# Patient Record
Sex: FEMALE | Race: WHITE | NOT HISPANIC OR LATINO | Employment: OTHER | URBAN - METROPOLITAN AREA
[De-identification: names, ages, dates, MRNs, and addresses within clinical notes are randomized per-mention and may not be internally consistent; named-entity substitution may affect disease eponyms.]

---

## 2017-01-09 DIAGNOSIS — F41.9 ANXIETY: ICD-10-CM

## 2017-01-09 DIAGNOSIS — F98.8 ADD (ATTENTION DEFICIT DISORDER): ICD-10-CM

## 2017-01-09 DIAGNOSIS — K21.9 GASTROESOPHAGEAL REFLUX DISEASE, ESOPHAGITIS PRESENCE NOT SPECIFIED: Primary | ICD-10-CM

## 2017-01-11 LAB
ALBUMIN SERPL-MCNC: 4.5 G/DL (ref 3.5–5.2)
ALBUMIN/GLOB SERPL: 1.6 G/DL
ALP SERPL-CCNC: 86 U/L (ref 39–117)
ALT SERPL-CCNC: 15 U/L (ref 1–33)
AST SERPL-CCNC: 21 U/L (ref 1–32)
BASOPHILS # BLD AUTO: 0.02 10*3/MM3 (ref 0–0.2)
BASOPHILS NFR BLD AUTO: 0.3 % (ref 0–1.5)
BILIRUB SERPL-MCNC: 0.2 MG/DL (ref 0.1–1.2)
BUN SERPL-MCNC: 22 MG/DL (ref 8–23)
BUN/CREAT SERPL: 26.5 (ref 7–25)
CALCIUM SERPL-MCNC: 9.6 MG/DL (ref 8.6–10.5)
CHLORIDE SERPL-SCNC: 99 MMOL/L (ref 98–107)
CO2 SERPL-SCNC: 28.3 MMOL/L (ref 22–29)
CREAT SERPL-MCNC: 0.83 MG/DL (ref 0.57–1)
EOSINOPHIL # BLD AUTO: 0.16 10*3/MM3 (ref 0–0.7)
EOSINOPHIL NFR BLD AUTO: 2.3 % (ref 0.3–6.2)
ERYTHROCYTE [DISTWIDTH] IN BLOOD BY AUTOMATED COUNT: 12.3 % (ref 11.7–13)
GLOBULIN SER CALC-MCNC: 2.8 GM/DL
GLUCOSE SERPL-MCNC: 125 MG/DL (ref 65–99)
HCT VFR BLD AUTO: 41.3 % (ref 35.6–45.5)
HGB BLD-MCNC: 13.2 G/DL (ref 11.9–15.5)
IMM GRANULOCYTES # BLD: 0 10*3/MM3 (ref 0–0.03)
IMM GRANULOCYTES NFR BLD: 0 % (ref 0–0.5)
LYMPHOCYTES # BLD AUTO: 2.02 10*3/MM3 (ref 0.9–4.8)
LYMPHOCYTES NFR BLD AUTO: 29 % (ref 19.6–45.3)
MCH RBC QN AUTO: 29.9 PG (ref 26.9–32)
MCHC RBC AUTO-ENTMCNC: 32 G/DL (ref 32.4–36.3)
MCV RBC AUTO: 93.7 FL (ref 80.5–98.2)
MONOCYTES # BLD AUTO: 0.53 10*3/MM3 (ref 0.2–1.2)
MONOCYTES NFR BLD AUTO: 7.6 % (ref 5–12)
NEUTROPHILS # BLD AUTO: 4.23 10*3/MM3 (ref 1.9–8.1)
NEUTROPHILS NFR BLD AUTO: 60.8 % (ref 42.7–76)
PLATELET # BLD AUTO: 238 10*3/MM3 (ref 140–500)
POTASSIUM SERPL-SCNC: 4 MMOL/L (ref 3.5–5.2)
PROT SERPL-MCNC: 7.3 G/DL (ref 6–8.5)
RBC # BLD AUTO: 4.41 10*6/MM3 (ref 3.9–5.2)
SODIUM SERPL-SCNC: 142 MMOL/L (ref 136–145)
TSH SERPL DL<=0.005 MIU/L-ACNC: 2.79 MIU/ML (ref 0.27–4.2)
WBC # BLD AUTO: 6.96 10*3/MM3 (ref 4.5–10.7)

## 2017-01-11 NOTE — PROGRESS NOTES
Your laboratory results are NORMAL with the exception of glucose which is ELEVATED. Start a low carbohydrate diet w/ routine fitness.  We will discuss these results in detail at your next office visit. Please call with any questions or concerns.  Sincerely,   Camila Mejia MD

## 2017-01-24 ENCOUNTER — OFFICE VISIT (OUTPATIENT)
Dept: INTERNAL MEDICINE | Facility: CLINIC | Age: 67
End: 2017-01-24

## 2017-01-24 VITALS
WEIGHT: 150 LBS | SYSTOLIC BLOOD PRESSURE: 124 MMHG | DIASTOLIC BLOOD PRESSURE: 66 MMHG | HEART RATE: 64 BPM | BODY MASS INDEX: 23.49 KG/M2 | OXYGEN SATURATION: 99 %

## 2017-01-24 DIAGNOSIS — F41.9 ANXIETY: ICD-10-CM

## 2017-01-24 DIAGNOSIS — H61.891 NODULE OF EXTERNAL EAR, RIGHT: ICD-10-CM

## 2017-01-24 DIAGNOSIS — F98.8 ADD (ATTENTION DEFICIT DISORDER): Primary | ICD-10-CM

## 2017-01-24 DIAGNOSIS — Z83.3 FAMILY HISTORY OF DIABETES MELLITUS (DM): ICD-10-CM

## 2017-01-24 DIAGNOSIS — R73.9 HYPERGLYCEMIA: ICD-10-CM

## 2017-01-24 PROCEDURE — 99214 OFFICE O/P EST MOD 30 MIN: CPT | Performed by: INTERNAL MEDICINE

## 2017-01-24 RX ORDER — ALPRAZOLAM 0.25 MG/1
0.25 TABLET ORAL 2 TIMES DAILY PRN
Qty: 20 TABLET | Refills: 2 | Status: SHIPPED | OUTPATIENT
Start: 2017-01-24 | End: 2019-01-08

## 2017-01-24 NOTE — PROGRESS NOTES
Anxious symptoms  History of Present Illness   Iesha Roldan is a 66 y.o. female presents for routine follow up evaluation. She has mild symptoms of anxiety. Lost her father last year and has a sister that is ill. Did not require any prozac. Has excellent support network w/ family and friends and attends counseling sessions. Takes 1/2-1 tab xanax about 2 times weekly w/ good benefit. On adderall for ADD w good focus unfortunately is noting some jaw clenching.    noting nodule behind right ear. Nontender. None noted on left.   The following portions of the patient's history were reviewed and updated as appropriate: allergies, current medications, past family history, past medical history, past social history, past surgical history and problem list.  Current Outpatient Prescriptions on File Prior to Visit   Medication Sig Dispense Refill   • vitamin D (ERGOCALCIFEROL) 51160 UNITS capsule capsule Take  by mouth.     • [DISCONTINUED] ALPRAZolam (XANAX) 0.25 MG tablet Take 1 tablet by mouth 2 (two) times a day as needed for anxiety. 15 tablet 1   • [DISCONTINUED] amphetamine-dextroamphetamine (ADDERALL) 20 MG tablet Take 1 tablet by mouth daily. 90 tablet 0   • [DISCONTINUED] FLUoxetine (PROZAC) 20 MG capsule Take 1 capsule by mouth daily. 30 capsule 6   • [DISCONTINUED] meclizine (ANTIVERT) 25 MG tablet Take 1 tablet by mouth 3 (three) times a day as needed for dizziness. 25 tablet 1     No current facility-administered medications on file prior to visit.      Review of Systems   Constitutional: Negative.    HENT: Negative.    Eyes: Negative.    Respiratory: Negative.    Cardiovascular: Negative.    Gastrointestinal: Negative.    Endocrine: Negative.    Genitourinary: Negative.    Musculoskeletal: Negative.    Skin: Negative.    Allergic/Immunologic: Negative.    Neurological: Negative.    Hematological: Negative.    Psychiatric/Behavioral:        Mild anxiety  ADD       Objective   Physical Exam   Constitutional:  She is oriented to person, place, and time. She appears well-developed and well-nourished.   HENT:   Head: Normocephalic and atraumatic.   Right Ear: External ear normal.   Left Ear: External ear normal.   Nose: Nose normal.   Mouth/Throat: Oropharynx is clear and moist.   Retroauricular cyst v nodule   Eyes: Conjunctivae and EOM are normal. Pupils are equal, round, and reactive to light.   Neck: Normal range of motion. Neck supple.   Cardiovascular: Normal rate, regular rhythm, normal heart sounds and intact distal pulses.    Pulmonary/Chest: Effort normal and breath sounds normal.   Abdominal: Soft. Bowel sounds are normal.   Musculoskeletal: Normal range of motion.   Neurological: She is alert and oriented to person, place, and time. She has normal reflexes.   Skin: Skin is warm and dry.   Psychiatric: She has a normal mood and affect. Her behavior is normal. Judgment and thought content normal.   Nursing note and vitals reviewed.       Visit Vitals   • /66   • Pulse 64   • Wt 150 lb (68 kg)   • SpO2 99%   • BMI 23.49 kg/m2       Assessment/Plan   Diagnoses and all orders for this visit:    ADD (attention deficit disorder)    Anxiety    Hyperglycemia  -     Basic Metabolic Panel  -     Hemoglobin A1c    Family history of diabetes mellitus (DM)  -     Basic Metabolic Panel  -     Hemoglobin A1c    Other orders  -     lisdexamfetamine (VYVANSE) 20 MG capsule; Take 1 capsule by mouth Every Morning.  -     ALPRAZolam (XANAX) 0.25 MG tablet; Take 1 tablet by mouth 2 (Two) Times a Day As Needed for anxiety.        Patient w/ ADD. Will try changing from adderall to vyvanse due to jaw clenching. Will start at a low dose and increase if needed.   Continue prn xanax for anxiety. Patient is aware of risks and benefits of meds and displays no signs of dependence nor abuse. Slight increase in glucose. Nonfasting. Will test a repeat glucose level w/ a1c.   Will refer to ENT for nodule behind right ear.     F/u 1 month or  prn.

## 2017-01-24 NOTE — MR AVS SNAPSHOT
Iesha Roldan   1/24/2017 10:00 AM   Office Visit    Dept Phone:  [phone]   Encounter #:  [id number]    Provider:  Camila Mejia MD   Department:  [facility]                Your Full Care Plan              Today's Medication Changes          These changes are accurate as of: 1/24/17 10:53 AM.  If you have any questions, ask your nurse or doctor.               New Medication(s)Ordered:     lisdexamfetamine 20 MG capsule   Commonly known as:  VYVANSE   Take 1 capsule by mouth Every Morning.         Stop taking medication(s)listed here:     amphetamine-dextroamphetamine 20 MG tablet   Commonly known as:  ADDERALL           FLUoxetine 20 MG capsule   Commonly known as:  PROZAC           meclizine 25 MG tablet   Commonly known as:  ANTIVERT                Where to Get Your Medications      You can get these medications from any pharmacy     Bring a paper prescription for each of these medications     ALPRAZolam 0.25 MG tablet    lisdexamfetamine 20 MG capsule                  Your Updated Medication List          This list is accurate as of: 1/24/17 10:53 AM.  Always use your most recent med list.                ALPRAZolam 0.25 MG tablet   Commonly known as:  XANAX   Take 1 tablet by mouth 2 (Two) Times a Day As Needed for anxiety.       lisdexamfetamine 20 MG capsule   Commonly known as:  VYVANSE   Take 1 capsule by mouth Every Morning.       vitamin D 78295 UNITS capsule capsule   Commonly known as:  ERGOCALCIFEROL               We Performed the Following     Ambulatory Referral to ENT (Otolaryngology)     Basic Metabolic Panel     Hemoglobin A1c       You Were Diagnosed With        Codes Comments    ADD (attention deficit disorder)    -  Primary ICD-10-CM: F98.8  ICD-9-CM: 314.00     Anxiety     ICD-10-CM: F41.9  ICD-9-CM: 300.00     Hyperglycemia     ICD-10-CM: R73.9  ICD-9-CM: 790.29     Family history of diabetes mellitus (DM)     ICD-10-CM:  Z83.3  ICD-9-CM: V18.0     Nodule of external ear, right     ICD-10-CM: H61.891  ICD-9-CM: 380.89       Instructions     None    Patient Instructions History      Upcoming Appointments     Visit Type Date Time Department    OFFICE VISIT 2017 10:00 AM [facility]    WELLNESS 2017 10:00 AM [facility]    BONE DENSITY 2017  9:30 AM [facility]    OFFICE VISIT 2017 11:15 AM [facility]      MyChart Signup     [facility] [employer] allows you to send messages to your doctor, view your test results, renew your prescriptions, schedule appointments, and more. To sign up, go to [employer] and click on the Sign Up Now link in the New User? box. Enter your [employer] Activation Code exactly as it appears below along with the last four digits of your Social Security Number and your Date of Birth () to complete the sign-up process. If you do not sign up before the expiration date, you must request a new code.    [employer] Activation Code: [email or web address]  Expires: 2017 10:53 AM    If you have questions, you can email [email or web address] or call [phone] to talk to our [employer] staff. Remember, [employer] is NOT to be used for urgent needs. For medical emergencies, dial 911.               Other Info from Your Visit           Your Appointments     2017  9:30 AM EST   Bone Density with [facility] (--)    [address] [address]   [phone]            2017 10:00 AM EST   Wellness with Clement Valderrama MD   [facility] (--)    [address] [address]   [phone]            2017 11:15 AM EST   Office Visit with Camila Mejia MD   [facility] (--)    [address] [address] [phone]           Arrive 15 minutes prior to appointment.               Allergies     Erythromycin        Reason for Visit     Results blood work      Vital Signs     Blood Pressure Pulse Weight Oxygen Saturation Body Mass Index Smoking Status    124/66 64 150 lb (68 kg) 99% 23.49 kg/m2 Never Smoker      Problems and Diagnoses Noted     ADD (attention deficit disorder)    Anxiety problem    Hyperglycemia    Family history of diabetes mellitus (DM)        Nodule of external ear

## 2017-01-25 LAB
BUN SERPL-MCNC: 17 MG/DL (ref 8–23)
BUN/CREAT SERPL: 20.7 (ref 7–25)
CALCIUM SERPL-MCNC: 9.7 MG/DL (ref 8.6–10.5)
CHLORIDE SERPL-SCNC: 100 MMOL/L (ref 98–107)
CO2 SERPL-SCNC: 30.5 MMOL/L (ref 22–29)
CREAT SERPL-MCNC: 0.82 MG/DL (ref 0.57–1)
GLUCOSE SERPL-MCNC: 82 MG/DL (ref 65–99)
HBA1C MFR BLD: 5.4 % (ref 4.8–5.6)
POTASSIUM SERPL-SCNC: 4.3 MMOL/L (ref 3.5–5.2)
SODIUM SERPL-SCNC: 143 MMOL/L (ref 136–145)

## 2017-01-25 NOTE — PROGRESS NOTES
Your laboratory results are NORMAL. Glucose and 3 mo glucose average are both NORMAL. We will discuss these results in detail at your next office visit. Please call with any questions or concerns.  Sincerely,  Camila Mejia MD

## 2017-01-27 RX ORDER — DEXTROAMPHETAMINE SACCHARATE, AMPHETAMINE ASPARTATE MONOHYDRATE, DEXTROAMPHETAMINE SULFATE AND AMPHETAMINE SULFATE 5; 5; 5; 5 MG/1; MG/1; MG/1; MG/1
20 CAPSULE, EXTENDED RELEASE ORAL EVERY MORNING
Qty: 30 CAPSULE | Refills: 0 | Status: SHIPPED | OUTPATIENT
Start: 2017-01-27 | End: 2017-02-20

## 2017-01-30 ENCOUNTER — APPOINTMENT (OUTPATIENT)
Dept: PREADMISSION TESTING | Facility: HOSPITAL | Age: 67
End: 2017-01-30

## 2017-01-30 VITALS
DIASTOLIC BLOOD PRESSURE: 77 MMHG | HEART RATE: 99 BPM | SYSTOLIC BLOOD PRESSURE: 113 MMHG | RESPIRATION RATE: 16 BRPM | HEIGHT: 67 IN | TEMPERATURE: 98.1 F | OXYGEN SATURATION: 99 % | WEIGHT: 148 LBS | BODY MASS INDEX: 23.23 KG/M2

## 2017-01-30 PROCEDURE — 93005 ELECTROCARDIOGRAM TRACING: CPT

## 2017-01-30 PROCEDURE — 93010 ELECTROCARDIOGRAM REPORT: CPT | Performed by: INTERNAL MEDICINE

## 2017-01-30 RX ORDER — HYDROCORTISONE 25 MG/ML
LOTION TOPICAL AS NEEDED
COMMUNITY
Start: 2017-01-25 | End: 2019-08-08

## 2017-01-31 ENCOUNTER — HOSPITAL ENCOUNTER (OUTPATIENT)
Facility: HOSPITAL | Age: 67
Discharge: HOME | End: 2017-02-01
Attending: OTOLARYNGOLOGY | Admitting: OTOLARYNGOLOGY

## 2017-01-31 ENCOUNTER — ANESTHESIA (OUTPATIENT)
Dept: PERIOP | Facility: HOSPITAL | Age: 67
End: 2017-01-31

## 2017-01-31 ENCOUNTER — ANESTHESIA EVENT (OUTPATIENT)
Dept: PERIOP | Facility: HOSPITAL | Age: 67
End: 2017-01-31

## 2017-01-31 DIAGNOSIS — K11.8 MASS OF PAROTID GLAND: ICD-10-CM

## 2017-01-31 PROCEDURE — 25010000002 ONDANSETRON PER 1 MG: Performed by: NURSE ANESTHETIST, CERTIFIED REGISTERED

## 2017-01-31 PROCEDURE — 25010000002 FENTANYL CITRATE (PF) 100 MCG/2ML SOLUTION: Performed by: NURSE ANESTHETIST, CERTIFIED REGISTERED

## 2017-01-31 PROCEDURE — C1729 CATH, DRAINAGE: HCPCS | Performed by: OTOLARYNGOLOGY

## 2017-01-31 PROCEDURE — 25010000002 DEXAMETHASONE PER 1 MG: Performed by: NURSE ANESTHETIST, CERTIFIED REGISTERED

## 2017-01-31 PROCEDURE — 25010000002 ONDANSETRON PER 1 MG: Performed by: OTOLARYNGOLOGY

## 2017-01-31 PROCEDURE — 25010000002 HYDROMORPHONE PER 4 MG: Performed by: NURSE ANESTHETIST, CERTIFIED REGISTERED

## 2017-01-31 PROCEDURE — 25010000002 MIDAZOLAM PER 1 MG: Performed by: ANESTHESIOLOGY

## 2017-01-31 PROCEDURE — 25010000002 SUCCINYLCHOLINE PER 20 MG: Performed by: NURSE ANESTHETIST, CERTIFIED REGISTERED

## 2017-01-31 PROCEDURE — 25010000002 PROPOFOL 10 MG/ML EMULSION: Performed by: NURSE ANESTHETIST, CERTIFIED REGISTERED

## 2017-01-31 PROCEDURE — 88307 TISSUE EXAM BY PATHOLOGIST: CPT | Performed by: OTOLARYNGOLOGY

## 2017-01-31 PROCEDURE — 25010000002 PROMETHAZINE PER 50 MG: Performed by: NURSE ANESTHETIST, CERTIFIED REGISTERED

## 2017-01-31 RX ORDER — ROCURONIUM BROMIDE 10 MG/ML
INJECTION, SOLUTION INTRAVENOUS AS NEEDED
Status: DISCONTINUED | OUTPATIENT
Start: 2017-01-31 | End: 2017-01-31 | Stop reason: SURG

## 2017-01-31 RX ORDER — DIPHENHYDRAMINE HYDROCHLORIDE 50 MG/ML
12.5 INJECTION INTRAMUSCULAR; INTRAVENOUS
Status: DISCONTINUED | OUTPATIENT
Start: 2017-01-31 | End: 2017-01-31 | Stop reason: HOSPADM

## 2017-01-31 RX ORDER — SODIUM CHLORIDE 0.9 % (FLUSH) 0.9 %
1-10 SYRINGE (ML) INJECTION AS NEEDED
Status: DISCONTINUED | OUTPATIENT
Start: 2017-01-31 | End: 2017-01-31 | Stop reason: HOSPADM

## 2017-01-31 RX ORDER — LIDOCAINE HYDROCHLORIDE AND EPINEPHRINE 10; 10 MG/ML; UG/ML
INJECTION, SOLUTION INFILTRATION; PERINEURAL AS NEEDED
Status: DISCONTINUED | OUTPATIENT
Start: 2017-01-31 | End: 2017-01-31 | Stop reason: HOSPADM

## 2017-01-31 RX ORDER — ALPRAZOLAM 0.25 MG/1
0.25 TABLET ORAL 2 TIMES DAILY PRN
Status: DISCONTINUED | OUTPATIENT
Start: 2017-01-31 | End: 2017-02-01 | Stop reason: HOSPADM

## 2017-01-31 RX ORDER — FENTANYL CITRATE 50 UG/ML
INJECTION, SOLUTION INTRAMUSCULAR; INTRAVENOUS AS NEEDED
Status: DISCONTINUED | OUTPATIENT
Start: 2017-01-31 | End: 2017-01-31 | Stop reason: SURG

## 2017-01-31 RX ORDER — FENTANYL CITRATE 50 UG/ML
100 INJECTION, SOLUTION INTRAMUSCULAR; INTRAVENOUS
Status: DISCONTINUED | OUTPATIENT
Start: 2017-01-31 | End: 2017-01-31 | Stop reason: HOSPADM

## 2017-01-31 RX ORDER — HYDRALAZINE HYDROCHLORIDE 20 MG/ML
5 INJECTION INTRAMUSCULAR; INTRAVENOUS
Status: DISCONTINUED | OUTPATIENT
Start: 2017-01-31 | End: 2017-01-31 | Stop reason: HOSPADM

## 2017-01-31 RX ORDER — MAGNESIUM HYDROXIDE 1200 MG/15ML
LIQUID ORAL AS NEEDED
Status: DISCONTINUED | OUTPATIENT
Start: 2017-01-31 | End: 2017-01-31 | Stop reason: HOSPADM

## 2017-01-31 RX ORDER — FAMOTIDINE 10 MG/ML
20 INJECTION, SOLUTION INTRAVENOUS ONCE
Status: COMPLETED | OUTPATIENT
Start: 2017-01-31 | End: 2017-01-31

## 2017-01-31 RX ORDER — HYDROMORPHONE HYDROCHLORIDE 1 MG/ML
0.5 INJECTION, SOLUTION INTRAMUSCULAR; INTRAVENOUS; SUBCUTANEOUS
Status: DISCONTINUED | OUTPATIENT
Start: 2017-01-31 | End: 2017-01-31 | Stop reason: HOSPADM

## 2017-01-31 RX ORDER — SODIUM CHLORIDE, SODIUM LACTATE, POTASSIUM CHLORIDE, CALCIUM CHLORIDE 600; 310; 30; 20 MG/100ML; MG/100ML; MG/100ML; MG/100ML
9 INJECTION, SOLUTION INTRAVENOUS CONTINUOUS
Status: DISCONTINUED | OUTPATIENT
Start: 2017-01-31 | End: 2017-01-31

## 2017-01-31 RX ORDER — PROMETHAZINE HYDROCHLORIDE 25 MG/ML
12.5 INJECTION, SOLUTION INTRAMUSCULAR; INTRAVENOUS ONCE AS NEEDED
Status: COMPLETED | OUTPATIENT
Start: 2017-01-31 | End: 2017-01-31

## 2017-01-31 RX ORDER — ONDANSETRON 2 MG/ML
4 INJECTION INTRAMUSCULAR; INTRAVENOUS ONCE AS NEEDED
Status: COMPLETED | OUTPATIENT
Start: 2017-01-31 | End: 2017-01-31

## 2017-01-31 RX ORDER — HYDROMORPHONE HCL 110MG/55ML
PATIENT CONTROLLED ANALGESIA SYRINGE INTRAVENOUS AS NEEDED
Status: DISCONTINUED | OUTPATIENT
Start: 2017-01-31 | End: 2017-01-31 | Stop reason: SURG

## 2017-01-31 RX ORDER — HYDROCODONE BITARTRATE AND ACETAMINOPHEN 7.5; 325 MG/1; MG/1
1 TABLET ORAL EVERY 4 HOURS PRN
Qty: 30 TABLET | Refills: 0 | Status: SHIPPED | OUTPATIENT
Start: 2017-01-31 | End: 2017-02-20

## 2017-01-31 RX ORDER — HYDROMORPHONE HYDROCHLORIDE 1 MG/ML
0.25 INJECTION, SOLUTION INTRAMUSCULAR; INTRAVENOUS; SUBCUTANEOUS
Status: DISCONTINUED | OUTPATIENT
Start: 2017-01-31 | End: 2017-01-31 | Stop reason: HOSPADM

## 2017-01-31 RX ORDER — LIDOCAINE HYDROCHLORIDE 10 MG/ML
5 INJECTION, SOLUTION EPIDURAL; INFILTRATION; INTRACAUDAL; PERINEURAL ONCE AS NEEDED
Status: COMPLETED | OUTPATIENT
Start: 2017-01-31 | End: 2017-01-31

## 2017-01-31 RX ORDER — NALOXONE HCL 0.4 MG/ML
0.2 VIAL (ML) INJECTION AS NEEDED
Status: DISCONTINUED | OUTPATIENT
Start: 2017-01-31 | End: 2017-01-31 | Stop reason: HOSPADM

## 2017-01-31 RX ORDER — HYDROCODONE BITARTRATE AND ACETAMINOPHEN 7.5; 325 MG/1; MG/1
1 TABLET ORAL EVERY 4 HOURS PRN
Status: DISCONTINUED | OUTPATIENT
Start: 2017-01-31 | End: 2017-02-01 | Stop reason: HOSPADM

## 2017-01-31 RX ORDER — LABETALOL HYDROCHLORIDE 5 MG/ML
5 INJECTION, SOLUTION INTRAVENOUS
Status: DISCONTINUED | OUTPATIENT
Start: 2017-01-31 | End: 2017-01-31 | Stop reason: HOSPADM

## 2017-01-31 RX ORDER — FENTANYL CITRATE 50 UG/ML
50 INJECTION, SOLUTION INTRAMUSCULAR; INTRAVENOUS
Status: DISCONTINUED | OUTPATIENT
Start: 2017-01-31 | End: 2017-01-31 | Stop reason: HOSPADM

## 2017-01-31 RX ORDER — BACITRACIN ZINC 500 [USP'U]/G
OINTMENT TOPICAL AS NEEDED
Status: DISCONTINUED | OUTPATIENT
Start: 2017-01-31 | End: 2017-01-31 | Stop reason: HOSPADM

## 2017-01-31 RX ORDER — PROMETHAZINE HYDROCHLORIDE 25 MG/1
12.5 TABLET ORAL ONCE AS NEEDED
Status: DISCONTINUED | OUTPATIENT
Start: 2017-01-31 | End: 2017-01-31 | Stop reason: HOSPADM

## 2017-01-31 RX ORDER — MIDAZOLAM HYDROCHLORIDE 1 MG/ML
2 INJECTION INTRAMUSCULAR; INTRAVENOUS
Status: DISCONTINUED | OUTPATIENT
Start: 2017-01-31 | End: 2017-01-31 | Stop reason: HOSPADM

## 2017-01-31 RX ORDER — PROMETHAZINE HYDROCHLORIDE 25 MG/ML
12.5 INJECTION, SOLUTION INTRAMUSCULAR; INTRAVENOUS EVERY 6 HOURS PRN
Status: DISCONTINUED | OUTPATIENT
Start: 2017-01-31 | End: 2017-02-01 | Stop reason: HOSPADM

## 2017-01-31 RX ORDER — PROMETHAZINE HYDROCHLORIDE 25 MG/ML
12.5 INJECTION, SOLUTION INTRAMUSCULAR; INTRAVENOUS EVERY 4 HOURS PRN
Status: DISCONTINUED | OUTPATIENT
Start: 2017-01-31 | End: 2017-02-01 | Stop reason: HOSPADM

## 2017-01-31 RX ORDER — FLUMAZENIL 0.1 MG/ML
0.2 INJECTION INTRAVENOUS AS NEEDED
Status: DISCONTINUED | OUTPATIENT
Start: 2017-01-31 | End: 2017-01-31 | Stop reason: HOSPADM

## 2017-01-31 RX ORDER — LIDOCAINE HYDROCHLORIDE 20 MG/ML
INJECTION, SOLUTION INFILTRATION; PERINEURAL AS NEEDED
Status: DISCONTINUED | OUTPATIENT
Start: 2017-01-31 | End: 2017-01-31 | Stop reason: SURG

## 2017-01-31 RX ORDER — PROMETHAZINE HYDROCHLORIDE 12.5 MG/1
12.5 TABLET ORAL EVERY 6 HOURS PRN
Qty: 12 TABLET | Refills: 0 | Status: SHIPPED | OUTPATIENT
Start: 2017-01-31 | End: 2017-02-20

## 2017-01-31 RX ORDER — PROMETHAZINE HYDROCHLORIDE 25 MG/1
25 TABLET ORAL ONCE AS NEEDED
Status: COMPLETED | OUTPATIENT
Start: 2017-01-31 | End: 2017-01-31

## 2017-01-31 RX ORDER — PROPOFOL 10 MG/ML
VIAL (ML) INTRAVENOUS AS NEEDED
Status: DISCONTINUED | OUTPATIENT
Start: 2017-01-31 | End: 2017-01-31 | Stop reason: SURG

## 2017-01-31 RX ORDER — PROMETHAZINE HYDROCHLORIDE 25 MG/1
25 SUPPOSITORY RECTAL ONCE AS NEEDED
Status: COMPLETED | OUTPATIENT
Start: 2017-01-31 | End: 2017-01-31

## 2017-01-31 RX ORDER — MIDAZOLAM HYDROCHLORIDE 1 MG/ML
1 INJECTION INTRAMUSCULAR; INTRAVENOUS
Status: DISCONTINUED | OUTPATIENT
Start: 2017-01-31 | End: 2017-01-31 | Stop reason: HOSPADM

## 2017-01-31 RX ORDER — HYDROCODONE BITARTRATE AND ACETAMINOPHEN 7.5; 325 MG/1; MG/1
1 TABLET ORAL ONCE AS NEEDED
Status: COMPLETED | OUTPATIENT
Start: 2017-01-31 | End: 2017-01-31

## 2017-01-31 RX ORDER — ONDANSETRON 2 MG/ML
INJECTION INTRAMUSCULAR; INTRAVENOUS AS NEEDED
Status: DISCONTINUED | OUTPATIENT
Start: 2017-01-31 | End: 2017-01-31 | Stop reason: SURG

## 2017-01-31 RX ORDER — DEXAMETHASONE SODIUM PHOSPHATE 10 MG/ML
INJECTION INTRAMUSCULAR; INTRAVENOUS AS NEEDED
Status: DISCONTINUED | OUTPATIENT
Start: 2017-01-31 | End: 2017-01-31 | Stop reason: SURG

## 2017-01-31 RX ORDER — ONDANSETRON 4 MG/1
4 TABLET, FILM COATED ORAL EVERY 6 HOURS PRN
Status: DISCONTINUED | OUTPATIENT
Start: 2017-01-31 | End: 2017-02-01 | Stop reason: HOSPADM

## 2017-01-31 RX ORDER — OXYCODONE AND ACETAMINOPHEN 7.5; 325 MG/1; MG/1
1 TABLET ORAL ONCE AS NEEDED
Status: DISCONTINUED | OUTPATIENT
Start: 2017-01-31 | End: 2017-01-31 | Stop reason: HOSPADM

## 2017-01-31 RX ORDER — ONDANSETRON 4 MG/1
4 TABLET, ORALLY DISINTEGRATING ORAL EVERY 6 HOURS PRN
Status: DISCONTINUED | OUTPATIENT
Start: 2017-01-31 | End: 2017-02-01 | Stop reason: HOSPADM

## 2017-01-31 RX ORDER — DEXTROSE AND SODIUM CHLORIDE 5; .45 G/100ML; G/100ML
100 INJECTION, SOLUTION INTRAVENOUS CONTINUOUS
Status: DISCONTINUED | OUTPATIENT
Start: 2017-01-31 | End: 2017-02-01 | Stop reason: HOSPADM

## 2017-01-31 RX ORDER — ONDANSETRON 2 MG/ML
4 INJECTION INTRAMUSCULAR; INTRAVENOUS EVERY 6 HOURS PRN
Status: DISCONTINUED | OUTPATIENT
Start: 2017-01-31 | End: 2017-02-01 | Stop reason: HOSPADM

## 2017-01-31 RX ORDER — SUCCINYLCHOLINE CHLORIDE 20 MG/ML
INJECTION INTRAMUSCULAR; INTRAVENOUS AS NEEDED
Status: DISCONTINUED | OUTPATIENT
Start: 2017-01-31 | End: 2017-01-31 | Stop reason: SURG

## 2017-01-31 RX ADMIN — ONDANSETRON 4 MG: 2 INJECTION INTRAMUSCULAR; INTRAVENOUS at 13:22

## 2017-01-31 RX ADMIN — SODIUM CHLORIDE, POTASSIUM CHLORIDE, SODIUM LACTATE AND CALCIUM CHLORIDE 9 ML/HR: 600; 310; 30; 20 INJECTION, SOLUTION INTRAVENOUS at 08:20

## 2017-01-31 RX ADMIN — DEXAMETHASONE SODIUM PHOSPHATE 8 MG: 10 INJECTION INTRAMUSCULAR; INTRAVENOUS at 13:22

## 2017-01-31 RX ADMIN — DEXTROSE AND SODIUM CHLORIDE 100 ML/HR: 5; 450 INJECTION, SOLUTION INTRAVENOUS at 17:26

## 2017-01-31 RX ADMIN — ROCURONIUM BROMIDE 10 MG: 10 INJECTION INTRAVENOUS at 10:06

## 2017-01-31 RX ADMIN — MIDAZOLAM 2 MG: 1 INJECTION INTRAMUSCULAR; INTRAVENOUS at 08:46

## 2017-01-31 RX ADMIN — ONDANSETRON 4 MG: 2 INJECTION INTRAMUSCULAR; INTRAVENOUS at 14:32

## 2017-01-31 RX ADMIN — EPHEDRINE SULFATE 10 MG: 50 INJECTION INTRAMUSCULAR; INTRAVENOUS; SUBCUTANEOUS at 10:59

## 2017-01-31 RX ADMIN — HYDROMORPHONE HYDROCHLORIDE 0.2 MG: 2 INJECTION, SOLUTION INTRAMUSCULAR; INTRAVENOUS; SUBCUTANEOUS at 12:51

## 2017-01-31 RX ADMIN — SODIUM CHLORIDE, POTASSIUM CHLORIDE, SODIUM LACTATE AND CALCIUM CHLORIDE: 600; 310; 30; 20 INJECTION, SOLUTION INTRAVENOUS at 11:00

## 2017-01-31 RX ADMIN — HYDROMORPHONE HYDROCHLORIDE 0.2 MG: 2 INJECTION, SOLUTION INTRAMUSCULAR; INTRAVENOUS; SUBCUTANEOUS at 10:33

## 2017-01-31 RX ADMIN — LIDOCAINE HYDROCHLORIDE 0.2 ML: 10 INJECTION, SOLUTION EPIDURAL; INFILTRATION; INTRACAUDAL; PERINEURAL at 08:29

## 2017-01-31 RX ADMIN — FENTANYL CITRATE 50 MCG: 50 INJECTION INTRAMUSCULAR; INTRAVENOUS at 14:41

## 2017-01-31 RX ADMIN — PROPOFOL 200 MG: 10 INJECTION, EMULSION INTRAVENOUS at 10:06

## 2017-01-31 RX ADMIN — LIDOCAINE HYDROCHLORIDE 100 MG: 20 INJECTION, SOLUTION INFILTRATION; PERINEURAL at 10:06

## 2017-01-31 RX ADMIN — HYDROCODONE BITARTRATE AND ACETAMINOPHEN 1 TABLET: 7.5; 325 TABLET ORAL at 19:02

## 2017-01-31 RX ADMIN — EPHEDRINE SULFATE 10 MG: 50 INJECTION INTRAMUSCULAR; INTRAVENOUS; SUBCUTANEOUS at 10:24

## 2017-01-31 RX ADMIN — SUCCINYLCHOLINE CHLORIDE 100 MG: 20 INJECTION, SOLUTION INTRAMUSCULAR; INTRAVENOUS; PARENTERAL at 10:06

## 2017-01-31 RX ADMIN — HYDROMORPHONE HYDROCHLORIDE 0.2 MG: 2 INJECTION, SOLUTION INTRAMUSCULAR; INTRAVENOUS; SUBCUTANEOUS at 13:15

## 2017-01-31 RX ADMIN — EPHEDRINE SULFATE 10 MG: 50 INJECTION INTRAMUSCULAR; INTRAVENOUS; SUBCUTANEOUS at 11:39

## 2017-01-31 RX ADMIN — ONDANSETRON 4 MG: 2 INJECTION INTRAMUSCULAR; INTRAVENOUS at 20:51

## 2017-01-31 RX ADMIN — HYDROCODONE BITARTRATE AND ACETAMINOPHEN 1 TABLET: 7.5; 325 TABLET ORAL at 14:45

## 2017-01-31 RX ADMIN — FENTANYL CITRATE 50 MCG: 50 INJECTION INTRAMUSCULAR; INTRAVENOUS at 14:49

## 2017-01-31 RX ADMIN — FAMOTIDINE 20 MG: 10 INJECTION, SOLUTION INTRAVENOUS at 08:46

## 2017-01-31 RX ADMIN — PROMETHAZINE HYDROCHLORIDE 12.5 MG: 25 INJECTION INTRAMUSCULAR; INTRAVENOUS at 14:14

## 2017-01-31 RX ADMIN — FENTANYL CITRATE 100 MCG: 50 INJECTION INTRAMUSCULAR; INTRAVENOUS at 10:06

## 2017-01-31 NOTE — ANESTHESIA POSTPROCEDURE EVALUATION
Patient: Iesha Roldan    Procedure Summary     Date Anesthesia Start Anesthesia Stop Room / Location    01/31/17 0959 1336  [facility]  /  [facility]       Procedure Diagnosis Surgeon Provider    RIGHT SUPERFICIAL PAROTIDECTOMY EXCISION PAROTID MASS  (Right Face) No diagnosis on file. MD Evan Hylton MD          Anesthesia Type: general  Last vitals  /71 (01/31/17 1430)    Temp      Pulse 83 (01/31/17 1430)   Resp 16 (01/31/17 1430)    SpO2 99 % (01/31/17 1430)      Post Anesthesia Care and Evaluation    Patient location during evaluation: bedside  Patient participation: complete - patient participated  Level of consciousness: awake  Pain score: 2  Pain management: adequate  Airway patency: patent  Anesthetic complications: No anesthetic complications    Cardiovascular status: acceptable  Respiratory status: acceptable  Hydration status: acceptable

## 2017-01-31 NOTE — ANESTHESIA PROCEDURE NOTES
Airway  Urgency: elective    Airway not difficult    General Information and Staff    Patient location during procedure: OR  Anesthesiologist: JESSICA GUARDADO  CRNA: KATHIE GARZA    Indications and Patient Condition  Indications for airway management: airway protection    Preoxygenated: yes  Mask difficulty assessment: 2 - vent by mask + OA or adjuvant +/- NMBA    Final Airway Details  Final airway type: endotracheal airway      Successful airway: ETT  Cuffed: yes   Successful intubation technique: direct laryngoscopy  Endotracheal tube insertion site: oral  Blade: Matthews  Blade size: #2  ETT size: 7.0 mm  Cormack-Lehane Classification: grade I - full view of glottis  Placement verified by: chest auscultation and capnometry   Cuff volume (mL): 8  Number of attempts at approach: 1    Additional Comments  PreO2 with 100% O2;  FeO2 >85%;  sniff position;  Intubated with no difficultly after eyes taped; Appears atraumatic;  Lips and teeth intact as preop condition;  Airway secured.

## 2017-02-01 VITALS
TEMPERATURE: 97.6 F | HEART RATE: 68 BPM | OXYGEN SATURATION: 94 % | RESPIRATION RATE: 18 BRPM | DIASTOLIC BLOOD PRESSURE: 64 MMHG | SYSTOLIC BLOOD PRESSURE: 106 MMHG

## 2017-02-01 PROBLEM — K11.8 MASS OF PAROTID GLAND: Status: ACTIVE | Noted: 2017-02-01

## 2017-02-01 PROBLEM — K11.8 MASS OF PAROTID GLAND: Status: RESOLVED | Noted: 2017-02-01 | Resolved: 2017-02-01

## 2017-02-01 PROCEDURE — G0378 HOSPITAL OBSERVATION PER HR: HCPCS

## 2017-02-01 RX ADMIN — ONDANSETRON HYDROCHLORIDE 4 MG: 4 TABLET, FILM COATED ORAL at 07:18

## 2017-02-01 RX ADMIN — HYDROCODONE BITARTRATE AND ACETAMINOPHEN 1 TABLET: 7.5; 325 TABLET ORAL at 08:35

## 2017-02-01 NOTE — PLAN OF CARE
Problem: Patient Care Overview (Adult)  Goal: Plan of Care Review  Outcome: Ongoing (interventions implemented as appropriate)    02/01/17 0300   Coping/Psychosocial Response Interventions   Plan Of Care Reviewed With patient   Patient Care Overview   Progress improving   Outcome Evaluation   Outcome Summary/Follow up Plan Doing well. Afebrile/VS stable. Incision to right neck/ear area intact. Drain to test-tube with small amts of output. voiding without difficulty. Ambulated. Nausea present post-op but appears to be stopping. Norco for pain but pt states only very minimal pain       Goal: Adult Individualization and Mutuality  Outcome: Ongoing (interventions implemented as appropriate)  Goal: Discharge Needs Assessment  Outcome: Ongoing (interventions implemented as appropriate)    Problem: Perioperative Period (Adult)  Goal: Signs and Symptoms of Listed Potential Problems Will be Absent or Manageable (Perioperative Period)  Outcome: Ongoing (interventions implemented as appropriate)    Problem: Pain, Acute (Adult)  Goal: Identify Related Risk Factors and Signs and Symptoms  Outcome: Outcome(s) achieved Date Met:  02/01/17  Goal: Acceptable Pain Control/Comfort Level  Outcome: Outcome(s) achieved Date Met:  02/01/17

## 2017-02-09 LAB
CYTO UR: NORMAL
DX PRELIMINARY: NORMAL
LAB AP CASE REPORT: NORMAL
Lab: NORMAL
PATH REPORT.FINAL DX SPEC: NORMAL
PATH REPORT.GROSS SPEC: NORMAL

## 2017-02-10 ENCOUNTER — TELEPHONE (OUTPATIENT)
Dept: INTERNAL MEDICINE | Facility: CLINIC | Age: 67
End: 2017-02-10

## 2017-02-10 NOTE — TELEPHONE ENCOUNTER
"Pt called to ask if you could call her  She had a knot on her neck and has had surgery and it is some kind of \"rare tumor\" and has some questions.  "

## 2017-02-14 NOTE — OP NOTE
DATE OF PROCEDURE:  02/13/2017    PREOPERATIVE DIAGNOSIS: Right parotid mass.     POSTOPERATIVE DIAGNOSIS: Right parotid mass.     PROCEDURE PERFORMED: Right superficial parotidectomy with preservation of facial nerve for excision of right parotid mass.     SURGEON:  Candelario Calderon MD      ANESTHESIA: General.     SPECIMEN: Right parotid mass.     COMPLICATIONS: None.     ESTIMATED BLOOD LOSS: Minimal.      DRAINS:  James-Baird x1     FINDINGS: Hard, firm mass in the central portion of the main body of the parotid gland just inferior to the parotid duct.  The mass measured approximately 12 mm.     DESCRIPTION OF PROCEDURE: The patient was placed supine on the operating table where general anesthetic was administered via oral endotracheal tube. The patient was prepped and draped in the usual manner for right parotidectomy. Sterile prep and drape was completed.  All motor units of the face were visible within the sterile see-through drape.  The preauricular skin incision area was injected with 1% lidocaine 1:100,000 epinephrine. We made our incision with a #15 blade meticulously in the preauricular space.  A facial flap was elevated off the parotid fascia without difficulty. The flap was retracted with a silk suture. The lobule of the ear was retracted posteriorly. The parotid gland was gently grasped with a smooth forceps and gland was  from the anterior sternocleidomastoid muscle.  The gland was also  from the cartilaginous portion of the external auditory canal. We meticulously divided soft tissue using Harmonic scalpel during this dissection. Once we had identified the level of the posterior belly of the digastric inferiorly we then began meticulously dissecting in the area of the tympanomastoid suture line until, we discovered the main trunk of the facial nerve.  We confirmed this nerve with nerve stimulation testing and all motor units tested positive. We then began dissecting superior  division sliding a blunt Miller dissector very meticulously above the nerve and dividing parotid tissue well above our dissector with a Harmonic.  At times we used a curved #12 blade to divide small amounts of parotid tissue,  it to allow further dissection of the nerve branches. As we were completing this dissection we were mindful at all times the position of the tumor and we continued to feel and palpate the tumor as we completed this dissection of the superior division. Once the superior division was dissected out completely we divided the parotid fascia around the tumor and began to dissect circumferentially around the mass  it away from the surrounding normal parotid gland. We tried to leave a good 5 mm of normal parotid tissue around the mass, but I should state that it was quite close to this upper division branch. We then traced the lower main division and as we traced it out it was actually closer to the mass. We gently pushed some of the buccal branches away from the palpable tumor as we continued to dissect with the Harmonic scalpel.  Eventually all branches were dissected out nicely, and the tumor was excised away from the gland.  Again most of our dissection was with the Harmonic scalpel.     The mass was submitted to pathology. We irrigated with cool saline. All branches of the facial nerve tested as a functional. We closed the deep layer with interrupted 4-0 Monocryl, and the skin with running 5-0 nylon. All of our sponge and needle counts were correct. We made our final closure over a small round James-Baird drain secured to a test tube. At the conclusion of the procedure, the patient was awakened and returned to recovery.       Candelario Calderon M.D.*  ALBERTA:josh  D:   02/13/2017 17:57:04  T:   02/13/2017 21:09:05  Job ID:   [id number]  Document ID:   [id number]  cc:

## 2017-02-20 ENCOUNTER — OFFICE VISIT (OUTPATIENT)
Dept: INTERNAL MEDICINE | Facility: CLINIC | Age: 67
End: 2017-02-20

## 2017-02-20 VITALS
SYSTOLIC BLOOD PRESSURE: 120 MMHG | WEIGHT: 150 LBS | HEART RATE: 70 BPM | OXYGEN SATURATION: 98 % | BODY MASS INDEX: 23.49 KG/M2 | DIASTOLIC BLOOD PRESSURE: 64 MMHG

## 2017-02-20 DIAGNOSIS — R93.7 ABNORMAL MRI, THORACIC SPINE: ICD-10-CM

## 2017-02-20 DIAGNOSIS — C80.1 MUCOEPIDERMOID CARCINOMA (HCC): Primary | ICD-10-CM

## 2017-02-20 PROCEDURE — 99214 OFFICE O/P EST MOD 30 MIN: CPT | Performed by: INTERNAL MEDICINE

## 2017-02-20 NOTE — PROGRESS NOTES
Chief Complaint   Patient presents with   • Follow-up   parotid mucoepidermoid carcinoma    History of Present Illness   Iesha Roldan is a 66 y.o. female presents for follow up evaluation. Had a nodule noted. Referred to ENT. Nodule removed. 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 Went to [facility] for further evaluation. MRI w/ contrast obtained. This revealed inflammation v residual cancer. Also noted a hemangioma v other on the spine. Had a MRI spine and was inconclusive. She was advised to have a repeat MRI neck in May. Unfortunately reports are not available for review here today. Patient did f/u w/ local ENT and is advised to discuss case further w/ rad onc. Overall she is feeling well. Some fatigue. Initially had difficulty w/ sleep but this has improved this week.           The following portions of the patient's history were reviewed and updated as appropriate: allergies, current medications, past family history, past medical history, past social history, past surgical history and problem list.  Current Outpatient Prescriptions on File Prior to Visit   Medication Sig Dispense Refill   • ALPRAZolam (XANAX) 0.25 MG tablet Take 1 tablet by mouth 2 (Two) Times a Day As Needed for anxiety. 20 tablet 2   • hydrocortisone 2.5 % lotion Apply  topically As Needed.     • [DISCONTINUED] amphetamine-dextroamphetamine XR (ADDERALL XR) 20 MG 24 hr capsule Take 1 capsule by mouth Every Morning. (Patient taking differently: Take 20 mg by mouth As Needed.) 30 capsule 0   • [DISCONTINUED] HYDROcodone-acetaminophen (NORCO) 7.5-325 MG per tablet Take 1 tablet by mouth Every 4 (Four) Hours As Needed for moderate pain (4-6). 30 tablet 0   • [DISCONTINUED] promethazine (PHENERGAN) 12.5 MG tablet Take 1 tablet by mouth Every 6 (Six) Hours As Needed for nausea or vomiting. 12 tablet 0     No current facility-administered medications on file prior to visit.      Review of Systems   Constitutional:  Negative.    HENT: Negative.    Eyes: Negative.    Respiratory: Negative.    Cardiovascular: Negative.    Gastrointestinal: Negative.    Endocrine: Negative.    Genitourinary: Negative.    Musculoskeletal: Negative.    Skin: Negative.    Allergic/Immunologic: Negative.    Neurological: Negative.    Hematological: Negative.    Psychiatric/Behavioral: Negative.        Objective   Physical Exam   Constitutional: She is oriented to person, place, and time. She appears well-developed and well-nourished.   HENT:   Head: Normocephalic and atraumatic.   Right Ear: External ear normal.   Left Ear: External ear normal.   Nose: Nose normal.   Mouth/Throat: Oropharynx is clear and moist.   Eyes: Conjunctivae and EOM are normal. Pupils are equal, round, and reactive to light.   Neck: Normal range of motion. Neck supple.   Cardiovascular: Normal rate, regular rhythm and normal heart sounds.    Pulmonary/Chest: Effort normal and breath sounds normal.   Abdominal: Soft. Bowel sounds are normal.   Musculoskeletal: Normal range of motion.   Neurological: She is alert and oriented to person, place, and time. She has normal reflexes.   Skin: Skin is warm and dry.   Psychiatric: She has a normal mood and affect. Her behavior is normal. Judgment and thought content normal.   Nursing note and vitals reviewed.       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 Will request records from [facility]. She is encouraged to also request films. Will see if additional testing needed on thoracic spine. She will have a referral w/ radiation oncologist. She declines need for counseling or meds for mood or sleep at this time. Close f/u.

## 2017-02-28 ENCOUNTER — APPOINTMENT (OUTPATIENT)
Dept: RADIATION ONCOLOGY | Facility: HOSPITAL | Age: 67
End: 2017-02-28

## 2017-02-28 ENCOUNTER — OFFICE VISIT (OUTPATIENT)
Dept: RADIATION ONCOLOGY | Facility: HOSPITAL | Age: 67
End: 2017-02-28

## 2017-02-28 VITALS
HEIGHT: 67 IN | TEMPERATURE: 97.7 F | BODY MASS INDEX: 23.54 KG/M2 | RESPIRATION RATE: 16 BRPM | SYSTOLIC BLOOD PRESSURE: 141 MMHG | DIASTOLIC BLOOD PRESSURE: 85 MMHG | WEIGHT: 150 LBS | OXYGEN SATURATION: 98 % | HEART RATE: 81 BPM

## 2017-02-28 DIAGNOSIS — C80.1 MUCOEPIDERMOID CARCINOMA (HCC): Primary | ICD-10-CM

## 2017-02-28 PROCEDURE — 99205 OFFICE O/P NEW HI 60 MIN: CPT

## 2017-02-28 PROCEDURE — G0463 HOSPITAL OUTPT CLINIC VISIT: HCPCS

## 2017-02-28 NOTE — PROGRESS NOTES
2/28/2017      Radiation Therapy Consultation:  Ms. Iesha Roldan   ( 1950 )    History of Present Illness:  Ms. Iesha Roldan is a 66 y.o. 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 . She is referred by Dr. Calderon.  The tumor was excised on 01/30/17 with clear margins though the deep margin (by the facial nerve) is less than 0.5 mm. The tumor is of intermediate grade.  She has been to the [facility] where she saw two oncologists and had two MRIs done. The visit did little to clarify the situation for her and she remains ill at ease about what, if anything else, to do at this time. .  We were able to borrow the disc of the MRIs from Dr. Calderon but, unfortunately it is locked and requires an access code to view and there is no access code. We will attempt to have the reports faxed to us.  The second oncologist suggested she do nothing else now and return to see him in May. She is not comfortable with that.  As she and I see it, her options are 1.) Observation alone  2.) Post-operative, prophylactic radiation therapy now based on intermediate grade and clse deep margin as suggested for consideration in NCCN guidelines  3.) Return to the [facility] to discuss more radical surgery. At present she favors post-op radiation therapy.    Active Problem List:     Patient Active Problem List   Diagnosis   • ADD (attention deficit disorder)   • Allergic rhinitis   • Fatigue   • Acid reflux   • Colon cancer screening   • Abnormal EKG   • Anxiety   • Hyperglycemia   • Mucoepidermoid carcinoma   • Abnormal MRI, thoracic spine        Past Medical History: she  has a past medical history of Acute bronchitis; Fracture, hip; Gastroenteritis; Neck pain; Osteopenia; and Pharyngitis.    Past Surgical History:  she has a past surgical history that includes Hip surgery;  "Parotidectomy; and Parotidectomy (Right, 1/31/2017).    Meds:    Current Outpatient Prescriptions:   •  ALPRAZolam (XANAX) 0.25 MG tablet, Take 1 tablet by mouth 2 (Two) Times a Day As Needed for anxiety., Disp: 20 tablet, Rfl: 2  •  hydrocortisone 2.5 % lotion, Apply  topically As Needed., Disp: , Rfl:     Allergies:  is allergic to erythromycin.    Family History:  her family history includes Peripheral vascular disease in her mother.    Social History:  she  reports that she has never smoked. She has never used smokeless tobacco. She reports that she drinks alcohol. She reports that she does not use illicit drugs.    Pertinent Findings on   Review of Systems - Oncology Checked nothingo n the 54 question inquiry sheet.    Vital Signs    Vitals:    02/28/17 0923   BP: 141/85   Pulse: 81   Resp: 16   Temp: 97.7 °F (36.5 °C)   TempSrc: Oral   SpO2: 98%   Weight: 150 lb (68 kg)   Height: 67\" (170.2 cm)   PainSc: 0-No pain       Pertinent Findings on Problem Focused Exam:  Physical Exam Well healed incision at angle of right mandible, still slightly indurated. No palpable nodes. No palpable sub-cu nodularity.    Pertinent Findings on Problem Focused Imaging: MRI reports pending.    Karnofsky Performance Status:  100 - Fully active, able to carry on all pre-disease performed without restriction    Impression: T1   Nx   Mx  Mucoepidermoid cancer of the right parotid gland.    Summary of Our Discussion :      We discussed the diagnosis, stage, standard of care in the [place]. We discussed the  intent of the treatment (curative, palliative, prophylactic). We discussed the role of radiation therapy in the treatment plan both in generality and in specific. With regard to the latter we discussed the logistics of treatment, the number of treatments, the side effects,  both probable and possible. We discussed potential after-effects,  both probable and possible. We discussed both intra-treatment and post-treatment " coordination of care. We discussed the recommendation in NCCN guidelines 1.2017 for salivary gland tumors of intermediate grade to consider post-op radiation therapy.     We discussed the utility of PET scans in staging of head and neck cancer and we both feel that should be done.     We discussed the planning process which will include immobilization devices required to assure high precision treatment and we discussed the planning CT scan (which will not require administration of any contrast agent).  All questions answered.     On the assumption she will want to have post-operative, prophylactic radiation therapy, we will begin  the planning process tomorrow with manufacture of the immobilization devices and the planning scan. We will then await the results of the pET scan and in the meantime, will evolve a high precision computerized treatment plan to treat the right parotid and first and second echelon lymph nodes regions to a dose of 6600 cGy in 33 fractions.    Plan:1.) PET scan ordered   2.) Planning prelims tomorrow   3.) Return after the PET scan and if she is still inclined to have post-op prophylactic radiation therapy, we will begin same promptly.        I appreciate being asked to see  MsNilay Roldan in consultation.      Fred Love MD         Today, I was with Ms.Georgette Roldan  from  9:28am  until 10:55am  of which  80   minutes was face to face  Of that face to face time, 80 minutes  was spent in counseling and coordination of care as indicated in the Summary of Our Discussion, above.

## 2017-03-01 ENCOUNTER — APPOINTMENT (OUTPATIENT)
Dept: RADIATION ONCOLOGY | Facility: HOSPITAL | Age: 67
End: 2017-03-01

## 2017-03-02 PROCEDURE — 77290 THER RAD SIMULAJ FIELD CPLX: CPT

## 2017-03-02 PROCEDURE — 77334 RADIATION TREATMENT AID(S): CPT

## 2017-03-02 PROCEDURE — 77263 THER RADIOLOGY TX PLNG CPLX: CPT

## 2017-03-03 ENCOUNTER — PROCEDURE VISIT (OUTPATIENT)
Dept: OBSTETRICS AND GYNECOLOGY | Age: 67
End: 2017-03-03

## 2017-03-03 ENCOUNTER — OFFICE VISIT (OUTPATIENT)
Dept: OBSTETRICS AND GYNECOLOGY | Age: 67
End: 2017-03-03

## 2017-03-03 ENCOUNTER — HOSPITAL ENCOUNTER (OUTPATIENT)
Dept: PET IMAGING | Facility: HOSPITAL | Age: 67
Discharge: HOME | End: 2017-03-03

## 2017-03-03 VITALS
DIASTOLIC BLOOD PRESSURE: 78 MMHG | BODY MASS INDEX: 23.82 KG/M2 | SYSTOLIC BLOOD PRESSURE: 118 MMHG | WEIGHT: 148.2 LBS | HEIGHT: 66 IN

## 2017-03-03 DIAGNOSIS — Z00.00 ANNUAL PHYSICAL EXAM: ICD-10-CM

## 2017-03-03 DIAGNOSIS — Z78.0 POST-MENOPAUSAL: Primary | ICD-10-CM

## 2017-03-03 DIAGNOSIS — Z11.51 SPECIAL SCREENING EXAMINATION FOR HUMAN PAPILLOMAVIRUS (HPV): ICD-10-CM

## 2017-03-03 DIAGNOSIS — Z12.4 ROUTINE CERVICAL SMEAR: Primary | ICD-10-CM

## 2017-03-03 PROCEDURE — 78815 PET IMAGE W/CT SKULL-THIGH: CPT

## 2017-03-03 PROCEDURE — G0101 CA SCREEN;PELVIC/BREAST EXAM: HCPCS | Performed by: OBSTETRICS & GYNECOLOGY

## 2017-03-03 PROCEDURE — A9552 F18 FDG: HCPCS

## 2017-03-03 PROCEDURE — 0 FLUDEOXYGLUCOSE F18 SOLUTION

## 2017-03-03 PROCEDURE — 77080 DXA BONE DENSITY AXIAL: CPT | Performed by: OBSTETRICS & GYNECOLOGY

## 2017-03-03 RX ADMIN — FLUDEOXYGLUCOSE F18 1 DOSE: 300 INJECTION INTRAVENOUS at 12:35

## 2017-03-03 NOTE — PROGRESS NOTES
Subjective   Iesha Roldan is a 66 y.o. female is being seen today for Annual Exam  Chief Complaint   Patient presents with   • Gynecologic Exam   .    History of Present Illness  Patient is here for annual exam and has had an interesting last few months.  She came up with a not around the ear ended up seeing ENT Curtis downtrend that the a parotid gland tumor.  She then went up to [place] and has seen people hear a little conflicting results of everything but really everything was done correctly and the whole tumor was removed but there is a question of whether or not she will need radiation.  She is tied her workup work through all these potential questions.  Her father did die pancreatic cancer otherwise no new family history.  Father was 90.  She ever got that she feels vaccine.  Off the right now is not a good time for that.  Family is doing well no other complaints  The following portions of the patient's history were reviewed and updated as appropriate: allergies, current medications, past family history, past medical history, past social history, past surgical history and problem list.    PAST MEDICAL HISTORY  Past Medical History   Diagnosis Date   • Acute bronchitis    • Fracture, hip    • Gastroenteritis    • Neck pain    • Osteopenia    • Pharyngitis      OB History      Para Term  AB TAB SAB Ectopic Multiple Living    2 2 2               Past Surgical History   Procedure Laterality Date   • Hip surgery     • Parotidectomy     • Parotidectomy Right 2017     Procedure: RIGHT SUPERFICIAL PAROTIDECTOMY EXCISION PAROTID MASS ;  Surgeon: Candelario Calderon MD;  Location: [facility] OR [facility];  Service:      Family History   Problem Relation Age of Onset   • Peripheral vascular disease Mother    • No Known Problems Father    • No Known Problems Sister    • No Known Problems Brother    • No Known Problems Daughter    • No Known Problems Son    • No Known Problems Maternal Grandmother    • No  Known Problems Paternal Grandmother    • No Known Problems Maternal Aunt    • No Known Problems Paternal Aunt    • BRCA 1/2 Neg Hx    • Breast cancer Neg Hx    • Colon cancer Neg Hx    • Endometrial cancer Neg Hx    • Ovarian cancer Neg Hx      History   Smoking Status   • Never Smoker   Smokeless Tobacco   • Never Used       Current Outpatient Prescriptions:   •  ALPRAZolam (XANAX) 0.25 MG tablet, Take 1 tablet by mouth 2 (Two) Times a Day As Needed for anxiety., Disp: 20 tablet, Rfl: 2  •  hydrocortisone 2.5 % lotion, Apply  topically As Needed., Disp: , Rfl:   No current facility-administered medications for this visit.   Immunization History   Administered Date(s) Administered   • Tdap 04/18/2016       Review of Systems   Constitutional: Negative for chills, fatigue, fever and unexpected weight change.   Respiratory: Negative for shortness of breath and wheezing.    Cardiovascular: Negative for chest pain.   Gastrointestinal: Negative for abdominal distention, abdominal pain, blood in stool, constipation, diarrhea and nausea.   Genitourinary: Negative for difficulty urinating, dyspareunia, dysuria, frequency, hematuria, menstrual problem, pelvic pain, urgency and vaginal discharge.   Skin: Negative for rash.       Objective   Physical Exam   Constitutional: She is oriented to person, place, and time. Vital signs are normal. She appears well-developed and well-nourished.   Neck: No thyromegaly present.   Cardiovascular: Normal rate, regular rhythm and normal heart sounds.    Pulmonary/Chest: Effort normal. Right breast exhibits no inverted nipple, no mass, no nipple discharge, no skin change and no tenderness. Left breast exhibits no inverted nipple, no mass, no nipple discharge, no skin change and no tenderness. Breasts are symmetrical. There is no breast swelling.   Abdominal: Soft.   Genitourinary: Vagina normal and uterus normal. No breast tenderness, discharge or bleeding. Pelvic exam was performed with  patient supine. No labial fusion. There is no rash, tenderness, lesion or injury on the right labia. There is no rash, tenderness, lesion or injury on the left labia. Cervix exhibits no motion tenderness, no discharge and no friability. Right adnexum displays no mass, no tenderness and no fullness. Left adnexum displays no mass, no tenderness and no fullness.   Neurological: She is alert and oriented to person, place, and time.   Psychiatric: She has a normal mood and affect.   Vitals reviewed.        Assessment/Plan   Iesha was seen today for gynecologic exam.    Diagnoses and all orders for this visit:    Routine cervical smear  -     IGP, Apt HPV,rfx 16 / 18,45    Special screening examination for human papillomavirus (HPV)  -     IGP, Apt HPV,rfx 16 / 18,45    Annual physical exam    Overall normal exam today.  Pap was done today.  Colonoscopy was negative just a month ago bone density today showing the same osteopenia in the mammograms up-to-date.  Come back in a year

## 2017-03-06 PROCEDURE — 77370 RADIATION PHYSICS CONSULT: CPT

## 2017-03-06 NOTE — PROGRESS NOTES
Assessment/Plan   Assessment & Plan    Subjective   Subjective     @[id number]()@  @IOTHISSHIFT()@    Objective   Objective  Active Problems:    * No active hospital problems. *    dexa complete

## 2017-03-07 ENCOUNTER — TELEPHONE (OUTPATIENT)
Dept: INTERNAL MEDICINE | Facility: CLINIC | Age: 67
End: 2017-03-07

## 2017-03-07 ENCOUNTER — OFFICE VISIT (OUTPATIENT)
Dept: RADIATION ONCOLOGY | Facility: HOSPITAL | Age: 67
End: 2017-03-07

## 2017-03-07 VITALS
OXYGEN SATURATION: 99 % | HEART RATE: 66 BPM | SYSTOLIC BLOOD PRESSURE: 118 MMHG | DIASTOLIC BLOOD PRESSURE: 76 MMHG | RESPIRATION RATE: 16 BRPM | TEMPERATURE: 97 F

## 2017-03-07 DIAGNOSIS — C80.1 MUCOEPIDERMOID CARCINOMA (HCC): Primary | ICD-10-CM

## 2017-03-07 DIAGNOSIS — R93.89 ABNORMAL MRI, NECK: Primary | ICD-10-CM

## 2017-03-07 LAB
CYTOLOGIST CVX/VAG CYTO: NORMAL
CYTOLOGY CVX/VAG DOC THIN PREP: NORMAL
DX ICD CODE: NORMAL
HIV 1 & 2 AB SER-IMP: NORMAL
HPV I/H RISK 4 DNA CVX QL PROBE+SIG AMP: NEGATIVE
OTHER STN SPEC: NORMAL
PATH REPORT.FINAL DX SPEC: NORMAL
STAT OF ADQ CVX/VAG CYTO-IMP: NORMAL

## 2017-03-07 PROCEDURE — 99215 OFFICE O/P EST HI 40 MIN: CPT

## 2017-03-07 NOTE — PROGRESS NOTES
March 7, 2017        Second Radiation Oncology Visit          Ms. Iesha Roldan is a 66 y.o. female who is here for further discussions regarding treatment options for her  Low stage, intermediate grade mucoepidermoid carcinoma of the right parotid gland. Today she is accompanied by her , Eduard.  Allergies reviewed?   Yes  Problemlist reviewed?   Yes  Medication reviewed?   Yes   New medications given? no  Karnofsky Performance Status:  100 - Fully active, able to carry on all pre-disease performed without restriction      Summary of Our Discussion::   Since our last visit she has had a PET/CT scan which showed no evidence of metastatic lymphadenopathy nor distant spread. The uptake at the operative site was low level and indeterminate but certainly compatible with lingering post-op inflammation.  Also, since her first visit we were able to obtain the paper work from her [facility] ER visit and reviewed all of that with the Christianne.  We again reviewed the three options she has, as follows:  1.) Close observation and if that is done, in my opinion, it should be done by Dr. Calderon at monthly intervals and not the three or four month interval suggested by the ENT consultant  at the [facility] ER.  2.) Post-op, adjuvant irradiation as insurance against a recurrence  3.) More radical surgery now with the risks that carries to the integrity of the facial nerve      Plan: She wants to continue to think the matter over and will contact me if she wishes to pursue post-op adjuvant irradiation.  In the meantime I suggested she follow thru with the suggestion at the [facility] ER that she have a non-contrasted CT scan of her cervical spine to furhter investigate what may be a benign hemangioma of her C4 vertebral body.    Fred Love MD     Today, I was with Ms.Georgette Roldan  from 9:15am   until 10:15 am  of which  60  minutes was face to face  Of that face to face time, 60  minutes  was spent in counseling  and coordination of care as indicated in the Summary of Our Discussion, above.

## 2017-03-14 ENCOUNTER — DOCUMENTATION (OUTPATIENT)
Dept: RADIATION ONCOLOGY | Facility: HOSPITAL | Age: 67
End: 2017-03-14

## 2017-03-14 NOTE — PROGRESS NOTES
Referral rec'd from [facility].  Patient scored a 7 on distress screening tool.  Chart reviewed.  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  Patient still trying to decide her course of treatment, but is leaning towards XRT treatment.  Called and spoke to patient via phone.  Patient states that her anxiety has been all over the place since her diagnosis.  She states good family support while going through all of the tests, procedures and consultations.    OSW explained services and ways that our supportive care clinic can provided psychosocial support.  Patient expressed understanding and did not verbalize any needs at this time.    Contact letter sent to patient.  Encouraged patient to call if needs arise in the future.  Thank you,  Suzanne Charles, HAILEW, CSW, OSW-C

## 2017-03-16 ENCOUNTER — HOSPITAL ENCOUNTER (OUTPATIENT)
Dept: CT IMAGING | Facility: HOSPITAL | Age: 67
Discharge: HOME | End: 2017-03-16
Admitting: INTERNAL MEDICINE

## 2017-03-16 DIAGNOSIS — R93.89 ABNORMAL MRI, NECK: ICD-10-CM

## 2017-03-16 PROCEDURE — 72125 CT NECK SPINE W/O DYE: CPT

## 2017-03-21 ENCOUNTER — TELEPHONE (OUTPATIENT)
Dept: INTERNAL MEDICINE | Facility: CLINIC | Age: 67
End: 2017-03-21

## 2017-03-21 DIAGNOSIS — C07 CARCINOMA OF PAROTID GLAND (HCC): ICD-10-CM

## 2017-03-21 DIAGNOSIS — R91.1 LUNG NODULE: Primary | ICD-10-CM

## 2017-03-24 ENCOUNTER — APPOINTMENT (OUTPATIENT)
Dept: ONCOLOGY | Facility: CLINIC | Age: 67
End: 2017-03-24

## 2017-03-24 ENCOUNTER — APPOINTMENT (OUTPATIENT)
Dept: LAB | Facility: HOSPITAL | Age: 67
End: 2017-03-24

## 2017-03-27 ENCOUNTER — LAB (OUTPATIENT)
Dept: LAB | Facility: HOSPITAL | Age: 67
End: 2017-03-27

## 2017-03-27 ENCOUNTER — DOCUMENTATION (OUTPATIENT)
Dept: ONCOLOGY | Facility: CLINIC | Age: 67
End: 2017-03-27

## 2017-03-27 ENCOUNTER — APPOINTMENT (OUTPATIENT)
Dept: ONCOLOGY | Facility: CLINIC | Age: 67
End: 2017-03-27

## 2017-03-27 ENCOUNTER — CONSULT (OUTPATIENT)
Dept: ONCOLOGY | Facility: CLINIC | Age: 67
End: 2017-03-27

## 2017-03-27 VITALS
BODY MASS INDEX: 23.45 KG/M2 | HEIGHT: 67 IN | HEART RATE: 68 BPM | DIASTOLIC BLOOD PRESSURE: 70 MMHG | TEMPERATURE: 98.2 F | WEIGHT: 149.4 LBS | SYSTOLIC BLOOD PRESSURE: 132 MMHG | RESPIRATION RATE: 16 BRPM

## 2017-03-27 DIAGNOSIS — C80.1 MUCOEPIDERMOID CARCINOMA (HCC): Primary | ICD-10-CM

## 2017-03-27 DIAGNOSIS — C80.1 CARCINOMA (HCC): Primary | ICD-10-CM

## 2017-03-27 LAB
BASOPHILS # BLD AUTO: 0.02 10*3/MM3 (ref 0–0.1)
BASOPHILS NFR BLD AUTO: 0.3 % (ref 0–1.1)
DEPRECATED RDW RBC AUTO: 43.5 FL (ref 37–49)
EOSINOPHIL # BLD AUTO: 0.14 10*3/MM3 (ref 0–0.36)
EOSINOPHIL NFR BLD AUTO: 2.4 % (ref 1–5)
ERYTHROCYTE [DISTWIDTH] IN BLOOD BY AUTOMATED COUNT: 13.1 % (ref 11.7–14.5)
HCT VFR BLD AUTO: 39.1 % (ref 34–45)
HGB BLD-MCNC: 12.7 G/DL (ref 11.5–14.9)
HOLD SPECIMEN: NORMAL
IMM GRANULOCYTES # BLD: 0.01 10*3/MM3 (ref 0–0.03)
IMM GRANULOCYTES NFR BLD: 0.2 % (ref 0–0.5)
LYMPHOCYTES # BLD AUTO: 1.77 10*3/MM3 (ref 1–3.5)
LYMPHOCYTES NFR BLD AUTO: 30.8 % (ref 20–49)
MCH RBC QN AUTO: 29.8 PG (ref 27–33)
MCHC RBC AUTO-ENTMCNC: 32.5 G/DL (ref 32–35)
MCV RBC AUTO: 91.8 FL (ref 83–97)
MONOCYTES # BLD AUTO: 0.42 10*3/MM3 (ref 0.25–0.8)
MONOCYTES NFR BLD AUTO: 7.3 % (ref 4–12)
NEUTROPHILS # BLD AUTO: 3.39 10*3/MM3 (ref 1.5–7)
NEUTROPHILS NFR BLD AUTO: 59 % (ref 39–75)
NRBC BLD MANUAL-RTO: 0 /100 WBC (ref 0–0)
PLATELET # BLD AUTO: 228 10*3/MM3 (ref 150–375)
PMV BLD AUTO: 9.8 FL (ref 8.9–12.1)
RBC # BLD AUTO: 4.26 10*6/MM3 (ref 3.9–5)
WBC NRBC COR # BLD: 5.75 10*3/MM3 (ref 4–10)
WHOLE BLOOD HOLD SPECIMEN: NORMAL

## 2017-03-27 PROCEDURE — 99205 OFFICE O/P NEW HI 60 MIN: CPT | Performed by: INTERNAL MEDICINE

## 2017-03-27 PROCEDURE — 85025 COMPLETE CBC W/AUTO DIFF WBC: CPT | Performed by: INTERNAL MEDICINE

## 2017-03-27 PROCEDURE — 36415 COLL VENOUS BLD VENIPUNCTURE: CPT | Performed by: INTERNAL MEDICINE

## 2017-03-27 NOTE — PROGRESS NOTES
History:     Reason For Consultation:   1. Stage I salivary gland tumor, intermediate grade mucoepidermoid carcinoma    Referring Provider:   Camila Mejia MD  [address]  [address] [address]    Records Obtained: Records of the patients history including those obtained from the referring provider were reviewed and summarized in detail.    HPI:   Iesha Roldan presents for consultation of newly diagnosed intermediate grade mucoepidermoid carcinoma.  Patient noticed swelling behind the right ear over a few months and ultimately underwent evaluation by ENT. On January 31, 2017, she underwent parotidectomy on the right side with pathology showing intermediate grade mucoepidermoid carcinoma.  This surgery was limited due to concern for facial nerve paralysis. Pathology showed a 1.2 cm tumor with negative but close margins.  On February 13, 2017 patient presented to the [place] for further evaluation.  MRI of the face showed a 2.7 cm peripheral enhancement in the lateral aspect of the right parotid gland, representing postoperative changes versus residual cancer.  There was also an abnormal T1 hypointensity of the C4 vertebral body that was likely representing an atypical hemangioma, but CT was recommended.  While at the [facility] she saw ENT as well.  It was not felt that she would need adjuvant chemotherapy but felt that she would need either radiation therapy, observation, or additional surgery.  She received a second opinion multidisciplinary consult here in [place] and the recommendation was to proceed with radiation.  She has seen radiation oncology at [facility] and she is leaning towards treatment with radiation therapy.  Additional evaluation with imaging included a PET CT scan that revealed hypermetabolism in the right parotid surgical bed was nonspecific.  There is no evidence for gricel metastases or distant metastases.  The CT of the cervical spine that was done to follow-up on the abnormal  MRI showed a sclerotic C4 vertebral body and the findings are suggestive of a hemangioma.  There also was a 4 mm left upper lobe pulmonary nodule for which further evaluation with a chest CT scan was recommended.  The chest CT scan has been ordered but has yet to be performed. Patient is feeling well today, but understandably overwhelmed by the abnormal imaging findings that are nonspecific. She's very active and really asymptomatic. 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 She's really done very well since.     Past Medical   Past Medical History:   Diagnosis Date   • Acute bronchitis    • Fracture, hip    • Gastroenteritis    • H/O Lung nodule    • Mucoepidermoid carcinoma    • Neck pain    • Osteopenia    • Pharyngitis      Patient Active Problem List   Diagnosis   • ADD (attention deficit disorder)   • Allergic rhinitis   • Fatigue   • Acid reflux   • Colon cancer screening   • Abnormal EKG   • Anxiety   • Hyperglycemia   • Mucoepidermoid carcinoma   • Abnormal MRI, cervical spine, not thoracic; C4 to be specific   • Carcinoma     Social History   Social History     Social History   • Marital status:      Spouse name: Eduard   • Number of children: N/A   • Years of education: N/A     Occupational History   •  Retired     Social History Main Topics   • Smoking status: Never Smoker   • Smokeless tobacco: Never Used   • Alcohol use Yes      Comment: SOCIALLY   • Drug use: No   • Sexual activity: Defer     Other Topics Concern   • Not on file     Social History Narrative     Family History  Family History   Problem Relation Age of Onset   • Peripheral vascular disease Mother    • Pancreatic cancer Father 91   • Heart disease Father    • Rectal cancer Sister 36   • No Known Problems Brother    • No Known Problems Daughter    • No Known Problems Son    • No Known Problems Maternal Grandmother    •  "No Known Problems Paternal Grandmother    • No Known Problems Maternal Aunt    • No Known Problems Paternal Aunt    • BRCA 1/2 Neg Hx    • Breast cancer Neg Hx    • Endometrial cancer Neg Hx    • Ovarian cancer Neg Hx      Medications    Current Outpatient Prescriptions:   •  ALPRAZolam (XANAX) 0.25 MG tablet, Take 1 tablet by mouth 2 (Two) Times a Day As Needed for anxiety., Disp: 20 tablet, Rfl: 2  •  hydrocortisone 2.5 % lotion, Apply  topically As Needed., Disp: , Rfl:   Allergies  Allergies   Allergen Reactions   • Erythromycin      Review of Systems  GENERAL: No change in appetite or weight; No fevers, chills, sweats.    SKIN: No nonhealing lesions. No rashes.  HEME/LYMPH: No easy bruising, bleeding. No swollen nodes.   EYES: No vision changes or diplopia.   ENT: No tinnitus, hearing loss, gum bleeding, epistaxis, hoarseness or dysphagia.   RESPIRATORY: No cough, shortness of breath, hemoptysis or wheezing.   CVS: No chest pain, palpitations, orthopnea, dyspnea on exertion or PND.   GI: No melena or hematochezia. No abdominal pain. No nausea, vomiting, constipation, diarrhea  : No lower tract obstructive symptoms, dysuria or hematuria.   MUSCULOSKELETAL: No bone pain.No joint stiffness.   NEUROLOGICAL: No global weakness, loss of consciousness or seizures.   PSYCHIATRIC: No increased nervousness, mood changes or depression.      Objective     Objective:     Vitals:    03/27/17 1336   BP: 132/70   Pulse: 68   Resp: 16   Temp: 98.2 °F (36.8 °C)   Weight: 149 lb 6.4 oz (67.8 kg)   Height: 66.54\" (169 cm)   PainSc: 0-No pain     Current Status 3/27/2017   ECOG score 0     GENERAL:  Well-developed, well-nourished female in no acute distress.   SKIN:  Warm, dry without rashes, purpura or petechiae.  HEAD:  Normocephalic.  EYES:  EOMs intact.  Conjunctivae normal.  EARS:  Hearing intact.  NOSE:  Septum midline.  No excoriations or nasal discharge.  MOUTH:  Tongue is well-papillated; no ulcers.  Lips " "normal.  THROAT:  Oropharynx without lesions or exudates.  NECK:  Supple with good range of motion; predominance noted right postauricular area about 2-3 cm.   LYMPHATICS:  No cervical, supraclavicular adenopathy.  CHEST:  Lungs clear to percussion and auscultation. Good airflow.  CARDIAC:  Regular rate and rhythm without murmurs, rubs or gallops. Normal S1,S2.  ABDOMEN:  Soft, nontender, no hepatosplenomegaly, normal bowel sounds  EXTREMITIES:  No clubbing, cyanosis or edema.  NEUROLOGICAL:  No focal neurological deficits.  PSYCHIATRIC:  Normal affect and mood.     Labs and Imaging  Results for orders placed or performed in visit on 03/27/17   CBC Auto Differential   Result Value Ref Range    WBC 5.75 4.00 - 10.00 10*3/mm3    RBC 4.26 3.90 - 5.00 10*6/mm3    Hemoglobin 12.7 11.5 - 14.9 g/dL    Hematocrit 39.1 34.0 - 45.0 %    MCV 91.8 83.0 - 97.0 fL    MCH 29.8 27.0 - 33.0 pg    MCHC 32.5 32.0 - 35.0 g/dL    RDW 13.1 11.7 - 14.5 %    RDW-SD 43.5 37.0 - 49.0 fl    MPV 9.8 8.9 - 12.1 fL    Platelets 228 150 - 375 10*3/mm3    Neutrophil % 59.0 39.0 - 75.0 %    Lymphocyte % 30.8 20.0 - 49.0 %    Monocyte % 7.3 4.0 - 12.0 %    Eosinophil % 2.4 1.0 - 5.0 %    Basophil % 0.3 0.0 - 1.1 %    Immature Grans % 0.2 0.0 - 0.5 %    Neutrophils, Absolute 3.39 1.50 - 7.00 10*3/mm3    Lymphocytes, Absolute 1.77 1.00 - 3.50 10*3/mm3    Monocytes, Absolute 0.42 0.25 - 0.80 10*3/mm3    Eosinophils, Absolute 0.14 0.00 - 0.36 10*3/mm3    Basophils, Absolute 0.02 0.00 - 0.10 10*3/mm3    Immature Grans, Absolute 0.01 0.00 - 0.03 10*3/mm3    nRBC 0.0 0.0 - 0.0 /100 WBC     Multiple images reviewed    Pathology:  Final Diagnosis   \"RIGHT PAROTID MASS\", PRELIMINARY REPORT:  MUCOEPIDERMOID CARCINOMA, INTERMEDIATE GRADE, 1.2 CM, with sclerosis and inflammation.  MARGINS APPEAR CLEAR BUT FOCALLY VERY CLOSE (LESS THAN 0.5 MM).       Assessment/Plan   Assessment/Plan:   1. 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 "    * status post resection 1/2017   * We discussed that intermediate grade mucoepidermoid tumors can have variable behaviors. She has met with multiple surgeons, a medical oncologist at [facility], and our radiation oncologist. The current plans are for likely radiation therapy, but imaging also shows a 4 mm PATRICK nodule of unclear significance. She had a PET/CT scan that was normal, but a 4 mm lesions would be beyond its detection limits. I will follow up with results of the CT scan chest that's ordered. If this is an isolated lesion, my suggestion would be to move forward with radiation therapy and monitor this area with repeat CT scans. It is too small to biopsy. If the area were to grow, biopsy confirmed metastatic disease, and she was oligometastatic, I would favor radiation or surgery as chemotherapy isn't known to dramatically change the course of disease. If there are multiple areas present on CT chest, I will plan to send to pulmonary. I will call her on Friday and make final plans.     2. C4 hemangioma.   3. History of bronchiolitis obliterans organizing pneumonia. Treated by Dr Sotelo about 15 years ago.   4. PATRICK pulmonary nodule. See #1. CT Chest Wednesday.     Follow-up depends on CT chest results. I asked the patient to call for any questions, concerns, or new symptoms.

## 2017-03-29 ENCOUNTER — APPOINTMENT (OUTPATIENT)
Dept: CT IMAGING | Facility: HOSPITAL | Age: 67
End: 2017-03-29

## 2017-03-29 ENCOUNTER — HOSPITAL ENCOUNTER (OUTPATIENT)
Dept: CT IMAGING | Facility: HOSPITAL | Age: 67
End: 2017-03-29

## 2017-03-30 ENCOUNTER — OFFICE VISIT (OUTPATIENT)
Dept: RADIATION ONCOLOGY | Facility: HOSPITAL | Age: 67
End: 2017-03-30

## 2017-03-30 ENCOUNTER — HOSPITAL ENCOUNTER (OUTPATIENT)
Dept: CT IMAGING | Facility: HOSPITAL | Age: 67
Discharge: HOME | End: 2017-03-30
Admitting: INTERNAL MEDICINE

## 2017-03-30 ENCOUNTER — APPOINTMENT (OUTPATIENT)
Dept: CT IMAGING | Facility: HOSPITAL | Age: 67
End: 2017-03-30

## 2017-03-30 ENCOUNTER — DOCUMENTATION (OUTPATIENT)
Dept: NUTRITION | Facility: HOSPITAL | Age: 67
End: 2017-03-30

## 2017-03-30 VITALS — WEIGHT: 149 LBS | BODY MASS INDEX: 23.39 KG/M2 | HEIGHT: 67 IN

## 2017-03-30 VITALS
WEIGHT: 147 LBS | RESPIRATION RATE: 16 BRPM | DIASTOLIC BLOOD PRESSURE: 71 MMHG | TEMPERATURE: 97.7 F | HEART RATE: 69 BPM | BODY MASS INDEX: 23.35 KG/M2 | SYSTOLIC BLOOD PRESSURE: 112 MMHG | OXYGEN SATURATION: 97 %

## 2017-03-30 DIAGNOSIS — R91.1 LUNG NODULE: ICD-10-CM

## 2017-03-30 DIAGNOSIS — C07 CARCINOMA OF PAROTID GLAND (HCC): ICD-10-CM

## 2017-03-30 DIAGNOSIS — C80.1 MUCOEPIDERMOID CARCINOMA (HCC): Primary | ICD-10-CM

## 2017-03-30 LAB — CREAT BLDA-MCNC: 0.8 MG/DL (ref 0.6–1.3)

## 2017-03-30 PROCEDURE — 77301 RADIOTHERAPY DOSE PLAN IMRT: CPT

## 2017-03-30 PROCEDURE — 71260 CT THORAX DX C+: CPT

## 2017-03-30 PROCEDURE — 82565 ASSAY OF CREATININE: CPT

## 2017-03-30 PROCEDURE — 0 IOPAMIDOL 61 % SOLUTION: Performed by: INTERNAL MEDICINE

## 2017-03-30 PROCEDURE — 99214 OFFICE O/P EST MOD 30 MIN: CPT

## 2017-03-30 RX ADMIN — IOPAMIDOL 75 ML: 612 INJECTION, SOLUTION INTRAVENOUS at 16:30

## 2017-03-30 NOTE — PROGRESS NOTES
"Adult Outpatient Nutrition  Assessment/PES    Patient Name:  Iesha Roladn  YOB: 1950  MRN: [id number]    Assessment Date:  3/30/2017     Comments:  [facility] to discuss nutrition prior to treatment, and during treatment, side effects, etc.  Provided resources including handout for head and neck ca patients undergoing radiation treatments and reviewed importance of maintaining weight during treatment. Will follow as patient progresses through treatment and help as needed to ensure that she is able to meet nutrition needs with po intake. Request that patient be placed on twice a week weights to monitor.              General Info       03/30/17 1413    Today's Session    Person(s) attending today's session Patient    General Information    Oncology patient? yes   R salivary gland mucoepidermoid tumor    Oncology Specific Assessment    Type of treatment Radiation    Frequency of treatment to begin 4/13/2017    Goal of treatment Currative                Nutritional Info/Activity       03/30/17 1415    Nutritional Information    Have you had weight changes? No            Home Nutrition Report       03/30/17 1415    Home Nutrition Report    Diet No specific            Estimated/Assessed Needs       03/30/17 1415    Calculation Measurements    Weight Used For Calculations 67.6 kg (149 lb)    Height Used for Calculations 1.69 m (5' 6.53\")    Estimated/Assessed Energy Needs    Energy Need Method Kcal/kg    kcal/kg 25    25 Kcal/Kg (kcal) 1689.65    Estimated/Assessed Protein Needs    Weight Used for Protein Calculation 67.6 kg (149 lb)    Protein (gm/kg) 1.0    1.0 Gm Protein (gm) 67.59    Estimated/Assessed Fluid Needs    Fluid Need Method RDA method    RDA Method (mL)  1690              Labs/Tests/Procedures/Meds       03/30/17 1416    Labs/Tests/Procedures/Meds    Diagnostic Test/Procedure Review reviewed    Labs/Tests Review Reviewed    Medication Review Reviewed, " pertinent    Significant Vitals reviewed            Problem/Interventions:        Problem 1       03/30/17 1416    Nutrition Diagnoses Problem 1    Problem 1 Nutrition Appropriate for Condition at this Time    Etiology (related to) Medical Diagnosis    Oncology Head/neck cancer                    Intervention Goal       03/30/17 1417    Intervention Goal    General Maintain nutrition    PO Tolerate PO    Weight No significant weight loss                Electronically signed by:  Shannon Jean RD  03/30/17 2:18 PM

## 2017-03-31 ENCOUNTER — DOCUMENTATION (OUTPATIENT)
Dept: ONCOLOGY | Facility: CLINIC | Age: 67
End: 2017-03-31

## 2017-03-31 DIAGNOSIS — R91.8 PULMONARY NODULES: Primary | ICD-10-CM

## 2017-03-31 NOTE — PROGRESS NOTES
Reviewed CT of the chest with patient. There are two very small pulmonary nodules. These are indeterminate and my recommendation would be to proceed forward with radiation for the mucoepidermoid tumor with plan to repeat CT scan of the chest in 3-4 months. Plan discussed with patient and she is agreeable.

## 2017-04-01 ENCOUNTER — APPOINTMENT (OUTPATIENT)
Dept: RADIATION ONCOLOGY | Facility: HOSPITAL | Age: 67
End: 2017-04-01

## 2017-04-03 ENCOUNTER — TELEPHONE (OUTPATIENT)
Dept: ONCOLOGY | Facility: CLINIC | Age: 67
End: 2017-04-03

## 2017-04-03 PROCEDURE — 77300 RADIATION THERAPY DOSE PLAN: CPT

## 2017-04-03 NOTE — TELEPHONE ENCOUNTER
----- Message from Desiree Isabel MD sent at 3/31/2017  5:32 PM EDT -----  Regarding: CT scan/appt  Can patient be scheduled for a CT chest with contrast in 3 months with 2 unit MD follow up a few days afterwards?    Thanks,  Desiree

## 2017-04-06 DIAGNOSIS — C80.1 MUCOEPIDERMOID CARCINOMA (HCC): Primary | ICD-10-CM

## 2017-04-14 ENCOUNTER — OFFICE VISIT (OUTPATIENT)
Dept: RADIATION ONCOLOGY | Facility: HOSPITAL | Age: 67
End: 2017-04-14

## 2017-04-14 VITALS
DIASTOLIC BLOOD PRESSURE: 75 MMHG | BODY MASS INDEX: 23.51 KG/M2 | OXYGEN SATURATION: 96 % | WEIGHT: 148 LBS | SYSTOLIC BLOOD PRESSURE: 113 MMHG | RESPIRATION RATE: 16 BRPM | TEMPERATURE: 97.7 F | HEART RATE: 66 BPM

## 2017-04-14 DIAGNOSIS — C80.1 MUCOEPIDERMOID CARCINOMA (HCC): Primary | ICD-10-CM

## 2017-04-14 PROCEDURE — 77386 CHG INTENSITY MODULATED RADIATION TX DLVR COMPLEX: CPT | Performed by: RADIOLOGY

## 2017-04-14 PROCEDURE — 99213 OFFICE O/P EST LOW 20 MIN: CPT

## 2017-04-14 PROCEDURE — 77338 DESIGN MLC DEVICE FOR IMRT: CPT

## 2017-04-14 PROCEDURE — 77386: CPT | Performed by: RADIOLOGY

## 2017-04-14 PROCEDURE — 77014 CHG CT GUIDANCE RADIATION THERAPY FLDS PLACEMENT: CPT

## 2017-04-14 PROCEDURE — 77427 RADIATION TX MANAGEMENT X5: CPT | Performed by: RADIOLOGY

## 2017-04-14 RX ORDER — CHLORHEXIDINE GLUCONATE 0.12 MG/ML
RINSE ORAL
COMMUNITY
Start: 2017-03-28 | End: 2017-08-29

## 2017-04-14 NOTE — PROGRESS NOTES
April 14, 2017        Repeat Radiation Oncology Visit          Ms. Iesha Roldan is a 66 y.o. 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    Allergies reviewed?   Yes  Problemlist reviewed?   Yes  Medication reviewed?   Yes   New medications given? no  Karnofsky Performance Status:  100 - Fully active, able to carry on all pre-disease performed without restriction      Summary of Our Discussion::   Mrs. Roldan returns for re-evaluation. Sites of recent tooth extraction all healed. Ready to start.      Plan: Unilateral treatment to right parotid by Rapid Arc image guided IMRT which will relatively spare the contralateral salivary glands and the left side of her mouth. Plan a dose of 200 cGy per day X 30 days.      Fred Love MD     Today, I was with Ms.Georgette Roldan  from  9:25am  until  9:45am  of which  20 minutes was face to face  Of that face to face time, 20  minutes  was spent in counseling and coordination of care as indicated in the Summary of Our Discussion, above.

## 2017-04-17 PROCEDURE — 77386: CPT

## 2017-04-17 PROCEDURE — 77386 CHG INTENSITY MODULATED RADIATION TX DLVR COMPLEX: CPT

## 2017-04-17 PROCEDURE — 77014 CHG CT GUIDANCE RADIATION THERAPY FLDS PLACEMENT: CPT

## 2017-04-18 ENCOUNTER — HOSPITAL ENCOUNTER (OUTPATIENT)
Dept: PHYSICAL THERAPY | Facility: HOSPITAL | Age: 67
Setting detail: THERAPIES SERIES
Discharge: HOME | End: 2017-04-18

## 2017-04-18 DIAGNOSIS — C80.1 MUCOEPIDERMOID CARCINOMA (HCC): Primary | ICD-10-CM

## 2017-04-18 PROCEDURE — 77386 CHG INTENSITY MODULATED RADIATION TX DLVR COMPLEX: CPT

## 2017-04-18 PROCEDURE — G8986 CARRY D/C STATUS: HCPCS

## 2017-04-18 PROCEDURE — 97161 PT EVAL LOW COMPLEX 20 MIN: CPT | Performed by: PHYSICAL THERAPIST

## 2017-04-18 PROCEDURE — 77014 CHG CT GUIDANCE RADIATION THERAPY FLDS PLACEMENT: CPT

## 2017-04-18 PROCEDURE — G8985 CARRY GOAL STATUS: HCPCS

## 2017-04-18 PROCEDURE — 97110 THERAPEUTIC EXERCISES: CPT | Performed by: PHYSICAL THERAPIST

## 2017-04-18 PROCEDURE — 77386: CPT

## 2017-04-18 PROCEDURE — G8984 CARRY CURRENT STATUS: HCPCS

## 2017-04-18 NOTE — PROGRESS NOTES
Outpatient Physical Therapy Ortho Initial Evaluation  [facility]     Patient Name: Iesha Roldan  : 1950  MRN: [id number]  Today's Date: 2017      Visit Date: 2017    Patient Active Problem List   Diagnosis   • ADD (attention deficit disorder)   • Allergic rhinitis   • Fatigue   • Acid reflux   • Colon cancer screening   • Abnormal EKG   • Anxiety   • Hyperglycemia   • Mucoepidermoid carcinoma   • Abnormal MRI, cervical spine, not thoracic; C4 to be specific   • Carcinoma        Past Medical History:   Diagnosis Date   • Acute bronchitis    • Fracture, hip    • Gastroenteritis    • H/O Lung nodule    • Mucoepidermoid carcinoma    • Neck pain    • Osteopenia    • Pharyngitis         Past Surgical History:   Procedure Laterality Date   • HIP SURGERY     • PAROTIDECTOMY Right 2017    Procedure: RIGHT SUPERFICIAL PAROTIDECTOMY EXCISION PAROTID MASS ;  Surgeon: Candelario Calderon MD;  Location: [facility] OR [facility];  Service:        Visit Dx:     ICD-10-CM ICD-9-CM   1. Mucoepidermoid carcinoma C80.1 199.1             Patient History       17 1000          Fall Risk Assessment    Any falls in the past year: No  -KJ        User Key  (r) = Recorded By, (t) = Taken By, (c) = Cosigned By    Initials Name Provider Type    THOMAS Ramirez, PT Physical Therapist                PT Ortho       17 1000    Posture/Observations    Alignment Options Forward head;Rounded shoulders;Lumbar lordosis  -KJ    Forward Head Mild;Moderate  -KJ    Rounded Shoulders Mild;Moderate  -KJ    Lumbar lordosis Moderate  -KJ    Myotomal Screen- Upper Quarter Clearing    Shoulder flexion (C5) --   shoulder abd R 4/5, L 4+5. B ER 4/5, IR 4+/5 no pain  -KJ    Elbow flexion/wrist extension (C6) Bilateral:;4+ (Good +)  -KJ    Elbow extension/wrist flexion (C7) Bilateral:;4+ (Good +)  -KJ    Myotomal Screen- Lower Quarter Clearing    Hip flexion (L2) Bilateral:;4+ (Good +)  -KJ    Knee extension (L3)  Bilateral:;4+ (Good +)  -KJ    Ankle DF (L4) Bilateral:;4+ (Good +)  -KJ    Ankle PF (S1) Bilateral:;4+ (Good +)  -KJ    Knee flexion (S2) Bilateral:;4+ (Good +)  -KJ    Lumbar ROM Screen- Lower Quarter Clearing    Lumbar Flexion Normal  -KJ    Lumbar Extension Normal  -KJ    Lumbar Lateral Flexion Normal  -KJ    Lumbar Rotation Normal  -KJ    ROM (Range of Motion)    General ROM Detail Full B UE and LE functional AROM, no pain  -KJ      User Key  (r) = Recorded By, (t) = Taken By, (c) = Cosigned By    Initials Name Provider Type    THOMAS Ramirez, PT Physical Therapist                            Therapy Education       04/18/17 1127          Therapy Education    Given HEP;Symptoms/condition management;Posture/body mechanics   Importance of postural awareness, goals of CARE program, realistic expectations, likelihood of low back pain being postural issues/sress and that strengthening program will help this.   -KJ      Program New  -KJ      How Provided Verbal;Demonstration;Written  -KJ      Provided to Patient  -KJ      Level of Understanding Teach back education performed;Verbalized;Demonstrated  -KJ        User Key  (r) = Recorded By, (t) = Taken By, (c) = Cosigned By    Initials Name Provider Type    THOMAS Ramirez, PT Physical Therapist                PT OP Goals       04/18/17 1100       PT Short Term Goals    STG 1 Pt. will verbalize understanding of CARE program, benefits, and plan.   -KJ     STG 1 Progress Met  -KJ       User Key  (r) = Recorded By, (t) = Taken By, (c) = Cosigned By    Initials Name Provider Type    THOMAS Ramirez, PT Physical Therapist                PT Assessment/Plan       04/18/17 1128       PT Assessment    Functional Limitations Performance in leisure activities;Performance in sport activities  -KJ     Impairments Muscle strength;Posture  -KJ     Assessment Comments Pt. is a 66 year old female currently being treated with radiation for Mucoepidermoid carcinoma and has  been referred for the CARE program at [facility].  Pt. presents with normal range of motion but decreased B shoulder ER, hip extension, and scapular strength. Pt. presents with forward head and rounded shoulders posture. Pt. reports increasing general back pain  (PET scans negative) and this is likely due to poor muscle strength and postural awareness. Pt. would benefit from focused strengthening on postural musculature, core, and overall endurance work.   -KJ     Please refer to paper survey for additional self-reported information Yes  -KJ     Rehab Potential Good  -KJ     Patient/caregiver participated in establishment of treatment plan and goals Yes  -KJ     Patient would benefit from skilled therapy intervention Yes  -KJ     PT Plan    PT Frequency One time visit  -KJ     Predicted Duration of Therapy Intervention (days/wks) One time vists  -KJ     Planned CPT's? PT EVAL LOW COMPLEXITY: 89009;PT THER PROC EA 15 MIN: 94057  -KJ     PT Plan Comments Refer to [facility] for CARE program.  -KJ       User Key  (r) = Recorded By, (t) = Taken By, (c) = Cosigned By    Initials Name Provider Type    KJ Agatha Ramirez, PT Physical Therapist                                    Time Calculation:   Start Time: 1045  Stop Time: 1125  Time Calculation (min): 40 min     Therapy Charges for Today     Code Description Service Date Service Provider Modifiers Qty    89658501745 HC PT THER PROC EA 15 MIN 4/18/2017 Agatha Ramirez, PT GP 1    27919321124 HC PT EVAL LOW COMPLEXITY 2 4/18/2017 Agatha Ramirez, PT GP 1                    Agatha Ramirez, PT  4/18/2017

## 2017-04-19 ENCOUNTER — RADIATION ONCOLOGY WEEKLY ASSESSMENT (OUTPATIENT)
Dept: RADIATION ONCOLOGY | Facility: HOSPITAL | Age: 67
End: 2017-04-19

## 2017-04-19 VITALS
WEIGHT: 147 LBS | OXYGEN SATURATION: 97 % | DIASTOLIC BLOOD PRESSURE: 71 MMHG | BODY MASS INDEX: 23.07 KG/M2 | TEMPERATURE: 97 F | HEART RATE: 63 BPM | SYSTOLIC BLOOD PRESSURE: 114 MMHG | RESPIRATION RATE: 16 BRPM | HEIGHT: 67 IN

## 2017-04-19 DIAGNOSIS — C80.1 MUCOEPIDERMOID CARCINOMA (HCC): Primary | ICD-10-CM

## 2017-04-19 PROCEDURE — 77386: CPT

## 2017-04-19 PROCEDURE — 77014 CHG CT GUIDANCE RADIATION THERAPY FLDS PLACEMENT: CPT

## 2017-04-19 PROCEDURE — 77386 CHG INTENSITY MODULATED RADIATION TX DLVR COMPLEX: CPT

## 2017-04-19 NOTE — PROGRESS NOTES
4/19/2017    Iesha Roldan is a 66 y.o. female    Vitals:    04/19/17 1159   BP: 114/71   Pulse: 63   Resp: 16   Temp: 97 °F (36.1 °C)   SpO2: 97%       Weekly Management:  Allergies reviewed?   Yes  Problemlist reviewed?   Yes  Medication reviewed?   Yes   New medications given? no  Problem added?   no  Issues raised requiring referral to support services:no    Technical aspects reviewed:  Weekly port films approved?   Yes  Weekly OBI imaging reviewed? Yes  Patient setup and plan reviewed?   Yes  Change requests noted on port film?   no    Chart Reviewed:  Treatment plan change requested?   no  Continue current treatment plan?   Yes  Concurrent Chemo?  no  CBC reviewed?   no    Toxicities:  None     Performance Status: 100 - Fully active, able to carry on all pre-disease performed without restriction    Reason for Visit: No chief complaint on file.      Notes on Treatment course, Films, Medical progress: 4 / 30. Doing well. No problems. No questions.         Seen and approved by:  Fred Love MD  04/19/2017

## 2017-04-20 PROCEDURE — 77336 RADIATION PHYSICS CONSULT: CPT | Performed by: RADIOLOGY

## 2017-04-20 PROCEDURE — 77014 CHG CT GUIDANCE RADIATION THERAPY FLDS PLACEMENT: CPT

## 2017-04-20 PROCEDURE — 77386: CPT

## 2017-04-20 PROCEDURE — 77386 CHG INTENSITY MODULATED RADIATION TX DLVR COMPLEX: CPT

## 2017-04-21 PROCEDURE — 77427 RADIATION TX MANAGEMENT X5: CPT

## 2017-04-21 PROCEDURE — 77386: CPT

## 2017-04-21 PROCEDURE — 77386 CHG INTENSITY MODULATED RADIATION TX DLVR COMPLEX: CPT

## 2017-04-21 PROCEDURE — 77014 CHG CT GUIDANCE RADIATION THERAPY FLDS PLACEMENT: CPT

## 2017-04-24 PROCEDURE — 77014 CHG CT GUIDANCE RADIATION THERAPY FLDS PLACEMENT: CPT

## 2017-04-24 PROCEDURE — 77386: CPT

## 2017-04-24 PROCEDURE — 77386 CHG INTENSITY MODULATED RADIATION TX DLVR COMPLEX: CPT

## 2017-04-25 PROCEDURE — 77386 CHG INTENSITY MODULATED RADIATION TX DLVR COMPLEX: CPT

## 2017-04-25 PROCEDURE — 77386: CPT

## 2017-04-25 PROCEDURE — 77014 CHG CT GUIDANCE RADIATION THERAPY FLDS PLACEMENT: CPT

## 2017-04-26 ENCOUNTER — RADIATION ONCOLOGY WEEKLY ASSESSMENT (OUTPATIENT)
Dept: RADIATION ONCOLOGY | Facility: HOSPITAL | Age: 67
End: 2017-04-26
Payer: MEDICARE

## 2017-04-26 VITALS
BODY MASS INDEX: 23.7 KG/M2 | RESPIRATION RATE: 16 BRPM | TEMPERATURE: 97.1 F | HEART RATE: 60 BPM | DIASTOLIC BLOOD PRESSURE: 76 MMHG | HEIGHT: 67 IN | OXYGEN SATURATION: 98 % | WEIGHT: 151 LBS | SYSTOLIC BLOOD PRESSURE: 139 MMHG

## 2017-04-26 DIAGNOSIS — C80.1 MUCOEPIDERMOID CARCINOMA: Primary | ICD-10-CM

## 2017-04-26 PROCEDURE — 77014 CHG CT GUIDANCE RADIATION THERAPY FLDS PLACEMENT: CPT

## 2017-04-26 PROCEDURE — 77386 CHG INTENSITY MODULATED RADIATION TX DLVR COMPLEX: CPT

## 2017-04-26 PROCEDURE — 77386: CPT

## 2017-04-26 NOTE — PROGRESS NOTES
4/26/2017    Iesha Roldan is a 66 y.o. female    Vitals:    04/26/17 1153   BP: 139/76   Pulse: 60   Resp: 16   Temp: 97.1 °F (36.2 °C)   SpO2: 98%       Weekly Management:  Allergies reviewed?   Yes  Problemlist reviewed?   Yes  Medication reviewed?   Yes   New medications given? no  Problem added?   no  Issues raised requiring referral to support services:no    Technical aspects reviewed:  Weekly port films approved?   Yes  Weekly OBI imaging reviewed? Yes  Patient setup and plan reviewed?   Yes  Change requests noted on port film?   no    Chart Reviewed:  Treatment plan change requested?   no  Continue current treatment plan?   Yes  Concurrent Chemo?  no  CBC reviewed?   no    Toxicities:   None, so far     Performance Status: 100 - Fully active, able to carry on all pre-disease performed without restriction    Reason for Visit: Radiation (9/30)      Notes on Treatment course, Films, Medical progress: Doing well. No problems. Continue on.         Seen and approved by:  Fred Love MD  04/26/2017

## 2017-04-27 PROCEDURE — 77014 CHG CT GUIDANCE RADIATION THERAPY FLDS PLACEMENT: CPT

## 2017-04-27 PROCEDURE — 77386 CHG INTENSITY MODULATED RADIATION TX DLVR COMPLEX: CPT

## 2017-04-27 PROCEDURE — 77336 RADIATION PHYSICS CONSULT: CPT

## 2017-04-27 PROCEDURE — 77386: CPT

## 2017-04-28 PROCEDURE — 77386: CPT

## 2017-04-28 PROCEDURE — 77014 CHG CT GUIDANCE RADIATION THERAPY FLDS PLACEMENT: CPT

## 2017-04-28 PROCEDURE — 77427 RADIATION TX MANAGEMENT X5: CPT

## 2017-04-28 PROCEDURE — 77386 CHG INTENSITY MODULATED RADIATION TX DLVR COMPLEX: CPT

## 2017-05-01 ENCOUNTER — RADIATION ONCOLOGY WEEKLY ASSESSMENT (OUTPATIENT)
Dept: RADIATION ONCOLOGY | Facility: HOSPITAL | Age: 67
End: 2017-05-01

## 2017-05-01 ENCOUNTER — APPOINTMENT (OUTPATIENT)
Dept: RADIATION ONCOLOGY | Facility: HOSPITAL | Age: 67
End: 2017-05-01

## 2017-05-01 DIAGNOSIS — C80.1 MUCOEPIDERMOID CARCINOMA (HCC): Primary | ICD-10-CM

## 2017-05-01 PROCEDURE — 77386 CHG INTENSITY MODULATED RADIATION TX DLVR COMPLEX: CPT

## 2017-05-01 PROCEDURE — 77014 CHG CT GUIDANCE RADIATION THERAPY FLDS PLACEMENT: CPT

## 2017-05-01 PROCEDURE — 77386: CPT

## 2017-05-02 PROCEDURE — 77386 CHG INTENSITY MODULATED RADIATION TX DLVR COMPLEX: CPT

## 2017-05-02 PROCEDURE — 77014 CHG CT GUIDANCE RADIATION THERAPY FLDS PLACEMENT: CPT

## 2017-05-02 PROCEDURE — 77386: CPT

## 2017-05-03 PROCEDURE — 77386: CPT

## 2017-05-03 PROCEDURE — 77386 CHG INTENSITY MODULATED RADIATION TX DLVR COMPLEX: CPT

## 2017-05-03 PROCEDURE — 77014 CHG CT GUIDANCE RADIATION THERAPY FLDS PLACEMENT: CPT

## 2017-05-04 PROCEDURE — 77386 CHG INTENSITY MODULATED RADIATION TX DLVR COMPLEX: CPT

## 2017-05-04 PROCEDURE — 77386: CPT

## 2017-05-04 PROCEDURE — 77336 RADIATION PHYSICS CONSULT: CPT

## 2017-05-04 PROCEDURE — 77014 CHG CT GUIDANCE RADIATION THERAPY FLDS PLACEMENT: CPT

## 2017-05-05 PROCEDURE — 77386 CHG INTENSITY MODULATED RADIATION TX DLVR COMPLEX: CPT

## 2017-05-05 PROCEDURE — 77427 RADIATION TX MANAGEMENT X5: CPT

## 2017-05-05 PROCEDURE — 77014 CHG CT GUIDANCE RADIATION THERAPY FLDS PLACEMENT: CPT

## 2017-05-05 PROCEDURE — 77386: CPT

## 2017-05-08 ENCOUNTER — DOCUMENTATION (OUTPATIENT)
Dept: NUTRITION | Facility: HOSPITAL | Age: 67
End: 2017-05-08

## 2017-05-08 PROCEDURE — 77386: CPT

## 2017-05-08 PROCEDURE — 77014 CHG CT GUIDANCE RADIATION THERAPY FLDS PLACEMENT: CPT

## 2017-05-08 PROCEDURE — 77386 CHG INTENSITY MODULATED RADIATION TX DLVR COMPLEX: CPT

## 2017-05-09 PROCEDURE — 77386: CPT

## 2017-05-09 PROCEDURE — 77014 CHG CT GUIDANCE RADIATION THERAPY FLDS PLACEMENT: CPT

## 2017-05-09 PROCEDURE — 77386 CHG INTENSITY MODULATED RADIATION TX DLVR COMPLEX: CPT

## 2017-05-10 ENCOUNTER — RADIATION ONCOLOGY WEEKLY ASSESSMENT (OUTPATIENT)
Dept: RADIATION ONCOLOGY | Facility: HOSPITAL | Age: 67
End: 2017-05-10

## 2017-05-10 VITALS
OXYGEN SATURATION: 98 % | DIASTOLIC BLOOD PRESSURE: 79 MMHG | TEMPERATURE: 97 F | SYSTOLIC BLOOD PRESSURE: 132 MMHG | WEIGHT: 149 LBS | RESPIRATION RATE: 16 BRPM | BODY MASS INDEX: 23.66 KG/M2 | HEART RATE: 65 BPM

## 2017-05-10 DIAGNOSIS — C80.1 MUCOEPIDERMOID CARCINOMA (HCC): Primary | ICD-10-CM

## 2017-05-10 PROCEDURE — 77386 CHG INTENSITY MODULATED RADIATION TX DLVR COMPLEX: CPT

## 2017-05-10 PROCEDURE — 77014 CHG CT GUIDANCE RADIATION THERAPY FLDS PLACEMENT: CPT

## 2017-05-10 PROCEDURE — 77386: CPT

## 2017-05-11 PROCEDURE — 77336 RADIATION PHYSICS CONSULT: CPT

## 2017-05-11 PROCEDURE — 77386: CPT

## 2017-05-11 PROCEDURE — 77386 CHG INTENSITY MODULATED RADIATION TX DLVR COMPLEX: CPT

## 2017-05-11 PROCEDURE — 77014 CHG CT GUIDANCE RADIATION THERAPY FLDS PLACEMENT: CPT

## 2017-05-12 PROCEDURE — 77427 RADIATION TX MANAGEMENT X5: CPT

## 2017-05-12 PROCEDURE — 77386: CPT

## 2017-05-12 PROCEDURE — 77386 CHG INTENSITY MODULATED RADIATION TX DLVR COMPLEX: CPT

## 2017-05-12 PROCEDURE — 77014 CHG CT GUIDANCE RADIATION THERAPY FLDS PLACEMENT: CPT

## 2017-05-15 PROCEDURE — 77386: CPT

## 2017-05-15 PROCEDURE — 77386 CHG INTENSITY MODULATED RADIATION TX DLVR COMPLEX: CPT

## 2017-05-15 PROCEDURE — 77014 CHG CT GUIDANCE RADIATION THERAPY FLDS PLACEMENT: CPT

## 2017-05-16 PROCEDURE — 77386: CPT

## 2017-05-16 PROCEDURE — 77386 CHG INTENSITY MODULATED RADIATION TX DLVR COMPLEX: CPT

## 2017-05-16 PROCEDURE — 77014 CHG CT GUIDANCE RADIATION THERAPY FLDS PLACEMENT: CPT

## 2017-05-17 PROCEDURE — 77386: CPT

## 2017-05-17 PROCEDURE — 77014 CHG CT GUIDANCE RADIATION THERAPY FLDS PLACEMENT: CPT

## 2017-05-17 PROCEDURE — 77386 CHG INTENSITY MODULATED RADIATION TX DLVR COMPLEX: CPT

## 2017-05-18 ENCOUNTER — RADIATION ONCOLOGY WEEKLY ASSESSMENT (OUTPATIENT)
Dept: RADIATION ONCOLOGY | Facility: HOSPITAL | Age: 67
End: 2017-05-18

## 2017-05-18 DIAGNOSIS — C80.1 MUCOEPIDERMOID CARCINOMA (HCC): Primary | ICD-10-CM

## 2017-05-18 PROCEDURE — 77386 CHG INTENSITY MODULATED RADIATION TX DLVR COMPLEX: CPT

## 2017-05-18 PROCEDURE — 77386: CPT

## 2017-05-18 PROCEDURE — 77336 RADIATION PHYSICS CONSULT: CPT

## 2017-05-18 PROCEDURE — 77014 CHG CT GUIDANCE RADIATION THERAPY FLDS PLACEMENT: CPT

## 2017-05-19 DIAGNOSIS — C80.1 MUCOEPIDERMOID CARCINOMA (HCC): Primary | ICD-10-CM

## 2017-05-19 PROCEDURE — 77427 RADIATION TX MANAGEMENT X5: CPT

## 2017-05-19 PROCEDURE — 77386 CHG INTENSITY MODULATED RADIATION TX DLVR COMPLEX: CPT

## 2017-05-19 PROCEDURE — 77014 CHG CT GUIDANCE RADIATION THERAPY FLDS PLACEMENT: CPT

## 2017-05-19 PROCEDURE — 77386: CPT

## 2017-05-22 ENCOUNTER — RADIATION ONCOLOGY WEEKLY ASSESSMENT (OUTPATIENT)
Dept: RADIATION ONCOLOGY | Facility: HOSPITAL | Age: 67
End: 2017-05-22

## 2017-05-22 ENCOUNTER — DOCUMENTATION (OUTPATIENT)
Dept: PSYCHIATRY | Facility: HOSPITAL | Age: 67
End: 2017-05-22

## 2017-05-22 VITALS
TEMPERATURE: 97.3 F | OXYGEN SATURATION: 97 % | DIASTOLIC BLOOD PRESSURE: 73 MMHG | BODY MASS INDEX: 23.82 KG/M2 | RESPIRATION RATE: 16 BRPM | SYSTOLIC BLOOD PRESSURE: 114 MMHG | HEART RATE: 69 BPM | WEIGHT: 150 LBS

## 2017-05-22 DIAGNOSIS — C80.1 MUCOEPIDERMOID CARCINOMA (HCC): Primary | ICD-10-CM

## 2017-05-22 PROCEDURE — 77014 CHG CT GUIDANCE RADIATION THERAPY FLDS PLACEMENT: CPT

## 2017-05-22 PROCEDURE — 77386: CPT | Performed by: RADIOLOGY

## 2017-05-22 PROCEDURE — 77386 CHG INTENSITY MODULATED RADIATION TX DLVR COMPLEX: CPT | Performed by: RADIOLOGY

## 2017-05-23 PROCEDURE — 77386: CPT | Performed by: RADIOLOGY

## 2017-05-23 PROCEDURE — 77014 CHG CT GUIDANCE RADIATION THERAPY FLDS PLACEMENT: CPT

## 2017-05-23 PROCEDURE — 77386 CHG INTENSITY MODULATED RADIATION TX DLVR COMPLEX: CPT | Performed by: RADIOLOGY

## 2017-05-24 PROCEDURE — 77386: CPT | Performed by: RADIOLOGY

## 2017-05-24 PROCEDURE — 77014 CHG CT GUIDANCE RADIATION THERAPY FLDS PLACEMENT: CPT

## 2017-05-24 PROCEDURE — 77386 CHG INTENSITY MODULATED RADIATION TX DLVR COMPLEX: CPT | Performed by: RADIOLOGY

## 2017-05-25 ENCOUNTER — DOCUMENTATION (OUTPATIENT)
Dept: RADIATION ONCOLOGY | Facility: HOSPITAL | Age: 67
End: 2017-05-25

## 2017-05-25 PROCEDURE — 77386: CPT | Performed by: RADIOLOGY

## 2017-05-25 PROCEDURE — 77014 CHG CT GUIDANCE RADIATION THERAPY FLDS PLACEMENT: CPT

## 2017-05-25 PROCEDURE — 77336 RADIATION PHYSICS CONSULT: CPT | Performed by: RADIOLOGY

## 2017-05-25 PROCEDURE — 77386 CHG INTENSITY MODULATED RADIATION TX DLVR COMPLEX: CPT | Performed by: RADIOLOGY

## 2017-05-31 ENCOUNTER — LAB REQUISITION (OUTPATIENT)
Dept: LAB | Facility: HOSPITAL | Age: 67
End: 2017-05-31

## 2017-05-31 DIAGNOSIS — Z00.00 ENCOUNTER FOR GENERAL ADULT MEDICAL EXAMINATION WITHOUT ABNORMAL FINDINGS: ICD-10-CM

## 2017-05-31 PROCEDURE — 87070 CULTURE OTHR SPECIMN AEROBIC: CPT | Performed by: OTOLARYNGOLOGY

## 2017-05-31 PROCEDURE — 87147 CULTURE TYPE IMMUNOLOGIC: CPT | Performed by: OTOLARYNGOLOGY

## 2017-05-31 PROCEDURE — 87205 SMEAR GRAM STAIN: CPT | Performed by: OTOLARYNGOLOGY

## 2017-06-02 ENCOUNTER — TELEPHONE (OUTPATIENT)
Dept: INTERNAL MEDICINE | Facility: CLINIC | Age: 67
End: 2017-06-02

## 2017-06-02 DIAGNOSIS — C80.1 MUCOEPIDERMOID CARCINOMA (HCC): ICD-10-CM

## 2017-06-02 DIAGNOSIS — R91.1 PULMONARY NODULE: Primary | ICD-10-CM

## 2017-06-02 DIAGNOSIS — R91.1 LUNG NODULE: Primary | ICD-10-CM

## 2017-06-02 LAB
BACTERIA SPEC AEROBE CULT: ABNORMAL
GRAM STN SPEC: ABNORMAL
GRAM STN SPEC: ABNORMAL

## 2017-06-02 NOTE — TELEPHONE ENCOUNTER
Pt would like to go see Dr. Sotelo. He is a pulmonologist. She was recently diagnosed with cancer and on a scan she had they found some lung nodules. They recommended she see a pulmonologist. She has seen Dr. Mcintosh before.   DX:Lung Nodules  Order is in, just needs signed.

## 2017-06-05 ENCOUNTER — TELEPHONE (OUTPATIENT)
Dept: INTERNAL MEDICINE | Facility: CLINIC | Age: 67
End: 2017-06-05

## 2017-06-07 DIAGNOSIS — C80.1 MUCOEPIDERMOID CARCINOMA (HCC): Primary | ICD-10-CM

## 2017-06-13 ENCOUNTER — TELEPHONE (OUTPATIENT)
Dept: ONCOLOGY | Facility: CLINIC | Age: 67
End: 2017-06-13

## 2017-06-13 NOTE — TELEPHONE ENCOUNTER
Patient calling to see why she had a PET scan ordered for the 19th. Informed patient, it wasn't a PET scan, its a f/u CT chest. Pt v/u

## 2017-06-15 ENCOUNTER — TELEPHONE (OUTPATIENT)
Dept: RADIATION ONCOLOGY | Facility: HOSPITAL | Age: 67
End: 2017-06-15

## 2017-06-15 NOTE — TELEPHONE ENCOUNTER
Ms. Roldan states she feels unsettled after completing radiation and did not have closure. She states she has had ear pain but saw Dr Calderon regarding the pain and he noted she had an open wound and was started on Silvadene. She states her wound is healing and she no longer needs to be seen in our office and will be followed by Dr. Calderon.

## 2017-06-19 ENCOUNTER — HOSPITAL ENCOUNTER (OUTPATIENT)
Dept: PET IMAGING | Facility: HOSPITAL | Age: 67
End: 2017-06-19
Attending: INTERNAL MEDICINE

## 2017-06-19 ENCOUNTER — HOSPITAL ENCOUNTER (OUTPATIENT)
Dept: CT IMAGING | Facility: HOSPITAL | Age: 67
Discharge: HOME | End: 2017-06-19
Attending: INTERNAL MEDICINE | Admitting: INTERNAL MEDICINE

## 2017-06-19 DIAGNOSIS — R91.8 PULMONARY NODULES: ICD-10-CM

## 2017-06-19 LAB — CREAT BLDA-MCNC: 0.8 MG/DL (ref 0.6–1.3)

## 2017-06-19 PROCEDURE — 0 IOPAMIDOL 61 % SOLUTION: Performed by: INTERNAL MEDICINE

## 2017-06-19 PROCEDURE — 71260 CT THORAX DX C+: CPT

## 2017-06-19 PROCEDURE — 82565 ASSAY OF CREATININE: CPT

## 2017-06-19 RX ADMIN — IOPAMIDOL 75 ML: 612 INJECTION, SOLUTION INTRAVENOUS at 14:46

## 2017-06-21 ENCOUNTER — TELEPHONE (OUTPATIENT)
Dept: ONCOLOGY | Facility: HOSPITAL | Age: 67
End: 2017-06-21

## 2017-06-21 NOTE — TELEPHONE ENCOUNTER
Pt called wanting CT scan results. Reviewed With Dr. Isabel per her order will discuss with pt on Monday however, looks stable. Informed pt and she V/U.

## 2017-06-26 ENCOUNTER — OFFICE VISIT (OUTPATIENT)
Dept: ONCOLOGY | Facility: CLINIC | Age: 67
End: 2017-06-26

## 2017-06-26 ENCOUNTER — APPOINTMENT (OUTPATIENT)
Dept: LAB | Facility: HOSPITAL | Age: 67
End: 2017-06-26

## 2017-06-26 VITALS
HEIGHT: 67 IN | OXYGEN SATURATION: 97 % | DIASTOLIC BLOOD PRESSURE: 58 MMHG | WEIGHT: 146.4 LBS | TEMPERATURE: 97.8 F | BODY MASS INDEX: 22.98 KG/M2 | SYSTOLIC BLOOD PRESSURE: 108 MMHG | HEART RATE: 80 BPM | RESPIRATION RATE: 16 BRPM

## 2017-06-26 DIAGNOSIS — C80.1 MUCOEPIDERMOID CARCINOMA (HCC): Primary | ICD-10-CM

## 2017-06-26 PROCEDURE — 99214 OFFICE O/P EST MOD 30 MIN: CPT | Performed by: INTERNAL MEDICINE

## 2017-06-26 PROCEDURE — G0463 HOSPITAL OUTPT CLINIC VISIT: HCPCS | Performed by: INTERNAL MEDICINE

## 2017-06-26 NOTE — PROGRESS NOTES
History:     Reason for follow up:   1. 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    * status post resection 1/2017   * status post radiation therapy   2. Pulmonary nodules     HPI:  Iesha Roldan presents for follow-up of her salivary gland mucoepidermoid tumor.  She has completed radiation therapy and did rather well.  She is also followed up with Dr. Calderon since I last saw her.  She had CT scans performed last week that reveals stability. She has an appointment with Dr Sotelo in the coming weeks. She's feeling well without any cough, fevers, chills, night sweats.     Past Medical   Past Medical History:   Diagnosis Date   • Acute bronchitis    • Fracture, hip    • Gastroenteritis    • H/O Lung nodule    • Mucoepidermoid carcinoma    • Neck pain    • Osteopenia    • Pharyngitis     and   Patient Active Problem List   Diagnosis   • ADD (attention deficit disorder)   • Allergic rhinitis   • Fatigue   • Acid reflux   • Colon cancer screening   • Abnormal EKG   • Anxiety   • Hyperglycemia   • Mucoepidermoid carcinoma of RIGHT parotid gland   • Abnormal MRI, cervical spine, not thoracic; C4 to be specific     Social History   Social History     Social History   • Marital status:      Spouse name: Eduard   • Number of children: N/A   • Years of education: College     Occupational History   • 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     Social History Main Topics   • Smoking status: Never Smoker   • Smokeless tobacco: Never Used   • Alcohol use Yes      Comment: SOCIALLY   • Drug use: No   • Sexual activity: Defer     Other Topics Concern   • Not on file     Social History Narrative     Family History  Family History   Problem Relation Age of Onset   • Peripheral vascular disease Mother    • Pancreatic cancer Father 91   • Heart disease Father    • Rectal cancer Sister 36   • No Known Problems Brother    • No Known Problems Daughter    • No Known Problems Son    • No Known Problems Maternal  "Grandmother    • No Known Problems Paternal Grandmother    • No Known Problems Maternal Aunt    • No Known Problems Paternal Aunt    • BRCA 1/2 Neg Hx    • Breast cancer Neg Hx    • Endometrial cancer Neg Hx    • Ovarian cancer Neg Hx      Allergies  Allergies   Allergen Reactions   • Erythromycin        Medications: The current medication list was reviewed in the EMR.    Review of Systems  GENERAL: No change in appetite or weight; No fevers, chills, sweats.    SKIN: No nonhealing lesions. No rashes.  HEME/LYMPH: No easy bruising, bleeding. No swollen nodes.   EYES: No vision changes or diplopia.   ENT: No tinnitus, hearing loss, gum bleeding, epistaxis, hoarseness or dysphagia.   RESPIRATORY: No cough, shortness of breath, hemoptysis or wheezing.   CVS: No chest pain, palpitations, orthopnea, dyspnea on exertion or PND.   GI: No melena or hematochezia. No abdominal pain.No nausea, vomiting, constipation, diarrhea  : No lower tract obstructive symptoms, dysuria or hematuria.   MUSCULOSKELETAL: No bone pain.No joint stiffness.   NEUROLOGICAL: No global weakness, loss of consciousness or seizures.   PSYCHIATRIC: No increased nervousness, mood changes or depression.     Objective    Objective:     Vitals:    06/26/17 1444   BP: 108/58   Pulse: 80   Resp: 16   Temp: 97.8 °F (36.6 °C)   SpO2: 97%   Weight: 146 lb 6.4 oz (66.4 kg)   Height: 66.54\" (169 cm)   PainSc: 0-No pain     Current Status 6/26/2017   ECOG score 0     GENERAL:  Well-developed, well-nourished female in no acute distress.   SKIN:  Warm, dry without rashes, purpura or petechiae.  HEAD:  Normocephalic.  EYES:  EOMs intact.  Conjunctivae normal.  EARS:  Hearing intact.  NOSE:  Septum midline.  No excoriations or nasal discharge.  MOUTH:  Tongue is well-papillated; no stomatitis or ulcers.  Lips normal.  CHEST:  Lungs clear to percussion and auscultation. Good airflow.  CARDIAC:  Regular rate and rhythm without murmurs, rubs or gallops. Normal " S1,S2.  EXTREMITIES:  No clubbing, cyanosis or edema.     Labs and Imaging  Results for orders placed or performed during the hospital encounter of 06/19/17   POC Creatinine   Result Value Ref Range    Creatinine 0.80 0.60 - 1.30 mg/dL     Ct Chest With Contrast    Result Date: 6/20/2017  Narrative: CT SCAN OF THE CHEST WITH INTRAVENOUS CONTRAST  HISTORY: Follow-up of tiny pulmonary nodules.  FINDINGS:  The CT scan was performed through the chest with intravenous contrast and compared to the previous chest CT scan of 03/30/2017. The following findings are present:  1. There are 3 calcified granulomas in the lower left lung. There is also a larger partially calcified nodule with central calcification located in the left midlung posteriorly measuring 11 mm. This likely represents a partially calcified granuloma and is unchanged. There is some minimal pleural-based nodular density in the left upper lobe laterally that remains unchanged. There is also a slightly more nodular parenchymal density in the left lower lobe measuring 5 or 6 mm which is unchanged. This is best seen on image 66. This also shows no change from an abdominal CT scan dating back to 05/17/2011 and is therefore consistent with benign etiology.  2. There is also some minimal predominantly pleural-based parenchymal and pleural scarring in the left midlung posteromedially that remains stable. Lungs are otherwise clear. There is no mediastinal or hilar or axillary adenopathy.  3. There is a very small pericardial effusion which is unchanged. The CT images through the upper liver, spleen, both adrenal glands, and upper poles of both kidneys are unremarkable except for a small cortical cyst involving the right kidney.  This report was finalized on 6/20/2017 3:04 PM by Dr. Portillo Oliver MD.        Assessment/Plan   Assessment/Plan:   1. 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    * status post resection 1/2017   * status post  radiation therapy 4/14/2017 - 5/25/2017   * We previously discussed that intermediate grade mucoepidermoid tumors can have variable behaviors. She has met with multiple surgeons, a medical oncologist at [place], and our radiation oncologist. Staging CTs showed pulmonary nodules however they were favored to be benign.    * Following with Dr Calderon for surveillance.      2. History of bronchiolitis obliterans organizing pneumonia. Treated by Dr Sotelo about 15 years ago.   3. PATRICK pulmonary nodule. Repeat imaging shows stability and benign etiology is favored. She is seeing Dr Sotelo in a few weeks, and since we suspect benign etiology, I will defer repeat imaging of these pulmonary nodules to him.       Follow-up as needed since she'll be following with Dr Calderon and Dr Sotelo. I asked the patient to call for any questions, concerns, or new symptoms.

## 2017-07-28 ENCOUNTER — OFFICE VISIT (OUTPATIENT)
Dept: OTHER | Facility: HOSPITAL | Age: 67
End: 2017-07-28

## 2017-07-28 VITALS
WEIGHT: 143 LBS | TEMPERATURE: 97 F | HEIGHT: 67 IN | OXYGEN SATURATION: 99 % | HEART RATE: 69 BPM | BODY MASS INDEX: 22.44 KG/M2 | SYSTOLIC BLOOD PRESSURE: 112 MMHG | RESPIRATION RATE: 18 BRPM | DIASTOLIC BLOOD PRESSURE: 76 MMHG

## 2017-07-28 DIAGNOSIS — C80.1 MUCOEPIDERMOID CARCINOMA (HCC): Primary | ICD-10-CM

## 2017-07-28 PROCEDURE — 99215 OFFICE O/P EST HI 40 MIN: CPT | Performed by: NURSE PRACTITIONER

## 2017-07-28 PROCEDURE — G0463 HOSPITAL OUTPT CLINIC VISIT: HCPCS | Performed by: NURSE PRACTITIONER

## 2017-07-28 NOTE — PROGRESS NOTES
[facility] CANCER SURVIVORSHIP VISIT NOTE    Iesha Roldan is a pleasant 67 y.o.   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 Here today to review survivorship care plan.    TREATMENT HISTORY:   67 year old female with intermediate grade mucoepidermoid carcinoma.  Patient noticed swelling behind the right ear over a few months and ultimately underwent evaluation by ENT. On January 31, 2017, she underwent parotidectomy on the right side with pathology showing intermediate grade mucoepidermoid carcinoma.  This surgery was limited due to concern for facial nerve paralysis. Pathology showed a 1.2 cm tumor with negative but close margins.      On February 13, 2017 patient presented to the [facility] for further evaluation.  MRI of the face showed a 2.7 cm peripheral enhancement in the lateral aspect of the right parotid gland, representing postoperative changes versus residual cancer.  There was also an abnormal T1 hypointensity of the C4 vertebral body that was likely representing an atypical hemangioma, but CT was recommended.  While at the [facility] she saw ENT as well.  It was not felt that she would need adjuvant chemotherapy but felt that she would need either radiation therapy, observation, or additional surgery.  She received a second opinion multidisciplinary consult here in [place] and the recommendation was to proceed with radiation.        Additional evaluation with imaging included a PET CT scan that revealed hypermetabolism in the right parotid surgical bed was nonspecific.  There is no evidence for gricel metastases or distant metastases.  The CT of the cervical spine that was done to follow-up on the abnormal MRI showed a sclerotic C4 vertebral body and the findings are suggestive of a hemangioma.  There also was a 4 mm left upper lobe pulmonary nodule  for which further evaluation with a chest CT scan was recommended.      3/31/2017 CT chest w contrast shows two very small pulmonary nodules. These are indeterminate and Dr Isabel did recommend proceeding forward with radiation for the mucoepidermoid tumor with plan to repeat CT scan of the chest in 3-4 months.    She underwent radiation to the right parotid from 4/14/2017 - 5/25/2017 and tolerated the treatments very well, with only the expected mild mucositis and sore throat.    She's very active and really asymptomatic. 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     Repeat imaging of PATRICK pulmonary nodule shows stability and benign etiology is favored. 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    Patient following with Dr Calderon for surveillance.      Allergies as of 07/28/2017 - Herrera as Reviewed 07/28/2017   Allergen Reaction Noted   • Erythromycin  04/18/2016       MEDICATIONS:  I have reviewed the medication list with the patient and I updated it in the electronic medical record. Medication dosages and frequencies were confirmed to be accurate with the patient.      Review of Systems   Constitutional: Positive for fatigue. Negative for appetite change.   HENT: Positive for dental problem and sore throat. Negative for trouble swallowing.    Respiratory: Negative for choking, chest tightness and shortness of breath.    Cardiovascular: Negative for chest pain and leg swelling.   Gastrointestinal: Negative for abdominal pain, constipation, diarrhea and nausea.   Genitourinary: Negative for difficulty urinating and dysuria.  "  Musculoskeletal: Positive for neck stiffness.        Neck \"aches, feels tired\"   Skin: Negative for rash and wound.   Neurological: Positive for headaches. Negative for dizziness, seizures, light-headedness and numbness.   Psychiatric/Behavioral: Positive for decreased concentration. The patient is nervous/anxious.         Fear of recurrence       DISTRESS QUESTIONNAIRE: 5-6/10 EFFECT TO LEVEL OF FUNCTIONING: moderate  Patient identified areas of distress: see flowsheet    FATIGUE: 5/10    /76  Pulse 69  Temp 97 °F (36.1 °C) (Oral)   Resp 18  Ht 67\" (170.2 cm)  Wt 143 lb (64.9 kg)  SpO2 99% Comment: room air  BMI 22.4 kg/m2    Pain Score    07/28/17 1246   PainSc: 0-No pain         Physical Exam   Constitutional: She is oriented to person, place, and time. Vital signs are normal. She appears well-developed and well-nourished. She is cooperative.   HENT:   Head: Normocephalic and atraumatic.   Mouth/Throat: Oropharynx is clear and moist and mucous membranes are normal.   Neck: Normal range of motion.   Cardiovascular: Normal rate, regular rhythm and intact distal pulses.    Pulmonary/Chest: Effort normal. No respiratory distress.   Abdominal: Soft. Normal appearance.   Neurological: She is alert and oriented to person, place, and time.   Skin: Skin is warm, dry and intact.   Psychiatric: Her speech is normal and behavior is normal. Judgment and thought content normal. Her mood appears anxious.         Problem List Items Addressed This Visit        Other    Mucoepidermoid carcinoma of RIGHT parotid gland - Primary            SURVIVORSHIP DISCUSSION HELD TODAY:   Primary patient goal(s): employment rights, late and long-term effects of dz/tx, wellness and treatment decisions     Management of disease and treatment related effects: Iesha has had some persistent dry mouth. She had been using chlorhexidine but she felt like this was contributing to mouth/jaw pain and swelling and so stopped using it " "about a week ago and has noticed some improvement.       Psychosocial and spiritual: Iesha's biggest concern today was related to uncertainty. She consulted with many physicians during her treatment and is understandably concerned over the possibility of anything falling through the cracks. She describes feeling a bit adrift and desiring closure. We discussed that many of the feelings are normal. Her spouse has been a great source of support for her. She also mentions connecting with several friends also living with a cancer diagnosis. She is quite knowledgeable regarding her health and treatment history and I applauded her consistent efforts to advocate for herself. She has met with social work in the past and expressed appreciation for the contact but does not feel the need to pursue additional supportive counseling in that way.      Maintaining personal wellness: Iesha presents with many questions regarding her treatment decisions. She brought up her concern that recurrence is most likely for her within the first two years post-diagnosis. We did spend some time in review of NCCN guidelines for treatment of salivary cancers and I believe this provided her some relief. She is understandably concerned about her dental health particularly since she sometimes feels like \"my whole mouth hurts.\" She will be following with her dentist every three months. We reviewed s/s requiring evaluation. We reviewed her surveillance schedule. Reviewed wellness and cancer screening recommendations. She is scheduled for follow-up with Dr Calderon on Tuesday and her dentist also on Tuesday. We discussed the importance of self-care and strategies for coping with uncertainty. We discussed availability of Reiki and Massage therapy here at the cancer resource center and written information provided.         I will follow-up with Iesha by phone next week after she meets with Dr Calderon and her dentist to assess for additional needs " at that time.    After review of the Survivorship Treatment Summary & Care Plan, the patient verbalized understanding of reccommendations for follow-up. As outlined in the care plan, they were advised to continue with follow-up care in accordance with the NCCN surveillance guidelines while transitioning back to their primary care physician for continued general preventive and healthcare needs. We discussed the importance of healthy eating, exercise and weight management. We reviewed current guidelines for routine screening of other cancers.     A copy of the Survivorship Treatment Summary & Care Plan for Ms. Roldan (see below) was provided to her and forwarded to the providers identified on the care team.    60 minutes out of 70 minutes face-to-face spent counseling patient extensively on treatment summary, surveillance for recurrence, late and long-term effects of disease and treatment, coordination of care, intimacy and self-image, preventative screening for other cancers, achieving/maintatining healthy BMI and link to risk reduction, adherence to current therapies, management of anxiety/uncertainty and self care strategies.       This Cancer Care Plan will facilitate cancer care following active treatment. It may include important contact information, a treatment summary, recommendations for follow-up care testing, a directory of support services and resources, and other information. [1]                 Care Plan          Prepared by:    ROBERT Verdugo DNP on 7/10/2017 at [facility]                         General Information         Patient Name CHENTE ROLDAN   Medical record number [id number]   Phone (home) [phone]   Phone (cell) [phone]   Email [email or web address]   Date of birth 1950   Age at diagnosis 66   Support contact ROBERT VERDUGO, [phone]                  Care team    Hematologist/oncologist TONY CARLOS MD, [phone]   Surgeon ANAMARIA  YADY PEREZ, [phone]   Radiation oncologist JEWEL RODRIGUEZ MD, [phone]   Primary care physician BRENNA GALVEZ MD, [phone]   Nurse/nurse practitioner    Mental health/    Pulmonology MARIA TERESA SOTELO MD, [phone]   Gynecologist AFRICA GALLEGOS MD, [phone]               Background Information         Symptoms/signs Developed nodule near right ear   Family history/predisposing conditions No   Other health concerns GERD, hyperglycemia, seasonal allergies   Tobacco use-past No   Tobacco use-current No   Cancer type/location Salivary Gland   Diagnosis date 1/31/2017   New or recurrent cancer diagnosis New   Surgery Curative resection   Surgical procedure & findings Less than total parotidectomy w/facial nerve spared   Tumor type/histology/grade Malignant, Poorly differentiated mucoepidermoid carcinoma                  Staging study Date Findings   MRI head/face at [facility] 2/13/2017 1.6 x 2.2 x 2.7 cm T2 hyperintensity of peripheral enhancement in the right lateral aspect of the parotid which would be her operative site   PET scan 3/3/2017 IMP: The hypermetabolic activity at the right parotid surgical bed is nonspecific and may represent residual postsurgical change or possibly radiation change if patient has undergone radiation to the right parotid gland. Residual malignancy can't be                T stage T1   N stage N0   M stage M0   Stage I   Location(s) of metastasis or recurrence NONE   Comments Referral to Radiation Oncology for consideration of radiation therapy due to close margin upon surgery               Treatment Plan & Summary         Patient's height 67 in       Pre-Treatment Post-Treatment   Patient's weight 148 lb. 146 lb.   Patient's BSA 1.78 m² 1.77 m²   Patient's BMI 23.2 22.9   Comments  6/20/2017 CT scan of chest with contrast showed unchanged lung nodules, following with Dr Calderon and Dr Sotelo                  Treatment on clinical trial No       Chemotherapy agents  Route Dose Schedule # cycles % dose reduction                          Non-chemotherapy agents Route Purpose/goal Comments                     Radiation therapy 6000cGy x30 treatments, Administered, to Right parotid, 4/14/2017-5/25/2017               Follow-up Care           Follow-up care When/How often? Coordinating provider   Medical oncology visits Year 1, every 1-3 months; year 2, every 2-6 months; years 3-5, every 4-8 months; >5 years, every 12 months. ANAMARIA CONTEH MD   Lab tests TSH (check thyroid function) every 6-12 months if neck irradiated. Other labs as clinically indicated. ANAMARIA CONTEH MD   Imaging As clinically indicated. ANAMARIA CONTEH MD   Pulmonology MARIA TERESA BRYAN MD Follow-up as needed                  Potential late effects of treatment Permanent skin changes including hyperpigmentation, telangiectasias, fibrosis of the skin and surrounding tissue/muscle resulting in smaller size, poor cosmetic outcome, late edema or cellulitis, late rib fracture, late cardiac damage resulting in slight increased risk of heart attack, late pulmonary fibrosis, lymphedema, brachialplexopathy resulting in numbness and the remote risk of second malignancies.    Symptoms to watch for Swelling anywhere in head and neck or in an area that does not heal; red or white patch in mouth; lumps, bumps or masses; foul oral cavity odor independent of hygiene practices; persistent nasal obstruction or congestion; frequent nose bleeds; unusual discharge from nose; difficulty breathing; double vision; numbness/weakness; ear or jaw pain; difficulty chewing, swallowing or moving jaw or tongue; blood in saliva or phlegm; loose teeth; ill-fitting dentures; unexplained weight loss and/or fatigue.               Needs or concerns    Prevention & wellness Continue all standard non-cancer related healthcare with your primary care provider. Any new, unusual, and/or persistent symptoms should be brought to the attention of your provider.  "  Emotional or mental health Psychiatric care or counseling and/or initiation of pharmacotherapy are available at the [facility]. Please call [phone] or talk to a member of your treatment team about a referral.   Financial advice or assistance For information about resources that are available you can go to the NCI database, “Organizations That Offer Support Services,” at http://www.cancer.gov, search terms “financial assistance.”   Other cancer screening Annual mammogram, pelvic exam, PAP test. Next Mammogram scheduled 3/6/2018. Colonoscopy every 5-10 years if last one was normal. Last colonoscopy completed 2/27/2017   Reducing risk of lymphedema Maintain a healthy weight, avoid restrictive clothing, limit exposure to extreme heat or cold, seek medical evaluation for signs of infection including temperature >100.5 F, skin redness, swelling, warmth or pain   Memory and concentration Some things you can do to manage \"brain fog\" or memory problems include doing mental exercises (e.g., word games, crossword puzzles), setting up and following routines, repeating information, keeping memory journal, avoiding multitasking, exercise   Maintaining a healthy weight Aim for a target BMI of 20-25 m2; Consultations with a registered dietician are available at the cancer resource center. Please call [phone] for more information   Sexuality and intimacy Evaluation and treatment for anxiety, depression, as appropriate; Visit [email or web address]! for more information   Dry mouth Increase hydration; Alcohol free mouthwash, Peppermint water (possible interaction with the drug metoclopramide/Reglan); OTC artificial saliva/moisturizers (Biotene Dry Mouth Oral Rinse, Act Dry Mouth Mouthwash).   Vaccinations Annual trivalent inactivated influenza vaccination; Pneumococcal vaccination with revaccination as appropriate   Fear of recurrence Patients need to know that these feelings are normal, " and they won’t cause the cancer to come back. Support available through American Cancer Society (www.cancer.org, 946.598.3416), Cancer Support Community (www.[employer].org, 182.410.9848).   Alcohol intake Limitation of alcohol intake to less than 1 drink, 2-3x per week   Fatigue Regular physical activity (e.g., walking 20 minutes daily); Evaluation for hypothyroidism, anemia, depression               Referrals provided    Symptoms of head and neck lymphedema Swelling or eyes, face, lips, neck or area below chin; discomfort or tightness in any of those areas, difficulty moving neck, jaw or shoulders; scarring of neck or facial skin; difficulty swallowing speaking or breathing, drooling, congestion.                    Gwendolyn Dozier DNP, APRN, AGCNS-BC, OCN  Oncology Survivorship Clinical Nurse Specialist  [facility]

## 2017-08-29 ENCOUNTER — TRANSCRIBE ORDERS (OUTPATIENT)
Dept: ADMINISTRATIVE | Facility: HOSPITAL | Age: 67
End: 2017-08-29

## 2017-08-29 ENCOUNTER — OFFICE VISIT (OUTPATIENT)
Dept: INTERNAL MEDICINE | Facility: CLINIC | Age: 67
End: 2017-08-29

## 2017-08-29 VITALS
HEART RATE: 76 BPM | OXYGEN SATURATION: 98 % | WEIGHT: 144 LBS | BODY MASS INDEX: 22.6 KG/M2 | RESPIRATION RATE: 18 BRPM | DIASTOLIC BLOOD PRESSURE: 64 MMHG | SYSTOLIC BLOOD PRESSURE: 110 MMHG | HEIGHT: 67 IN

## 2017-08-29 DIAGNOSIS — C80.1 MUCOEPIDERMOID CARCINOMA (HCC): Primary | ICD-10-CM

## 2017-08-29 DIAGNOSIS — R51.9 HEADACHE, UNSPECIFIED HEADACHE TYPE: ICD-10-CM

## 2017-08-29 DIAGNOSIS — C07 CANCER OF PAROTID GLAND (HCC): Primary | ICD-10-CM

## 2017-08-29 DIAGNOSIS — F41.9 ANXIETY: ICD-10-CM

## 2017-08-29 DIAGNOSIS — M54.2 NECK PAIN: ICD-10-CM

## 2017-08-29 PROCEDURE — 99214 OFFICE O/P EST MOD 30 MIN: CPT | Performed by: INTERNAL MEDICINE

## 2017-08-29 RX ORDER — DULOXETIN HYDROCHLORIDE 30 MG/1
30 CAPSULE, DELAYED RELEASE ORAL DAILY
Qty: 30 CAPSULE | Refills: 5 | Status: SHIPPED | OUTPATIENT
Start: 2017-08-29 | End: 2017-10-30

## 2017-08-29 NOTE — PROGRESS NOTES
Chief Complaint   Patient presents with   • Radiation     follow up from radiation treatments   right parotid gland cancer (mucoepidermioid cancer)    History of Present Illness   Iesha Roldan is a 67 y.o. female presents for follow up evlaution. She has a right parotid mucoepidermoid cyst. She is s/p resection and radiation. She had some post radiation pain/ in her ear w/ incisional wound and drainage. She followed up w/ ENT and was started on topical wound care and this has resolved. She in general has done well after radiation. She does report a headache in the back of her neck and top of the neck and top of the head. She does have some continued anxious, depressed symptoms, and anger related to both her diagnosis and the challenges that were presented during her treatment when her radiation oncologist was on leave and she was not initially advised of this and she had some challenges getting to a different md. Previously tried prozac for mood and and developed muscle aches diffusely.     The following portions of the patient's history were reviewed and updated as appropriate: allergies, current medications, past family history, past medical history, past social history, past surgical history and problem list.  Current Outpatient Prescriptions on File Prior to Visit   Medication Sig Dispense Refill   • ALPRAZolam (XANAX) 0.25 MG tablet Take 1 tablet by mouth 2 (Two) Times a Day As Needed for anxiety. 20 tablet 2   • hydrocortisone 2.5 % lotion Apply  topically As Needed.     • [DISCONTINUED] chlorhexidine (PERIDEX) 0.12 % solution        No current facility-administered medications on file prior to visit.      Review of Systems   Constitutional: Positive for fatigue.        Occasional fatigue. Overall doing well   HENT: Positive for sore throat.         Post radiation throat discomfort   Eyes: Negative.    Respiratory: Negative.    Cardiovascular: Negative.    Gastrointestinal: Negative.    Endocrine:  "Negative.    Genitourinary: Negative.    Musculoskeletal: Positive for arthralgias and neck pain.   Skin: Negative.    Allergic/Immunologic: Negative.    Neurological: Negative.    Hematological: Negative.    Psychiatric/Behavioral: Positive for sleep disturbance.       Objective   Physical Exam   Constitutional: She is oriented to person, place, and time. She appears well-developed and well-nourished.   HENT:   Head: Normocephalic and atraumatic.   Right Ear: External ear normal.   Left Ear: External ear normal.   Nose: Nose normal.   Mouth/Throat: Oropharynx is clear and moist.   Eyes: EOM are normal. Pupils are equal, round, and reactive to light.   Neck: Neck supple.   Cardiovascular: Normal rate, regular rhythm and normal heart sounds.    Pulmonary/Chest: Effort normal and breath sounds normal. No respiratory distress.   Abdominal: Soft. Bowel sounds are normal.   Musculoskeletal: Normal range of motion.   Neurological: She is alert and oriented to person, place, and time.   Skin: Skin is warm and dry.   Psychiatric: She has a normal mood and affect. Her behavior is normal. Judgment and thought content normal.   Nursing note and vitals reviewed.       /64  Pulse 76  Resp 18  Ht 67\" (170.2 cm)  Wt 144 lb (65.3 kg)  SpO2 98%  BMI 22.55 kg/m2    Assessment/Plan   Diagnoses and all orders for this visit:    Headache, unspecified headache type  -     Ambulatory Referral to Physical Therapy Evaluate and treat    Neck pain  -     Ambulatory Referral to Physical Therapy Evaluate and treat    Mucoepidermoid carcinoma of RIGHT parotid gland    Anxiety    Other orders  -     DULoxetine (CYMBALTA) 30 MG capsule; Take 1 capsule by mouth Daily.      Patient w/ mucoepidermoid carcinoma of the right parotid gland. She is now s/p resection and xrt. She has healed well. She will continue to maintain hydration. She has some post radiation throat discomfort and will try berry magic mouthwash prn for this. She has had " some challenges through her therapy and feel that she would benefit from cymbalta daily and may add psychotherapy. She is having what sounds like a tension type headache. To try physical therapy for neck tension and headache. She needs to offload her shoulders and is instructed to get a bag on wheels. She should use caution w/ typing and always keep her head in a neutral position. She will f/u w/ oncology and ENT routinely. F/u here in 3 mo or prn.

## 2017-09-22 ENCOUNTER — APPOINTMENT (OUTPATIENT)
Dept: PHYSICAL THERAPY | Facility: HOSPITAL | Age: 67
End: 2017-09-22

## 2017-09-26 ENCOUNTER — HOSPITAL ENCOUNTER (OUTPATIENT)
Dept: PHYSICAL THERAPY | Facility: HOSPITAL | Age: 67
Setting detail: THERAPIES SERIES
Discharge: HOME | End: 2017-09-26

## 2017-09-26 DIAGNOSIS — R26.2 DIFFICULTY IN WALKING: ICD-10-CM

## 2017-09-26 DIAGNOSIS — Z78.9 DIFFICULTY WITH ACTIVITIES OF DAILY LIVING: Primary | ICD-10-CM

## 2017-09-26 DIAGNOSIS — M54.2 NECK PAIN: ICD-10-CM

## 2017-09-26 PROCEDURE — G8981 BODY POS CURRENT STATUS: HCPCS | Performed by: PHYSICAL THERAPIST

## 2017-09-26 PROCEDURE — 97161 PT EVAL LOW COMPLEX 20 MIN: CPT | Performed by: PHYSICAL THERAPIST

## 2017-09-26 PROCEDURE — G8982 BODY POS GOAL STATUS: HCPCS | Performed by: PHYSICAL THERAPIST

## 2017-09-26 PROCEDURE — 97110 THERAPEUTIC EXERCISES: CPT | Performed by: PHYSICAL THERAPIST

## 2017-09-26 NOTE — THERAPY EVALUATION
Outpatient Physical Therapy Ortho Initial Evaluation/Treatment Note  [facility]     Patient Name: Iesha Roldan  : 1950  MRN: [id number]  Today's Date: 2017      Visit Date: 2017    Patient Active Problem List   Diagnosis   • ADD (attention deficit disorder)   • Allergic rhinitis   • Fatigue   • Acid reflux   • Colon cancer screening   • Abnormal EKG   • Anxiety   • Hyperglycemia   • Mucoepidermoid carcinoma of RIGHT parotid gland   • Abnormal MRI, cervical spine, not thoracic; C4 to be specific        Past Medical History:   Diagnosis Date   • Acute bronchitis    • Fracture, hip    • Gastroenteritis    • H/O Lung nodule    • Mucoepidermoid carcinoma    • Neck pain    • Osteopenia    • Pharyngitis         Past Surgical History:   Procedure Laterality Date   • HIP SURGERY     • PAROTIDECTOMY Right 2017    Procedure: RIGHT SUPERFICIAL PAROTIDECTOMY EXCISION PAROTID MASS ;  Surgeon: Candelario Calderon MD;  Location: [facility] OR [facility] [place];  Service:        Visit Dx:     ICD-10-CM ICD-9-CM   1. Difficulty with activities of daily living R53.81 799.3   2. Difficulty in walking R26.2 719.7   3. Neck pain M54.2 723.1             Patient History       17 0900          History    Chief Complaint Difficulty Walking;Difficulty with daily activities;Pain  -MP      Type of Pain Neck pain;Shoulder pain  -MP      Date Current Problem(s) Began 06/15/17  -MP      Brief Description of Current Complaint Iesha reports that she experienced minor neck related issues secondary to postural challenges before 2016.  She was diagnosed with Muroepidnoid cancer, underwent surgery and radiation, during that time was not working regularly but once back at work full time in 2017, noticed that she experienced greater neck pain, B shoulder pain and headaches.  She cut her work hours back in August due to this problem.  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  -MP      Patient seeing anyone else for problem(s)? Yes   PCP, Camila Mejia M.D.  -MP      How has patient tried to help current problem? Changing posture, repositioning and doing some lower back exercises.  -MP      Pain     Pain Location Neck;Shoulder  -MP      Pain at Present 6  -MP      Pain at Best 0  -MP      Pain at Worst 6  -MP      Pain Frequency Intermittent  -MP      Pain Description Aching;Sharp  -MP      What Performance Factors Make the Current Problem(s) WORSE? Sitting, sleeping, reading  -MP      What Performance Factors Make the Current Problem(s) BETTER? Change positions  -MP      Tolerance Time- Standing 3 minutes  -MP      Tolerance Time- Sitting 1 minute  -MP      Tolerance Time- Walking Can do but not comfortable  -MP      Tolerance Time- Lying 2-3 minutes  -MP      Is your sleep disturbed? Yes  -MP      Total hours of sleep per night 6-7 hours but interrupted  -MP      Fall Risk Assessment    Any falls in the past year: No  -MP      Services    Prior Rehab/Home Health Experiences No  -MP      Do you plan to receive Home Health services in the near future No  -MP      Daily Activities    Primary Language English  -MP      Are you able to read Yes  -MP      Are you able to write Yes  -MP      How does patient learn best? Listening;Reading;Demonstration;Pictures/Video  -MP      Pt Participated in POC and Goals Yes  -MP      Safety    Are you being hurt, hit, or frightened by anyone at home or in your life? No  -MP      Are you being neglected by a caregiver No  -MP        User Key  (r) = Recorded By, (t) = Taken By, (c) = Cosigned By     Initials Name Provider Type    BRANDEN Neri PT Physical Therapist                PT Ortho       09/26/17 1200    Posture/Observations    Posture/Observations Comments In standing, posterior view of the spine, L shoulder, scapula and ilium are all higher than the R.  There is a slight scoliosis with L lumbar concavity.  Laterally, she presents with forward head, Dowager's hump at the CT junction, increased thoracic kyphosis and lumbar lordosis WNL.    -MP    ROM (Range of Motion)    General ROM Detail In sitting, C spine AROM, flexion 56 degrees with end range pain, extension, 30 degees with end range pain, rotation L 53 degrees with end range pain, R 51 degrees with end range pain, LB L 15 degrees with end range pain and R 17 degrees with end range pain.  -MP    MMT (Manual Muscle Testing)    General MMT Assessment Detail Motor: C2-T1 myotomes were equal/intact, no fatiguing weakness and grossly rated at 4 to 4+/5 B.  Sensation: C2-T2 dermatomes were intact/equal B to perception to light touch, DTRs B UEs were equal/trace and upper motor neuron testing, Cogwheel Clonus and Larsen's sign were negative.  -MP    Pathomechanics    Pathomechanics Comments C spine compression tests, neutral, negative but other positions were positive, Decompression test was negative  -MP      User Key  (r) = Recorded By, (t) = Taken By, (c) = Cosigned By    Initials Name Provider Type    BRANDEN Neri PT Physical Therapist                  Therapy Education       09/26/17 1209          Therapy Education    Education Details Lying CV flexion was issued to begin his HEP.  -MP      Given HEP  -MP      Program New  -MP      How Provided Written  -MP      Provided to Patient  -MP      Level of Understanding Teach back education performed  -MP        User Key  (r) = Recorded By, (t) = Taken By, (c) = Cosigned By    Initials Name Provider Type    BRANDEN Neri PT Physical Therapist                PT OP Goals       09/26/17 1200        PT Short Term Goals    STG Date to Achieve 10/26/17  -MP     STG 1 Decompression manual therapy for the C spine is begun.  -MP     STG 1 Progress New  -MP     STG 2 Postural exercises in lying to include neck rotation and wand flexion are added to her HEP  -MP     STG 2 Progress New  -MP     STG 3 Postural PREs are begun in standing to promote improved posture and stability in standing.  -MP     STG 3 Progress New  -MP     Long Term Goals    LTG Date to Achieve 11/24/17  -MP     LTG 1 Iesha is experiencing zero neck and B shoulder pain.  -MP     LTG 1 Progress New  -MP     LTG 2 C spine AROM, sitting, is pain free in cardinal planes.  -MP     LTG 2 Progress New  -MP     LTG 3 She is independent with a complete HEP and all self care education.  -MP     LTG 3 Progress New  -MP     LTG 4 NDI score is reduced to below 20%.  -MP     Time Calculation    PT Goal Re-Cert Due Date 10/26/17  -MP       User Key  (r) = Recorded By, (t) = Taken By, (c) = Cosigned By    Initials Name Provider Type    MP Herrera Neri, PT Physical Therapist                PT Assessment/Plan       09/26/17 1216       PT Assessment    Functional Limitations Impaired locomotion;Limitations in functional capacity and performance;Limitations in community activities;Performance in leisure activities  -MP     Impairments Pain;Poor body mechanics;Posture;Endurance;Range of motion  -MP     Assessment Comments C spine strain.  She has poor posture, contributing degenerative changes of the spine that has been further exacerbated by the cancer treatment in the neck area.  -MP     Please refer to paper survey for additional self-reported information Yes  -MP     Rehab Potential Good  -MP     Patient/caregiver participated in establishment of treatment plan and goals Yes  -MP     Patient would benefit from skilled therapy intervention Yes  -MP     PT Plan    PT Frequency 2x/week  -MP     Predicted Duration of Therapy Intervention (days/wks) 4-6 weeks  -MP      Planned CPT's? PT EVAL LOW COMPLEXITY: 15213;PT THER PROC EA 15 MIN: 24589;PT RE-EVAL: 83601;PT MANUAL THERAPY EA 15 MIN: 32647;PT SELF CARE/HOME MGMT/TRAIN EA 15: 87804;PT ELECTRICAL STIM UNATTEND:   -MP     PT Plan Comments Begin low intensity manual therapy to decompress the spine and progress postural exercises next visit.  -MP       User Key  (r) = Recorded By, (t) = Taken By, (c) = Cosigned By    Initials Name Provider Type    BRANDEN Neri PT Physical Therapist                  Exercises       09/26/17 1200          Exercise 1    Exercise Name 1 In hooklying, towel roll under cervical spine lordosis, CV flexion x 20  -MP        User Key  (r) = Recorded By, (t) = Taken By, (c) = Cosigned By    Initials Name Provider Type    BRANDEN Neri PT Physical Therapist                 Outcome Measures       09/26/17 1200          Neck Disability Index    Section 1 - Pain Intensity 2  -MP      Section 2 - Personal Care 0  -MP      Section 3 - Lifting 1  -MP      Section 4 - Work 3  -MP      Section 5 - Headaches 3  -MP      Section 6 - Concentration 1  -MP      Section 7 - Sleeping 3  -MP      Section 8 - Driving 2  -MP      Section 9 - Reading 3  -MP      Section 10 - Recreation 2  -MP      Neck Disability Index Score 20  -MP      Neck Disability Index Comments 20/50, 40% disability  -MP      Functional Assessment    Outcome Measure Options Neck Disability Index (NDI)  -MP        User Key  (r) = Recorded By, (t) = Taken By, (c) = Cosigned By    Initials Name Provider Type    BRANDEN Neri PT Physical Therapist            Time Calculation:   Start Time: 0930  Stop Time: 1015  Time Calculation (min): 45 min     Therapy Charges for Today     Code Description Service Date Service Provider Modifiers Qty    96607864863 HC PT CHNG MAIN POS CURRENT 9/26/2017 Herrera Neri PT GP, CJ 1    71074666511 HC PT CHNG MAIN POS PROJECTED 9/26/2017 Herrera Neri PT GP, CI 1    13289975831 HC PT THER PROC EA 15 MIN  9/26/2017 Herrera Neri, PT GP 1    81326933013 HC PT EVAL LOW COMPLEXITY 2 9/26/2017 Herrera Neri, PT GP 1          PT G-Codes  Outcome Measure Options: Neck Disability Index (NDI)  Score: 20/50, 40% disability  Functional Limitation: Changing and maintaining body position  Changing and Maintaining Body Position Current Status (): At least 20 percent but less than 40 percent impaired, limited or restricted  Changing and Maintaining Body Position Goal Status (): At least 1 percent but less than 20 percent impaired, limited or restricted         Herrera Neri, PT  9/26/2017

## 2017-09-29 ENCOUNTER — APPOINTMENT (OUTPATIENT)
Dept: CT IMAGING | Facility: HOSPITAL | Age: 67
End: 2017-09-29
Attending: OTOLARYNGOLOGY

## 2017-09-29 ENCOUNTER — HOSPITAL ENCOUNTER (OUTPATIENT)
Dept: CT IMAGING | Facility: HOSPITAL | Age: 67
Discharge: HOME | End: 2017-09-29
Attending: OTOLARYNGOLOGY | Admitting: OTOLARYNGOLOGY

## 2017-09-29 DIAGNOSIS — C07 CANCER OF PAROTID GLAND (HCC): ICD-10-CM

## 2017-09-29 LAB — CREAT BLDA-MCNC: 0.9 MG/DL (ref 0.6–1.3)

## 2017-09-29 PROCEDURE — 0 IOPAMIDOL 61 % SOLUTION: Performed by: OTOLARYNGOLOGY

## 2017-09-29 PROCEDURE — 70491 CT SOFT TISSUE NECK W/DYE: CPT

## 2017-09-29 PROCEDURE — 82565 ASSAY OF CREATININE: CPT

## 2017-09-29 RX ADMIN — IOPAMIDOL 75 ML: 612 INJECTION, SOLUTION INTRAVENOUS at 09:12

## 2017-10-30 ENCOUNTER — TELEPHONE (OUTPATIENT)
Dept: INTERNAL MEDICINE | Facility: CLINIC | Age: 67
End: 2017-10-30

## 2017-10-30 ENCOUNTER — TRANSCRIBE ORDERS (OUTPATIENT)
Dept: ADMINISTRATIVE | Facility: HOSPITAL | Age: 67
End: 2017-10-30

## 2017-10-30 ENCOUNTER — OFFICE VISIT (OUTPATIENT)
Dept: INTERNAL MEDICINE | Facility: CLINIC | Age: 67
End: 2017-10-30

## 2017-10-30 VITALS
HEART RATE: 75 BPM | DIASTOLIC BLOOD PRESSURE: 60 MMHG | OXYGEN SATURATION: 96 % | SYSTOLIC BLOOD PRESSURE: 120 MMHG | WEIGHT: 145 LBS | BODY MASS INDEX: 22.71 KG/M2

## 2017-10-30 DIAGNOSIS — Z13.6 ENCOUNTER FOR SCREENING FOR VASCULAR DISEASE: Primary | ICD-10-CM

## 2017-10-30 DIAGNOSIS — C80.1 MUCOEPIDERMOID CARCINOMA (HCC): Primary | ICD-10-CM

## 2017-10-30 PROCEDURE — G0008 ADMIN INFLUENZA VIRUS VAC: HCPCS | Performed by: INTERNAL MEDICINE

## 2017-10-30 PROCEDURE — 99214 OFFICE O/P EST MOD 30 MIN: CPT | Performed by: INTERNAL MEDICINE

## 2017-10-30 NOTE — TELEPHONE ENCOUNTER
----- Message from Camila Mejia MD sent at 10/30/2017  1:35 PM EDT -----  [facility].     ----- Message -----     From: Pratima Mills MA     Sent: 10/30/2017  12:46 PM       To: Camila Mejia MD    Can not find this Drs name  ----- Message -----     From: Camila Mejia MD     Sent: 10/30/2017  11:30 AM       To: Pratima iMlls MA    Please send CT report to dr. Siu. Patient is scheduled to f/u there in January. Ask to have Dr. Siu review and see if he would like to see her before January?       Faxed to [phone]    Office number [phone]

## 2017-10-30 NOTE — PROGRESS NOTES
Chief Complaint   Patient presents with   • Follow-up   mucoepidermoid carcinoma    History of Present Illness   Iesha Roldan is a 67 y.o. female presents for follow up evaluation. 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 Ct scan w/ ? Area of residual. Now noted to have stability from PET in march. She is scheduled to f/u at [facility] in January. Has mild dry mouth that is treated with topical compound solution. She reports that she has improving energy and mood is stable although down when she had this news.        The following portions of the patient's history were reviewed and updated as appropriate: allergies, current medications, past family history, past medical history, past social history, past surgical history and problem list.  Current Outpatient Prescriptions on File Prior to Visit   Medication Sig Dispense Refill   • ALPRAZolam (XANAX) 0.25 MG tablet Take 1 tablet by mouth 2 (Two) Times a Day As Needed for anxiety. 20 tablet 2   • hydrocortisone 2.5 % lotion Apply  topically As Needed.     • [DISCONTINUED] DULoxetine (CYMBALTA) 30 MG capsule Take 1 capsule by mouth Daily. 30 capsule 5     No current facility-administered medications on file prior to visit.      Review of Systems   Constitutional: Negative.    HENT:        Dry mouth   Eyes: Negative.    Respiratory: Negative.    Cardiovascular: Negative.    Gastrointestinal: Negative.    Endocrine: Negative.    Genitourinary: Negative.    Musculoskeletal: Negative.    Skin: Negative.    Allergic/Immunologic: Negative.    Neurological: Negative.    Hematological: Negative.    Psychiatric/Behavioral: Negative.        Objective   Physical Exam   Constitutional: She is oriented to person, place, and time. She appears well-developed and well-nourished.   HENT:   Head: Normocephalic and atraumatic.   Right Ear: External ear normal.   Left Ear: External ear normal.   Nose: Nose normal.   Mouth/Throat: Oropharynx is clear and  moist.   Eyes: EOM are normal. Pupils are equal, round, and reactive to light.   Neck: Neck supple.   Cardiovascular: Normal rate, regular rhythm and normal heart sounds.    Pulmonary/Chest: Effort normal and breath sounds normal. No respiratory distress.   Abdominal: Soft.   Musculoskeletal: Normal range of motion.   Neurological: She is alert and oriented to person, place, and time.   Skin: Skin is warm and dry.   Psychiatric: She has a normal mood and affect. Her behavior is normal. Judgment and thought content normal.   Nursing note and vitals reviewed.       /60  Pulse 75  Wt 145 lb (65.8 kg)  SpO2 96%  BMI 22.71 kg/m2    Assessment/Plan   Diagnoses and all orders for this visit:    Mucoepidermoid carcinoma of RIGHT parotid gland      Patient presents for f/u on mucoepidermoid carcinoma of right parotid gland. She is s/p resection and xrt. ? Residual on ct scan. She will f/u w/ dr. Calderon and is also scheduled to f/u w/ Dr. Siu at [place]. Will send CT report to Dr. Siu and see if he would like to see patient sooner or if additional imaging desired. shaan will also mail a copy of the ct to his clinic. Patient reports that mood is now stable and she declines any medication for this.

## 2017-11-01 ENCOUNTER — DOCUMENTATION (OUTPATIENT)
Dept: PHYSICAL THERAPY | Facility: HOSPITAL | Age: 67
End: 2017-11-01

## 2017-11-01 ENCOUNTER — APPOINTMENT (OUTPATIENT)
Dept: PHYSICAL THERAPY | Facility: HOSPITAL | Age: 67
End: 2017-11-01

## 2017-11-01 NOTE — THERAPY DISCHARGE NOTE
Outpatient Physical Therapy Ortho Discharge       Patient Name: Iesha Roldan  : 1950  MRN: [id number]  Today's Date: 2017      Visit Date: 2017    Patient Active Problem List   Diagnosis   • ADD (attention deficit disorder)   • Allergic rhinitis   • Fatigue   • Acid reflux   • Colon cancer screening   • Abnormal EKG   • Anxiety   • Hyperglycemia   • Mucoepidermoid carcinoma of RIGHT parotid gland   • Abnormal MRI, cervical spine, not thoracic; C4 to be specific        Past Medical History:   Diagnosis Date   • Acute bronchitis    • Fracture, hip    • Gastroenteritis    • H/O Lung nodule    • Mucoepidermoid carcinoma    • Neck pain    • Osteopenia    • Pharyngitis         Past Surgical History:   Procedure Laterality Date   • HIP SURGERY     • PAROTIDECTOMY Right 2017    Procedure: RIGHT SUPERFICIAL PAROTIDECTOMY EXCISION PAROTID MASS ;  Surgeon: Candelario Calderon MD;  [place] [facility] [place] [facility] [place]  Service:          Visit Dx:   No diagnosis found.                PT OP Goals       17 1700       PT Short Term Goals    STG Date to Achieve 10/26/17  -MP     STG 1 Decompression manual therapy for the C spine is begun.  -MP     STG 1 Progress Not Met  -MP     STG 2 Postural exercises in lying to include neck rotation and wand flexion are added to her HEP  -MP     STG 2 Progress Not Met  -MP     STG 3 Postural PREs are begun in standing to promote improved posture and stability in standing.  -MP     STG 3 Progress Not Met  -MP     Long Term Goals    LTG Date to Achieve 17  -MP     LTG 1 Iesha is experiencing zero neck and B shoulder pain.  -MP     LTG 1 Progress Not Met  -MP     LTG 2 C spine AROM, sitting, is pain free in cardinal planes.  -MP     LTG 2 Progress Not Met  -MP     LTG 3 She is independent with a complete HEP and all self care education.  -MP     LTG 3 Progress Not Met  -MP     LTG 4 NDI score is reduced to below 20%.  -MP     LTG 4 Progress Not Met  -MP        User Key  (r) = Recorded By, (t) = Taken By, (c) = Cosigned By    Initials Name Provider Type    BRANDEN Neri, PT Physical Therapist                OP PT Discharge Summary  Date of Discharge: 11/01/17  Reason for Discharge: Unable to participate  Outcomes Achieved: Unable to make functional progress toward goals at this time  Discharge Destination: Unknown (No showed on appointments and would not return calls.)  Discharge Instructions: Ms. Roldan did not show for her appointments and return calls when we called concerning her attendance.        Herrera Neri, PT  11/1/2017

## 2017-11-07 ENCOUNTER — APPOINTMENT (OUTPATIENT)
Dept: PHYSICAL THERAPY | Facility: HOSPITAL | Age: 67
End: 2017-11-07

## 2017-11-09 ENCOUNTER — APPOINTMENT (OUTPATIENT)
Dept: PHYSICAL THERAPY | Facility: HOSPITAL | Age: 67
End: 2017-11-09

## 2017-11-15 ENCOUNTER — APPOINTMENT (OUTPATIENT)
Dept: PHYSICAL THERAPY | Facility: HOSPITAL | Age: 67
End: 2017-11-15

## 2017-11-20 ENCOUNTER — TELEPHONE (OUTPATIENT)
Dept: INTERNAL MEDICINE | Facility: CLINIC | Age: 67
End: 2017-11-20

## 2018-01-30 ENCOUNTER — TELEPHONE (OUTPATIENT)
Dept: INTERNAL MEDICINE | Facility: CLINIC | Age: 68
End: 2018-01-30

## 2018-01-30 NOTE — TELEPHONE ENCOUNTER
Pt called to say that she is Cancer Free and will be in to see you in a few weeks. Just wanted to say what great care she gets here.

## 2018-03-22 ENCOUNTER — OFFICE VISIT (OUTPATIENT)
Dept: INTERNAL MEDICINE | Facility: CLINIC | Age: 68
End: 2018-03-22

## 2018-03-22 VITALS
WEIGHT: 140 LBS | TEMPERATURE: 98.4 F | BODY MASS INDEX: 21.97 KG/M2 | DIASTOLIC BLOOD PRESSURE: 72 MMHG | RESPIRATION RATE: 18 BRPM | HEIGHT: 67 IN | SYSTOLIC BLOOD PRESSURE: 112 MMHG | OXYGEN SATURATION: 98 % | HEART RATE: 81 BPM

## 2018-03-22 DIAGNOSIS — J06.9 ACUTE URI: Primary | ICD-10-CM

## 2018-03-22 PROCEDURE — 99213 OFFICE O/P EST LOW 20 MIN: CPT | Performed by: INTERNAL MEDICINE

## 2018-03-22 RX ORDER — FLUOROURACIL 50 MG/G
CREAM TOPICAL
COMMUNITY
Start: 2018-03-21 | End: 2018-03-26

## 2018-03-22 NOTE — PROGRESS NOTES
Chief Complaint  Iesha Roldan is a 67 y.o. female who presents for URI (x2 days ago); Sore Throat (Started just being scratchy, but completely sore now. ); Cough (Feels it in her chest. ); Earache (Both ears hurt. ); and Fever (Unsure , but is back and forth between chills and being hot, taking ibuprofen. )  .    History of Present Illness   She started the day before yesterday with sore throat, cough, chest congestion.  She has sinus congestion.  No SOA.  + chills.  She hasn't been taking any mucinex.      Review of Systems   Constitution: Positive for chills and malaise/fatigue. Negative for fever.   HENT: Positive for congestion and sore throat. Negative for ear pain.    Respiratory: Positive for cough. Negative for shortness of breath.        Patient Active Problem List   Diagnosis   • ADD (attention deficit disorder)   • Allergic rhinitis   • Fatigue   • Acid reflux   • Colon cancer screening   • Abnormal EKG   • Anxiety   • Hyperglycemia   • Mucoepidermoid carcinoma of RIGHT parotid gland   • Abnormal MRI, cervical spine, not thoracic; C4 to be specific       Past Medical History:   Diagnosis Date   • Acute bronchitis    • Fracture, hip    • Gastroenteritis    • H/O Lung nodule    • Mucoepidermoid carcinoma    • Neck pain    • Osteopenia    • Pharyngitis        Past Surgical History:   Procedure Laterality Date   • HIP SURGERY     • PAROTIDECTOMY Right 1/31/2017    Procedure: RIGHT SUPERFICIAL PAROTIDECTOMY EXCISION PAROTID MASS ;  Surgeon: Candelario Calderon MD;  Location: [facility];  Service:        Family History   Problem Relation Age of Onset   • Peripheral vascular disease Mother    • Pancreatic cancer Father 91   • Heart disease Father    • Rectal cancer Sister 36   • No Known Problems Brother    • No Known Problems Daughter    • No Known Problems Son    • No Known Problems Maternal Grandmother    • No Known Problems Paternal Grandmother    • No Known Problems Maternal Aunt    • No Known  "Problems Paternal Aunt    • BRCA 1/2 Neg Hx    • Breast cancer Neg Hx    • Endometrial cancer Neg Hx    • Ovarian cancer Neg Hx        Social History     Social History   • Marital status:      Spouse name: Eduard   • Number of children: N/A   • Years of education: College     Occupational History   • 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 20 tablet 2   • hydrocortisone 2.5 % lotion Apply  topically As Needed.     • [DISCONTINUED] amoxicillin-clavulanate (AUGMENTIN) 500-125 MG per tablet Take 1 tablet by mouth 3 (Three) Times a Day. 30 tablet 0     No current facility-administered medications on file prior to visit.        Allergies   Allergen Reactions   • Erythromycin        Vitals:    03/22/18 1408   BP: 112/72   Pulse: 81   Resp: 18   Temp: 98.4 °F (36.9 °C)   SpO2: 98%   Weight: 63.5 kg (140 lb)   Height: 170.2 cm (67.01\")       Body mass index is 21.92 kg/m².    Objective   Physical Exam   Constitutional: She is oriented to person, place, and time. She appears well-developed and well-nourished. No distress.   HENT:   Head: Normocephalic and atraumatic.   Right Ear: Tympanic membrane and ear canal normal.   Left Ear: Tympanic membrane and ear canal normal.   Nose: Right sinus exhibits no maxillary sinus tenderness and no frontal sinus tenderness. Left sinus exhibits no maxillary sinus tenderness and no frontal sinus tenderness.   Mouth/Throat: Oropharynx is clear and moist. No oropharyngeal exudate.   Eyes: Conjunctivae are normal. No scleral " icterus.   Neck: Normal range of motion. Neck supple.   Cardiovascular: Normal rate, regular rhythm and normal heart sounds.    No murmur heard.  Pulmonary/Chest: Effort normal and breath sounds normal. No respiratory distress. She has no wheezes. She has no rales.   Lymphadenopathy:     She has cervical adenopathy (mild, tender).   Neurological: She is alert and oriented to person, place, and time. No cranial nerve deficit.   Psychiatric: She has a normal mood and affect. Her behavior is normal.           Assessment/Plan   Diagnoses and all orders for this visit:    Acute URI    Other orders  -     fluorouracil (EFUDEX) 5 % cream;         Discussion/Summary  Iesha is a pt of Dr. Easton seeing me today for acute care.  She has what appears to be a viral URI.  Advised to use mucinex, saline nasal spray/netti pot, flonase, and advil.  Call if worsening or if not improved by Monday.  Nothing on exam was concerning for a bacterial infection.    No Follow-up on file.

## 2018-03-26 ENCOUNTER — OFFICE VISIT (OUTPATIENT)
Dept: INTERNAL MEDICINE | Facility: CLINIC | Age: 68
End: 2018-03-26

## 2018-03-26 VITALS
OXYGEN SATURATION: 95 % | HEART RATE: 74 BPM | SYSTOLIC BLOOD PRESSURE: 118 MMHG | BODY MASS INDEX: 22.08 KG/M2 | DIASTOLIC BLOOD PRESSURE: 78 MMHG | TEMPERATURE: 98.4 F | WEIGHT: 141 LBS

## 2018-03-26 DIAGNOSIS — J02.9 SORE THROAT: ICD-10-CM

## 2018-03-26 DIAGNOSIS — H66.91 RIGHT OTITIS MEDIA, UNSPECIFIED OTITIS MEDIA TYPE: ICD-10-CM

## 2018-03-26 DIAGNOSIS — J01.00 ACUTE NON-RECURRENT MAXILLARY SINUSITIS: ICD-10-CM

## 2018-03-26 DIAGNOSIS — R50.9 FEVER, UNSPECIFIED FEVER CAUSE: ICD-10-CM

## 2018-03-26 DIAGNOSIS — R05.9 COUGH: Primary | ICD-10-CM

## 2018-03-26 LAB
EXPIRATION DATE: NORMAL
HCT VFR BLDA CALC: 40.6 %
HGB BLDA-MCNC: 12.7 G/DL
INTERNAL CONTROL: NORMAL
LYMPHOCYTES # BLD: 23.4 %
Lab: NORMAL
MCH, POC: 28.2
MCHC, POC: 31.1
MCV, POC: 90.6
MONOCYTES # BLD: 5.4 %
PLATELET # BLD: 180 10*3/MM3
PMV BLD: 7.4 FL
POC NEUTROPHIL: 71.2 %
RBC, POC: 4.48
RDW, POC: 14.3
S PYO AG THROAT QL: NEGATIVE
WBC # BLD: 4.3 10*3/UL

## 2018-03-26 PROCEDURE — 87880 STREP A ASSAY W/OPTIC: CPT | Performed by: INTERNAL MEDICINE

## 2018-03-26 PROCEDURE — 85025 COMPLETE CBC W/AUTO DIFF WBC: CPT | Performed by: INTERNAL MEDICINE

## 2018-03-26 PROCEDURE — 99213 OFFICE O/P EST LOW 20 MIN: CPT | Performed by: INTERNAL MEDICINE

## 2018-03-26 PROCEDURE — 71046 X-RAY EXAM CHEST 2 VIEWS: CPT | Performed by: INTERNAL MEDICINE

## 2018-03-26 RX ORDER — GUAIFENESIN AND CODEINE PHOSPHATE 100; 10 MG/5ML; MG/5ML
5 SOLUTION ORAL 3 TIMES DAILY PRN
Qty: 150 ML | Refills: 0 | Status: SHIPPED | OUTPATIENT
Start: 2018-03-26 | End: 2018-05-07

## 2018-03-26 RX ORDER — AMOXICILLIN 875 MG/1
875 TABLET, COATED ORAL 2 TIMES DAILY
Qty: 14 TABLET | Refills: 0 | Status: SHIPPED | OUTPATIENT
Start: 2018-03-26 | End: 2018-03-27 | Stop reason: SINTOL

## 2018-03-26 RX ORDER — SACCHAROMYCES BOULARDII 250 MG
250 CAPSULE ORAL 2 TIMES DAILY
Qty: 20 CAPSULE | Refills: 1 | Status: SHIPPED | OUTPATIENT
Start: 2018-03-26 | End: 2018-05-07

## 2018-03-26 RX ORDER — AMOXICILLIN AND CLAVULANATE POTASSIUM 250; 125 MG/1; MG/1
1 TABLET, FILM COATED ORAL 3 TIMES DAILY
Qty: 14 TABLET | Refills: 0 | Status: SHIPPED | OUTPATIENT
Start: 2018-03-26 | End: 2018-03-26

## 2018-03-26 NOTE — PROGRESS NOTES
Chief Complaint   Patient presents with   • Sore Throat     seen by Dr Plasencia last week       History of Present Illness   Iesha Roldan is a 67 y.o. female presents for acute care. Patient w/ >1 week h/o cough, congestion, and generally feeling unwell. Diagnosed by Dr. Plasencia with viral URI and she has been conservatively managing the symptoms. Mucinex, nasal saline rinse, etc. She has felt feverish w/ tmax 100.0. She is feeling chilled alternating w/ clammy/ sweaty.     The following portions of the patient's history were reviewed and updated as appropriate: allergies, current medications, past family history, past medical history, past social history, past surgical history and problem list.  Current Outpatient Prescriptions on File Prior to Visit   Medication Sig Dispense Refill   • ALPRAZolam (XANAX) 0.25 MG tablet Take 1 tablet by mouth 2 (Two) Times a Day As Needed for anxiety. 20 tablet 2   • hydrocortisone 2.5 % lotion Apply  topically As Needed.     • [DISCONTINUED] fluorouracil (EFUDEX) 5 % cream        No current facility-administered medications on file prior to visit.      Review of Systems   Constitutional: Positive for chills and fever.   HENT: Positive for ear pain and sore throat.    Eyes: Negative.    Respiratory: Positive for cough.    Cardiovascular: Negative.    Gastrointestinal: Negative.    Endocrine: Negative.    Genitourinary: Negative.    Musculoskeletal: Negative.    Skin: Negative.    Allergic/Immunologic: Negative.    Neurological: Negative.    Hematological: Negative.    Psychiatric/Behavioral: Negative.        Objective   Physical Exam   Constitutional: She is oriented to person, place, and time.   Ill appearing but no distress   HENT:   Head: Normocephalic and atraumatic.   Right tm injected. Pharyngeal erythema   Eyes: EOM are normal. Pupils are equal, round, and reactive to light.   Neck: Normal range of motion. Neck supple.   Shotty ant cerv lad.    Cardiovascular: Normal  rate, regular rhythm, normal heart sounds and intact distal pulses.    Pulmonary/Chest: Effort normal and breath sounds normal.   Abdominal: Soft. Bowel sounds are normal.   Musculoskeletal: Normal range of motion.   Lymphadenopathy:     She has cervical adenopathy.   Neurological: She is alert and oriented to person, place, and time.   Skin: Skin is warm and dry.   Psychiatric: She has a normal mood and affect. Her behavior is normal. Judgment and thought content normal.      cxr w/out infiltrates. No prior for comparison available   /78   Pulse 74   Temp 98.4 °F (36.9 °C)   Wt 64 kg (141 lb)   SpO2 95%   BMI 22.08 kg/m²     Assessment/Plan   Diagnoses and all orders for this visit:    Cough  -     XR Chest PA & Lateral (In Office)  -     POC CBC With / Auto Diff    Acute non-recurrent maxillary sinusitis    Right otitis media, unspecified otitis media type    Sore throat  -     POC Rapid Strep A  -     XR Chest PA & Lateral (In Office)  -     POC CBC With / Auto Diff    Fever, unspecified fever cause  -     XR Chest PA & Lateral (In Office)  -     POC CBC With / Auto Diff    Other orders  -     amoxicillin-clavulanate (AUGMENTIN) 250-125 MG per tablet; Take 1 tablet by mouth 3 (Three) Times a Day.  -     saccharomyces boulardii (FLORASTOR) 250 MG capsule; Take 1 capsule by mouth 2 (Two) Times a Day.  -     guaifenesin-codeine (GUAIFENESIN AC) 100-10 MG/5ML liquid; Take 5 mL by mouth 3 (Three) Times a Day As Needed for Cough.       Patient w/ sinusitis, otitis, bronchitis. Cbc w/ sl decreased wbc.Will start augmentin 875 bid w/ florastor. Prn cheratussin cough. F/u if worsens or no improvement.     ADDENDUM  Gi upset w/ augmentin  cxr w/ infiltrate. Switched to doxycycline. Gi upset w/ this. D/c doxy and gi upset improved. Switched to levaquin 500 mg po qd x 7 days. cxr also w/ new nodule. Will repeat cxr in 3 weeks and have further testing if needed. Called and d/w patient. She will f/u 4/17/18.

## 2018-03-27 ENCOUNTER — TELEPHONE (OUTPATIENT)
Dept: INTERNAL MEDICINE | Facility: CLINIC | Age: 68
End: 2018-03-27

## 2018-03-27 RX ORDER — DOXYCYCLINE HYCLATE 100 MG/1
100 CAPSULE ORAL 2 TIMES DAILY
Qty: 14 CAPSULE | Refills: 0 | Status: SHIPPED | OUTPATIENT
Start: 2018-03-27 | End: 2018-05-07

## 2018-03-27 NOTE — TELEPHONE ENCOUNTER
Pt called to say that after her 2nd dose of her antibiotic and probiotic she vomited. Wants to know should she try again or do you want to send something else in for her to use

## 2018-03-27 NOTE — TELEPHONE ENCOUNTER
Stop augmentin.   She may start doxycycline 100 mg bid if symptoms have improved by this evening. Continue probiotic.

## 2018-03-28 ENCOUNTER — TELEPHONE (OUTPATIENT)
Dept: INTERNAL MEDICINE | Facility: CLINIC | Age: 68
End: 2018-03-28

## 2018-03-30 ENCOUNTER — TELEPHONE (OUTPATIENT)
Dept: INTERNAL MEDICINE | Facility: CLINIC | Age: 68
End: 2018-03-30

## 2018-03-30 RX ORDER — LEVOFLOXACIN 500 MG/1
500 TABLET, FILM COATED ORAL DAILY
Qty: 7 TABLET | Refills: 0 | Status: SHIPPED | OUTPATIENT
Start: 2018-03-30 | End: 2018-05-07

## 2018-03-30 NOTE — TELEPHONE ENCOUNTER
Contacted patient. Discussed that she has an apparent pneumonia on cxr. She has stopped doxycycline and will start levaquin daily for 1 week. Will repeat her cxr at a follow up visit in 3 weeks. No additional imaging indicated at this time until she completes levaquin

## 2018-04-11 ENCOUNTER — TELEPHONE (OUTPATIENT)
Dept: INTERNAL MEDICINE | Facility: CLINIC | Age: 68
End: 2018-04-11

## 2018-04-11 NOTE — TELEPHONE ENCOUNTER
Patient called states she finished her last dose of Levaquin on last Friday. She states she feels the cough is coming back. She would like to know if she needs to make another appointment. Please advise.

## 2018-04-12 NOTE — TELEPHONE ENCOUNTER
Cough can often persist after an infection due to lung irritation/ hyperreactivity. If patient is not improved then schedule an office visit.

## 2018-04-17 ENCOUNTER — OFFICE VISIT (OUTPATIENT)
Dept: INTERNAL MEDICINE | Facility: CLINIC | Age: 68
End: 2018-04-17

## 2018-04-17 VITALS
BODY MASS INDEX: 22.55 KG/M2 | DIASTOLIC BLOOD PRESSURE: 80 MMHG | WEIGHT: 144 LBS | HEART RATE: 74 BPM | OXYGEN SATURATION: 97 % | SYSTOLIC BLOOD PRESSURE: 120 MMHG

## 2018-04-17 DIAGNOSIS — R93.89 ABNORMAL CHEST X-RAY: Primary | ICD-10-CM

## 2018-04-17 DIAGNOSIS — J40 BRONCHITIS: ICD-10-CM

## 2018-04-17 PROCEDURE — 99213 OFFICE O/P EST LOW 20 MIN: CPT | Performed by: INTERNAL MEDICINE

## 2018-04-17 PROCEDURE — 71046 X-RAY EXAM CHEST 2 VIEWS: CPT | Performed by: INTERNAL MEDICINE

## 2018-04-17 RX ORDER — LORATADINE 10 MG/1
10 TABLET ORAL DAILY
Qty: 90 TABLET | Refills: 1 | Status: SHIPPED | OUTPATIENT
Start: 2018-04-17 | End: 2020-10-14

## 2018-04-17 RX ORDER — TRIAMCINOLONE ACETONIDE 55 UG/1
2 SPRAY, METERED NASAL DAILY
Qty: 1 BOTTLE | Refills: 11 | Status: SHIPPED | OUTPATIENT
Start: 2018-04-17 | End: 2018-11-12 | Stop reason: SDUPTHER

## 2018-04-17 NOTE — PROGRESS NOTES
Chief Complaint   Patient presents with   • Cough       History of Present Illness   Iesha Roldan is a 67 y.o. female presents for follow up evaluation. She had a recent episode of bronchitis. She had clearing of cough but remains with some sinus congestion. She had an opacification on the chest xray and is due for a repeat image today.     The following portions of the patient's history were reviewed and updated as appropriate: allergies, current medications, past family history, past medical history, past social history, past surgical history and problem list.  Current Outpatient Prescriptions on File Prior to Visit   Medication Sig Dispense Refill   • ALPRAZolam (XANAX) 0.25 MG tablet Take 1 tablet by mouth 2 (Two) Times a Day As Needed for anxiety. 20 tablet 2   • doxycycline (VIBRAMYCIN) 100 MG capsule Take 1 capsule by mouth 2 (Two) Times a Day. 14 capsule 0   • guaifenesin-codeine (GUAIFENESIN AC) 100-10 MG/5ML liquid Take 5 mL by mouth 3 (Three) Times a Day As Needed for Cough. 150 mL 0   • hydrocortisone 2.5 % lotion Apply  topically As Needed.     • saccharomyces boulardii (FLORASTOR) 250 MG capsule Take 1 capsule by mouth 2 (Two) Times a Day. 20 capsule 1   • levoFLOXacin (LEVAQUIN) 500 MG tablet Take 1 tablet by mouth Daily. 7 tablet 0     No current facility-administered medications on file prior to visit.      Review of Systems   Constitutional: Negative.    HENT: Positive for rhinorrhea.    Eyes: Negative.    Respiratory: Positive for cough.    Cardiovascular: Negative.    Gastrointestinal: Negative.    Endocrine: Negative.    Genitourinary: Negative.    Skin: Negative.    Allergic/Immunologic: Negative.    Neurological: Negative.    Hematological: Negative.    Psychiatric/Behavioral: Negative.      Repeat cxr for abnormality on prior. Apparent clearing. Sent to radiology for overread.   Objective   Physical Exam   Constitutional: She is oriented to person, place, and time. She appears  well-developed and well-nourished.   HENT:   Head: Normocephalic and atraumatic.   Right Ear: Hearing, tympanic membrane and external ear normal.   Left Ear: Hearing, tympanic membrane and external ear normal.   Nose: Nose normal.   Mouth/Throat: Oropharynx is clear and moist.   Neck: Neck supple. No thyromegaly present.   Cardiovascular: Normal rate, regular rhythm and normal heart sounds.    No murmur heard.  Pulmonary/Chest: Effort normal and breath sounds normal. Right breast exhibits no mass. Left breast exhibits no mass.   Abdominal: Soft. She exhibits no distension. There is no hepatosplenomegaly. There is no tenderness.   Genitourinary: No breast tenderness.   Lymphadenopathy:     She has no cervical adenopathy.   Neurological: She is alert and oriented to person, place, and time.   Skin: Skin is warm and dry.   Psychiatric: She has a normal mood and affect. Her speech is normal and behavior is normal. Judgment and thought content normal. Cognition and memory are normal.        /80   Pulse 74   Wt 65.3 kg (144 lb)   SpO2 97%   BMI 22.55 kg/m²     Assessment/Plan   Diagnoses and all orders for this visit:    Abnormal chest x-ray  -     XR Chest PA & Lateral (In Office)    Bronchitis    Other orders  -     Triamcinolone Acetonide (NASACORT AQ) 55 MCG/ACT nasal inhaler; 2 sprays into each nostril Daily.  -     loratadine (CLARITIN) 10 MG tablet; Take 1 tablet by mouth Daily.        Patient w/ recent bronchitis. She appears clinically improved and cxr w/ clearing. Sent to radiology for over read and will get CT if indicated given h/o salivary gland carcinoma. She will increase treatment for allergic rhinitis and use nasacort and claritin until pollen count reduces. Routine f/u as scheduled.

## 2018-05-02 DIAGNOSIS — R73.9 HYPERGLYCEMIA: ICD-10-CM

## 2018-05-02 DIAGNOSIS — K21.9 GASTROESOPHAGEAL REFLUX DISEASE, ESOPHAGITIS PRESENCE NOT SPECIFIED: ICD-10-CM

## 2018-05-02 DIAGNOSIS — R53.83 FATIGUE, UNSPECIFIED TYPE: ICD-10-CM

## 2018-05-02 DIAGNOSIS — Z00.00 HEALTHCARE MAINTENANCE: Primary | ICD-10-CM

## 2018-05-02 DIAGNOSIS — Z11.59 ENCOUNTER FOR HEPATITIS C SCREENING TEST FOR LOW RISK PATIENT: ICD-10-CM

## 2018-05-02 DIAGNOSIS — Z13.220 LIPID SCREENING: ICD-10-CM

## 2018-05-02 DIAGNOSIS — C80.1 MUCOEPIDERMOID CARCINOMA (HCC): ICD-10-CM

## 2018-05-07 ENCOUNTER — OFFICE VISIT (OUTPATIENT)
Dept: INTERNAL MEDICINE | Facility: CLINIC | Age: 68
End: 2018-05-07

## 2018-05-07 VITALS
WEIGHT: 143 LBS | SYSTOLIC BLOOD PRESSURE: 116 MMHG | HEIGHT: 66 IN | HEART RATE: 65 BPM | DIASTOLIC BLOOD PRESSURE: 78 MMHG | BODY MASS INDEX: 22.98 KG/M2 | OXYGEN SATURATION: 99 %

## 2018-05-07 DIAGNOSIS — C80.1 MUCOEPIDERMOID CARCINOMA (HCC): ICD-10-CM

## 2018-05-07 DIAGNOSIS — R73.9 HYPERGLYCEMIA: ICD-10-CM

## 2018-05-07 DIAGNOSIS — Z00.00 HEALTHCARE MAINTENANCE: Primary | ICD-10-CM

## 2018-05-07 PROCEDURE — 93000 ELECTROCARDIOGRAM COMPLETE: CPT | Performed by: INTERNAL MEDICINE

## 2018-05-07 PROCEDURE — 99213 OFFICE O/P EST LOW 20 MIN: CPT | Performed by: INTERNAL MEDICINE

## 2018-05-07 PROCEDURE — 90670 PCV13 VACCINE IM: CPT | Performed by: INTERNAL MEDICINE

## 2018-05-07 PROCEDURE — G0009 ADMIN PNEUMOCOCCAL VACCINE: HCPCS | Performed by: INTERNAL MEDICINE

## 2018-05-07 PROCEDURE — G0439 PPPS, SUBSEQ VISIT: HCPCS | Performed by: INTERNAL MEDICINE

## 2018-05-07 NOTE — PATIENT INSTRUCTIONS
Medicare Wellness  Personal Prevention Plan of Service     Date of Office Visit:  2018  Encounter Provider:  Camila Mejia MD  Place of Service:  [facility] INTERNAL MEDICINE  Patient Name: Iesha Roldan  :  1950    As part of the Medicare Wellness portion of your visit today, we are providing you with this personalized preventive plan of services (PPPS). This plan is based upon recommendations of the [place] Preventive Services Task Force (USPSTF) and the Advisory Committee on Immunization Practices (ACIP).    This lists the preventive care services that should be considered, and provides dates of when you are due. Items listed as completed are up-to-date and do not require any further intervention.    Health Maintenance   Topic Date Due   • ZOSTER VACCINE  2016   • PNEUMOCOCCAL VACCINES (65+ LOW/MEDIUM RISK) (1 of 2 - PCV13) 2016   • DXA SCAN  2017   • INFLUENZA VACCINE  2018   • MAMMOGRAM  2019   • MEDICARE ANNUAL WELLNESS  2019   • TDAP/TD VACCINES (2 - Td) 2026   • COLONOSCOPY  2027   • HEPATITIS C SCREENING  Completed       Orders Placed This Encounter   Procedures   • ECG 12 Lead     Order Specific Question:   Reason for Exam:     Answer:   hm       Return in about 6 months (around 2018) for Recheck.

## 2018-05-07 NOTE — PROGRESS NOTES
Subjective   AWV,  mucoepidermioid carcinoma  Allergic rhinitis  Elevated fasting glucose    Iesha Roldan is a 67 y.o. female who presents for an annual wellness visit and review of chronic issues. She is feeling well today. She is s/p excision and radiation of mucoepidermoid carcinoma. She has been to ENT here as well as [facility] for follow of this tumor. She has an MRI scheduled for later this month at [facility]. She has a history of elevated fasting glucose on one occasion with normal glucose after that test. She has a history of allergic rhinitis but this is well managed w/ nasal steroid and antihistamine.     Review of Systems   Constitutional: Negative.    HENT: Negative.    Eyes: Negative.    Respiratory: Negative.    Cardiovascular: Negative.    Gastrointestinal: Negative.    Endocrine: Negative.    Genitourinary: Negative.    Musculoskeletal: Negative.    Skin: Negative.    Allergic/Immunologic: Negative.    Neurological: Negative.    Hematological: Negative.    Psychiatric/Behavioral: Negative.        The following portions of the patient's history were reviewed and updated as appropriate: allergies, current medications, past family history, past medical history, past social history, past surgical history and problem list.     Patient Active Problem List   Diagnosis   • ADD (attention deficit disorder)   • Allergic rhinitis   • Fatigue   • Acid reflux   • Colon cancer screening   • Abnormal EKG   • Anxiety   • Hyperglycemia   • Mucoepidermoid carcinoma of RIGHT parotid gland   • Abnormal MRI, cervical spine, not thoracic; C4 to be specific       Past Medical History:   Diagnosis Date   • Acute bronchitis    • Fracture, hip    • Gastroenteritis    • H/O Lung nodule    • Mucoepidermoid carcinoma    • Neck pain    • Osteopenia    • Pharyngitis        Past Surgical History:   Procedure Laterality Date   • HIP SURGERY     • PAROTIDECTOMY Right 1/31/2017    Procedure: RIGHT SUPERFICIAL PAROTIDECTOMY  EXCISION PAROTID MASS ;  Surgeon: Candelario Calderon MD;  Location: [facility];  Service:        Family History   Problem Relation Age of Onset   • Peripheral vascular disease Mother    • Pancreatic cancer Father 91   • Heart disease Father    • Rectal cancer Sister 36   • No Known Problems Brother    • No Known Problems Daughter    • No Known Problems Son    • No Known Problems Maternal Grandmother    • No Known Problems Paternal Grandmother    • No Known Problems Maternal Aunt    • No Known Problems Paternal Aunt    • BRCA 1/2 Neg Hx    • Breast cancer Neg Hx    • Endometrial cancer Neg Hx    • Ovarian cancer Neg Hx        Social History     Social History   • Marital status:      Spouse name: Eduard   • Number of children: N/A   • Years of education: College     Occupational History   • 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 20 tablet 2   • hydrocortisone 2.5 % lotion Apply  topically As Needed.     • loratadine (CLARITIN) 10 MG tablet Take 1 tablet by mouth Daily. 90 tablet 1   • Triamcinolone Acetonide (NASACORT AQ) 55 MCG/ACT nasal inhaler 2 sprays into each nostril Daily. 1 bottle 11   • [DISCONTINUED] doxycycline (VIBRAMYCIN) 100 MG capsule Take 1 capsule by mouth 2 (Two) Times a Day. 14 capsule 0   • [DISCONTINUED] guaifenesin-codeine (GUAIFENESIN AC) 100-10 MG/5ML liquid Take 5 mL by mouth 3 (Three) Times a Day As Needed for Cough. 150 mL 0   • [DISCONTINUED] levoFLOXacin (LEVAQUIN) 500 MG tablet Take 1 tablet by mouth Daily. 7  "tablet 0   • [DISCONTINUED] saccharomyces boulardii (FLORASTOR) 250 MG capsule Take 1 capsule by mouth 2 (Two) Times a Day. 20 capsule 1     No current facility-administered medications on file prior to visit.        Allergies   Allergen Reactions   • Erythromycin        Immunization History   Administered Date(s) Administered   • Flu Vaccine High Dose PF 65YR+ 10/30/2017   • Tdap 04/18/2016       Objective     /78   Pulse 65   Ht 167.6 cm (66\")   Wt 64.9 kg (143 lb)   SpO2 99%   BMI 23.08 kg/m²     Physical Exam   Constitutional: She is oriented to person, place, and time. She appears well-developed and well-nourished.   HENT:   Head: Normocephalic and atraumatic.   Right Ear: Hearing, tympanic membrane and external ear normal.   Left Ear: Hearing, tympanic membrane and external ear normal.   Nose: Nose normal.   Mouth/Throat: Oropharynx is clear and moist.   Eyes: Conjunctivae and EOM are normal. Pupils are equal, round, and reactive to light.   Neck: Normal range of motion. Neck supple. No thyromegaly present.   Cardiovascular: Normal rate, regular rhythm and normal heart sounds.    No murmur heard.  Pulmonary/Chest: Effort normal and breath sounds normal. Right breast exhibits no mass. Left breast exhibits no mass.   Abdominal: Soft. Bowel sounds are normal. She exhibits no distension. There is no hepatosplenomegaly. There is no tenderness.   Genitourinary: No breast tenderness.   Musculoskeletal: Normal range of motion.   Lymphadenopathy:     She has no cervical adenopathy.   Neurological: She is alert and oriented to person, place, and time.   Skin: Skin is warm and dry.   Psychiatric: She has a normal mood and affect. Her speech is normal and behavior is normal. Judgment and thought content normal. Cognition and memory are normal.     EKG for h/o carcinoma and hyperglycemia. Sinus. No st changes. Unchanged from prior.   Assessment/Plan   Iesha was seen today for annual exam.    Diagnoses and " all orders for this visit:    Healthcare maintenance  -     ECG 12 Lead    Mucoepidermoid carcinoma of RIGHT parotid gland    Hyperglycemia        Discussion    AWV.  See scanned forms for sumit history, PHQ-9, functional ability questionnaire, cognitive impairment screening and preventive services check list.  These were all reviewed with the patient and the patient was provided with a copy of the preventative services checklist. Direct observation of cognitive ability was evaluated and is normal. Patient was given health advice or handouts on the following:  nutrition, fall prevention, exercise    Patient with history of mucoepidermoid carcinoma. She is doing well after excision and XRT. She will get imaging as scheduled. Lab results are pending and these will be reviewed once available. She is advised to get Hep A, shingrix, and prevnar vaccinations.            No future appointments.

## 2018-05-08 LAB
ALBUMIN SERPL-MCNC: 4.6 G/DL (ref 3.5–5.2)
ALBUMIN/GLOB SERPL: 1.7 G/DL
ALP SERPL-CCNC: 84 U/L (ref 39–117)
ALT SERPL-CCNC: 13 U/L (ref 1–33)
APPEARANCE UR: CLEAR
AST SERPL-CCNC: 22 U/L (ref 1–32)
BACTERIA #/AREA URNS HPF: NORMAL /HPF
BILIRUB SERPL-MCNC: 0.3 MG/DL (ref 0.1–1.2)
BILIRUB UR QL STRIP: NEGATIVE
BUN SERPL-MCNC: 17 MG/DL (ref 8–23)
BUN/CREAT SERPL: 25 (ref 7–25)
CALCIUM SERPL-MCNC: 9.7 MG/DL (ref 8.6–10.5)
CHLORIDE SERPL-SCNC: 103 MMOL/L (ref 98–107)
CO2 SERPL-SCNC: 30.9 MMOL/L (ref 22–29)
COLOR UR: YELLOW
CREAT SERPL-MCNC: 0.68 MG/DL (ref 0.57–1)
EPI CELLS #/AREA URNS HPF: NORMAL /HPF
GFR SERPLBLD CREATININE-BSD FMLA CKD-EPI: 105 ML/MIN/1.73
GFR SERPLBLD CREATININE-BSD FMLA CKD-EPI: 87 ML/MIN/1.73
GLOBULIN SER CALC-MCNC: 2.7 GM/DL
GLUCOSE SERPL-MCNC: 90 MG/DL (ref 65–99)
GLUCOSE UR QL: NEGATIVE
HBA1C MFR BLD: 5.1 % (ref 4.8–5.6)
HCV AB S/CO SERPL IA: <0.1 S/CO RATIO (ref 0–0.9)
HGB UR QL STRIP: NEGATIVE
KETONES UR QL STRIP: NEGATIVE
LEUKOCYTE ESTERASE UR QL STRIP: NEGATIVE
MICRO URNS: NORMAL
MICRO URNS: NORMAL
MUCOUS THREADS URNS QL MICRO: PRESENT /HPF
NITRITE UR QL STRIP: NEGATIVE
PH UR STRIP: 5 [PH] (ref 5–7.5)
POTASSIUM SERPL-SCNC: 4.5 MMOL/L (ref 3.5–5.2)
PROT SERPL-MCNC: 7.3 G/DL (ref 6–8.5)
PROT UR QL STRIP: NEGATIVE
RBC #/AREA URNS HPF: NORMAL /HPF
SODIUM SERPL-SCNC: 144 MMOL/L (ref 136–145)
SP GR UR: 1.02 (ref 1–1.03)
TSH SERPL DL<=0.005 MIU/L-ACNC: 2.52 MIU/ML (ref 0.27–4.2)
URINALYSIS REFLEX: NORMAL
UROBILINOGEN UR STRIP-MCNC: 0.2 MG/DL (ref 0.2–1)
WBC #/AREA URNS HPF: NORMAL /HPF

## 2018-05-08 NOTE — PROGRESS NOTES
Iesha,  Your laboratory results are NORMAL. We will discuss these results in detail at your next office visit. Please call with any questions or concerns.  Sincerely,  Camila Mejia MD

## 2018-06-25 ENCOUNTER — OFFICE VISIT (OUTPATIENT)
Dept: OBSTETRICS AND GYNECOLOGY | Age: 68
End: 2018-06-25

## 2018-06-25 ENCOUNTER — APPOINTMENT (OUTPATIENT)
Dept: WOMENS IMAGING | Facility: HOSPITAL | Age: 68
End: 2018-06-25

## 2018-06-25 VITALS
DIASTOLIC BLOOD PRESSURE: 64 MMHG | WEIGHT: 146.2 LBS | HEIGHT: 66 IN | SYSTOLIC BLOOD PRESSURE: 128 MMHG | BODY MASS INDEX: 23.5 KG/M2

## 2018-06-25 DIAGNOSIS — N32.81 OAB (OVERACTIVE BLADDER): Primary | ICD-10-CM

## 2018-06-25 PROCEDURE — 77067 SCR MAMMO BI INCL CAD: CPT | Performed by: RADIOLOGY

## 2018-06-25 PROCEDURE — 99213 OFFICE O/P EST LOW 20 MIN: CPT | Performed by: OBSTETRICS & GYNECOLOGY

## 2018-06-25 RX ORDER — TOBRAMYCIN AND DEXAMETHASONE 3; 1 MG/ML; MG/ML
SUSPENSION/ DROPS OPHTHALMIC
Refills: 0 | COMMUNITY
Start: 2018-04-03 | End: 2018-11-12 | Stop reason: HOSPADM

## 2018-06-25 NOTE — PROGRESS NOTES
Subjective   Iesha Roldan is a 68 y.o. female is being seen today for bladder issues.   Chief Complaint   Patient presents with   • Bladder Problem   .    History of Present Illness    The following portions of the patient's history were reviewed and updated as appropriate: allergies, current medications, past family history, past medical history, past social history, past surgical history and problem list.    Vitals:    18 1057   BP: 128/64         PAST MEDICAL HISTORY  Past Medical History:   Diagnosis Date   • Acute bronchitis    • Fracture, hip    • Gastroenteritis    • H/O Lung nodule    • Mucoepidermoid carcinoma    • Neck pain    • Osteopenia    • Pharyngitis      OB History      Para Term  AB Living    2 2 2          SAB TAB Ectopic Molar Multiple Live Births                       Past Surgical History:   Procedure Laterality Date   • HIP SURGERY     • PAROTIDECTOMY Right 2017    Procedure: RIGHT SUPERFICIAL PAROTIDECTOMY EXCISION PAROTID MASS ;  Surgeon: Candelario Calderon MD;  Location[facility] [place];  Service:      Family History   Problem Relation Age of Onset   • Peripheral vascular disease Mother    • Pancreatic cancer Father 91   • Heart disease Father    • Rectal cancer Sister 36   • No Known Problems Brother    • No Known Problems Daughter    • No Known Problems Son    • No Known Problems Maternal Grandmother    • No Known Problems Paternal Grandmother    • No Known Problems Maternal Aunt    • No Known Problems Paternal Aunt    • BRCA 1/2 Neg Hx    • Breast cancer Neg Hx    • Endometrial cancer Neg Hx    • Ovarian cancer Neg Hx      History   Smoking Status   • Never Smoker   Smokeless Tobacco   • Never Used       Current Outpatient Prescriptions:   •  ALPRAZolam (XANAX) 0.25 MG tablet, Take 1 tablet by mouth 2 (Two) Times a Day As Needed for anxiety., Disp: 20 tablet, Rfl: 2  •  hydrocortisone 2.5 % lotion, Apply  topically As Needed., Disp: , Rfl:   •  loratadine  (CLARITIN) 10 MG tablet, Take 1 tablet by mouth Daily., Disp: 90 tablet, Rfl: 1  •  tobramycin-dexamethasone (TOBRADEX) 0.3-0.1 % ophthalmic suspension, INSTILL 1 DROP INTO THE OD QID FOR 3-5 DAYS, Disp: , Rfl: 0  •  Triamcinolone Acetonide (NASACORT AQ) 55 MCG/ACT nasal inhaler, 2 sprays into each nostril Daily., Disp: 1 bottle, Rfl: 11  Immunization History   Administered Date(s) Administered   • Flu Vaccine High Dose PF 65YR+ 10/30/2017   • Pneumococcal Conjugate 13-Valent (PCV13) 05/07/2018   • Tdap 04/18/2016       Review of Systems    Objective   Physical Exam      Assessment/Plan   There are no diagnoses linked to this encounter.

## 2018-06-25 NOTE — PROGRESS NOTES
Subjective   Iesha Roldan is a 68 y.o. female is being seen today for   Chief Complaint   Patient presents with   • Bladder Problem   .    History of Present Illness  Patient is here for a problem check.  She has been having symptoms of OAB for quite a few months.  She has frequency urgency and occasional urge incontinence.  No particular stress incontinence.  This was discussed in detail and the options and she did rather not take any medicines at this time.  She is doing well since she had her parotid gland cancer has been to [facility] a couple of times is now on a routine follow-up for that.  And the questions well she stay in [place] or see someone else out of town back to [facility] and she is unsure of that right now.  Her family is doing well  The following portions of the patient's history were reviewed and updated as appropriate: allergies, current medications, past family history, past medical history, past social history, past surgical history and problem list.    Vitals:    18 1057   BP: 128/64         PAST MEDICAL HISTORY  Past Medical History:   Diagnosis Date   • Acute bronchitis    • Fracture, hip    • Gastroenteritis    • H/O Lung nodule    • Mucoepidermoid carcinoma    • Neck pain    • Osteopenia    • Pharyngitis      OB History      Para Term  AB Living    2 2 2          SAB TAB Ectopic Molar Multiple Live Births                       Past Surgical History:   Procedure Laterality Date   • HIP SURGERY     • PAROTIDECTOMY Right 2017    Procedure: RIGHT SUPERFICIAL PAROTIDECTOMY EXCISION PAROTID MASS ;  Surgeon: Candelario Calderon MD;  [place] [facility] [place] [facility] [place]  Service:      Family History   Problem Relation Age of Onset   • Peripheral vascular disease Mother    • Pancreatic cancer Father 91   • Heart disease Father    • Rectal cancer Sister 36   • No Known Problems Brother    • No Known Problems Daughter    • No Known Problems Son    • No Known Problems Maternal  Grandmother    • No Known Problems Paternal Grandmother    • No Known Problems Maternal Aunt    • No Known Problems Paternal Aunt    • BRCA 1/2 Neg Hx    • Breast cancer Neg Hx    • Endometrial cancer Neg Hx    • Ovarian cancer Neg Hx      History   Smoking Status   • Never Smoker   Smokeless Tobacco   • Never Used       Current Outpatient Prescriptions:   •  ALPRAZolam (XANAX) 0.25 MG tablet, Take 1 tablet by mouth 2 (Two) Times a Day As Needed for anxiety., Disp: 20 tablet, Rfl: 2  •  hydrocortisone 2.5 % lotion, Apply  topically As Needed., Disp: , Rfl:   •  loratadine (CLARITIN) 10 MG tablet, Take 1 tablet by mouth Daily., Disp: 90 tablet, Rfl: 1  •  tobramycin-dexamethasone (TOBRADEX) 0.3-0.1 % ophthalmic suspension, INSTILL 1 DROP INTO THE OD QID FOR 3-5 DAYS, Disp: , Rfl: 0  •  Triamcinolone Acetonide (NASACORT AQ) 55 MCG/ACT nasal inhaler, 2 sprays into each nostril Daily., Disp: 1 bottle, Rfl: 11  Immunization History   Administered Date(s) Administered   • Flu Vaccine High Dose PF 65YR+ 10/30/2017   • Pneumococcal Conjugate 13-Valent (PCV13) 05/07/2018   • Tdap 04/18/2016       Review of Systems   Genitourinary: Positive for frequency and urgency.       Objective   Physical Exam      Assessment/Plan   Iesha was seen today for bladder problem.    Diagnoses and all orders for this visit:    OAB (overactive bladder)      No physical exam today.  Which has had that discussion about her bladder.  15 minutes was spent with the patient face-to-face all in counseling.  Come back in year for an annual check.  Mammogram was also done today

## 2018-10-17 ENCOUNTER — TELEPHONE (OUTPATIENT)
Dept: INTERNAL MEDICINE | Facility: CLINIC | Age: 68
End: 2018-10-17

## 2018-10-17 RX ORDER — MELOXICAM 15 MG/1
15 TABLET ORAL DAILY
Qty: 30 TABLET | Refills: 1 | Status: SHIPPED | OUTPATIENT
Start: 2018-10-17 | End: 2018-11-12 | Stop reason: SDUPTHER

## 2018-11-12 ENCOUNTER — OFFICE VISIT (OUTPATIENT)
Dept: INTERNAL MEDICINE | Facility: CLINIC | Age: 68
End: 2018-11-12

## 2018-11-12 VITALS
SYSTOLIC BLOOD PRESSURE: 120 MMHG | DIASTOLIC BLOOD PRESSURE: 74 MMHG | WEIGHT: 148 LBS | OXYGEN SATURATION: 99 % | BODY MASS INDEX: 23.89 KG/M2 | HEART RATE: 72 BPM

## 2018-11-12 DIAGNOSIS — J30.9 ALLERGIC RHINITIS, UNSPECIFIED SEASONALITY, UNSPECIFIED TRIGGER: ICD-10-CM

## 2018-11-12 DIAGNOSIS — C80.1 MUCOEPIDERMOID CARCINOMA (HCC): Primary | ICD-10-CM

## 2018-11-12 PROCEDURE — 90732 PPSV23 VACC 2 YRS+ SUBQ/IM: CPT | Performed by: INTERNAL MEDICINE

## 2018-11-12 PROCEDURE — G0009 ADMIN PNEUMOCOCCAL VACCINE: HCPCS | Performed by: INTERNAL MEDICINE

## 2018-11-12 PROCEDURE — 99214 OFFICE O/P EST MOD 30 MIN: CPT | Performed by: INTERNAL MEDICINE

## 2018-11-12 RX ORDER — FLUTICASONE PROPIONATE 50 MCG
2 SPRAY, SUSPENSION (ML) NASAL DAILY
Qty: 1 BOTTLE | Refills: 5 | Status: SHIPPED | OUTPATIENT
Start: 2018-11-12 | End: 2021-08-24 | Stop reason: SDUPTHER

## 2018-11-12 RX ORDER — ALBUTEROL SULFATE 90 UG/1
2 AEROSOL, METERED RESPIRATORY (INHALATION) EVERY 4 HOURS PRN
Qty: 1 INHALER | Refills: 5 | Status: SHIPPED | OUTPATIENT
Start: 2018-11-12 | End: 2019-01-08

## 2018-11-12 RX ORDER — MONTELUKAST SODIUM 10 MG/1
10 TABLET ORAL NIGHTLY
Qty: 30 TABLET | Refills: 5 | Status: SHIPPED | OUTPATIENT
Start: 2018-11-12 | End: 2019-01-08

## 2018-11-12 RX ORDER — MELOXICAM 15 MG/1
15 TABLET ORAL DAILY
Qty: 30 TABLET | Refills: 5 | Status: SHIPPED | OUTPATIENT
Start: 2018-11-12 | End: 2019-07-02

## 2018-11-12 NOTE — PROGRESS NOTES
Chief Complaint   Patient presents with   • Nasal Congestion     seasonal   mucoepidermoid carcinoma      History of Present Illness   Iesha Roldan is a 68 y.o. female presents for follow up evaluation. 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 She is following at [facility]. Unfortunately her medical oncologist and her radiation oncologist have both left the area and she does not feel that she has a good physician to follow up with at this time.   Patient does note some upper respiratory congestion. She tends to do better if she uses flonase. She is concerned that she will have bronchitis as she has many times in the past this time of year. She does note that she feels more short of air in cold weather. Can hike well in normal temperatures but becomes winded if in a cold climate        The following portions of the patient's history were reviewed and updated as appropriate: allergies, current medications, past family history, past medical history, past social history, past surgical history and problem list.  Current Outpatient Medications on File Prior to Visit   Medication Sig Dispense Refill   • ALPRAZolam (XANAX) 0.25 MG tablet Take 1 tablet by mouth 2 (Two) Times a Day As Needed for anxiety. 20 tablet 2   • hydrocortisone 2.5 % lotion Apply  topically As Needed.     • loratadine (CLARITIN) 10 MG tablet Take 1 tablet by mouth Daily. 90 tablet 1   • [DISCONTINUED] meloxicam (MOBIC) 15 MG tablet Take 1 tablet by mouth Daily. 30 tablet 1   • [DISCONTINUED] tobramycin-dexamethasone (TOBRADEX) 0.3-0.1 % ophthalmic suspension INSTILL 1 DROP INTO THE OD QID FOR 3-5 DAYS  0   • [DISCONTINUED] Triamcinolone Acetonide (NASACORT AQ) 55 MCG/ACT nasal inhaler 2 sprays into each nostril Daily. 1 bottle 11     No current facility-administered medications on file prior to visit.      Review of Systems   Constitutional: Negative.    HENT: Positive for rhinorrhea.     Eyes: Negative.    Respiratory: Positive for wheezing.    Cardiovascular: Negative.    Gastrointestinal: Negative.    Endocrine: Negative.    Musculoskeletal: Negative.    Skin: Negative.    Allergic/Immunologic: Negative.    Neurological: Negative.    Hematological: Negative.    Psychiatric/Behavioral: Negative.        Objective   Physical Exam   Constitutional: She is oriented to person, place, and time. She appears well-developed and well-nourished.   HENT:   Head: Normocephalic and atraumatic.   Right Ear: External ear normal.   Left Ear: External ear normal.   Nose: Nose normal.   Mouth/Throat: Oropharynx is clear and moist.   Eyes: EOM are normal. Pupils are equal, round, and reactive to light.   Neck: Normal range of motion. Neck supple.   Cardiovascular: Normal rate, regular rhythm, normal heart sounds and intact distal pulses.   Pulmonary/Chest: Effort normal and breath sounds normal.   Abdominal: Soft. Bowel sounds are normal.   Musculoskeletal: Normal range of motion.   Neurological: She is alert and oriented to person, place, and time.   Skin: Skin is warm and dry.   Psychiatric: She has a normal mood and affect. Her behavior is normal. Judgment and thought content normal.   Nursing note and vitals reviewed.       /74   Pulse 72   Wt 67.1 kg (148 lb)   SpO2 99%   BMI 23.89 kg/m²     Assessment/Plan   Diagnoses and all orders for this visit:    Mucoepidermoid carcinoma (CMS/[facility])  -     Ambulatory Referral to Hematology / Oncology    Allergic rhinitis, unspecified seasonality, unspecified trigger    Other orders  -     fluticasone (FLONASE) 50 MCG/ACT nasal spray; 2 sprays into the nostril(s) as directed by provider Daily.  -     montelukast (SINGULAIR) 10 MG tablet; Take 1 tablet by mouth Every Night.  -     meloxicam (MOBIC) 15 MG tablet; Take 1 tablet by mouth Daily.  -     albuterol (PROVENTIL HFA;VENTOLIN HFA) 108 (90 Base) MCG/ACT inhaler; Inhale 2 puffs Every 4 (Four) Hours As Needed for  Wheezing.        Patient with history of mucoepidermoid carcinoma. She will return to oncology to discuss necessary surveillance moving forward.   She is having some sinus congestion and will start flonase +/- singulair w/ nasal saline rinse. She has some cold air induced bronchospasm and she will start albuterol in a prn fashion. She will receive a pneumovax vaccination today as well as hep A #1. Reviewed imaging from md naya on epic and mri and ct neck with no signs of recurrence. These are availble for oncology to review as well.     Follow up for cpe in 7 months or prn.

## 2018-12-13 ENCOUNTER — CONSULT (OUTPATIENT)
Dept: ONCOLOGY | Facility: CLINIC | Age: 68
End: 2018-12-13

## 2018-12-13 ENCOUNTER — LAB (OUTPATIENT)
Dept: LAB | Facility: HOSPITAL | Age: 68
End: 2018-12-13

## 2018-12-13 ENCOUNTER — APPOINTMENT (OUTPATIENT)
Dept: ONCOLOGY | Facility: CLINIC | Age: 68
End: 2018-12-13

## 2018-12-13 VITALS
SYSTOLIC BLOOD PRESSURE: 112 MMHG | TEMPERATURE: 97.6 F | WEIGHT: 145.9 LBS | DIASTOLIC BLOOD PRESSURE: 68 MMHG | OXYGEN SATURATION: 100 % | HEIGHT: 66 IN | RESPIRATION RATE: 16 BRPM | HEART RATE: 76 BPM | BODY MASS INDEX: 23.45 KG/M2

## 2018-12-13 DIAGNOSIS — E06.0 ACUTE THYROIDITIS: ICD-10-CM

## 2018-12-13 DIAGNOSIS — C80.1 MUCOEPIDERMOID CARCINOMA (HCC): Primary | ICD-10-CM

## 2018-12-13 DIAGNOSIS — D50.9 IRON DEFICIENCY ANEMIA, UNSPECIFIED IRON DEFICIENCY ANEMIA TYPE: ICD-10-CM

## 2018-12-13 DIAGNOSIS — D50.9 IRON DEFICIENCY ANEMIA, UNSPECIFIED IRON DEFICIENCY ANEMIA TYPE: Primary | ICD-10-CM

## 2018-12-13 DIAGNOSIS — C80.1 MUCOEPIDERMOID CARCINOMA (HCC): ICD-10-CM

## 2018-12-13 LAB
BASOPHILS # BLD AUTO: 0.03 10*3/MM3 (ref 0–0.1)
BASOPHILS NFR BLD AUTO: 0.5 % (ref 0–1.1)
DEPRECATED RDW RBC AUTO: 44.1 FL (ref 37–49)
EOSINOPHIL # BLD AUTO: 0.21 10*3/MM3 (ref 0–0.36)
EOSINOPHIL NFR BLD AUTO: 3.6 % (ref 1–5)
ERYTHROCYTE [DISTWIDTH] IN BLOOD BY AUTOMATED COUNT: 14.6 % (ref 11.7–14.5)
ERYTHROCYTE [SEDIMENTATION RATE] IN BLOOD: 33 MM/HR (ref 0–30)
FERRITIN SERPL-MCNC: <5 NG/ML
FOLATE SERPL-MCNC: 14.64 NG/ML (ref 4.78–24.2)
HAPTOGLOB SERPL-MCNC: 178 MG/DL (ref 30–200)
HCT VFR BLD AUTO: 28.4 % (ref 34–45)
HGB BLD-MCNC: 8.8 G/DL (ref 11.5–14.9)
HGB RETIC QN: 22.4 PG (ref 29.8–36.1)
IMM GRANULOCYTES # BLD: 0.05 10*3/MM3 (ref 0–0.03)
IMM GRANULOCYTES NFR BLD: 0.9 % (ref 0–0.5)
IMM RETICS NFR: 19.2 % (ref 3–15.8)
IRON 24H UR-MRATE: 13 MCG/DL (ref 37–145)
IRON SATN MFR SERPL: 3 % (ref 14–48)
LDH SERPL-CCNC: 189 U/L (ref 99–259)
LYMPHOCYTES # BLD AUTO: 1.58 10*3/MM3 (ref 1–3.5)
LYMPHOCYTES NFR BLD AUTO: 27.3 % (ref 20–49)
MCH RBC QN AUTO: 25.4 PG (ref 27–33)
MCHC RBC AUTO-ENTMCNC: 31 G/DL (ref 32–35)
MCV RBC AUTO: 82.1 FL (ref 83–97)
MONOCYTES # BLD AUTO: 0.58 10*3/MM3 (ref 0.25–0.8)
MONOCYTES NFR BLD AUTO: 10 % (ref 4–12)
NEUTROPHILS # BLD AUTO: 3.33 10*3/MM3 (ref 1.5–7)
NEUTROPHILS NFR BLD AUTO: 57.7 % (ref 39–75)
NRBC BLD MANUAL-RTO: 0.5 /100 WBC (ref 0–0)
PLATELET # BLD AUTO: 294 10*3/MM3 (ref 150–375)
PMV BLD AUTO: 10 FL (ref 8.9–12.1)
RBC # BLD AUTO: 3.46 10*6/MM3 (ref 3.9–5)
RETICS/RBC NFR AUTO: 1.33 % (ref 0.6–2)
T4 FREE SERPL-MCNC: 1.16 NG/DL (ref 0.93–1.7)
TIBC SERPL-MCNC: 498 MCG/DL (ref 249–505)
TRANSFERRIN SERPL-MCNC: 356 MG/DL (ref 200–360)
TSH SERPL DL<=0.05 MIU/L-ACNC: 2.14 MIU/ML (ref 0.27–4.2)
VIT B12 BLD-MCNC: 263 PG/ML (ref 211–946)
WBC NRBC COR # BLD: 5.78 10*3/MM3 (ref 4–10)

## 2018-12-13 PROCEDURE — 85652 RBC SED RATE AUTOMATED: CPT | Performed by: INTERNAL MEDICINE

## 2018-12-13 PROCEDURE — 84443 ASSAY THYROID STIM HORMONE: CPT | Performed by: INTERNAL MEDICINE

## 2018-12-13 PROCEDURE — 85025 COMPLETE CBC W/AUTO DIFF WBC: CPT | Performed by: INTERNAL MEDICINE

## 2018-12-13 PROCEDURE — 99214 OFFICE O/P EST MOD 30 MIN: CPT | Performed by: INTERNAL MEDICINE

## 2018-12-13 PROCEDURE — 36415 COLL VENOUS BLD VENIPUNCTURE: CPT | Performed by: INTERNAL MEDICINE

## 2018-12-13 PROCEDURE — 85046 RETICYTE/HGB CONCENTRATE: CPT | Performed by: INTERNAL MEDICINE

## 2018-12-13 PROCEDURE — 82746 ASSAY OF FOLIC ACID SERUM: CPT | Performed by: INTERNAL MEDICINE

## 2018-12-13 PROCEDURE — 83010 ASSAY OF HAPTOGLOBIN QUANT: CPT | Performed by: INTERNAL MEDICINE

## 2018-12-13 PROCEDURE — 82728 ASSAY OF FERRITIN: CPT | Performed by: INTERNAL MEDICINE

## 2018-12-13 PROCEDURE — 83615 LACTATE (LD) (LDH) ENZYME: CPT | Performed by: INTERNAL MEDICINE

## 2018-12-13 PROCEDURE — 83540 ASSAY OF IRON: CPT | Performed by: INTERNAL MEDICINE

## 2018-12-13 PROCEDURE — 84466 ASSAY OF TRANSFERRIN: CPT | Performed by: INTERNAL MEDICINE

## 2018-12-13 PROCEDURE — 84439 ASSAY OF FREE THYROXINE: CPT | Performed by: INTERNAL MEDICINE

## 2018-12-13 PROCEDURE — 82607 VITAMIN B-12: CPT | Performed by: INTERNAL MEDICINE

## 2018-12-13 RX ORDER — ASCORBIC ACID 500 MG
1 TABLET ORAL DAILY
COMMUNITY
End: 2022-02-24 | Stop reason: HOSPADM

## 2018-12-13 NOTE — PROGRESS NOTES
Subjective     REASON FOR CONSULTATION: Follow-up of right parotid mucoepidermoid carcinoma resected in January 2017 treated with postoperative radiation  Provide an opinion on any further workup or treatment                             REQUESTING PHYSICIAN:  Camila Mejia M.D.    RECORDS OBTAINED:  Records of the patients history including those obtained from the referring provider were reviewed and summarized in detail.    HISTORY OF PRESENT ILLNESS:  The patient is a 68 y.o. year old female who is here for an opinion about the above issue.    History of Present Illness patient is a 68-year-old lady seen here for a brief follow-up visit for her UR carcinoma the right parotid which was resected 2 years ago.  She was treated with postoperative radiation had some residual findings on CAT scan and PET scan that were felt to be benign.  She went to [facility] who followed up and told her this was indeed scar tissue in the parotid and did not need any further imaging.  She returned to [place] and has no follow-up here and came to my office to follow up on her parotid cancer.  She is doing well except for fatigue which is fairly new onset.  No weight loss change in bowel habits hematemesis melena hematochezia.  She does report reflux symptoms and wonders if she has gastritis    I had plan to see her back for complete follow-up in a month because I had a previous engagement but noted on her blood work that she is anemic with a hemoglobin of 8.8 and borderline macrocytosis and we have ordered blood work to assess her anemia with plans to see her back in 2-3 weeks for complete exam in follow-up of her parotid cancer in evaluation of anemia    Past Medical History:   Diagnosis Date   • Acute bronchitis    • BOOP (bronchiolitis obliterans with organizing pneumonia) (CMS/HCC) 2000    Resolved   • Fracture, hip (CMS/[facility])    • Gastroenteritis    • H/O Lung nodule    • Herpes zoster 2015   • Mucoepidermoid carcinoma  (CMS/[facility])    • Neck pain    • Osteopenia    • Pharyngitis         Past Surgical History:   Procedure Laterality Date   • HIP SURGERY Left 2009    Joint replacement        Current Outpatient Medications on File Prior to Visit   Medication Sig Dispense Refill   • Multiple Vitamin (MULTIVITAMIN) tablet tablet Take 1 tablet by mouth Daily.     • albuterol (PROVENTIL HFA;VENTOLIN HFA) 108 (90 Base) MCG/ACT inhaler Inhale 2 puffs Every 4 (Four) Hours As Needed for Wheezing. 1 inhaler 5   • ALPRAZolam (XANAX) 0.25 MG tablet Take 1 tablet by mouth 2 (Two) Times a Day As Needed for anxiety. 20 tablet 2   • fluticasone (FLONASE) 50 MCG/ACT nasal spray 2 sprays into the nostril(s) as directed by provider Daily. 1 bottle 5   • hydrocortisone 2.5 % lotion Apply  topically As Needed.     • loratadine (CLARITIN) 10 MG tablet Take 1 tablet by mouth Daily. 90 tablet 1   • meloxicam (MOBIC) 15 MG tablet Take 1 tablet by mouth Daily. 30 tablet 5   • montelukast (SINGULAIR) 10 MG tablet Take 1 tablet by mouth Every Night. 30 tablet 5     No current facility-administered medications on file prior to visit.         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   Tobacco Use   • Smoking status: Never Smoker   • Smokeless tobacco: Never Used   Substance and Sexual Activity   • Alcohol use: Yes     Comment: SOCIALLY   • Drug use: No   • Sexual activity: Defer        Family History   Problem Relation Age of Onset   • Peripheral vascular disease Mother    • Pancreatic cancer Father 91   • Heart disease Father    • Rectal cancer Sister 36   • Diabetes Sister    • No Known Problems Brother    • No Known Problems Daughter    • No Known  "Problems Son    • Colon cancer Maternal Grandmother    • No Known Problems Paternal Grandmother    • No Known Problems Maternal Aunt    • No Known Problems Paternal Aunt    • Cancer Paternal Uncle 38        Mucoepidermoid CA of the parotid gland   • BRCA 1/2 Neg Hx    • Breast cancer Neg Hx    • Endometrial cancer Neg Hx    • Ovarian cancer Neg Hx         Review of Systems   Constitutional: Positive for fatigue. Negative for activity change, appetite change, fever and unexpected weight change.   Cardiovascular: Positive for chest pain (Reflux symptoms).        Objective     Vitals:    12/13/18 1249   BP: 112/68   Pulse: 76   Resp: 16   Temp: 97.6 °F (36.4 °C)   SpO2: 100%   Weight: 66.2 kg (145 lb 14.4 oz)   Height: 167.6 cm (66\")  Comment: new ht w/shoes   PainSc: 0-No pain     Current Status 12/13/2018   ECOG score 0       Physical Exam    GENERAL:  Well-developed, well-nourished in no acute distress.   SKIN:  Warm, dry without rashes, purpura or petechiae.  EYES:  Pupils equal, round and reactive to light.  EOMs intact.  Conjunctivae normal.  EARS:  Hearing intact.  NOSE:  Septum midline.  No excoriations or nasal discharge.  MOUTH:  Tongue is well-papillated; no stomatitis or ulcers.  Lips normal.  THROAT:  Oropharynx without lesions or exudates.  NECK:  Supple with good range of motion; no thyromegaly or masses, no JVD.  No masses appreciated in the right parotid  LYMPHATICS:  No cervical, supraclavicular, axillary or inguinal adenopathy.  CHEST:  Lungs clear to auscultation. Good airflow.  CARDIAC:  Regular rate and rhythm without murmurs, rubs or gallops. Normal S1,S2.  ABDOMEN:  Soft, nontender with no hepatosplenomegaly or masses.  EXTREMITIES:  No clubbing, cyanosis or edema.  NEUROLOGICAL:  Cranial Nerves II-XII grossly intact.  No focal neurological deficits.  PSYCHIATRIC:  Normal affect and mood.    RECENT LABS:  Hematology WBC   Date Value Ref Range Status   12/13/2018 5.78 4.00 - 10.00 10*3/mm3 Final "     RBC   Date Value Ref Range Status   12/13/2018 3.46 (L) 3.90 - 5.00 10*6/mm3 Final   03/26/2018 4.48  Final     Hemoglobin   Date Value Ref Range Status   12/13/2018 8.8 (L) 11.5 - 14.9 g/dL Final     Hematocrit   Date Value Ref Range Status   12/13/2018 28.4 (L) 34.0 - 45.0 % Final     Platelets   Date Value Ref Range Status   12/13/2018 294 150 - 375 10*3/mm3 Final   03/26/2018 180 10*3/mm3 Final                Assessment/Plan   1.  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  New onset anemia with no previous CBCs for comparison normal white count and platelets?  B12 deficiency/iron deficiency  3.  History of BOOP and lung nodule which have resolved     Plan  Anemia workup  Restage with CT of the neck and chest    return in 3-4 weeks to review the results and we will call her with the anemia results sooner

## 2018-12-14 LAB
IGA SERPL-MCNC: 260 MG/DL (ref 87–352)
IGG SERPL-MCNC: 890 MG/DL (ref 700–1600)
IGM SERPL-MCNC: 132 MG/DL (ref 26–217)
PROT PATTERN SERPL IFE-IMP: NORMAL

## 2018-12-17 ENCOUNTER — TELEPHONE (OUTPATIENT)
Dept: GENERAL RADIOLOGY | Facility: HOSPITAL | Age: 68
End: 2018-12-17

## 2018-12-17 ENCOUNTER — TELEPHONE (OUTPATIENT)
Dept: ONCOLOGY | Facility: HOSPITAL | Age: 68
End: 2018-12-17

## 2018-12-17 ENCOUNTER — TELEPHONE (OUTPATIENT)
Dept: INTERNAL MEDICINE | Facility: CLINIC | Age: 68
End: 2018-12-17

## 2018-12-17 DIAGNOSIS — D51.9 ANEMIA DUE TO VITAMIN B12 DEFICIENCY, UNSPECIFIED B12 DEFICIENCY TYPE: Primary | ICD-10-CM

## 2018-12-17 NOTE — TELEPHONE ENCOUNTER
----- Message from Trinidad Richter sent at 12/17/2018 11:39 AM EST -----  Contact: [phone]  Pt has question for nurse.      Attempted to call pt, no answer. LVM for her to return our call.     Pt called for lab results. Reviewed lab results with Dr. Tran. Pt is iron deficient and borderline B12 deficient. Pt will need to start on Ferrous Sulfate 325mg BID with meals. She also needs to get B12 injections weekly x 4. We are awaiting MMA results. Dr. Tran also sent Dr. Mejia a message regarding following up with GI. Informed pt and she v/u. Message sent to scheduling.

## 2018-12-17 NOTE — TELEPHONE ENCOUNTER
----- Message from Prasad Puente sent at 12/17/2018  3:07 PM EST -----  Regarding: Overbook  Can you please overbook a b12 injection for me for 1/10 at [facility] at 9:30 please?    Thank you!!    Bianca    ----- Message -----  From: Kelli Chen RN  Sent: 12/17/2018   2:58 PM  To: k On[facility] Desk Pool    Please schedule pt for B12 injections weekly x 4. No lab needed

## 2018-12-18 ENCOUNTER — TELEPHONE (OUTPATIENT)
Dept: ONCOLOGY | Facility: CLINIC | Age: 68
End: 2018-12-18

## 2018-12-18 DIAGNOSIS — Z78.9 HEALTH MAINTENANCE ALTERATION: Primary | ICD-10-CM

## 2018-12-18 NOTE — TELEPHONE ENCOUNTER
----- Message from Kelli Chen RN sent at 12/17/2018  2:58 PM EST -----  Please schedule pt for B12 injections weekly x 4. No lab needed

## 2018-12-19 ENCOUNTER — INFUSION (OUTPATIENT)
Dept: ONCOLOGY | Facility: HOSPITAL | Age: 68
End: 2018-12-19

## 2018-12-19 DIAGNOSIS — E53.8 B12 DEFICIENCY: Primary | ICD-10-CM

## 2018-12-19 LAB
Lab: NORMAL
METHYLMALONATE SERPL-SCNC: 204 NMOL/L (ref 0–378)

## 2018-12-19 PROCEDURE — 96372 THER/PROPH/DIAG INJ SC/IM: CPT | Performed by: INTERNAL MEDICINE

## 2018-12-19 PROCEDURE — 25010000002 CYANOCOBALAMIN PER 1000 MCG: Performed by: INTERNAL MEDICINE

## 2018-12-19 RX ORDER — CYANOCOBALAMIN 1000 UG/ML
1000 INJECTION, SOLUTION INTRAMUSCULAR; SUBCUTANEOUS
Status: CANCELLED | OUTPATIENT
Start: 2019-01-09

## 2018-12-19 RX ORDER — CYANOCOBALAMIN 1000 UG/ML
1000 INJECTION, SOLUTION INTRAMUSCULAR; SUBCUTANEOUS
Status: CANCELLED | OUTPATIENT
Start: 2019-01-02

## 2018-12-19 RX ORDER — CYANOCOBALAMIN 1000 UG/ML
1000 INJECTION, SOLUTION INTRAMUSCULAR; SUBCUTANEOUS
Status: CANCELLED | OUTPATIENT
Start: 2018-12-26

## 2018-12-19 RX ORDER — CYANOCOBALAMIN 1000 UG/ML
1000 INJECTION, SOLUTION INTRAMUSCULAR; SUBCUTANEOUS
Status: DISCONTINUED | OUTPATIENT
Start: 2018-12-19 | End: 2018-12-19 | Stop reason: HOSPADM

## 2018-12-19 RX ADMIN — CYANOCOBALAMIN 1000 MCG: 1000 INJECTION, SOLUTION INTRAMUSCULAR at 13:41

## 2018-12-21 LAB
BASOPHILS # BLD AUTO: 0.03 10*3/MM3 (ref 0–0.2)
BASOPHILS NFR BLD AUTO: 0.6 % (ref 0–1.5)
ENDOMYSIUM IGA SER QL: NEGATIVE
EOSINOPHIL # BLD AUTO: 0.21 10*3/MM3 (ref 0–0.7)
EOSINOPHIL NFR BLD AUTO: 4.1 % (ref 0.3–6.2)
ERYTHROCYTE [DISTWIDTH] IN BLOOD BY AUTOMATED COUNT: 15 % (ref 11.7–13)
HCT VFR BLD AUTO: 30.7 % (ref 35.6–45.5)
HGB BLD-MCNC: 8.9 G/DL (ref 11.9–15.5)
IF BLOCK AB SER-ACNC: 13.8 AU/ML (ref 0–1.1)
IGA SERPL-MCNC: 278 MG/DL (ref 87–352)
IMM GRANULOCYTES # BLD: 0 10*3/MM3 (ref 0–0.03)
IMM GRANULOCYTES NFR BLD: 0 % (ref 0–0.5)
LYMPHOCYTES # BLD AUTO: 1.63 10*3/MM3 (ref 0.9–4.8)
LYMPHOCYTES NFR BLD AUTO: 32 % (ref 19.6–45.3)
MCH RBC QN AUTO: 24.5 PG (ref 26.9–32)
MCHC RBC AUTO-ENTMCNC: 29 G/DL (ref 32.4–36.3)
MCV RBC AUTO: 84.6 FL (ref 80.5–98.2)
MONOCYTES # BLD AUTO: 0.49 10*3/MM3 (ref 0.2–1.2)
MONOCYTES NFR BLD AUTO: 9.6 % (ref 5–12)
NEUTROPHILS # BLD AUTO: 2.74 10*3/MM3 (ref 1.9–8.1)
NEUTROPHILS NFR BLD AUTO: 53.7 % (ref 42.7–76)
PLATELET # BLD AUTO: 369 10*3/MM3 (ref 140–500)
RBC # BLD AUTO: 3.63 10*6/MM3 (ref 3.9–5.2)
TTG IGA SER-ACNC: <2 U/ML (ref 0–3)
WBC # BLD AUTO: 5.1 10*3/MM3 (ref 4.5–10.7)

## 2018-12-26 ENCOUNTER — INFUSION (OUTPATIENT)
Dept: ONCOLOGY | Facility: HOSPITAL | Age: 68
End: 2018-12-26

## 2018-12-26 ENCOUNTER — HOSPITAL ENCOUNTER (OUTPATIENT)
Dept: PET IMAGING | Facility: HOSPITAL | Age: 68
Discharge: HOME | End: 2018-12-26
Attending: INTERNAL MEDICINE | Admitting: INTERNAL MEDICINE

## 2018-12-26 DIAGNOSIS — E53.8 B12 DEFICIENCY: Primary | ICD-10-CM

## 2018-12-26 DIAGNOSIS — C80.1 MUCOEPIDERMOID CARCINOMA (HCC): ICD-10-CM

## 2018-12-26 LAB — CREAT BLDA-MCNC: 0.8 MG/DL (ref 0.6–1.3)

## 2018-12-26 PROCEDURE — 82565 ASSAY OF CREATININE: CPT

## 2018-12-26 PROCEDURE — 71260 CT THORAX DX C+: CPT

## 2018-12-26 PROCEDURE — 96372 THER/PROPH/DIAG INJ SC/IM: CPT | Performed by: INTERNAL MEDICINE

## 2018-12-26 PROCEDURE — 25010000002 CYANOCOBALAMIN PER 1000 MCG: Performed by: INTERNAL MEDICINE

## 2018-12-26 PROCEDURE — 70491 CT SOFT TISSUE NECK W/DYE: CPT

## 2018-12-26 PROCEDURE — 25010000002 IOPAMIDOL 61 % SOLUTION: Performed by: INTERNAL MEDICINE

## 2018-12-26 RX ORDER — CYANOCOBALAMIN 1000 UG/ML
1000 INJECTION, SOLUTION INTRAMUSCULAR; SUBCUTANEOUS
Status: DISCONTINUED | OUTPATIENT
Start: 2018-12-26 | End: 2018-12-26 | Stop reason: HOSPADM

## 2018-12-26 RX ADMIN — CYANOCOBALAMIN 1000 MCG: 1000 INJECTION, SOLUTION INTRAMUSCULAR at 10:22

## 2018-12-26 RX ADMIN — IOPAMIDOL 75 ML: 612 INJECTION, SOLUTION INTRAVENOUS at 09:40

## 2019-01-04 ENCOUNTER — INFUSION (OUTPATIENT)
Dept: ONCOLOGY | Facility: HOSPITAL | Age: 69
End: 2019-01-04

## 2019-01-04 DIAGNOSIS — E53.8 B12 DEFICIENCY: Primary | ICD-10-CM

## 2019-01-04 PROCEDURE — 96372 THER/PROPH/DIAG INJ SC/IM: CPT | Performed by: INTERNAL MEDICINE

## 2019-01-04 PROCEDURE — 25010000002 CYANOCOBALAMIN PER 1000 MCG: Performed by: INTERNAL MEDICINE

## 2019-01-04 RX ORDER — CYANOCOBALAMIN 1000 UG/ML
1000 INJECTION, SOLUTION INTRAMUSCULAR; SUBCUTANEOUS
Status: DISCONTINUED | OUTPATIENT
Start: 2019-01-04 | End: 2019-01-04 | Stop reason: HOSPADM

## 2019-01-04 RX ADMIN — CYANOCOBALAMIN 1000 MCG: 1000 INJECTION, SOLUTION INTRAMUSCULAR at 12:52

## 2019-01-08 ENCOUNTER — CLINICAL SUPPORT (OUTPATIENT)
Dept: ONCOLOGY | Facility: CLINIC | Age: 69
End: 2019-01-08

## 2019-01-08 ENCOUNTER — INFUSION (OUTPATIENT)
Dept: ONCOLOGY | Facility: HOSPITAL | Age: 69
End: 2019-01-08

## 2019-01-08 ENCOUNTER — OFFICE VISIT (OUTPATIENT)
Dept: ONCOLOGY | Facility: CLINIC | Age: 69
End: 2019-01-08

## 2019-01-08 ENCOUNTER — LAB (OUTPATIENT)
Dept: LAB | Facility: HOSPITAL | Age: 69
End: 2019-01-08

## 2019-01-08 VITALS
HEIGHT: 66 IN | DIASTOLIC BLOOD PRESSURE: 68 MMHG | BODY MASS INDEX: 23.19 KG/M2 | OXYGEN SATURATION: 97 % | SYSTOLIC BLOOD PRESSURE: 114 MMHG | RESPIRATION RATE: 16 BRPM | WEIGHT: 144.3 LBS | HEART RATE: 69 BPM | TEMPERATURE: 98.3 F

## 2019-01-08 DIAGNOSIS — D50.9 IRON DEFICIENCY ANEMIA, UNSPECIFIED IRON DEFICIENCY ANEMIA TYPE: ICD-10-CM

## 2019-01-08 DIAGNOSIS — C80.1 MUCOEPIDERMOID CARCINOMA (HCC): Primary | ICD-10-CM

## 2019-01-08 DIAGNOSIS — C80.1 CARCINOMA (HCC): ICD-10-CM

## 2019-01-08 DIAGNOSIS — E53.8 B12 DEFICIENCY: Primary | ICD-10-CM

## 2019-01-08 LAB
BASOPHILS # BLD AUTO: 0.04 10*3/MM3 (ref 0–0.1)
BASOPHILS NFR BLD AUTO: 0.8 % (ref 0–1.1)
DEPRECATED RDW RBC AUTO: 63 FL (ref 37–49)
EOSINOPHIL # BLD AUTO: 0.2 10*3/MM3 (ref 0–0.36)
EOSINOPHIL NFR BLD AUTO: 4 % (ref 1–5)
ERYTHROCYTE [DISTWIDTH] IN BLOOD BY AUTOMATED COUNT: 20.7 % (ref 11.7–14.5)
HCT VFR BLD AUTO: 35.8 % (ref 34–45)
HGB BLD-MCNC: 11.2 G/DL (ref 11.5–14.9)
IMM GRANULOCYTES # BLD AUTO: 0.01 10*3/MM3 (ref 0–0.03)
IMM GRANULOCYTES NFR BLD AUTO: 0.2 % (ref 0–0.5)
LYMPHOCYTES # BLD AUTO: 1.55 10*3/MM3 (ref 1–3.5)
LYMPHOCYTES NFR BLD AUTO: 31.4 % (ref 20–49)
MCH RBC QN AUTO: 26.5 PG (ref 27–33)
MCHC RBC AUTO-ENTMCNC: 31.3 G/DL (ref 32–35)
MCV RBC AUTO: 84.8 FL (ref 83–97)
MONOCYTES # BLD AUTO: 0.52 10*3/MM3 (ref 0.25–0.8)
MONOCYTES NFR BLD AUTO: 10.5 % (ref 4–12)
NEUTROPHILS # BLD AUTO: 2.62 10*3/MM3 (ref 1.5–7)
NEUTROPHILS NFR BLD AUTO: 53.1 % (ref 39–75)
NRBC BLD AUTO-RTO: 0 /100 WBC (ref 0–0)
PLATELET # BLD AUTO: 267 10*3/MM3 (ref 150–375)
PMV BLD AUTO: 10.2 FL (ref 8.9–12.1)
RBC # BLD AUTO: 4.22 10*6/MM3 (ref 3.9–5)
WBC NRBC COR # BLD: 4.94 10*3/MM3 (ref 4–10)

## 2019-01-08 PROCEDURE — 25010000002 CYANOCOBALAMIN PER 1000 MCG: Performed by: INTERNAL MEDICINE

## 2019-01-08 PROCEDURE — 36415 COLL VENOUS BLD VENIPUNCTURE: CPT | Performed by: INTERNAL MEDICINE

## 2019-01-08 PROCEDURE — 96372 THER/PROPH/DIAG INJ SC/IM: CPT | Performed by: INTERNAL MEDICINE

## 2019-01-08 PROCEDURE — 85025 COMPLETE CBC W/AUTO DIFF WBC: CPT | Performed by: INTERNAL MEDICINE

## 2019-01-08 PROCEDURE — 99214 OFFICE O/P EST MOD 30 MIN: CPT | Performed by: INTERNAL MEDICINE

## 2019-01-08 RX ORDER — CYANOCOBALAMIN 1000 UG/ML
1000 INJECTION, SOLUTION INTRAMUSCULAR; SUBCUTANEOUS
Status: DISCONTINUED | OUTPATIENT
Start: 2019-01-08 | End: 2019-01-08 | Stop reason: HOSPADM

## 2019-01-08 RX ORDER — FERROUS SULFATE 325(65) MG
325 TABLET ORAL 2 TIMES DAILY WITH MEALS
Qty: 60 TABLET | Refills: 4 | Status: SHIPPED | OUTPATIENT
Start: 2019-01-08 | End: 2019-08-08

## 2019-01-08 RX ADMIN — CYANOCOBALAMIN 1000 MCG: 1000 INJECTION, SOLUTION INTRAMUSCULAR at 16:10

## 2019-01-08 NOTE — PROGRESS NOTES
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  Anemia due to B12 and iron deficiency                             REQUESTING PHYSICIAN:  Camila Mejia M.D.    History of Present Illness patient is 68-year-old lady with a parotid carcinoma resected 2 years ago and a newly diagnosed anemia here for review     CAT scans were done of the neck and chest and thankfully this shows no evidence of recurrent cancer  Her anemia workup shows B12 and iron deficiency although her methylmalonic acid levels were not elevated her anti-intrinsic factor antibodies were elevated and this may very well be a pernicious anemia    She has had an improvement in her hemoglobin since starting the B12 injections and she is taking over-the-counter iron but I prescribed ferrous sulfate for is I think this will work with car    She is up-to-date with colonoscopies but is scheduled to see a gastroenterologist after upper endoscopy done and we will wait to see the results of this      Past Medical History:   Diagnosis Date   • Acute bronchitis    • BOOP (bronchiolitis obliterans with organizing pneumonia) (CMS/HCC) 2000    Resolved   • Fracture, hip (CMS/HCC)    • Gastroenteritis    • H/O Lung nodule    • Herpes zoster 2015   • 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   • Neck pain    • Osteopenia    • Pharyngitis         Past Surgical History:   Procedure Laterality Date   • HIP SURGERY Left 2009    Joint replacement   • PAROTIDECTOMY Right 1/31/2017    Procedure: RIGHT SUPERFICIAL PAROTIDECTOMY EXCISION PAROTID MASS ;  Surgeon: Candelario Calderon MD;  Location: [facility];  Service:       HEME ONC HISTORY:   patient is a 68-year-old lady seen here for a brief follow-up visit for her UR carcinoma the right parotid which was resected 2 years ago.  She was treated with postoperative radiation had some residual findings on CAT  scan and PET scan that were felt to be benign.  She went to [facility] who followed up and told her this was indeed scar tissue in the parotid and did not need any further imaging.  She returned to [place] and has no follow-up here and came to my office to follow up on her parotid cancer.  She is doing well except for fatigue which is fairly new onset.  No weight loss change in bowel habits hematemesis melena hematochezia.  She does report reflux symptoms and wonders if she has gastritis    I had plan to see her back for complete follow-up in a month because I had a previous engagement but noted on her blood work that she is anemic with a hemoglobin of 8.8 and borderline macrocytosis and we have ordered blood work to assess her anemia with plans to see her back in 2-3 weeks for complete exam in follow-up of her parotid cancer in evaluation of anemia    Current Outpatient Medications on File Prior to Visit   Medication Sig Dispense Refill   • fluticasone (FLONASE) 50 MCG/ACT nasal spray 2 sprays into the nostril(s) as directed by provider Daily. 1 bottle 5   • hydrocortisone 2.5 % lotion Apply  topically As Needed.     • loratadine (CLARITIN) 10 MG tablet Take 1 tablet by mouth Daily. 90 tablet 1   • meloxicam (MOBIC) 15 MG tablet Take 1 tablet by mouth Daily. 30 tablet 5   • Multiple Vitamin (MULTIVITAMIN) tablet tablet Take 1 tablet by mouth Daily.     • [DISCONTINUED] albuterol (PROVENTIL HFA;VENTOLIN HFA) 108 (90 Base) MCG/ACT inhaler Inhale 2 puffs Every 4 (Four) Hours As Needed for Wheezing. 1 inhaler 5   • [DISCONTINUED] ALPRAZolam (XANAX) 0.25 MG tablet Take 1 tablet by mouth 2 (Two) Times a Day As Needed for anxiety. 20 tablet 2   • [DISCONTINUED] montelukast (SINGULAIR) 10 MG tablet Take 1 tablet by mouth Every Night. 30 tablet 5     No current facility-administered medications on file prior to visit.         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   Tobacco Use   • Smoking status: Never Smoker   • Smokeless tobacco: Never Used   Substance and Sexual Activity   • Alcohol use: Yes     Comment: SOCIALLY   • Drug use: No   • Sexual activity: Defer        Family History   Problem Relation Age of Onset   • Peripheral vascular disease Mother    • Pancreatic cancer Father 91   • Heart disease Father    • Hypertension Father    • Rectal cancer Sister 36   • Diabetes Sister    • No Known Problems Brother    • No Known Problems Daughter    • No Known Problems Son    • Colon cancer Maternal Grandmother    • No Known Problems Paternal Grandmother    • No Known Problems Maternal Aunt    • No Known Problems Paternal Aunt    • Cancer Paternal Uncle 38        Mucoepidermoid CA of the parotid gland   • BRCA 1/2 Neg Hx    • Breast cancer Neg Hx    • Endometrial cancer Neg Hx    • Ovarian cancer Neg Hx         Review of Systems   Constitutional: Positive for fatigue (better after injection 01/07/19). Negative for activity change, appetite change, fever and unexpected weight change.   Respiratory: Negative for chest tightness and shortness of breath (occasional 01/07/19).    Cardiovascular: Negative for chest pain (Reflux symptoms).   Gastrointestinal: Negative for constipation, diarrhea, nausea and vomiting.   Genitourinary: Negative for difficulty urinating.   Musculoskeletal: Negative for arthralgias, back pain and joint swelling.   Neurological: Negative for dizziness, light-headedness, numbness and headaches.   Psychiatric/Behavioral: The patient is not nervous/anxious.         Objective     Vitals:    01/08/19 1450   Height: 167.6 cm (65.98\")     Current Status 1/8/2019   ECOG score 0       Physical Exam  "   GENERAL:  Well-developed, well-nourished in no acute distress.   SKIN:  Warm, dry without rashes, purpura or petechiae.  EYES:  Pupils equal, round and reactive to light.  EOMs intact.  Conjunctivae normal.  EARS:  Hearing intact.  NOSE:  Septum midline.  No excoriations or nasal discharge.  MOUTH:  Tongue is well-papillated; no stomatitis or ulcers.  Lips normal.  THROAT:  Oropharynx without lesions or exudates.  NECK:  Supple with good range of motion; no thyromegaly or masses, no JVD.  No masses appreciated in the right parotid  LYMPHATICS:  No cervical, supraclavicular, axillary or inguinal adenopathy.  CHEST:  Lungs clear to auscultation. Good airflow.  CARDIAC:  Regular rate and rhythm without murmurs, rubs or gallops. Normal S1,S2.  ABDOMEN:  Soft, nontender with no hepatosplenomegaly or masses.  EXTREMITIES:  No clubbing, cyanosis or edema.  NEUROLOGICAL:  Cranial Nerves II-XII grossly intact.  No focal neurological deficits.  PSYCHIATRIC:  Normal affect and mood.    RECENT LABS:  Hematology WBC   Date Value Ref Range Status   12/19/2018 5.10 4.50 - 10.70 10*3/mm3 Final     RBC   Date Value Ref Range Status   12/19/2018 3.63 (L) 3.90 - 5.20 10*6/mm3 Final     Hemoglobin   Date Value Ref Range Status   12/19/2018 8.9 (L) 11.9 - 15.5 g/dL Final   12/13/2018 8.8 (L) 11.5 - 14.9 g/dL Final     Hematocrit   Date Value Ref Range Status   12/19/2018 30.7 (L) 35.6 - 45.5 % Final   12/13/2018 28.4 (L) 34.0 - 45.0 % Final     Platelets   Date Value Ref Range Status   12/19/2018 369 140 - 500 10*3/mm3 Final   12/13/2018 294 150 - 375 10*3/mm3 Final   03/26/2018 180 10*3/mm3 Final          CT CHEST WITH CONTRAST-, CT SOFT TISSUE NECK WITH CONTRAST-     IMPRESSION:  Status post right parotidectomy. There is no evidence of recurrent parotid tumor or metastatic disease within the neck. A couple tiny noncalcified right lower lobe pulmonary nodules remain stable.     This report was finalized on 12/26/2018           Assessment/Plan   1.  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  Low B12 levels and low ferritin?  Etiology responding to therapy   3.  History of BOOP and lung nodule which have resolved     Plan  1.  Continue B12 now monthly after the 4 weekly injections  2.  Ferrous sulfate 325 twice a day for 6 months  3.  GI evaluation with upper GI endoscopy  4.  Return to see me in 3 months review the results of her upper endoscopy and recheck her iron levels

## 2019-01-10 ENCOUNTER — APPOINTMENT (OUTPATIENT)
Dept: ONCOLOGY | Facility: CLINIC | Age: 69
End: 2019-01-10

## 2019-01-10 ENCOUNTER — APPOINTMENT (OUTPATIENT)
Dept: ONCOLOGY | Facility: HOSPITAL | Age: 69
End: 2019-01-10

## 2019-01-10 ENCOUNTER — APPOINTMENT (OUTPATIENT)
Dept: LAB | Facility: HOSPITAL | Age: 69
End: 2019-01-10

## 2019-01-14 ENCOUNTER — OFFICE VISIT (OUTPATIENT)
Dept: INTERNAL MEDICINE | Facility: CLINIC | Age: 69
End: 2019-01-14

## 2019-01-14 VITALS
HEART RATE: 65 BPM | SYSTOLIC BLOOD PRESSURE: 118 MMHG | WEIGHT: 146 LBS | OXYGEN SATURATION: 99 % | BODY MASS INDEX: 23.58 KG/M2 | DIASTOLIC BLOOD PRESSURE: 72 MMHG

## 2019-01-14 DIAGNOSIS — R53.83 FATIGUE, UNSPECIFIED TYPE: ICD-10-CM

## 2019-01-14 DIAGNOSIS — C80.1 MUCOEPIDERMOID CARCINOMA (HCC): ICD-10-CM

## 2019-01-14 DIAGNOSIS — D50.9 IRON DEFICIENCY ANEMIA, UNSPECIFIED IRON DEFICIENCY ANEMIA TYPE: ICD-10-CM

## 2019-01-14 DIAGNOSIS — D51.0 VITAMIN B12 DEFICIENCY ANEMIA DUE TO INTRINSIC FACTOR DEFICIENCY: Primary | ICD-10-CM

## 2019-01-14 PROCEDURE — 99214 OFFICE O/P EST MOD 30 MIN: CPT | Performed by: INTERNAL MEDICINE

## 2019-01-14 NOTE — PROGRESS NOTES
No chief complaint on file.  mucoepidermoid carcinoma  Anemia   Intrinsic factor related b12 deficiency  Iron deficiency      History of Present Illness   Iesha Roldan is a 68 y.o. female presents for an acute care visit. Recent diagnosis of both iron deficiency and b12 deficient anemia. She does have intrinsic factor antibody. She has had injections once weekly for last 4 weeks and is to now switch to monthly injections. She will note improved energy right after a shot but then this fades. She has a history of a mucoepidermoid carcinoma. Ct scans w/ no signs of recurrence.       The following portions of the patient's history were reviewed and updated as appropriate: allergies, current medications, past family history, past medical history, past social history, past surgical history and problem list.  Current Outpatient Medications on File Prior to Visit   Medication Sig Dispense Refill   • ferrous sulfate 325 (65 FE) MG tablet Take 1 tablet by mouth 2 (Two) Times a Day With Meals. 60 tablet 4   • fluticasone (FLONASE) 50 MCG/ACT nasal spray 2 sprays into the nostril(s) as directed by provider Daily. 1 bottle 5   • hydrocortisone 2.5 % lotion Apply  topically As Needed.     • loratadine (CLARITIN) 10 MG tablet Take 1 tablet by mouth Daily. 90 tablet 1   • meloxicam (MOBIC) 15 MG tablet Take 1 tablet by mouth Daily. 30 tablet 5   • Multiple Vitamin (MULTIVITAMIN) tablet tablet Take 1 tablet by mouth Daily.       No current facility-administered medications on file prior to visit.      Review of Systems   Constitutional: Negative.    HENT: Negative.    Eyes: Negative.    Respiratory: Negative.    Cardiovascular: Negative.    Gastrointestinal: Negative.    Endocrine: Negative.    Genitourinary: Negative.    Musculoskeletal: Negative.    Skin: Negative.    Allergic/Immunologic: Negative.    Neurological: Negative.    Hematological: Negative.    Psychiatric/Behavioral: Negative.        Objective   Physical Exam    Constitutional: She is oriented to person, place, and time. She appears well-developed and well-nourished.   HENT:   Head: Normocephalic and atraumatic.   Right Ear: External ear normal.   Left Ear: External ear normal.   Nose: Nose normal.   Mouth/Throat: Oropharynx is clear and moist.   Eyes: Conjunctivae and EOM are normal. Pupils are equal, round, and reactive to light.   Neck: Normal range of motion. Neck supple.   Cardiovascular: Normal rate, regular rhythm and normal heart sounds.   Pulmonary/Chest: Effort normal and breath sounds normal. No respiratory distress.   Abdominal: Soft. Bowel sounds are normal.   Musculoskeletal: Normal range of motion.   Neurological: She is alert and oriented to person, place, and time.   Skin: Skin is warm and dry.   Psychiatric: She has a normal mood and affect. Her behavior is normal. Judgment and thought content normal.   Nursing note and vitals reviewed.       /72   Pulse 65   Wt 66.2 kg (146 lb)   SpO2 99%   BMI 23.58 kg/m²     Assessment/Plan   Diagnoses and all orders for this visit:    Vitamin B12 deficiency anemia due to intrinsic factor deficiency    Iron deficiency anemia, unspecified iron deficiency anemia type    Fatigue, unspecified type    Mucoepidermoid carcinoma (CMS/HCC)    patient w/ anemia. She remains a little fatigued so will check levels today. If now at goal will move b12 inj to q month as planned. She will continue iron replacement for full 6 months as planned and will continue an iron rich diet. She had no evidence of recurrence of cancer on ct scan and pulm nodule is stable. She will follow up routinely.

## 2019-01-15 LAB
ALBUMIN SERPL-MCNC: 4.3 G/DL (ref 3.5–5.2)
ALBUMIN/GLOB SERPL: 1.7 G/DL
ALP SERPL-CCNC: 71 U/L (ref 39–117)
ALT SERPL-CCNC: 13 U/L (ref 1–33)
AST SERPL-CCNC: 20 U/L (ref 1–32)
BASOPHILS # BLD AUTO: 0.02 10*3/MM3 (ref 0–0.2)
BASOPHILS NFR BLD AUTO: 0.5 % (ref 0–1.5)
BILIRUB SERPL-MCNC: <0.2 MG/DL (ref 0.1–1.2)
BUN SERPL-MCNC: 23 MG/DL (ref 8–23)
BUN/CREAT SERPL: 28 (ref 7–25)
CALCIUM SERPL-MCNC: 9.4 MG/DL (ref 8.6–10.5)
CHLORIDE SERPL-SCNC: 103 MMOL/L (ref 98–107)
CO2 SERPL-SCNC: 27.4 MMOL/L (ref 22–29)
CREAT SERPL-MCNC: 0.82 MG/DL (ref 0.57–1)
EOSINOPHIL # BLD AUTO: 0.14 10*3/MM3 (ref 0–0.7)
EOSINOPHIL NFR BLD AUTO: 3.4 % (ref 0.3–6.2)
ERYTHROCYTE [DISTWIDTH] IN BLOOD BY AUTOMATED COUNT: 20.7 % (ref 11.7–13)
FERRITIN SERPL-MCNC: 32.04 NG/ML (ref 13–150)
GLOBULIN SER CALC-MCNC: 2.6 GM/DL
GLUCOSE SERPL-MCNC: 84 MG/DL (ref 65–99)
HCT VFR BLD AUTO: 38 % (ref 35.6–45.5)
HGB BLD-MCNC: 11.4 G/DL (ref 11.9–15.5)
IMM GRANULOCYTES # BLD AUTO: 0 10*3/MM3 (ref 0–0.03)
IMM GRANULOCYTES NFR BLD AUTO: 0 % (ref 0–0.5)
LYMPHOCYTES # BLD AUTO: 1.43 10*3/MM3 (ref 0.9–4.8)
LYMPHOCYTES NFR BLD AUTO: 34.7 % (ref 19.6–45.3)
MCH RBC QN AUTO: 26.8 PG (ref 26.9–32)
MCHC RBC AUTO-ENTMCNC: 30 G/DL (ref 32.4–36.3)
MCV RBC AUTO: 89.4 FL (ref 80.5–98.2)
MONOCYTES # BLD AUTO: 0.31 10*3/MM3 (ref 0.2–1.2)
MONOCYTES NFR BLD AUTO: 7.5 % (ref 5–12)
NEUTROPHILS # BLD AUTO: 2.22 10*3/MM3 (ref 1.9–8.1)
NEUTROPHILS NFR BLD AUTO: 53.9 % (ref 42.7–76)
PLATELET # BLD AUTO: 254 10*3/MM3 (ref 140–500)
POTASSIUM SERPL-SCNC: 4.4 MMOL/L (ref 3.5–5.2)
PROT SERPL-MCNC: 6.9 G/DL (ref 6–8.5)
RBC # BLD AUTO: 4.25 10*6/MM3 (ref 3.9–5.2)
SODIUM SERPL-SCNC: 142 MMOL/L (ref 136–145)
VIT B12 SERPL-MCNC: 766 PG/ML (ref 211–946)
WBC # BLD AUTO: 4.12 10*3/MM3 (ref 4.5–10.7)

## 2019-01-15 NOTE — PROGRESS NOTES
On 19, [facility] met with the patient prior to her meeting with Dr. Tran. She was diagnosed with a parotid gland cancer in 2017. She had surgery and started her radiation treatment. She said that one week into her radiation, Dr. Love . She said that she had a bad experience with the radiation center after that. She ended up with an open wound, which she felt was not attended to properly. She eventually went to [facility] for assessment. They gave her a clean bill of health.     Pt states that her primary care doctor recently had blood work done, and told the patient that she is anemic, and had many blood factors that were low. She was referred to Dr. Tran for further follow-up. She saw Dr. Mejia, an ENT physician. She has had a CT scan done, and hopes to learn the results today.     Pt is  and has 3 children. 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 She is a licensed LMFT.     Pt is alert, oriented x 3, and likely above average in intelligence. She was very pleasant, neatly groomed, and offered information readily during our meeting. She hopes that her CT scan today does not indicate any further problems with cancer. Social work availability and services were explained and assistance offered if needed in the future.

## 2019-01-18 ENCOUNTER — TELEPHONE (OUTPATIENT)
Dept: INTERNAL MEDICINE | Facility: CLINIC | Age: 69
End: 2019-01-18

## 2019-01-28 ENCOUNTER — OFFICE VISIT (OUTPATIENT)
Dept: INTERNAL MEDICINE | Facility: CLINIC | Age: 69
End: 2019-01-28

## 2019-01-28 VITALS
BODY MASS INDEX: 23.09 KG/M2 | OXYGEN SATURATION: 99 % | HEART RATE: 74 BPM | DIASTOLIC BLOOD PRESSURE: 70 MMHG | WEIGHT: 143 LBS | SYSTOLIC BLOOD PRESSURE: 126 MMHG

## 2019-01-28 DIAGNOSIS — F98.8 ATTENTION DEFICIT DISORDER (ADD) WITHOUT HYPERACTIVITY: Primary | ICD-10-CM

## 2019-01-28 PROCEDURE — 99213 OFFICE O/P EST LOW 20 MIN: CPT | Performed by: INTERNAL MEDICINE

## 2019-01-28 RX ORDER — DEXTROAMPHETAMINE SACCHARATE, AMPHETAMINE ASPARTATE MONOHYDRATE, DEXTROAMPHETAMINE SULFATE AND AMPHETAMINE SULFATE 5; 5; 5; 5 MG/1; MG/1; MG/1; MG/1
20 CAPSULE, EXTENDED RELEASE ORAL EVERY MORNING
Qty: 30 CAPSULE | Refills: 0 | Status: SHIPPED | OUTPATIENT
Start: 2019-01-28 | End: 2019-04-05

## 2019-01-28 NOTE — PROGRESS NOTES
Chief Complaint   Patient presents with   • Med Refill   ADD    History of Present Illness   Iesha Roldan is a 68 y.o. female presents for acute care. Patient has a history of ADD. She previously took Adderall XR 20 mg for this with good focus. She is to teach a class and in the past she was unable to do this as successfully without the medication. Patient reports that currently she will start a task and then start one or more others before completing the initial task.       The following portions of the patient's history were reviewed and updated as appropriate: allergies, current medications, past family history, past medical history, past social history, past surgical history and problem list.  Current Outpatient Medications on File Prior to Visit   Medication Sig Dispense Refill   • ferrous sulfate 325 (65 FE) MG tablet Take 1 tablet by mouth 2 (Two) Times a Day With Meals. 60 tablet 4   • fluticasone (FLONASE) 50 MCG/ACT nasal spray 2 sprays into the nostril(s) as directed by provider Daily. 1 bottle 5   • loratadine (CLARITIN) 10 MG tablet Take 1 tablet by mouth Daily. 90 tablet 1   • meloxicam (MOBIC) 15 MG tablet Take 1 tablet by mouth Daily. 30 tablet 5   • Multiple Vitamin (MULTIVITAMIN) tablet tablet Take 1 tablet by mouth Daily.     • hydrocortisone 2.5 % lotion Apply  topically As Needed.       No current facility-administered medications on file prior to visit.      Review of Systems   Constitutional: Negative.    HENT: Negative.    Eyes: Negative.    Respiratory: Negative.    Cardiovascular: Negative.    Gastrointestinal: Negative.    Endocrine: Negative.    Genitourinary: Negative.    Musculoskeletal: Negative.    Skin: Negative.    Allergic/Immunologic: Negative.    Neurological: Negative.    Hematological: Negative.    Psychiatric/Behavioral:        ADD  Focus challenges.        Objective   Physical Exam   Constitutional: She is oriented to person, place, and time. She appears well-developed  and well-nourished.   HENT:   Head: Normocephalic and atraumatic.   Right Ear: External ear normal.   Left Ear: External ear normal.   Mouth/Throat: Oropharynx is clear and moist.   Eyes: Conjunctivae and EOM are normal. Pupils are equal, round, and reactive to light.   Neck: Normal range of motion. Neck supple.   Cardiovascular: Normal rate, regular rhythm, normal heart sounds and intact distal pulses.   Pulmonary/Chest: Effort normal and breath sounds normal.   Abdominal: Soft. Bowel sounds are normal.   Musculoskeletal: Normal range of motion.   Neurological: She is alert and oriented to person, place, and time.   Skin: Skin is warm and dry.   Psychiatric: She has a normal mood and affect. Her behavior is normal. Judgment and thought content normal.   Focus challenge   Nursing note and vitals reviewed.       /70   Pulse 74   Wt 64.9 kg (143 lb)   SpO2 99%   BMI 23.09 kg/m²     Assessment/Plan   Diagnoses and all orders for this visit:    Attention deficit disorder (ADD) without hyperactivity    Other orders  -     amphetamine-dextroamphetamine XR (ADDERALL XR) 20 MG 24 hr capsule; Take 1 capsule by mouth Every Morning    patient with ADD. Previous success with adderall xr 20 mg. Will restart this medication at this dosing. She will monitor bp, pulse, and weight on the medication. She will continue to maximize her environment for focus success as well. She is aware of the risks and benefits of this medication. She will complete a controlled sub agreement w/ the office.

## 2019-02-05 ENCOUNTER — INFUSION (OUTPATIENT)
Dept: ONCOLOGY | Facility: HOSPITAL | Age: 69
End: 2019-02-05

## 2019-02-05 ENCOUNTER — LAB (OUTPATIENT)
Dept: LAB | Facility: HOSPITAL | Age: 69
End: 2019-02-05

## 2019-02-05 DIAGNOSIS — E53.8 B12 DEFICIENCY: Primary | ICD-10-CM

## 2019-02-05 LAB
BASOPHILS # BLD AUTO: 0.03 10*3/MM3 (ref 0–0.1)
BASOPHILS NFR BLD AUTO: 0.9 % (ref 0–1.1)
DEPRECATED RDW RBC AUTO: 60.7 FL (ref 37–49)
EOSINOPHIL # BLD AUTO: 0.13 10*3/MM3 (ref 0–0.36)
EOSINOPHIL NFR BLD AUTO: 3.7 % (ref 1–5)
ERYTHROCYTE [DISTWIDTH] IN BLOOD BY AUTOMATED COUNT: 20.2 % (ref 11.7–14.5)
HCT VFR BLD AUTO: 41.4 % (ref 34–45)
HGB BLD-MCNC: 13.1 G/DL (ref 11.5–14.9)
IMM GRANULOCYTES # BLD AUTO: 0.01 10*3/MM3 (ref 0–0.03)
IMM GRANULOCYTES NFR BLD AUTO: 0.3 % (ref 0–0.5)
LYMPHOCYTES # BLD AUTO: 1.06 10*3/MM3 (ref 1–3.5)
LYMPHOCYTES NFR BLD AUTO: 30.4 % (ref 20–49)
MCH RBC QN AUTO: 26.6 PG (ref 27–33)
MCHC RBC AUTO-ENTMCNC: 31.6 G/DL (ref 32–35)
MCV RBC AUTO: 84.1 FL (ref 83–97)
MONOCYTES # BLD AUTO: 0.31 10*3/MM3 (ref 0.25–0.8)
MONOCYTES NFR BLD AUTO: 8.9 % (ref 4–12)
NEUTROPHILS # BLD AUTO: 1.95 10*3/MM3 (ref 1.5–7)
NEUTROPHILS NFR BLD AUTO: 55.8 % (ref 39–75)
NRBC BLD AUTO-RTO: 0 /100 WBC (ref 0–0)
PLATELET # BLD AUTO: 186 10*3/MM3 (ref 150–375)
PMV BLD AUTO: 10.9 FL (ref 8.9–12.1)
RBC # BLD AUTO: 4.92 10*6/MM3 (ref 3.9–5)
WBC NRBC COR # BLD: 3.49 10*3/MM3 (ref 4–10)

## 2019-02-05 PROCEDURE — 85025 COMPLETE CBC W/AUTO DIFF WBC: CPT | Performed by: INTERNAL MEDICINE

## 2019-02-05 PROCEDURE — 96372 THER/PROPH/DIAG INJ SC/IM: CPT | Performed by: INTERNAL MEDICINE

## 2019-02-05 PROCEDURE — 36415 COLL VENOUS BLD VENIPUNCTURE: CPT | Performed by: INTERNAL MEDICINE

## 2019-02-05 PROCEDURE — 25010000002 CYANOCOBALAMIN PER 1000 MCG: Performed by: INTERNAL MEDICINE

## 2019-02-05 RX ORDER — CYANOCOBALAMIN 1000 UG/ML
1000 INJECTION, SOLUTION INTRAMUSCULAR; SUBCUTANEOUS
Status: DISCONTINUED | OUTPATIENT
Start: 2019-02-05 | End: 2019-02-05 | Stop reason: HOSPADM

## 2019-02-05 RX ADMIN — CYANOCOBALAMIN 1000 MCG: 1000 INJECTION, SOLUTION INTRAMUSCULAR at 08:39

## 2019-02-27 ENCOUNTER — TELEPHONE (OUTPATIENT)
Dept: GASTROENTEROLOGY | Facility: CLINIC | Age: 69
End: 2019-02-27

## 2019-02-27 ENCOUNTER — OFFICE VISIT (OUTPATIENT)
Dept: GASTROENTEROLOGY | Facility: CLINIC | Age: 69
End: 2019-02-27

## 2019-02-27 VITALS
DIASTOLIC BLOOD PRESSURE: 68 MMHG | WEIGHT: 140.6 LBS | HEIGHT: 66 IN | SYSTOLIC BLOOD PRESSURE: 100 MMHG | TEMPERATURE: 98.3 F | BODY MASS INDEX: 22.6 KG/M2

## 2019-02-27 DIAGNOSIS — D50.9 IRON DEFICIENCY ANEMIA, UNSPECIFIED IRON DEFICIENCY ANEMIA TYPE: Primary | ICD-10-CM

## 2019-02-27 DIAGNOSIS — E53.8 B12 DEFICIENCY: ICD-10-CM

## 2019-02-27 DIAGNOSIS — D51.0 PERNICIOUS ANEMIA: ICD-10-CM

## 2019-02-27 PROCEDURE — 99203 OFFICE O/P NEW LOW 30 MIN: CPT | Performed by: INTERNAL MEDICINE

## 2019-02-27 NOTE — PROGRESS NOTES
Chief Complaint   Patient presents with   • Abnormal Lab     low hemoglobin      Iesha Roldan is a 68 y.o. female who presents with anemia.    Most  Recent hb was normal 2/5.  Previously had developed anemia- hb 8.8. Followed by Dr Tran for hx parotid cancer.  Found to have B12 def due to anti-intrinsic factor antibody.  She also was noted to have iron deficiency with ferritin < 5.  Normal celiac panel.    No previous GI bleed or endoscopic exam - last c/s 2018 (Dr Zapata).  She reports that she had a polyp at age 51.  No abdominal pain.  No constipation.  Occasional nausea.  No heartburn, no dysphagia.  Her sister had rectal cancer at age 38.  MGM had CRC.    HPI  Past Medical History:   Diagnosis Date   • Acute bronchitis    • Anemia    • BOOP (bronchiolitis obliterans with organizing pneumonia) (CMS/HCC) 2000    Resolved   • Fracture, hip (CMS/HCC)    • Gastroenteritis    • H/O Lung nodule    • Herpes zoster 2015   • 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   • Neck pain    • Osteopenia    • Pharyngitis      Past Surgical History:   Procedure Laterality Date   • HIP SURGERY Left 2009    Joint replacement   • PAROTIDECTOMY Right 1/31/2017    Procedure: RIGHT SUPERFICIAL PAROTIDECTOMY EXCISION PAROTID MASS ;  Surgeon: Candelario Calderon MD;  Location: [facility];  Service:    • TUBAL ABDOMINAL LIGATION  In 1989       Current Outpatient Medications:   •  ferrous sulfate 325 (65 FE) MG tablet, Take 1 tablet by mouth 2 (Two) Times a Day With Meals., Disp: 60 tablet, Rfl: 4  •  fluticasone (FLONASE) 50 MCG/ACT nasal spray, 2 sprays into the nostril(s) as directed by provider Daily., Disp: 1 bottle, Rfl: 5  •  hydrocortisone 2.5 % lotion, Apply  topically As Needed., Disp: , Rfl:   •  meloxicam (MOBIC) 15 MG tablet, Take 1 tablet by mouth Daily., Disp: 30 tablet, Rfl: 5  •  Multiple Vitamin (MULTIVITAMIN) tablet tablet, Take 1 tablet by mouth Daily., Disp: , Rfl:   •  amphetamine-dextroamphetamine XR  (ADDERALL XR) 20 MG 24 hr capsule, Take 1 capsule by mouth Every Morning, Disp: 30 capsule, Rfl: 0  •  loratadine (CLARITIN) 10 MG tablet, Take 1 tablet by mouth Daily., Disp: 90 tablet, Rfl: 1  Allergies   Allergen Reactions   • Erythromycin      Social History     Socioeconomic History   • Marital status:      Spouse name: Eduard   • Number of children: Not on file   • Years of education: College   • Highest education level: Not on file   Social Needs   • Financial resource strain: Not on file   • Food insecurity - worry: Not on file   • Food insecurity - inability: Not on file   • Transportation needs - medical: Not on file   • Transportation needs - non-medical: Not on file   Occupational History   • 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   Tobacco Use   • Smoking status: Never Smoker   • Smokeless tobacco: Never Used   Substance and Sexual Activity   • Alcohol use: Yes     Comment: SOCIALLY   • Drug use: No   • Sexual activity: Yes     Partners: Male     Birth control/protection: Post-menopausal   Other Topics Concern   • Not on file   Social History Narrative   • Not on file     Family History   Problem Relation Age of Onset   • Peripheral vascular disease Mother    • Pancreatic cancer Father 91   • Heart disease Father    • Hypertension Father    • Rectal cancer Sister 36   • Diabetes Sister    • No Known Problems Brother    • No Known Problems Daughter    • No Known Problems Son    • Colon cancer Maternal Grandmother    • No Known Problems Paternal Grandmother    • No Known Problems Maternal Aunt    • No Known Problems Paternal Aunt    • Cancer Paternal Uncle 38        Mucoepidermoid CA of the parotid gland   • BRCA 1/2 Neg Hx    • Breast cancer Neg Hx    • Endometrial cancer Neg Hx    • Ovarian cancer Neg Hx      Review of Systems   Constitutional: Negative for appetite change and unexpected weight change.   HENT: Negative for trouble swallowing.    Gastrointestinal: Negative for  abdominal pain, blood in stool, nausea and vomiting.   All other systems reviewed and are negative.    Vitals:    02/27/19 1440   BP: 100/68   Temp: 98.3 °F (36.8 °C)         02/27/19  1440   Weight: 63.8 kg (140 lb 9.6 oz)     Physical Exam   Constitutional: She appears well-developed and well-nourished.   HENT:   Head: Normocephalic and atraumatic.   Eyes: No scleral icterus.   Abdominal: Soft. She exhibits no distension and no mass. There is no tenderness.   Neurological: She is alert.   Skin: Skin is warm and dry.   Psychiatric: She has a normal mood and affect.     No images are attached to the encounter.  No notes on file  Iesha was seen today for abnormal lab.    Diagnoses and all orders for this visit:    Iron deficiency anemia, unspecified iron deficiency anemia type  -     Case Request; Standing  -     Case Request    B12 deficiency    Pernicious anemia    Other orders  -     Follow Anesthesia Guidelines / Standing Orders; Future  -     Implement Anesthesia orders day of procedure.; Standing  -     Obtain informed consent; Standing         Plan:  - request c/s report from [facility] - if she really had a high quality c/s last year, would recommend egd only - will review records  - continue iron and B12 per hematoology recs

## 2019-02-27 NOTE — TELEPHONE ENCOUNTER
request c/s from [facility] - Dr Zapata   Received: Today   Message Contents   Naz Conteh MD  P [facility] Clinical 1 Pool     Called Dr Zapata's [phone] and spoke with Yumiko and requested pt c/s path and report be faxed to [phone]

## 2019-02-28 NOTE — TELEPHONE ENCOUNTER
Please let her know that I reviewed Dr. Zapata colonoscopy and it was performed in February 2017.  Given her new anemia and her family history, I would repeat her colonoscopy with the EGD at this time.  If she is agreeable I will modify the case request she will need colonoscopy instructions and prep mailed to her.

## 2019-03-01 ENCOUNTER — PREP FOR SURGERY (OUTPATIENT)
Dept: OTHER | Facility: HOSPITAL | Age: 69
End: 2019-03-01

## 2019-03-05 ENCOUNTER — INFUSION (OUTPATIENT)
Dept: ONCOLOGY | Facility: HOSPITAL | Age: 69
End: 2019-03-05

## 2019-03-05 ENCOUNTER — LAB (OUTPATIENT)
Dept: LAB | Facility: HOSPITAL | Age: 69
End: 2019-03-05

## 2019-03-05 DIAGNOSIS — C80.1 MUCOEPIDERMOID CARCINOMA (HCC): Primary | ICD-10-CM

## 2019-03-05 DIAGNOSIS — E53.8 B12 DEFICIENCY: Primary | ICD-10-CM

## 2019-03-05 LAB
BASOPHILS # BLD AUTO: 0.03 10*3/MM3 (ref 0–0.2)
BASOPHILS NFR BLD AUTO: 0.9 % (ref 0–1.5)
DEPRECATED RDW RBC AUTO: 59.6 FL (ref 37–54)
EOSINOPHIL # BLD AUTO: 0.14 10*3/MM3 (ref 0–0.4)
EOSINOPHIL NFR BLD AUTO: 4.1 % (ref 0.3–6.2)
ERYTHROCYTE [DISTWIDTH] IN BLOOD BY AUTOMATED COUNT: 19.9 % (ref 12.3–15.4)
HCT VFR BLD AUTO: 38.8 % (ref 34–46.6)
HGB BLD-MCNC: 12.7 G/DL (ref 12–15.9)
IMM GRANULOCYTES # BLD AUTO: 0.04 10*3/MM3 (ref 0–0.05)
IMM GRANULOCYTES NFR BLD AUTO: 1.2 % (ref 0–0.5)
LYMPHOCYTES # BLD AUTO: 1.08 10*3/MM3 (ref 0.7–3.1)
LYMPHOCYTES NFR BLD AUTO: 31.3 % (ref 19.6–45.3)
MCH RBC QN AUTO: 27.4 PG (ref 26.6–33)
MCHC RBC AUTO-ENTMCNC: 32.7 G/DL (ref 31.5–35.7)
MCV RBC AUTO: 83.6 FL (ref 79–97)
MONOCYTES # BLD AUTO: 0.36 10*3/MM3 (ref 0.1–0.9)
MONOCYTES NFR BLD AUTO: 10.4 % (ref 5–12)
NEUTROPHILS # BLD AUTO: 1.8 10*3/MM3 (ref 1.4–7)
NEUTROPHILS NFR BLD AUTO: 52.1 % (ref 42.7–76)
NRBC BLD AUTO-RTO: 0 /100 WBC (ref 0–0)
PLATELET # BLD AUTO: 202 10*3/MM3 (ref 140–450)
PMV BLD AUTO: 10.6 FL (ref 6–12)
RBC # BLD AUTO: 4.64 10*6/MM3 (ref 3.77–5.28)
WBC NRBC COR # BLD: 3.45 10*3/MM3 (ref 3.4–10.8)

## 2019-03-05 PROCEDURE — 96372 THER/PROPH/DIAG INJ SC/IM: CPT | Performed by: INTERNAL MEDICINE

## 2019-03-05 PROCEDURE — 25010000002 CYANOCOBALAMIN PER 1000 MCG: Performed by: INTERNAL MEDICINE

## 2019-03-05 PROCEDURE — 36415 COLL VENOUS BLD VENIPUNCTURE: CPT | Performed by: INTERNAL MEDICINE

## 2019-03-05 PROCEDURE — 85025 COMPLETE CBC W/AUTO DIFF WBC: CPT | Performed by: INTERNAL MEDICINE

## 2019-03-05 RX ORDER — CYANOCOBALAMIN 1000 UG/ML
1000 INJECTION, SOLUTION INTRAMUSCULAR; SUBCUTANEOUS
Status: DISCONTINUED | OUTPATIENT
Start: 2019-03-05 | End: 2019-03-05 | Stop reason: HOSPADM

## 2019-03-05 RX ADMIN — CYANOCOBALAMIN 1000 MCG: 1000 INJECTION, SOLUTION INTRAMUSCULAR at 08:57

## 2019-03-20 ENCOUNTER — TELEPHONE (OUTPATIENT)
Dept: INTERNAL MEDICINE | Facility: CLINIC | Age: 69
End: 2019-03-20

## 2019-03-20 RX ORDER — MECLIZINE HYDROCHLORIDE 25 MG/1
25 TABLET ORAL 3 TIMES DAILY PRN
Qty: 30 TABLET | Refills: 0 | Status: SHIPPED | OUTPATIENT
Start: 2019-03-20 | End: 2019-04-05

## 2019-03-20 NOTE — TELEPHONE ENCOUNTER
Pt would like refill on Meclizine 25mg for vertigo. She believes its back. LG    I refilled but if persists she should see Dr. Mejia in f/u.  SLW

## 2019-03-21 NOTE — TELEPHONE ENCOUNTER
Please offer patient either PT referral or office visit if needed for vertigo. Dr. Easton refilled meclizine.

## 2019-03-29 ENCOUNTER — OUTSIDE FACILITY SERVICE (OUTPATIENT)
Dept: GASTROENTEROLOGY | Facility: CLINIC | Age: 69
End: 2019-03-29

## 2019-03-29 PROCEDURE — 45385 COLONOSCOPY W/LESION REMOVAL: CPT | Performed by: INTERNAL MEDICINE

## 2019-03-29 PROCEDURE — 43239 EGD BIOPSY SINGLE/MULTIPLE: CPT | Performed by: INTERNAL MEDICINE

## 2019-04-04 NOTE — PROGRESS NOTES
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  Anemia due to B12 and iron deficiency                             REQUESTING PHYSICIAN:  Camila Mejia M.D.    History of Present Illness patient is 68-year-old lady with a parotid carcinoma resected 2 years ago and a newly diagnosed anemia here for review after having upper endoscopy because of persistent iron deficiency.  Biopsy showed H. pylori infection and she is going back to see her gastroenterologist to discuss treatment  Colonoscopy showed one adenomatous polyp  and I reviewed her path reports with her    At this point iron deficiency appears to be improved and her ferritin is up to 58  And she will stop the iron pills in 6 months  To continue B12 monthly in our office    He tells me 1 of her sisters had rectal cancer at age 38 she never had any genetic testing and I recommended strongly that her sister get genetic testing because her family history is fairly significant-obviously if a mutation is found she will also qualify for genetic testing      Past Medical History:   Diagnosis Date   • Acute bronchitis    • Anemia    • BOOP (bronchiolitis obliterans with organizing pneumonia) (CMS/HCC) 2000    Resolved   • Fracture, hip (CMS/HCC)    • Gastroenteritis    • H/O Lung nodule    • Herpes zoster 2015   • 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   • Neck pain    • Osteopenia    • Pharyngitis         Past Surgical History:   Procedure Laterality Date   • HIP SURGERY Left 2009    Joint replacement   • PAROTIDECTOMY Right 1/31/2017    Procedure: RIGHT SUPERFICIAL PAROTIDECTOMY EXCISION PAROTID MASS ;  Surgeon: Candelario Calderon MD;  Location: [facility];  Service:    • TUBAL ABDOMINAL LIGATION  In 1989      HEME ONC HISTORY:   patient is a 68-year-old lady seen here for a brief follow-up visit for her UR carcinoma the right parotid which was  resected 2 years ago.  She was treated with postoperative radiation had some residual findings on CAT scan and PET scan that were felt to be benign.  She went to [facility] who followed up and told her this was indeed scar tissue in the parotid and did not need any further imaging.  She returned to [facility] and has no follow-up here and came to my office to follow up on her parotid cancer.  She is doing well except for fatigue which is fairly new onset.  No weight loss change in bowel habits hematemesis melena hematochezia.  She does report reflux symptoms and wonders if she has gastritis    I had plan to see her back for complete follow-up in a month because I had a previous engagement but noted on her blood work that she is anemic with a hemoglobin of 8.8 and borderline macrocytosis and we have ordered blood work to assess her anemia with plans to see her back in 2-3 weeks for complete exam in follow-up of her parotid cancer in evaluation of anemia  12/18    CAT scans were done of the neck and chest and thankfully this shows no evidence of recurrent cancer  Her anemia workup shows B12 and iron deficiency although her methylmalonic acid levels were not elevated her anti-intrinsic factor antibodies were elevated and this may very well be a pernicious anemia    She has had an improvement in her hemoglobin since starting the B12 injections and she is taking over-the-counter iron but I prescribed ferrous sulfate for is I think this will work with car    She is up-to-date with colonoscopies but is scheduled to see a gastroenterologist after upper endoscopy done and we will wait to see the results of this      Current Outpatient Medications on File Prior to Visit   Medication Sig Dispense Refill   • amphetamine-dextroamphetamine XR (ADDERALL XR) 20 MG 24 hr capsule Take 1 capsule by mouth Every Morning 30 capsule 0   • ferrous sulfate 325 (65 FE) MG tablet Take 1 tablet by mouth 2 (Two) Times a Day With Meals. 60  tablet 4   • fluticasone (FLONASE) 50 MCG/ACT nasal spray 2 sprays into the nostril(s) as directed by provider Daily. 1 bottle 5   • hydrocortisone 2.5 % lotion Apply  topically As Needed.     • loratadine (CLARITIN) 10 MG tablet Take 1 tablet by mouth Daily. 90 tablet 1   • meclizine (ANTIVERT) 25 MG tablet Take 1 tablet by mouth 3 (Three) Times a Day As Needed for dizziness. 30 tablet 0   • meloxicam (MOBIC) 15 MG tablet Take 1 tablet by mouth Daily. 30 tablet 5   • Multiple Vitamin (MULTIVITAMIN) tablet tablet Take 1 tablet by mouth Daily.       No current facility-administered medications on file prior to visit.         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   Tobacco Use   • Smoking status: Never Smoker   • Smokeless tobacco: Never Used   Substance and Sexual Activity   • Alcohol use: Yes     Comment: SOCIALLY   • Drug use: No   • Sexual activity: Yes     Partners: Male     Birth control/protection: Post-menopausal        Family History   Problem Relation Age of Onset   • Peripheral vascular disease Mother    • Pancreatic cancer Father 91   • Heart disease Father    • Hypertension Father    • Rectal cancer Sister 36   • Diabetes Sister    • No Known Problems Brother    • No Known Problems Daughter    • No Known Problems Son    • Colon cancer Maternal Grandmother    • No Known Problems Paternal Grandmother    • No Known Problems Maternal Aunt    • No Known Problems Paternal Aunt    • Cancer Paternal Uncle 38        Mucoepidermoid CA of the parotid gland   • BRCA 1/2 Neg Hx    • Breast cancer Neg Hx    • Endometrial cancer Neg Hx    • Ovarian cancer Neg Hx         Review of Systems   Constitutional:  Positive for fatigue (better after injection for about 3 weeks 4/5/19 ). Negative for activity change, appetite change, fever and unexpected weight change.   Respiratory: Positive for shortness of breath (occasional  better 4/5/19). Negative for chest tightness.    Cardiovascular: Negative for chest pain (Reflux symptoms).   Gastrointestinal: Negative for constipation, diarrhea, nausea and vomiting.   Genitourinary: Negative for difficulty urinating.   Musculoskeletal: Negative for arthralgias, back pain, gait problem and joint swelling.   Neurological: Positive for dizziness (when sob happens 4/5/19). Negative for light-headedness, numbness and headaches.   Psychiatric/Behavioral: The patient is not nervous/anxious.         Objective     There were no vitals filed for this visit.  Current Status 1/8/2019   ECOG score 0       Physical Exam    GENERAL:  Well-developed, well-nourished in no acute distress.   SKIN:  Warm, dry without rashes, purpura or petechiae.  EYES:  Pupils equal, round and reactive to light.  EOMs intact.  Conjunctivae normal.  EARS:  Hearing intact.  NOSE:  Septum midline.  No excoriations or nasal discharge.  MOUTH:  Tongue is well-papillated; no stomatitis or ulcers.  Lips normal.  THROAT:  Oropharynx without lesions or exudates.  NECK:  Supple with good range of motion; no thyromegaly or masses, no JVD.  No masses appreciated in the right parotid  LYMPHATICS:  No cervical, supraclavicular, axillary or inguinal adenopathy.  CHEST:  Lungs clear to auscultation. Good airflow.  CARDIAC:  Regular rate and rhythm without murmurs, rubs or gallops. Normal S1,S2.  ABDOMEN:  Soft, nontender with no hepatosplenomegaly or masses.  EXTREMITIES:  No clubbing, cyanosis or edema.  NEUROLOGICAL:  Cranial Nerves II-XII grossly intact.  No focal neurological deficits.  PSYCHIATRIC:  Normal affect and mood.    RECENT LABS:  Hematology WBC   Date Value Ref Range Status   03/05/2019 3.45 3.40 - 10.80 10*3/mm3 Final    01/14/2019 4.12 (L) 4.50 - 10.70 10*3/mm3 Final     RBC   Date Value Ref Range Status   03/05/2019 4.64 3.77 - 5.28 10*6/mm3 Final   01/14/2019 4.25 3.90 - 5.20 10*6/mm3 Final     Hemoglobin   Date Value Ref Range Status   03/05/2019 12.7 12.0 - 15.9 g/dL Final     Hematocrit   Date Value Ref Range Status   03/05/2019 38.8 34.0 - 46.6 % Final     Platelets   Date Value Ref Range Status   03/05/2019 202 140 - 450 10*3/mm3 Final   03/26/2018 180 10*3/mm3 Final          CT CHEST WITH CONTRAST-, CT SOFT TISSUE NECK WITH CONTRAST-     IMPRESSION:  Status post right parotidectomy. There is no evidence of recurrent parotid tumor or metastatic disease within the neck. A couple tiny noncalcified right lower lobe pulmonary nodules remain stable.     This report was finalized on 12/26/2018          Assessment/Plan   1.  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  Low B12 levels and low ferritin?  Etiology responding to therapy   3.  History of BOOP and lung nodule which have resolved   4.  H. pylori infection and colon polyps     plan  1.  Continue B12 now monthly   2.  Ferrous sulfate 325 twice a day for 6 months  3.  Return in 6 months for follow-up with iron stores.  We will hold off on imaging at this time unless she has symptoms

## 2019-04-05 ENCOUNTER — INFUSION (OUTPATIENT)
Dept: ONCOLOGY | Facility: HOSPITAL | Age: 69
End: 2019-04-05

## 2019-04-05 ENCOUNTER — LAB (OUTPATIENT)
Dept: LAB | Facility: HOSPITAL | Age: 69
End: 2019-04-05

## 2019-04-05 ENCOUNTER — APPOINTMENT (OUTPATIENT)
Dept: LAB | Facility: HOSPITAL | Age: 69
End: 2019-04-05

## 2019-04-05 ENCOUNTER — OFFICE VISIT (OUTPATIENT)
Dept: ONCOLOGY | Facility: CLINIC | Age: 69
End: 2019-04-05

## 2019-04-05 ENCOUNTER — APPOINTMENT (OUTPATIENT)
Dept: ONCOLOGY | Facility: HOSPITAL | Age: 69
End: 2019-04-05

## 2019-04-05 ENCOUNTER — APPOINTMENT (OUTPATIENT)
Dept: ONCOLOGY | Facility: CLINIC | Age: 69
End: 2019-04-05

## 2019-04-05 VITALS
HEART RATE: 77 BPM | WEIGHT: 140.7 LBS | HEIGHT: 66 IN | BODY MASS INDEX: 22.61 KG/M2 | OXYGEN SATURATION: 97 % | TEMPERATURE: 97.7 F | SYSTOLIC BLOOD PRESSURE: 107 MMHG | DIASTOLIC BLOOD PRESSURE: 69 MMHG | RESPIRATION RATE: 16 BRPM

## 2019-04-05 DIAGNOSIS — E53.8 B12 DEFICIENCY: Primary | ICD-10-CM

## 2019-04-05 DIAGNOSIS — C80.1 MUCOEPIDERMOID CARCINOMA (HCC): ICD-10-CM

## 2019-04-05 DIAGNOSIS — C80.1 CARCINOMA (HCC): Primary | ICD-10-CM

## 2019-04-05 DIAGNOSIS — D50.9 IRON DEFICIENCY ANEMIA, UNSPECIFIED IRON DEFICIENCY ANEMIA TYPE: ICD-10-CM

## 2019-04-05 LAB
ALBUMIN SERPL-MCNC: 4.2 G/DL (ref 3.5–5.2)
ALBUMIN/GLOB SERPL: 1.4 G/DL (ref 1.1–2.4)
ALP SERPL-CCNC: 89 U/L (ref 38–116)
ALT SERPL W P-5'-P-CCNC: 14 U/L (ref 0–33)
ANION GAP SERPL CALCULATED.3IONS-SCNC: 13.1 MMOL/L
AST SERPL-CCNC: 23 U/L (ref 0–32)
BASOPHILS # BLD AUTO: 0.03 10*3/MM3 (ref 0–0.2)
BASOPHILS NFR BLD AUTO: 0.9 % (ref 0–1.5)
BILIRUB SERPL-MCNC: 0.2 MG/DL (ref 0.2–1.2)
BUN BLD-MCNC: 16 MG/DL (ref 6–20)
BUN/CREAT SERPL: 19 (ref 7.3–30)
CALCIUM SPEC-SCNC: 9.7 MG/DL (ref 8.5–10.2)
CHLORIDE SERPL-SCNC: 101 MMOL/L (ref 98–107)
CO2 SERPL-SCNC: 27.9 MMOL/L (ref 22–29)
CREAT BLD-MCNC: 0.84 MG/DL (ref 0.6–1.1)
DEPRECATED RDW RBC AUTO: 48.3 FL (ref 37–54)
EOSINOPHIL # BLD AUTO: 0.14 10*3/MM3 (ref 0–0.4)
EOSINOPHIL NFR BLD AUTO: 4 % (ref 0.3–6.2)
ERYTHROCYTE [DISTWIDTH] IN BLOOD BY AUTOMATED COUNT: 15.9 % (ref 12.3–15.4)
FERRITIN SERPL-MCNC: 58.5 NG/ML (ref 13–150)
GFR SERPL CREATININE-BSD FRML MDRD: 68 ML/MIN/1.73
GLOBULIN UR ELPH-MCNC: 2.9 GM/DL (ref 1.8–3.5)
GLUCOSE BLD-MCNC: 107 MG/DL (ref 74–124)
HCT VFR BLD AUTO: 41.7 % (ref 34–46.6)
HGB BLD-MCNC: 13.2 G/DL (ref 12–15.9)
IMM GRANULOCYTES # BLD AUTO: 0.02 10*3/MM3 (ref 0–0.05)
IMM GRANULOCYTES NFR BLD AUTO: 0.6 % (ref 0–0.5)
LYMPHOCYTES # BLD AUTO: 1.17 10*3/MM3 (ref 0.7–3.1)
LYMPHOCYTES NFR BLD AUTO: 33.6 % (ref 19.6–45.3)
MCH RBC QN AUTO: 28.4 PG (ref 26.6–33)
MCHC RBC AUTO-ENTMCNC: 31.7 G/DL (ref 31.5–35.7)
MCV RBC AUTO: 89.9 FL (ref 79–97)
MONOCYTES # BLD AUTO: 0.35 10*3/MM3 (ref 0.1–0.9)
MONOCYTES NFR BLD AUTO: 10.1 % (ref 5–12)
NEUTROPHILS # BLD AUTO: 1.77 10*3/MM3 (ref 1.4–7)
NEUTROPHILS NFR BLD AUTO: 50.8 % (ref 42.7–76)
NRBC BLD AUTO-RTO: 0 /100 WBC (ref 0–0)
PLATELET # BLD AUTO: 199 10*3/MM3 (ref 140–450)
PMV BLD AUTO: 9.8 FL (ref 6–12)
POTASSIUM BLD-SCNC: 4.9 MMOL/L (ref 3.5–4.7)
PROT SERPL-MCNC: 7.1 G/DL (ref 6.3–8)
RBC # BLD AUTO: 4.64 10*6/MM3 (ref 3.77–5.28)
SODIUM BLD-SCNC: 142 MMOL/L (ref 134–145)
WBC NRBC COR # BLD: 3.48 10*3/MM3 (ref 3.4–10.8)

## 2019-04-05 PROCEDURE — 82728 ASSAY OF FERRITIN: CPT

## 2019-04-05 PROCEDURE — 36415 COLL VENOUS BLD VENIPUNCTURE: CPT

## 2019-04-05 PROCEDURE — 96372 THER/PROPH/DIAG INJ SC/IM: CPT | Performed by: INTERNAL MEDICINE

## 2019-04-05 PROCEDURE — 85025 COMPLETE CBC W/AUTO DIFF WBC: CPT

## 2019-04-05 PROCEDURE — 99214 OFFICE O/P EST MOD 30 MIN: CPT | Performed by: INTERNAL MEDICINE

## 2019-04-05 PROCEDURE — 80053 COMPREHEN METABOLIC PANEL: CPT

## 2019-04-05 PROCEDURE — 25010000002 CYANOCOBALAMIN PER 1000 MCG: Performed by: INTERNAL MEDICINE

## 2019-04-05 RX ORDER — CYANOCOBALAMIN 1000 UG/ML
1000 INJECTION, SOLUTION INTRAMUSCULAR; SUBCUTANEOUS
Status: DISCONTINUED | OUTPATIENT
Start: 2019-04-05 | End: 2019-04-05 | Stop reason: HOSPADM

## 2019-04-05 RX ADMIN — CYANOCOBALAMIN 1000 MCG: 1000 INJECTION, SOLUTION INTRAMUSCULAR; SUBCUTANEOUS at 09:54

## 2019-04-08 ENCOUNTER — TELEPHONE (OUTPATIENT)
Dept: GASTROENTEROLOGY | Facility: CLINIC | Age: 69
End: 2019-04-08

## 2019-04-08 NOTE — TELEPHONE ENCOUNTER
Pt calling for results from [employer] 03/29/2019.  Isaura is to scan results today.  Message sent to Dr Conteh.

## 2019-04-08 NOTE — TELEPHONE ENCOUNTER
----- Message from Carolee Aparicio sent at 4/8/2019 12:19 PM EDT -----  Regarding: Path   Contact: [phone]  Results.

## 2019-04-09 ENCOUNTER — TELEPHONE (OUTPATIENT)
Dept: GASTROENTEROLOGY | Facility: CLINIC | Age: 69
End: 2019-04-09

## 2019-04-09 LAB
Lab: NORMAL
METHYLMALONATE SERPL-SCNC: 251 NMOL/L (ref 0–378)

## 2019-04-09 RX ORDER — TETRACYCLINE HYDROCHLORIDE 500 MG/1
500 CAPSULE ORAL 4 TIMES DAILY
Qty: 56 CAPSULE | Refills: 0 | Status: SHIPPED | OUTPATIENT
Start: 2019-04-09 | End: 2019-05-13

## 2019-04-09 RX ORDER — BISMUTH SUBSALICYLATE 262 MG
524 TABLET,CHEWABLE ORAL 4 TIMES DAILY
Qty: 112 TABLET | Refills: 0 | Status: SHIPPED | OUTPATIENT
Start: 2019-04-09 | End: 2019-05-13

## 2019-04-09 RX ORDER — AMOXICILLIN 500 MG/1
1000 CAPSULE ORAL 2 TIMES DAILY
Qty: 56 CAPSULE | Refills: 0 | Status: SHIPPED | OUTPATIENT
Start: 2019-04-09 | End: 2019-04-09 | Stop reason: ALTCHOICE

## 2019-04-09 RX ORDER — METRONIDAZOLE 250 MG/1
250 TABLET ORAL 4 TIMES DAILY
Qty: 56 TABLET | Refills: 0 | Status: SHIPPED | OUTPATIENT
Start: 2019-04-09 | End: 2019-04-23

## 2019-04-09 RX ORDER — PANTOPRAZOLE SODIUM 40 MG/1
40 TABLET, DELAYED RELEASE ORAL 2 TIMES DAILY
Qty: 28 TABLET | Refills: 0 | Status: SHIPPED | OUTPATIENT
Start: 2019-04-09 | End: 2019-04-23

## 2019-04-09 NOTE — TELEPHONE ENCOUNTER
Pt called and advised per Dr Conteh that her stomach bx showed active inflammation and are positive for h pylori.  She recommends treatment with anitbx to eradicate bacteria.  She has sent tetracycline, flagyl, bismuth and ppi to take these twice a day for 2 wks.      She would like her to have breath test to confirm eradication in 8 wks.     Also advised the colon polyp showed adenomatous change.  This is not cancerous but is considered potentially precancerous and she recommends a repeat c/s  In 5 yrs.      Pt verb understanding and made lab appt for 06/10 at 10am.

## 2019-04-09 NOTE — TELEPHONE ENCOUNTER
Gastric bx show active inflammation and are positive for h pylori - rec treatment with antibiotics to eradicate bacteria - sent to pharmacy (tetracycline, flagyl, bismuth + bid PPI x 14 days)    Schedule breath test to confirm eradication in 8 weeks    The polyp(s) biopsies showed adenomatous change. This is not cancerous but is considered potentially precancerous. Follow-up colonoscopy in 5 years is advised.

## 2019-04-10 ENCOUNTER — PRIOR AUTHORIZATION (OUTPATIENT)
Dept: GASTROENTEROLOGY | Facility: CLINIC | Age: 69
End: 2019-04-10

## 2019-04-10 NOTE — TELEPHONE ENCOUNTER
PA for Tetracyclline sent to [facility] via [facility] with decision pending

## 2019-04-12 ENCOUNTER — TELEPHONE (OUTPATIENT)
Dept: GASTROENTEROLOGY | Facility: CLINIC | Age: 69
End: 2019-04-12

## 2019-04-12 NOTE — TELEPHONE ENCOUNTER
Called pt and advised per Dr Conteh that the inflammation caused by the bacteria venu be contributing to decline in her hgb and we will need to continue to follow to make sure it it improving.  Pt verb understanding.

## 2019-04-12 NOTE — TELEPHONE ENCOUNTER
Tetracycline PA approved per [employer]. Called pharmacy and patient regarding approval status. Patient verbalized understanding.

## 2019-04-12 NOTE — TELEPHONE ENCOUNTER
Pt called and reports that the tetracycline needs a pa.  Advised pt that we began working on this on 04/10.  We will check to see if we have heard from her insurance.      Pt verb understanding and is asking if this bacteria could be the cause of her hgb dropping.  Advised will send message to Dr Conteh.

## 2019-04-12 NOTE — TELEPHONE ENCOUNTER
The inflammation caused by the bacteria may be contributing to decline in h/h will need to continue to follow to make sure it is improving

## 2019-04-29 ENCOUNTER — TELEPHONE (OUTPATIENT)
Dept: GASTROENTEROLOGY | Facility: CLINIC | Age: 69
End: 2019-04-29

## 2019-04-29 DIAGNOSIS — A04.8 HELICOBACTER PYLORI INFECTION: Primary | ICD-10-CM

## 2019-04-29 RX ORDER — ONDANSETRON 4 MG/1
4 TABLET, FILM COATED ORAL EVERY 8 HOURS PRN
Qty: 36 TABLET | Refills: 0 | COMMUNITY
End: 2019-04-29 | Stop reason: SDUPTHER

## 2019-04-29 RX ORDER — ONDANSETRON 4 MG/1
4 TABLET, FILM COATED ORAL EVERY 8 HOURS PRN
Qty: 36 TABLET | Refills: 0 | Status: SHIPPED | OUTPATIENT
Start: 2019-04-29 | End: 2019-05-13

## 2019-04-29 NOTE — TELEPHONE ENCOUNTER
----- Message from Mariola Joshi sent at 4/29/2019 12:07 PM EDT -----  Regarding: pt called with questions [facility] meds   Contact: [phone]  .

## 2019-04-29 NOTE — TELEPHONE ENCOUNTER
It is noted that zofran rx does not show receipt confirmed.  Re-escribed - receipt confirmed.    Call to pt.  Advise per Dr Conteh that recommend zofran q8h while on meds. If can push thru and try to finish, that would be great.  Options for tx are limited to some extent by e-mycin allergy.  If cannot tolerate, let DR Conteh know.      Verb understanding.  Lab appt scheduled for 7/1 @ 9am,.  Instruct NPO 1 hour prior to testing, no abx/ppi/bismuth 2 wks prior.  Verb understanding.    Order placed for h pylori breath test - message to DR Conteh.

## 2019-04-29 NOTE — TELEPHONE ENCOUNTER
rec zofran q 8 while on mds - if she can push through and try to finish that would be great - our options for treatment are limited to some extent by her e-mycin allergy.  Sent to pharmacy.  If she cannot tolerate let me know - h pylori breath test in 8 weeks

## 2019-05-01 ENCOUNTER — TELEPHONE (OUTPATIENT)
Dept: GASTROENTEROLOGY | Facility: CLINIC | Age: 69
End: 2019-05-01

## 2019-05-01 NOTE — TELEPHONE ENCOUNTER
----- Message from Mariola Joshi sent at 5/1/2019  1:19 PM EDT -----  Regarding: pt called back   Contact: [phone]  Stated she is still having trouble with the medication & Is asking for a call back

## 2019-05-01 NOTE — TELEPHONE ENCOUNTER
Recommend holding on further antibiotics.  use anti-gas remedies as needed.  If pain becomes worse, would consider ER evaluation.  Will need to discuss how to treat this infection once her symptoms have resolved.  I will find her a follow-up appointment with me in the next few weeks.

## 2019-05-01 NOTE — TELEPHONE ENCOUNTER
See note of 4/29.    Call to pt.  States zofran helped for 1 day.  Last night, wakened with intense RLQ pain radiating up to ribs and down to lower back.  Ranked at 8 on pain scale.  Got up and paced the floor.      Has not taken any rx today - continues to have pain as above.  Ranks between 5-7 on pain scale.  Nauseated.  Trying to sip fluids, and has had dry toast.     States has had diarrhea every day, but today can't go at all.  Stomach hard. States thought would be able to push thru and take H pylori rx, but doesn't think can tolerate.    Advise will update DR Conteh and if pain becomes OOC - go to ER.  Verb understanding.

## 2019-05-01 NOTE — TELEPHONE ENCOUNTER
Call to pt.  Advise per Dr Conteh that recommend holding on further abx.  Use anti gas remedies as needed. If pain becomes worse, would consider ER eval.  Will need to discuss how to tx this infection once symptoms have resolved.  Dr Conteh will find a fu appt with her in the next few weeks.  Verb understanding .

## 2019-05-09 ENCOUNTER — APPOINTMENT (OUTPATIENT)
Dept: ONCOLOGY | Facility: HOSPITAL | Age: 69
End: 2019-05-09

## 2019-05-09 ENCOUNTER — INFUSION (OUTPATIENT)
Dept: ONCOLOGY | Facility: HOSPITAL | Age: 69
End: 2019-05-09

## 2019-05-09 DIAGNOSIS — E53.8 B12 DEFICIENCY: Primary | ICD-10-CM

## 2019-05-09 PROCEDURE — 25010000002 CYANOCOBALAMIN PER 1000 MCG: Performed by: INTERNAL MEDICINE

## 2019-05-09 PROCEDURE — 96372 THER/PROPH/DIAG INJ SC/IM: CPT | Performed by: INTERNAL MEDICINE

## 2019-05-09 RX ORDER — CYANOCOBALAMIN 1000 UG/ML
1000 INJECTION, SOLUTION INTRAMUSCULAR; SUBCUTANEOUS
Status: DISCONTINUED | OUTPATIENT
Start: 2019-05-09 | End: 2019-05-09 | Stop reason: HOSPADM

## 2019-05-09 RX ADMIN — CYANOCOBALAMIN 1000 MCG: 1000 INJECTION, SOLUTION INTRAMUSCULAR at 11:59

## 2019-05-10 ENCOUNTER — TELEPHONE (OUTPATIENT)
Dept: GASTROENTEROLOGY | Facility: CLINIC | Age: 69
End: 2019-05-10

## 2019-05-10 NOTE — TELEPHONE ENCOUNTER
----- Message from Carolee Aparicio sent at 5/10/2019 12:12 PM EDT -----  Regarding: O/V WITH DR GARCIA-SEE PREVIOUS TASK  Contact: [phone]  PT STILL HAVING ISSUES. STATED DR GARCIA WAS GOING TO FIND A SLOT FOR HER TO COME IN A DISCUSS MEDS. PLEASE CHECK WITH DR GARCIA FOR A DATE.

## 2019-05-13 ENCOUNTER — OFFICE VISIT (OUTPATIENT)
Dept: GASTROENTEROLOGY | Facility: CLINIC | Age: 69
End: 2019-05-13

## 2019-05-13 VITALS
DIASTOLIC BLOOD PRESSURE: 64 MMHG | TEMPERATURE: 97.9 F | BODY MASS INDEX: 22.66 KG/M2 | WEIGHT: 141 LBS | HEIGHT: 66 IN | SYSTOLIC BLOOD PRESSURE: 106 MMHG

## 2019-05-13 DIAGNOSIS — A04.8 HELICOBACTER PYLORI INFECTION: Primary | ICD-10-CM

## 2019-05-13 DIAGNOSIS — D50.9 IRON DEFICIENCY ANEMIA, UNSPECIFIED IRON DEFICIENCY ANEMIA TYPE: ICD-10-CM

## 2019-05-13 PROCEDURE — 99213 OFFICE O/P EST LOW 20 MIN: CPT | Performed by: INTERNAL MEDICINE

## 2019-05-13 RX ORDER — AMOXICILLIN 500 MG/1
1000 CAPSULE ORAL 2 TIMES DAILY
Qty: 56 CAPSULE | Refills: 0 | Status: SHIPPED | OUTPATIENT
Start: 2019-05-13 | End: 2019-06-14

## 2019-05-13 RX ORDER — OMEPRAZOLE 20 MG/1
20 CAPSULE, DELAYED RELEASE ORAL 2 TIMES DAILY
Qty: 28 CAPSULE | Refills: 0 | Status: SHIPPED | OUTPATIENT
Start: 2019-05-13 | End: 2019-05-27

## 2019-05-13 RX ORDER — LEVOFLOXACIN 500 MG/1
500 TABLET, FILM COATED ORAL DAILY
Qty: 14 TABLET | Refills: 0 | Status: SHIPPED | OUTPATIENT
Start: 2019-05-13 | End: 2019-05-27

## 2019-05-13 NOTE — PROGRESS NOTES
Chief Complaint   Patient presents with   • Follow-up   • Anemia   • GI Problem     pain with medication       Iesha Roldan is a  68 y.o. female here for a follow up visit for h pylori gastritis.  She recejntly had an egd/c/s for iron deficiency anemia.  This has since resolved.  EGD notable for chronic active gastritis. Positive for h pylori. Treatment with tetracycline, amoxicillin and flagyl was poorly tolerated due to n/v and she could not complete it.  She has taken erythromycin in the past but it caused n/v.  She denies abd pain, n/v.  No blood in stool.  HPI  Past Medical History:   Diagnosis Date   • Acute bronchitis    • Anemia    • BOOP (bronchiolitis obliterans with organizing pneumonia) (CMS/[facility]) 2000    Resolved   • Fracture, hip (CMS/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   • Neck pain    • Osteopenia    • Pharyngitis      Past Surgical History:   Procedure Laterality Date   • HIP SURGERY Left 2009    Joint replacement   • PAROTIDECTOMY Right 1/31/2017    Procedure: RIGHT SUPERFICIAL PAROTIDECTOMY EXCISION PAROTID MASS ;  Surgeon: Candelario Calderon MD;  Location: [facility] OR [facility];  Service:    • TUBAL ABDOMINAL LIGATION  In 1989       Current Outpatient Medications:   •  ferrous sulfate 325 (65 FE) MG tablet, Take 1 tablet by mouth 2 (Two) Times a Day With Meals., Disp: 60 tablet, Rfl: 4  •  fluticasone (FLONASE) 50 MCG/ACT nasal spray, 2 sprays into the nostril(s) as directed by provider Daily. (Patient taking differently: 2 sprays into the nostril(s) as directed by provider Daily. seasonal), Disp: 1 bottle, Rfl: 5  •  meloxicam (MOBIC) 15 MG tablet, Take 1 tablet by mouth Daily. (Patient taking differently: Take 15 mg by mouth Daily. As needed), Disp: 30 tablet, Rfl: 5  •  Multiple Vitamin (MULTIVITAMIN) tablet tablet, Take 1 tablet by mouth Daily., Disp: , Rfl:   •  amoxicillin (AMOXIL) 500 MG capsule, Take 2  capsules by mouth 2 (Two) Times a Day., Disp: 56 capsule, Rfl: 0  •  hydrocortisone 2.5 % lotion, Apply  topically As Needed., Disp: , Rfl:   •  levoFLOXacin (LEVAQUIN) 500 MG tablet, Take 1 tablet by mouth Daily for 14 days., Disp: 14 tablet, Rfl: 0  •  loratadine (CLARITIN) 10 MG tablet, Take 1 tablet by mouth Daily., Disp: 90 tablet, Rfl: 1  •  omeprazole (priLOSEC) 20 MG capsule, Take 1 capsule by mouth 2 (Two) Times a Day for 14 days., Disp: 28 capsule, Rfl: 0  PRN Meds:.  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   Tobacco Use   • Smoking status: Never Smoker   • Smokeless tobacco: Never Used   Substance and Sexual Activity   • Alcohol use: Yes     Comment: SOCIALLY   • Drug use: No   • Sexual activity: Yes     Partners: Male     Birth control/protection: Post-menopausal     Family History   Problem Relation Age of Onset   • Peripheral vascular disease Mother    • Pancreatic cancer Father 91   • Heart disease Father    • Hypertension Father    • Rectal cancer Sister 36   • Diabetes Sister    • No Known Problems Brother    • No Known Problems Daughter    • No Known Problems Son    • Colon cancer Maternal Grandmother    • No Known Problems Paternal Grandmother    • No Known Problems Maternal Aunt    • No Known Problems Paternal Aunt    • Cancer Paternal Uncle 38        Mucoepidermoid CA of the parotid gland   • BRCA 1/2 Neg Hx    • Breast cancer Neg Hx    • Endometrial cancer Neg Hx    • Ovarian cancer Neg Hx      Review of Systems   Constitutional: Negative for appetite change and unexpected weight change.   Gastrointestinal: Negative for blood in stool, nausea and vomiting.   All other systems reviewed and are  negative.    Vitals:    05/13/19 1203   BP: 106/64   Temp: 97.9 °F (36.6 °C)         05/13/19  1203   Weight: 64 kg (141 lb)     Physical Exam   Constitutional: She appears well-developed and well-nourished.   HENT:   Head: Normocephalic and atraumatic.   Eyes: No scleral icterus.   Pulmonary/Chest: Effort normal.   Abdominal: She exhibits no distension.   Neurological: She is alert.   Skin: Skin is warm and dry.   Psychiatric: She has a normal mood and affect.     No images are attached to the encounter.  Diagnoses and all orders for this visit:    Helicobacter pylori infection    Iron deficiency anemia, unspecified iron deficiency anemia type    Other orders  -     omeprazole (priLOSEC) 20 MG capsule; Take 1 capsule by mouth 2 (Two) Times a Day for 14 days.  -     amoxicillin (AMOXIL) 500 MG capsule; Take 2 capsules by mouth 2 (Two) Times a Day.  -     levoFLOXacin (LEVAQUIN) 500 MG tablet; Take 1 tablet by mouth Daily for 14 days.    Plan:  - no obvious source of anemia but gastritis can certainly contribute and labs have improved - if it should recur of blood seen in stool, would consider capsule  - recommend retreating h pylori with levaquin based therapy in the hopes it will be better tolerated  - breath test in 8 weeks to ensure eradication

## 2019-06-07 DIAGNOSIS — Z00.00 HEALTHCARE MAINTENANCE: Primary | ICD-10-CM

## 2019-06-10 ENCOUNTER — INFUSION (OUTPATIENT)
Dept: ONCOLOGY | Facility: HOSPITAL | Age: 69
End: 2019-06-10

## 2019-06-10 DIAGNOSIS — E53.8 B12 DEFICIENCY: Primary | ICD-10-CM

## 2019-06-10 PROCEDURE — 25010000002 CYANOCOBALAMIN PER 1000 MCG: Performed by: INTERNAL MEDICINE

## 2019-06-10 PROCEDURE — 96372 THER/PROPH/DIAG INJ SC/IM: CPT | Performed by: INTERNAL MEDICINE

## 2019-06-10 RX ORDER — CYANOCOBALAMIN 1000 UG/ML
1000 INJECTION, SOLUTION INTRAMUSCULAR; SUBCUTANEOUS
Status: DISCONTINUED | OUTPATIENT
Start: 2019-06-10 | End: 2019-06-10 | Stop reason: HOSPADM

## 2019-06-10 RX ADMIN — CYANOCOBALAMIN 1000 MCG: 1000 INJECTION, SOLUTION INTRAMUSCULAR at 09:45

## 2019-06-11 LAB
ALBUMIN SERPL-MCNC: 4.5 G/DL (ref 3.5–5.2)
ALBUMIN/GLOB SERPL: 1.9 G/DL
ALP SERPL-CCNC: 80 U/L (ref 39–117)
ALT SERPL-CCNC: 11 U/L (ref 1–33)
APPEARANCE UR: CLEAR
AST SERPL-CCNC: 20 U/L (ref 1–32)
BACTERIA #/AREA URNS HPF: NORMAL /HPF
BASOPHILS # BLD AUTO: 0.02 10*3/MM3 (ref 0–0.2)
BASOPHILS NFR BLD AUTO: 0.6 % (ref 0–1.5)
BILIRUB SERPL-MCNC: 0.5 MG/DL (ref 0.2–1.2)
BILIRUB UR QL STRIP: NEGATIVE
BUN SERPL-MCNC: 17 MG/DL (ref 8–23)
BUN/CREAT SERPL: 24.3 (ref 7–25)
CALCIUM SERPL-MCNC: 9.9 MG/DL (ref 8.6–10.5)
CHLORIDE SERPL-SCNC: 104 MMOL/L (ref 98–107)
CHOLEST SERPL-MCNC: 176 MG/DL (ref 0–200)
CO2 SERPL-SCNC: 26.7 MMOL/L (ref 22–29)
COLOR UR: YELLOW
CREAT SERPL-MCNC: 0.7 MG/DL (ref 0.57–1)
EOSINOPHIL # BLD AUTO: 0.08 10*3/MM3 (ref 0–0.4)
EOSINOPHIL NFR BLD AUTO: 2.4 % (ref 0.3–6.2)
EPI CELLS #/AREA URNS HPF: NORMAL /HPF
ERYTHROCYTE [DISTWIDTH] IN BLOOD BY AUTOMATED COUNT: 13.4 % (ref 12.3–15.4)
GLOBULIN SER CALC-MCNC: 2.4 GM/DL
GLUCOSE SERPL-MCNC: 89 MG/DL (ref 65–99)
GLUCOSE UR QL: NEGATIVE
HCT VFR BLD AUTO: 40.6 % (ref 34–46.6)
HDLC SERPL-MCNC: 71 MG/DL (ref 40–60)
HGB BLD-MCNC: 12.5 G/DL (ref 12–15.9)
HGB UR QL STRIP: NEGATIVE
IMM GRANULOCYTES # BLD AUTO: 0 10*3/MM3 (ref 0–0.05)
IMM GRANULOCYTES NFR BLD AUTO: 0 % (ref 0–0.5)
KETONES UR QL STRIP: NEGATIVE
LDLC SERPL CALC-MCNC: 89 MG/DL (ref 0–100)
LEUKOCYTE ESTERASE UR QL STRIP: NEGATIVE
LYMPHOCYTES # BLD AUTO: 1.19 10*3/MM3 (ref 0.7–3.1)
LYMPHOCYTES NFR BLD AUTO: 36.3 % (ref 19.6–45.3)
MCH RBC QN AUTO: 28.9 PG (ref 26.6–33)
MCHC RBC AUTO-ENTMCNC: 30.8 G/DL (ref 31.5–35.7)
MCV RBC AUTO: 94 FL (ref 79–97)
MICRO URNS: NORMAL
MICRO URNS: NORMAL
MONOCYTES # BLD AUTO: 0.32 10*3/MM3 (ref 0.1–0.9)
MONOCYTES NFR BLD AUTO: 9.8 % (ref 5–12)
MUCOUS THREADS URNS QL MICRO: PRESENT /HPF
NEUTROPHILS # BLD AUTO: 1.67 10*3/MM3 (ref 1.7–7)
NEUTROPHILS NFR BLD AUTO: 50.9 % (ref 42.7–76)
NITRITE UR QL STRIP: NEGATIVE
NRBC BLD AUTO-RTO: 0 /100 WBC (ref 0–0.2)
PH UR STRIP: 5 [PH] (ref 5–7.5)
PLATELET # BLD AUTO: 204 10*3/MM3 (ref 140–450)
POTASSIUM SERPL-SCNC: 4.4 MMOL/L (ref 3.5–5.2)
PROT SERPL-MCNC: 6.9 G/DL (ref 6–8.5)
PROT UR QL STRIP: NEGATIVE
RBC # BLD AUTO: 4.32 10*6/MM3 (ref 3.77–5.28)
RBC #/AREA URNS HPF: NORMAL /HPF
SODIUM SERPL-SCNC: 141 MMOL/L (ref 136–145)
SP GR UR: 1.02 (ref 1–1.03)
TRIGL SERPL-MCNC: 78 MG/DL (ref 0–150)
TSH SERPL DL<=0.005 MIU/L-ACNC: 2.71 MIU/ML (ref 0.27–4.2)
URINALYSIS REFLEX: NORMAL
UROBILINOGEN UR STRIP-MCNC: 0.2 MG/DL (ref 0.2–1)
VLDLC SERPL CALC-MCNC: 15.6 MG/DL
WBC # BLD AUTO: 3.28 10*3/MM3 (ref 3.4–10.8)
WBC #/AREA URNS HPF: NORMAL /HPF

## 2019-06-14 ENCOUNTER — OFFICE VISIT (OUTPATIENT)
Dept: INTERNAL MEDICINE | Facility: CLINIC | Age: 69
End: 2019-06-14

## 2019-06-14 VITALS
BODY MASS INDEX: 22.44 KG/M2 | HEART RATE: 75 BPM | SYSTOLIC BLOOD PRESSURE: 100 MMHG | WEIGHT: 143 LBS | HEIGHT: 67 IN | DIASTOLIC BLOOD PRESSURE: 60 MMHG | OXYGEN SATURATION: 97 %

## 2019-06-14 DIAGNOSIS — K21.9 GASTROESOPHAGEAL REFLUX DISEASE, ESOPHAGITIS PRESENCE NOT SPECIFIED: ICD-10-CM

## 2019-06-14 DIAGNOSIS — C80.1 MUCOEPIDERMOID CARCINOMA (HCC): ICD-10-CM

## 2019-06-14 DIAGNOSIS — E53.8 VITAMIN B 12 DEFICIENCY: ICD-10-CM

## 2019-06-14 DIAGNOSIS — D50.9 IRON DEFICIENCY ANEMIA, UNSPECIFIED IRON DEFICIENCY ANEMIA TYPE: ICD-10-CM

## 2019-06-14 DIAGNOSIS — Z00.00 HEALTHCARE MAINTENANCE: Primary | ICD-10-CM

## 2019-06-14 LAB
Lab: NORMAL
VIT B12 SERPL-MCNC: >2000 PG/ML (ref 211–946)
WRITTEN AUTHORIZATION: NORMAL

## 2019-06-14 PROCEDURE — 99213 OFFICE O/P EST LOW 20 MIN: CPT | Performed by: INTERNAL MEDICINE

## 2019-06-14 PROCEDURE — G0439 PPPS, SUBSEQ VISIT: HCPCS | Performed by: INTERNAL MEDICINE

## 2019-06-14 PROCEDURE — 93000 ELECTROCARDIOGRAM COMPLETE: CPT | Performed by: INTERNAL MEDICINE

## 2019-06-14 NOTE — PROGRESS NOTES
Subjective   AWV  H pylori    Iesha Roldan is a 69 y.o. female who presents for an annual wellness visit. She is doing fairly well today. Recent cscope and egd obtained. She is h pylori +. She was intolerant to first course abx. She tolerated 6 d second abx course then dev emesis and diarrhea. She reports feeling well today. She has a history of b12  Deficiency related anemia. Likely some contribution from gastritis related to h pylori. She is currently no longer taking a PPI.   Patient has a history of a mucoepidermoid carcinoma. S/p resection and xrt. She shows no signs of recurrence at this time.         Review of Systems   Constitutional: Negative.    HENT: Negative.    Eyes: Negative.    Respiratory: Negative.    Cardiovascular: Negative.         Occ palp when anemia. atyp cp w/ gastritis.    Gastrointestinal: Negative for diarrhea.   Endocrine: Negative.    Genitourinary: Negative.    Musculoskeletal: Negative.    Skin: Negative.    Allergic/Immunologic: Negative.    Neurological: Negative.    Hematological: Negative.    Psychiatric/Behavioral: Negative.        The following portions of the patient's history were reviewed and updated as appropriate: allergies, current medications, past family history, past medical history, past social history, past surgical history and problem list.     Patient Active Problem List   Diagnosis   • ADD (attention deficit disorder)   • Allergic rhinitis   • Fatigue   • Acid reflux   • Colon cancer screening   • Abnormal EKG   • Anxiety   • Hyperglycemia   • Mucoepidermoid carcinoma (CMS/HCC)   • Abnormal MRI, cervical spine, not thoracic; C4 to be specific   • OAB (overactive bladder)   • Iron deficiency anemia   • B12 deficiency       Past Medical History:   Diagnosis Date   • Acute bronchitis    • Anemia    • BOOP (bronchiolitis obliterans with organizing pneumonia) (CMS/HCC) 2000    Resolved   • Fracture, hip (CMS/HCC)    • Gastroenteritis    • H/O Lung nodule    •  Herpes zoster 2015   • 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   • Neck pain    • Osteopenia    • Pharyngitis        Past Surgical History:   Procedure Laterality Date   • HIP SURGERY Left 2009    Joint replacement   • PAROTIDECTOMY Right 1/31/2017    Procedure: RIGHT SUPERFICIAL PAROTIDECTOMY EXCISION PAROTID MASS ;  Surgeon: Candelario Calderon MD;  [place] [facility] [place] [facility] [place]  Service:    • TUBAL ABDOMINAL LIGATION  In 1989       Family History   Problem Relation Age of Onset   • Peripheral vascular disease Mother    • Pancreatic cancer Father 91   • Heart disease Father    • Hypertension Father    • Rectal cancer Sister 36   • Diabetes Sister    • No Known Problems Brother    • No Known Problems Daughter    • No Known Problems Son    • Colon cancer Maternal Grandmother    • No Known Problems Paternal Grandmother    • No Known Problems Maternal Aunt    • No Known Problems Paternal Aunt    • Cancer Paternal Uncle 38        Mucoepidermoid CA of the parotid gland   • BRCA 1/2 Neg Hx    • Breast cancer Neg Hx    • Endometrial cancer Neg Hx    • Ovarian cancer Neg Hx        Social History     Socioeconomic History   • Marital status:      Spouse name: Eduard   • Number of children: Not on file   • Years of education: College   • Highest education level: Not on file   Occupational History   • 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 (Patient taking differently: 2 sprays into the nostril(s) as directed by provider Daily. seasonal) 1 bottle 5  "  • hydrocortisone 2.5 % lotion Apply  topically As Needed.     • loratadine (CLARITIN) 10 MG tablet Take 1 tablet by mouth Daily. 90 tablet 1   • meloxicam (MOBIC) 15 MG tablet Take 1 tablet by mouth Daily. (Patient taking differently: Take 15 mg by mouth Daily. As needed) 30 tablet 5   • Multiple Vitamin (MULTIVITAMIN) tablet tablet Take 1 tablet by mouth Daily.     • ferrous sulfate 325 (65 FE) MG tablet Take 1 tablet by mouth 2 (Two) Times a Day With Meals. 60 tablet 4   • [DISCONTINUED] amoxicillin (AMOXIL) 500 MG capsule Take 2 capsules by mouth 2 (Two) Times a Day. 56 capsule 0     No current facility-administered medications on file prior to visit.        Allergies   Allergen Reactions   • Erythromycin        Immunization History   Administered Date(s) Administered   • Flu Vaccine High Dose PF 65YR+ 10/30/2017, 10/31/2018   • Pneumococcal Conjugate 13-Valent (PCV13) 05/07/2018   • Pneumococcal Polysaccharide (PPSV23) 11/12/2018   • Tdap 04/18/2016       Objective     /60   Pulse 75   Ht 168.9 cm (66.5\")   Wt 64.9 kg (143 lb)   SpO2 97%   BMI 22.74 kg/m²     Physical Exam   Constitutional: She is oriented to person, place, and time. She appears well-developed and well-nourished.   HENT:   Head: Normocephalic and atraumatic.   Right Ear: External ear normal.   Left Ear: External ear normal.   Nose: Nose normal.   Mouth/Throat: Oropharynx is clear and moist.   Eyes: Conjunctivae and EOM are normal. Pupils are equal, round, and reactive to light.   Neck: Normal range of motion. Neck supple.   Cardiovascular: Normal rate, regular rhythm, normal heart sounds and intact distal pulses.   Pulmonary/Chest: Effort normal and breath sounds normal. No respiratory distress.   Abdominal: Soft. Bowel sounds are normal.   Musculoskeletal: Normal range of motion.   Neurological: She is alert and oriented to person, place, and time.   Skin: Skin is warm and dry.   Psychiatric: She has a normal mood and affect. Her " behavior is normal. Judgment and thought content normal.   Nursing note and vitals reviewed.  EKG for history atypical chest pain/ palpitations. Sinus. 67 bpm. No st changes. Unchanged from prior.       Assessment/Plan   Iesha was seen today for annual exam.    Diagnoses and all orders for this visit:    Healthcare maintenance  -     ECG 12 Lead    Vitamin B 12 deficiency  -     Vitamin B12    Mucoepidermoid carcinoma (CMS/HCC)    Iron deficiency anemia, unspecified iron deficiency anemia type    Gastroesophageal reflux disease, esophagitis presence not specified        Discussion    AWV.     Direct observation of cognitive ability was evaluated and is normal. Patient was given health advice or handouts on the following:  nutrition, fall prevention, exercise, tobacco cessation, weight management  Patient with GATO and anemia. She will contact Dr. Conteh to address H Pylori gastritis given antibiotic intolerance. She also will need to determine longevity of PPI as well. She will have b12 level tested and continue injections. She will use meloxicam on a very limited basis given gastritis. She will f/u w/ oncology and ENT routinely. Gave rx for hep a and shingrix vac. She will f/u here in 6 months or prn.              Future Appointments   Date Time Provider Department Center   7/1/2019  9:00 AM LAB GASTRO EAST [facility] GE [facility] None   7/8/2019  8:30 AM INJECTION CHAIR [facility] INF[facility]   8/5/2019  8:30 AM INJECTION CHAIR [facility]   8/6/2019  1:40 PM MAMMOGRAM [facility] None   8/6/2019  2:10 PM Clement Valderrama MD [facility] None   9/10/2019  8:30 AM LAB CHAIR 3 [facility]  LAB [facility]   9/10/2019  9:00 AM Esha Tran MD [facility] CBC [facility]RES  CBC [facility]   9/10/2019  9:30 AM INJECTION CHAIR [facility]

## 2019-06-18 ENCOUNTER — TELEPHONE (OUTPATIENT)
Dept: GASTROENTEROLOGY | Facility: CLINIC | Age: 69
End: 2019-06-18

## 2019-06-18 NOTE — TELEPHONE ENCOUNTER
Call to pt.  States delayed tx for H pylori after 5/13 visit for a couple of weeks  because was going on vacation.  Then started omeprazole, amoxicillin, and levaquin as ordered.  On day 6, again developed debilitating nausea and stopped. Has been off for about 1 1/2 wks.    Asking if should try to complete current rx and take zofran daily routinely, or if should try whole new course of tx.  Concerned because of potentially risks with untx H pylori.  H/o radiation tx's 2018.    Update/concerns to Dr Conteh.

## 2019-06-18 NOTE — TELEPHONE ENCOUNTER
----- Message from Estevan Sharp sent at 6/18/2019 12:47 PM EDT -----  Regarding: MED   Contact: [phone]  MED QUESTIONS BUT DON'T KNOW THE NAME

## 2019-06-19 RX ORDER — ONDANSETRON 4 MG/1
4 TABLET, FILM COATED ORAL EVERY 8 HOURS PRN
Qty: 36 TABLET | Refills: 0 | Status: SHIPPED | OUTPATIENT
Start: 2019-06-19 | End: 2019-07-02

## 2019-06-19 NOTE — TELEPHONE ENCOUNTER
Called pt and advised per Dr Conteh that she would try to finish using zofran routinely if possible.  Pt verb understanding and does need refill on zofran. Zofran refill sent to pt's [facility] pharmacy.

## 2019-06-24 ENCOUNTER — RESULTS ENCOUNTER (OUTPATIENT)
Dept: GASTROENTEROLOGY | Facility: CLINIC | Age: 69
End: 2019-06-24

## 2019-06-24 DIAGNOSIS — A04.8 HELICOBACTER PYLORI INFECTION: ICD-10-CM

## 2019-06-29 LAB — VIT B12 SERPL-MCNC: 442 PG/ML (ref 211–946)

## 2019-07-02 ENCOUNTER — OFFICE VISIT (OUTPATIENT)
Dept: INTERNAL MEDICINE | Facility: CLINIC | Age: 69
End: 2019-07-02

## 2019-07-02 ENCOUNTER — TELEPHONE (OUTPATIENT)
Dept: GASTROENTEROLOGY | Facility: CLINIC | Age: 69
End: 2019-07-02

## 2019-07-02 VITALS
BODY MASS INDEX: 23.05 KG/M2 | WEIGHT: 145 LBS | HEART RATE: 75 BPM | SYSTOLIC BLOOD PRESSURE: 130 MMHG | DIASTOLIC BLOOD PRESSURE: 76 MMHG | OXYGEN SATURATION: 97 %

## 2019-07-02 DIAGNOSIS — R53.83 FATIGUE, UNSPECIFIED TYPE: ICD-10-CM

## 2019-07-02 DIAGNOSIS — M79.10 MYALGIA: ICD-10-CM

## 2019-07-02 DIAGNOSIS — M25.50 ARTHRALGIA, UNSPECIFIED JOINT: Primary | ICD-10-CM

## 2019-07-02 PROCEDURE — 99214 OFFICE O/P EST MOD 30 MIN: CPT | Performed by: INTERNAL MEDICINE

## 2019-07-02 RX ORDER — METHYLPREDNISOLONE 4 MG/1
TABLET ORAL
Qty: 21 EACH | Refills: 0 | Status: SHIPPED | OUTPATIENT
Start: 2019-07-02 | End: 2019-08-08

## 2019-07-02 NOTE — TELEPHONE ENCOUNTER
Call to pt. Advise per Dr Conteh that would recommend eval of lower ext by PCP.  It is unclear whether this is related to abx or not.  Would be unusual, but DR Conteh concerned about red swollen knee.      Verb understanding - states will do so.

## 2019-07-02 NOTE — TELEPHONE ENCOUNTER
Would recommend evaluation of her lower extremities by her primary care physician.  It is unclear to me whether this is related to her antibiotics or not.  I think it would be unusual but I am concerned about her red swollen knee.

## 2019-07-02 NOTE — PROGRESS NOTES
Chief Complaint   Patient presents with   • Itching     2 weeks   • Edema   • Shortness of Breath   • Generalized Body Aches       History of Present Illness   Iesha Roldan is a 69 y.o. female presents for acute care. Patient notes about 2 week history of generalized joint and muscle pains. She is feeling short of breath, and has noticed swelling.   She recently was diagnosed with h pylori. She has been intolerant to treatment for this. She tried flagyl, amoxil, augmentin, now levaquin for this. Gi intolerance was most pronounced side effect. She completed meds for this 5 days ago.   Taking advil yesterday 300 mg x 2 tablets.   b12 def on inj therapy. Level is 442.     The following portions of the patient's history were reviewed and updated as appropriate: allergies, current medications, past family history, past medical history, past social history, past surgical history and problem list.  Current Outpatient Medications on File Prior to Visit   Medication Sig Dispense Refill   • ferrous sulfate 325 (65 FE) MG tablet Take 1 tablet by mouth 2 (Two) Times a Day With Meals. 60 tablet 4   • fluticasone (FLONASE) 50 MCG/ACT nasal spray 2 sprays into the nostril(s) as directed by provider Daily. (Patient taking differently: 2 sprays into the nostril(s) as directed by provider Daily. seasonal) 1 bottle 5   • hydrocortisone 2.5 % lotion Apply  topically As Needed.     • loratadine (CLARITIN) 10 MG tablet Take 1 tablet by mouth Daily. 90 tablet 1   • Multiple Vitamin (MULTIVITAMIN) tablet tablet Take 1 tablet by mouth Daily.     • [DISCONTINUED] meloxicam (MOBIC) 15 MG tablet Take 1 tablet by mouth Daily. (Patient taking differently: Take 15 mg by mouth Daily. As needed) 30 tablet 5   • [DISCONTINUED] ondansetron (ZOFRAN) 4 MG tablet Take 1 tablet by mouth Every 8 (Eight) Hours As Needed for Nausea or Vomiting. 36 tablet 0     No current facility-administered medications on file prior to visit.      Review of Systems    Constitutional: Positive for fatigue.   HENT: Negative.    Eyes: Negative.    Respiratory: Negative.    Cardiovascular: Positive for leg swelling.   Gastrointestinal: Negative.    Endocrine: Negative.    Genitourinary: Negative.    Musculoskeletal: Positive for arthralgias and myalgias.   Skin: Negative.    Allergic/Immunologic: Negative.    Neurological: Negative.    Hematological: Negative.    Psychiatric/Behavioral: Negative.        Objective   Physical Exam   Constitutional: She is oriented to person, place, and time. She appears well-developed and well-nourished.   HENT:   Head: Normocephalic and atraumatic.   Right Ear: External ear normal.   Left Ear: External ear normal.   Mouth/Throat: Oropharynx is clear and moist.   Eyes: EOM are normal. Pupils are equal, round, and reactive to light.   Neck: Normal range of motion. Neck supple.   Cardiovascular: Normal rate, regular rhythm, normal heart sounds and intact distal pulses.   Pulmonary/Chest: Effort normal and breath sounds normal.   Abdominal: Soft. Bowel sounds are normal.   Musculoskeletal: Normal range of motion.   Neurological: She is alert and oriented to person, place, and time.   Skin: Skin is warm and dry.   Psychiatric: She has a normal mood and affect. Her behavior is normal. Judgment and thought content normal.   Nursing note and vitals reviewed.       /76   Pulse 75   Wt 65.8 kg (145 lb)   SpO2 97%   BMI 23.05 kg/m²     Assessment/Plan   Diagnoses and all orders for this visit:    Arthralgia, unspecified joint  -     TIAN With / DsDNA, RNP, Sjogrens A / B, Smith  -     C-reactive Protein  -     Cyclic Citrul Peptide Antibody, IgG / IgA  -     Rheumatoid Factor  -     Sedimentation Rate  -     Antihistone Antibodies  -     Parvovirus B19 Antibody, IgG & IgM  -     CBC & Differential  -     Comprehensive Metabolic Panel  -     Daisy-Barr Virus VCA, IgM  -     Lyme, Total Antibody Test / Reflex    Myalgia  -     TIAN With / DsDNA, RNP,  Sjogrens A / B, Alvarenga  -     C-reactive Protein  -     Cyclic Citrul Peptide Antibody, IgG / IgA  -     Rheumatoid Factor  -     Sedimentation Rate  -     Antihistone Antibodies  -     Parvovirus B19 Antibody, IgG & IgM  -     CBC & Differential  -     Comprehensive Metabolic Panel  -     Daisy-Barr Virus VCA, IgM  -     Lyme, Total Antibody Test / Reflex    Fatigue, unspecified type  -     TIAN With / DsDNA, RNP, Sjogrens A / B, Smith  -     C-reactive Protein  -     Cyclic Citrul Peptide Antibody, IgG / IgA  -     Rheumatoid Factor  -     Sedimentation Rate  -     Antihistone Antibodies  -     Parvovirus B19 Antibody, IgG & IgM  -     CBC & Differential  -     Comprehensive Metabolic Panel  -     Daisy-Barr Virus VCA, IgM  -     Lyme, Total Antibody Test / Reflex    Other orders  -     methylPREDNISolone (MEDROL, MERCEDES,) 4 MG tablet; Take as directed on package instructions.    pateint w/ acute arthralgia, myalgia, and edema. ? If recent infection v drug reaction like a drug induced lupus v other. Will test listed labs. She will increase hydration. tyl v other for pain. She is advised that nsaids can increase swelling and symptoms. She will call if pain worsens. Follow up as needed.

## 2019-07-02 NOTE — TELEPHONE ENCOUNTER
"Called pt and she reports that she has h pylori. She completed her h pylori treatment two days ago( amoxicillin and levaquin).  Pt reports she noticed a bruise on her left ankle about a week ago.  Then she noticed her ankle became swollen.  She states the swelling has moved up her leg. She is also reporting a \"weird bruise \" on her right arm.   Pt today is reporting more left knee swelling .  She also reports that her knee is red and warm to the touch.  Pt denies any fever, chills, and red streaking of the leg.  Advised pt would send message to Dr Conteh and if her symptoms worsen to go to ER.  Pt verb understanding.   "

## 2019-07-04 LAB
ALBUMIN SERPL-MCNC: 4.4 G/DL (ref 3.5–5.2)
ALBUMIN/GLOB SERPL: 2 G/DL
ALP SERPL-CCNC: 82 U/L (ref 39–117)
ALT SERPL-CCNC: 10 U/L (ref 1–33)
ANA SER QL: NEGATIVE
AST SERPL-CCNC: 22 U/L (ref 1–32)
B BURGDOR IGG+IGM SER-ACNC: <0.91 ISR (ref 0–0.9)
B19V IGG SER IA-ACNC: 4.9 INDEX (ref 0–0.8)
B19V IGM SER IA-ACNC: 0.2 INDEX (ref 0–0.8)
BASOPHILS # BLD AUTO: 0.03 10*3/MM3 (ref 0–0.2)
BASOPHILS NFR BLD AUTO: 0.7 % (ref 0–1.5)
BILIRUB SERPL-MCNC: 0.3 MG/DL (ref 0.2–1.2)
BUN SERPL-MCNC: 22 MG/DL (ref 8–23)
BUN/CREAT SERPL: 30.6 (ref 7–25)
CALCIUM SERPL-MCNC: 9 MG/DL (ref 8.6–10.5)
CCP IGA+IGG SERPL IA-ACNC: 4 UNITS (ref 0–19)
CHLORIDE SERPL-SCNC: 102 MMOL/L (ref 98–107)
CO2 SERPL-SCNC: 28.2 MMOL/L (ref 22–29)
CREAT SERPL-MCNC: 0.72 MG/DL (ref 0.57–1)
CRP SERPL-MCNC: 0.09 MG/DL (ref 0–0.5)
EBV VCA IGM SER IA-ACNC: <36 U/ML (ref 0–35.9)
EOSINOPHIL # BLD AUTO: 0.11 10*3/MM3 (ref 0–0.4)
EOSINOPHIL NFR BLD AUTO: 2.5 % (ref 0.3–6.2)
ERYTHROCYTE [DISTWIDTH] IN BLOOD BY AUTOMATED COUNT: 12.9 % (ref 12.3–15.4)
ERYTHROCYTE [SEDIMENTATION RATE] IN BLOOD BY WESTERGREN METHOD: 8 MM/HR (ref 0–30)
GLOBULIN SER CALC-MCNC: 2.2 GM/DL
GLUCOSE SERPL-MCNC: 95 MG/DL (ref 65–99)
HCT VFR BLD AUTO: 40.6 % (ref 34–46.6)
HGB BLD-MCNC: 12.8 G/DL (ref 12–15.9)
HISTONE IGG SER IA-ACNC: 0.4 UNITS (ref 0–0.9)
IMM GRANULOCYTES # BLD AUTO: 0.01 10*3/MM3 (ref 0–0.05)
IMM GRANULOCYTES NFR BLD AUTO: 0.2 % (ref 0–0.5)
LYMPHOCYTES # BLD AUTO: 1.19 10*3/MM3 (ref 0.7–3.1)
LYMPHOCYTES NFR BLD AUTO: 27.1 % (ref 19.6–45.3)
MCH RBC QN AUTO: 29.6 PG (ref 26.6–33)
MCHC RBC AUTO-ENTMCNC: 31.5 G/DL (ref 31.5–35.7)
MCV RBC AUTO: 93.8 FL (ref 79–97)
MONOCYTES # BLD AUTO: 0.44 10*3/MM3 (ref 0.1–0.9)
MONOCYTES NFR BLD AUTO: 10 % (ref 5–12)
NEUTROPHILS # BLD AUTO: 2.61 10*3/MM3 (ref 1.7–7)
NEUTROPHILS NFR BLD AUTO: 59.5 % (ref 42.7–76)
NRBC BLD AUTO-RTO: 0 /100 WBC (ref 0–0.2)
PLATELET # BLD AUTO: 193 10*3/MM3 (ref 140–450)
POTASSIUM SERPL-SCNC: 4.3 MMOL/L (ref 3.5–5.2)
PROT SERPL-MCNC: 6.6 G/DL (ref 6–8.5)
RBC # BLD AUTO: 4.33 10*6/MM3 (ref 3.77–5.28)
RHEUMATOID FACT SERPL-ACNC: <10 IU/ML (ref 0–13.9)
SODIUM SERPL-SCNC: 143 MMOL/L (ref 136–145)
WBC # BLD AUTO: 4.39 10*3/MM3 (ref 3.4–10.8)

## 2019-07-08 ENCOUNTER — APPOINTMENT (OUTPATIENT)
Dept: ONCOLOGY | Facility: HOSPITAL | Age: 69
End: 2019-07-08

## 2019-07-10 ENCOUNTER — INFUSION (OUTPATIENT)
Dept: ONCOLOGY | Facility: HOSPITAL | Age: 69
End: 2019-07-10

## 2019-07-10 ENCOUNTER — TELEPHONE (OUTPATIENT)
Dept: GASTROENTEROLOGY | Facility: CLINIC | Age: 69
End: 2019-07-10

## 2019-07-10 DIAGNOSIS — E53.8 B12 DEFICIENCY: Primary | ICD-10-CM

## 2019-07-10 PROCEDURE — 25010000002 CYANOCOBALAMIN PER 1000 MCG: Performed by: INTERNAL MEDICINE

## 2019-07-10 PROCEDURE — 96372 THER/PROPH/DIAG INJ SC/IM: CPT | Performed by: INTERNAL MEDICINE

## 2019-07-10 RX ORDER — CYANOCOBALAMIN 1000 UG/ML
1000 INJECTION, SOLUTION INTRAMUSCULAR; SUBCUTANEOUS
Status: DISCONTINUED | OUTPATIENT
Start: 2019-07-10 | End: 2019-07-10 | Stop reason: HOSPADM

## 2019-07-10 RX ADMIN — CYANOCOBALAMIN 1000 MCG: 1000 INJECTION, SOLUTION INTRAMUSCULAR at 12:28

## 2019-07-10 NOTE — TELEPHONE ENCOUNTER
Called pt and pt reports that She did see Dr Mejia regarding the swelling in her legs.  She reports that Dr Mejia took several vials of blood and concluded that the swelling was related to the levaquin.  Pt states she took a few days of prednisone and the is now gone.  She reports that Dr Mejia wanted her to call Dr Conteh and see if she needed to be put on a ppi for her gastritis.  ADvised pt will send message to Dr Conteh.  Pt verb understanding.

## 2019-07-10 NOTE — TELEPHONE ENCOUNTER
----- Message from Estevan Sharp sent at 7/10/2019  8:51 AM EDT -----  Regarding: MED   Contact: [phone]  PT CALLED WITH QUESTIONS ABOUT MEDICATION DUE TO BE TAKING OFF ONE.

## 2019-07-10 NOTE — TELEPHONE ENCOUNTER
Called pt and advised per Dr Conteh that she does not need to be on chronic ppi for gastritis as it was likely h pylori related unless she continues to have dyspepsia , nausea or abd discomfort.  She will need h pylori breath test in 8 wks. Pt verb understanding and reports she is already scheduled for breath test.

## 2019-07-10 NOTE — TELEPHONE ENCOUNTER
She does not need to be on chronic ppi for gastritis as it was likely h pylori related unless she continues to have dyspepsia, nausea or abdominal discomfort.  She will need h pylori breath test in 8 weeks

## 2019-08-05 ENCOUNTER — INFUSION (OUTPATIENT)
Dept: ONCOLOGY | Facility: HOSPITAL | Age: 69
End: 2019-08-05

## 2019-08-05 DIAGNOSIS — E53.8 B12 DEFICIENCY: Primary | ICD-10-CM

## 2019-08-05 PROCEDURE — 96372 THER/PROPH/DIAG INJ SC/IM: CPT | Performed by: INTERNAL MEDICINE

## 2019-08-05 PROCEDURE — 25010000002 CYANOCOBALAMIN PER 1000 MCG: Performed by: INTERNAL MEDICINE

## 2019-08-05 RX ORDER — CYANOCOBALAMIN 1000 UG/ML
1000 INJECTION, SOLUTION INTRAMUSCULAR; SUBCUTANEOUS
Status: DISCONTINUED | OUTPATIENT
Start: 2019-08-05 | End: 2019-08-05 | Stop reason: HOSPADM

## 2019-08-05 RX ADMIN — CYANOCOBALAMIN 1000 MCG: 1000 INJECTION, SOLUTION INTRAMUSCULAR at 09:30

## 2019-08-08 ENCOUNTER — PROCEDURE VISIT (OUTPATIENT)
Dept: OBSTETRICS AND GYNECOLOGY | Age: 69
End: 2019-08-08

## 2019-08-08 ENCOUNTER — APPOINTMENT (OUTPATIENT)
Dept: WOMENS IMAGING | Facility: HOSPITAL | Age: 69
End: 2019-08-08

## 2019-08-08 ENCOUNTER — OFFICE VISIT (OUTPATIENT)
Dept: OBSTETRICS AND GYNECOLOGY | Age: 69
End: 2019-08-08

## 2019-08-08 VITALS
HEIGHT: 67 IN | BODY MASS INDEX: 22.44 KG/M2 | WEIGHT: 143 LBS | DIASTOLIC BLOOD PRESSURE: 72 MMHG | SYSTOLIC BLOOD PRESSURE: 110 MMHG

## 2019-08-08 DIAGNOSIS — Z12.31 VISIT FOR SCREENING MAMMOGRAM: Primary | ICD-10-CM

## 2019-08-08 DIAGNOSIS — Z78.0 POST-MENOPAUSAL: Primary | ICD-10-CM

## 2019-08-08 DIAGNOSIS — Z00.00 ANNUAL PHYSICAL EXAM: Primary | ICD-10-CM

## 2019-08-08 PROBLEM — C07 MUCOEPIDERMOID CARCINOMA OF PAROTID GLAND: Status: ACTIVE | Noted: 2017-01-31

## 2019-08-08 PROBLEM — R73.9 HYPERGLYCEMIA: Status: RESOLVED | Noted: 2017-01-24 | Resolved: 2019-08-08

## 2019-08-08 PROBLEM — N32.81 OAB (OVERACTIVE BLADDER): Status: RESOLVED | Noted: 2018-06-25 | Resolved: 2019-08-08

## 2019-08-08 PROCEDURE — 77080 DXA BONE DENSITY AXIAL: CPT | Performed by: OBSTETRICS & GYNECOLOGY

## 2019-08-08 PROCEDURE — G0101 CA SCREEN;PELVIC/BREAST EXAM: HCPCS | Performed by: OBSTETRICS & GYNECOLOGY

## 2019-08-08 PROCEDURE — 77067 SCR MAMMO BI INCL CAD: CPT | Performed by: OBSTETRICS & GYNECOLOGY

## 2019-08-08 PROCEDURE — 77067 SCR MAMMO BI INCL CAD: CPT | Performed by: RADIOLOGY

## 2019-08-08 NOTE — PROGRESS NOTES
Subjective   Iesha Roldan is a 69 y.o. female is being seen today for   Chief Complaint   Patient presents with   • Gynecologic Exam     AE, MG and Dexa after exam.  Hx oab.  2017 neg pap.  Dexa 2017 = osteopenia.  C-scope 2017.  Family hx of pancreatic and colon/rectal cancer.    .    History of Present Illness  Patient is here for an annual check and she has had an eventful year.  She came up with the severe anemia hemoglobin of 8 and therefore was worked up found to be low on B12 and iron but also positive for H. pylori.  Colonoscopy was negative so she had the work-up for that but at this point 6 months later hemoglobin is back to normal with iron therapy etc..  She had a reaction to potentially when the antibiotics that she took for that possible Levaquin but this point she is back to square 1 feeling pretty good but will be followed up accordingly.  Her family is doing well nothing new to report.  Bowels and bladder work well and she is exercising some.  The following portions of the patient's history were reviewed and updated as appropriate: allergies, current medications, past family history, past medical history, past social history, past surgical history and problem list.    Vitals:    19 1000   BP: 110/72       PAST MEDICAL HISTORY  Past Medical History:   Diagnosis Date   • Acute bronchitis    • Anemia    • BOOP (bronchiolitis obliterans with organizing pneumonia) (CMS/HCC)     Resolved   • Fracture, hip (CMS/HCC)    • Gastroenteritis    • H/O Lung nodule    • Herpes zoster    • Mucoepidermoid carcinoma (CMS/HCC) 2017   • Neck pain    • Osteopenia    • Pharyngitis      OB History      Para Term  AB Living    2 2 2     2    SAB TAB Ectopic Molar Multiple Live Births              2        Past Surgical History:   Procedure Laterality Date   • COLONOSCOPY     • HIP SURGERY Left     Joint replacement   • PAROTIDECTOMY Right 2017    Procedure: RIGHT  SUPERFICIAL PAROTIDECTOMY EXCISION PAROTID MASS ;  Surgeon: Candelario Calderon MD;  [facility]: [facility] OR [facility] [place];  Service:    • TUBAL ABDOMINAL LIGATION  In 1989     Family History   Problem Relation Age of Onset   • Peripheral vascular disease Mother    • Pancreatic cancer Father 91   • Heart disease Father    • Hypertension Father    • Rectal cancer Sister 36   • Diabetes Sister    • No Known Problems Brother    • No Known Problems Daughter    • No Known Problems Son    • Colon cancer Maternal Grandmother    • No Known Problems Paternal Grandmother    • No Known Problems Maternal Aunt    • No Known Problems Paternal Aunt    • Cancer Paternal Uncle 38        Mucoepidermoid CA of the parotid gland   • BRCA 1/2 Neg Hx    • Breast cancer Neg Hx    • Endometrial cancer Neg Hx    • Ovarian cancer Neg Hx      Social History     Tobacco Use   Smoking Status Never Smoker   Smokeless Tobacco Never Used       Current Outpatient Medications:   •  fluticasone (FLONASE) 50 MCG/ACT nasal spray, 2 sprays into the nostril(s) as directed by provider Daily. (Patient taking differently: 2 sprays into the nostril(s) as directed by provider Daily. seasonal), Disp: 1 bottle, Rfl: 5  •  loratadine (CLARITIN) 10 MG tablet, Take 1 tablet by mouth Daily., Disp: 90 tablet, Rfl: 1  •  Multiple Vitamin (MULTIVITAMIN) tablet tablet, Take 1 tablet by mouth Daily., Disp: , Rfl:   Immunization History   Administered Date(s) Administered   • Flu Vaccine High Dose PF 65YR+ 10/30/2017, 10/31/2018   • Pneumococcal Conjugate 13-Valent (PCV13) 05/07/2018   • Pneumococcal Polysaccharide (PPSV23) 11/12/2018   • Tdap 04/18/2016       Review of Systems   Constitutional: Negative for chills, fatigue, fever and unexpected weight change.   Respiratory: Negative for shortness of breath and wheezing.    Cardiovascular: Negative for chest pain.   Gastrointestinal: Negative for abdominal distention, abdominal pain, blood in stool, constipation, diarrhea and nausea.    Genitourinary: Negative for difficulty urinating, dyspareunia, dysuria, frequency, hematuria, menstrual problem, pelvic pain, urgency and vaginal discharge.   Skin: Negative for rash.       Objective   Physical Exam   Constitutional: She is oriented to person, place, and time. Vital signs are normal. She appears well-developed and well-nourished.   Neck: No thyromegaly present.   Cardiovascular: Normal rate, regular rhythm and normal heart sounds.   Pulmonary/Chest: Effort normal. Right breast exhibits no inverted nipple, no mass, no nipple discharge, no skin change and no tenderness. Left breast exhibits no inverted nipple, no mass, no nipple discharge, no skin change and no tenderness. Breasts are symmetrical. There is no breast swelling.   Abdominal: Soft.   Genitourinary: Vagina normal and uterus normal. No breast tenderness, discharge or bleeding. Pelvic exam was performed with patient supine. No labial fusion. There is no rash, tenderness, lesion or injury on the right labia. There is no rash, tenderness, lesion or injury on the left labia. Cervix exhibits no motion tenderness, no discharge and no friability. Right adnexum displays no mass, no tenderness and no fullness. Left adnexum displays no mass, no tenderness and no fullness.   Neurological: She is alert and oriented to person, place, and time.   Psychiatric: She has a normal mood and affect.   Vitals reviewed.        Assessment/Plan   Iesha was seen today for gynecologic exam.    Diagnoses and all orders for this visit:    Annual physical exam      Eye exam was normal today.  Pap was done in 17 will be due next year.  We are doing a mammogram today and bone density today and colonoscopy is up-to-date.  Again she is pretty good with the exercise plan will be back in a year  Bone density again showed moderate osteopenia with a spine a little bit better in the hip a little bit worse and that was discussed with the patient

## 2019-08-26 LAB — UREA BREATH TEST QL: NEGATIVE

## 2019-08-27 ENCOUNTER — TELEPHONE (OUTPATIENT)
Dept: INTERNAL MEDICINE | Facility: CLINIC | Age: 69
End: 2019-08-27

## 2019-08-27 DIAGNOSIS — E55.9 VITAMIN D DEFICIENCY: ICD-10-CM

## 2019-08-27 DIAGNOSIS — D50.9 IRON DEFICIENCY ANEMIA, UNSPECIFIED IRON DEFICIENCY ANEMIA TYPE: ICD-10-CM

## 2019-08-27 DIAGNOSIS — E53.8 B12 DEFICIENCY: Primary | ICD-10-CM

## 2019-08-27 DIAGNOSIS — R53.83 FATIGUE, UNSPECIFIED TYPE: ICD-10-CM

## 2019-08-27 NOTE — TELEPHONE ENCOUNTER
Orders for nonfasting labs are in her chart. She should schedule an office visit 1 week after labs.

## 2019-08-28 ENCOUNTER — TELEPHONE (OUTPATIENT)
Dept: GASTROENTEROLOGY | Facility: CLINIC | Age: 69
End: 2019-08-28

## 2019-08-28 NOTE — TELEPHONE ENCOUNTER
----- Message from Hanna Gee sent at 8/28/2019  3:16 PM EDT -----  Regarding: results  Contact: [phone]  Pt called to see if results are back from her H. pylori Breath Test, can you please call her thank you

## 2019-08-30 LAB
25(OH)D3+25(OH)D2 SERPL-MCNC: 20.5 NG/ML (ref 30–100)
BASOPHILS # BLD AUTO: 0.03 10*3/MM3 (ref 0–0.2)
BASOPHILS NFR BLD AUTO: 0.5 % (ref 0–1.5)
BUN SERPL-MCNC: 24 MG/DL (ref 8–23)
BUN/CREAT SERPL: 30 (ref 7–25)
CALCIUM SERPL-MCNC: 9.2 MG/DL (ref 8.6–10.5)
CHLORIDE SERPL-SCNC: 103 MMOL/L (ref 98–107)
CO2 SERPL-SCNC: 29.1 MMOL/L (ref 22–29)
CREAT SERPL-MCNC: 0.8 MG/DL (ref 0.57–1)
EOSINOPHIL # BLD AUTO: 0.13 10*3/MM3 (ref 0–0.4)
EOSINOPHIL NFR BLD AUTO: 2.2 % (ref 0.3–6.2)
ERYTHROCYTE [DISTWIDTH] IN BLOOD BY AUTOMATED COUNT: 12.5 % (ref 12.3–15.4)
FOLATE SERPL-MCNC: 13.3 NG/ML (ref 4.78–24.2)
GLUCOSE SERPL-MCNC: 94 MG/DL (ref 65–99)
HCT VFR BLD AUTO: 42.5 % (ref 34–46.6)
HGB BLD-MCNC: 13 G/DL (ref 12–15.9)
IMM GRANULOCYTES # BLD AUTO: 0.01 10*3/MM3 (ref 0–0.05)
IMM GRANULOCYTES NFR BLD AUTO: 0.2 % (ref 0–0.5)
IRON SATN MFR SERPL: 20 % (ref 20–50)
IRON SERPL-MCNC: 79 MCG/DL (ref 37–145)
LYMPHOCYTES # BLD AUTO: 1.42 10*3/MM3 (ref 0.7–3.1)
LYMPHOCYTES NFR BLD AUTO: 23.7 % (ref 19.6–45.3)
MCH RBC QN AUTO: 28.9 PG (ref 26.6–33)
MCHC RBC AUTO-ENTMCNC: 30.6 G/DL (ref 31.5–35.7)
MCV RBC AUTO: 94.4 FL (ref 79–97)
MONOCYTES # BLD AUTO: 0.54 10*3/MM3 (ref 0.1–0.9)
MONOCYTES NFR BLD AUTO: 9 % (ref 5–12)
NEUTROPHILS # BLD AUTO: 3.87 10*3/MM3 (ref 1.7–7)
NEUTROPHILS NFR BLD AUTO: 64.4 % (ref 42.7–76)
NRBC BLD AUTO-RTO: 0 /100 WBC (ref 0–0.2)
PLATELET # BLD AUTO: 209 10*3/MM3 (ref 140–450)
POTASSIUM SERPL-SCNC: 4.8 MMOL/L (ref 3.5–5.2)
RBC # BLD AUTO: 4.5 10*6/MM3 (ref 3.77–5.28)
SODIUM SERPL-SCNC: 142 MMOL/L (ref 136–145)
TIBC SERPL-MCNC: 400 MCG/DL
UIBC SERPL-MCNC: 321 MCG/DL (ref 112–346)
VIT B12 SERPL-MCNC: 565 PG/ML (ref 211–946)
WBC # BLD AUTO: 6 10*3/MM3 (ref 3.4–10.8)

## 2019-08-30 NOTE — TELEPHONE ENCOUNTER
Called pt and advised per Dr Conteh that her breath test was neg and bacteria has been eradicated.  Pt verb understanding.

## 2019-08-30 NOTE — PROGRESS NOTES
Your laboratory results are NORMAL with the exception of vitamin D which is a little low. Start a vitamin D3 supplement at 2,000 iu daily. We will discuss these results in detail at your next office visit. Please call with any questions or concerns.  Sincerely,  Camila Mejia Md

## 2019-09-04 ENCOUNTER — APPOINTMENT (OUTPATIENT)
Dept: ONCOLOGY | Facility: HOSPITAL | Age: 69
End: 2019-09-04

## 2019-09-06 ENCOUNTER — OFFICE VISIT (OUTPATIENT)
Dept: INTERNAL MEDICINE | Facility: CLINIC | Age: 69
End: 2019-09-06

## 2019-09-06 VITALS
OXYGEN SATURATION: 99 % | DIASTOLIC BLOOD PRESSURE: 84 MMHG | WEIGHT: 147 LBS | BODY MASS INDEX: 23.63 KG/M2 | HEART RATE: 63 BPM | SYSTOLIC BLOOD PRESSURE: 124 MMHG | HEIGHT: 66 IN

## 2019-09-06 DIAGNOSIS — R06.00 DYSPNEA, UNSPECIFIED TYPE: Primary | ICD-10-CM

## 2019-09-06 DIAGNOSIS — K29.60 REFLUX GASTRITIS: ICD-10-CM

## 2019-09-06 PROCEDURE — 99214 OFFICE O/P EST MOD 30 MIN: CPT | Performed by: INTERNAL MEDICINE

## 2019-09-06 RX ORDER — ERGOCALCIFEROL 1.25 MG/1
50000 CAPSULE ORAL WEEKLY
Qty: 12 CAPSULE | Refills: 1 | Status: SHIPPED | OUTPATIENT
Start: 2019-09-06 | End: 2020-01-27

## 2019-09-06 RX ORDER — OMEPRAZOLE 40 MG/1
40 CAPSULE, DELAYED RELEASE ORAL DAILY
Qty: 30 CAPSULE | Refills: 5 | Status: SHIPPED | OUTPATIENT
Start: 2019-09-06 | End: 2021-08-24 | Stop reason: DRUGHIGH

## 2019-09-06 NOTE — PROGRESS NOTES
"Chief Complaint   Patient presents with   • Shortness of Breath   • Heartburn       History of Present Illness   Iesha Roldan is a 69 y.o. female presents for acute care. Patient reports that about 2 weeks ago she noticed some shortness of air. Standing in her kitchen inactive at the time. She had previously had a similar sensation when she was anemic. She was then tested and blood counts are actually very strong. She denied any palpitations but reports she felt \"very aware\" of having a sensation of not bringing in enough air. Since that time she notes taking deep breaths lately. Of note, she recently lost her great nephew to a drug related incident. This naturally has been incredibly traumatic.   No chest pain. Negative treadmill stress test and echo in 2016.   Patient reports that she walks most every day for fitness with someone and she is able to talk throughout without any shortness of air.   She is having some reflux symptoms particularly when lying down at night. She has a history of h pylori/ related to her iron def anemia.   She has started drinking more tea recently.        The following portions of the patient's history were reviewed and updated as appropriate: allergies, current medications, past family history, past medical history, past social history, past surgical history and problem list.  Current Outpatient Medications on File Prior to Visit   Medication Sig Dispense Refill   • fluticasone (FLONASE) 50 MCG/ACT nasal spray 2 sprays into the nostril(s) as directed by provider Daily. (Patient taking differently: 2 sprays into the nostril(s) as directed by provider Daily. seasonal) 1 bottle 5   • loratadine (CLARITIN) 10 MG tablet Take 1 tablet by mouth Daily. 90 tablet 1   • Multiple Vitamin (MULTIVITAMIN) tablet tablet Take 1 tablet by mouth Daily.       No current facility-administered medications on file prior to visit.      Review of Systems   Constitutional: Negative.    HENT: Negative.  " "  Eyes: Negative.    Respiratory: Positive for shortness of breath.    Cardiovascular: Negative for chest pain and palpitations.   Gastrointestinal: Negative.    Endocrine: Negative.    Genitourinary: Negative.    Musculoskeletal: Negative.    Skin: Negative.    Allergic/Immunologic: Negative.    Neurological: Negative.    Hematological: Negative.    Psychiatric/Behavioral: Negative for sleep disturbance. The patient is nervous/anxious.        Objective   Physical Exam   Constitutional: She is oriented to person, place, and time. She appears well-developed and well-nourished.   HENT:   Head: Normocephalic and atraumatic.   Right Ear: External ear normal.   Left Ear: External ear normal.   Nose: Nose normal.   Mouth/Throat: Oropharynx is clear and moist.   Eyes: Conjunctivae and EOM are normal. Pupils are equal, round, and reactive to light.   Neck: Normal range of motion. Neck supple.   Cardiovascular: Normal rate, regular rhythm, normal heart sounds and intact distal pulses.   Pulmonary/Chest: Effort normal and breath sounds normal. No respiratory distress.   Abdominal: Soft. Bowel sounds are normal.   Musculoskeletal: Normal range of motion.   Neurological: She is alert and oriented to person, place, and time.   Skin: Skin is warm and dry.   Psychiatric: She has a normal mood and affect. Her behavior is normal. Judgment and thought content normal.   Nursing note and vitals reviewed.       /84   Pulse 63   Ht 167.6 cm (66\")   Wt 66.7 kg (147 lb)   SpO2 99%   BMI 23.73 kg/m²     Assessment/Plan   Diagnoses and all orders for this visit:    Dyspnea, unspecified type    Reflux gastritis    Other orders  -     omeprazole (PRILOSEC) 40 MG capsule; Take 1 capsule by mouth Daily.  -     vitamin D (ERGOCALCIFEROL) 86990 units capsule capsule; Take 1 capsule by mouth 1 (One) Time Per Week.      Patient w/ mild dyspnea. Suspect this is actually related to reflux as well as stressors. She will work towards " addressing her grief. Reduce or d/c tea. Start omeprazole. Will also add vit d supplementation. She will start daily omeprazole. F/u if symptoms do not improve.

## 2019-09-09 NOTE — PROGRESS NOTES
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  Anemia due to B12 and iron deficiency                             REQUESTING PHYSICIAN:  Camila Mejia M.D.    History of Present Illness patient is 68-year-old lady with a parotid carcinoma resected 2 years ago and a newly diagnosed anemia here for review    Her hemoglobin is improved the ferritin is pending and she is been off iron for the last 3 months.    She continues with monthly B12 injections in our office but I think we can prescribe this for her and send it to her home for her to give injections to herself    We will continue to scan her annually because of her parotid cancer and I have recommended she go see the genetic counselor because of her family history of cancer      Past Medical History:   Diagnosis Date   • Acute bronchitis    • Anemia    • BOOP (bronchiolitis obliterans with organizing pneumonia) (CMS/HCC) 2000    Resolved   • Fracture, hip (CMS/HCC)    • Gastroenteritis    • H/O Lung nodule    • Herpes zoster 2015   • 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   • Neck pain    • Osteopenia    • Pharyngitis         Past Surgical History:   Procedure Laterality Date   • COLONOSCOPY  2017   • HIP SURGERY Left 2009    Joint replacement   • PAROTIDECTOMY Right 1/31/2017    Procedure: RIGHT SUPERFICIAL PAROTIDECTOMY EXCISION PAROTID MASS ;  Surgeon: Candelario Calderon MD;  Location: [facility] OR [facility] – [place], [place];  Service:    • TUBAL ABDOMINAL LIGATION  In 1989      HEME ONC HISTORY:   patient is a 68-year-old lady seen here for a brief follow-up visit for her UR carcinoma the right parotid which was resected 2 years ago.  She was treated with postoperative radiation had some residual findings on CAT scan and PET scan that were felt to be benign.  She went to [facility] who followed up and told her this was indeed scar tissue in the parotid and did  not need any further imaging.  She returned to [place] and has no follow-up here and came to my office to follow up on her parotid cancer.  She is doing well except for fatigue which is fairly new onset.  No weight loss change in bowel habits hematemesis melena hematochezia.  She does report reflux symptoms and wonders if she has gastritis    I had plan to see her back for complete follow-up in a month because I had a previous engagement but noted on her blood work that she is anemic with a hemoglobin of 8.8 and borderline macrocytosis and we have ordered blood work to assess her anemia with plans to see her back in 2-3 weeks for complete exam in follow-up of her parotid cancer in evaluation of anemia  12/18    CAT scans were done of the neck and chest and thankfully this shows no evidence of recurrent cancer  Her anemia workup shows B12 and iron deficiency although her methylmalonic acid levels were not elevated her anti-intrinsic factor antibodies were elevated and this may very well be a pernicious anemia    She has had an improvement in her hemoglobin since starting the B12 injections and she is taking over-the-counter iron but I prescribed ferrous sulfate for is I think this will work with car    She is up-to-date with colonoscopies but is scheduled to see a gastroenterologist after upper endoscopy done and we will wait to see the results of this  3/19  She is here after having upper endoscopy because of persistent iron deficiency.  Biopsy showed H. pylori infection and she is going back to see her gastroenterologist to discuss treatment  Colonoscopy showed one adenomatous polyp  and I reviewed her path reports with her    At this point iron deficiency appears to be improved and her ferritin is up to 58  And she will stop the iron pills in 6 months  To continue B12 monthly in our office    He tells me 1 of her sisters had rectal cancer at age 38 she never had any genetic testing and I recommended strongly  that her sister get genetic testing because her family history is fairly significant-obviously if a mutation is found she will also qualify for genetic testing      Current Outpatient Medications on File Prior to Visit   Medication Sig Dispense Refill   • fluticasone (FLONASE) 50 MCG/ACT nasal spray 2 sprays into the nostril(s) as directed by provider Daily. (Patient taking differently: 2 sprays into the nostril(s) as directed by provider Daily. seasonal) 1 bottle 5   • loratadine (CLARITIN) 10 MG tablet Take 1 tablet by mouth Daily. 90 tablet 1   • Multiple Vitamin (MULTIVITAMIN) tablet tablet Take 1 tablet by mouth Daily.     • omeprazole (PRILOSEC) 40 MG capsule Take 1 capsule by mouth Daily. 30 capsule 5   • vitamin D (ERGOCALCIFEROL) 27390 units capsule capsule Take 1 capsule by mouth 1 (One) Time Per Week. 12 capsule 1     No current facility-administered medications on file prior to visit.         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   Tobacco Use   • Smoking status: Never Smoker   • Smokeless tobacco: Never Used   Substance and Sexual Activity   • Alcohol use: Yes     Comment: SOCIALLY   • Drug use: No   • Sexual activity: Yes     Partners: Male     Birth control/protection: Post-menopausal, Tubal ligation     Comment: spouse = FRANC        Family History   Problem Relation Age of Onset   • Peripheral vascular disease Mother    • Pancreatic cancer Father 91   • Heart disease Father    • Hypertension Father    • Rectal cancer Sister 36   • Diabetes Sister    • No Known Problems Brother    • No Known Problems Daughter    • No Known Problems Son    • Colon cancer Maternal Grandmother    • No  "Known Problems Paternal Grandmother    • No Known Problems Maternal Aunt    • No Known Problems Paternal Aunt    • Cancer Paternal Uncle 38        Mucoepidermoid CA of the parotid gland   • BRCA 1/2 Neg Hx    • Breast cancer Neg Hx    • Endometrial cancer Neg Hx    • Ovarian cancer Neg Hx         Review of Systems   Constitutional: Positive for fatigue (better after injection for about 3 weeks 4/5/19 ). Negative for activity change, appetite change, fever and unexpected weight change.   Respiratory: Positive for shortness of breath (occasional  better 4/5/19). Negative for chest tightness.    Cardiovascular: Negative for chest pain (Reflux symptoms).   Gastrointestinal: Negative for constipation, diarrhea, nausea and vomiting.   Genitourinary: Negative for difficulty urinating.   Musculoskeletal: Negative for arthralgias, back pain, gait problem and joint swelling.   Neurological: Positive for dizziness (when sob happens 4/5/19). Negative for light-headedness, numbness and headaches.   Psychiatric/Behavioral: The patient is not nervous/anxious.         Objective     Vitals:    09/10/19 0921   BP: 99/65   Pulse: 65   Resp: 16   Temp: 97.8 °F (36.6 °C)   SpO2: 98%   Weight: 66.1 kg (145 lb 11.2 oz)   Height: 170 cm (66.93\")  Comment: new ht.   PainSc: 0-No pain     Current Status 9/10/2019   ECOG score 0       Physical Exam    GENERAL:  Well-developed, well-nourished in no acute distress.   SKIN:  Warm, dry without rashes, purpura or petechiae.  EYES:  Pupils equal, round and reactive to light.  EOMs intact.  Conjunctivae normal.  EARS:  Hearing intact.  NOSE:  Septum midline.  No excoriations or nasal discharge.  MOUTH:  Tongue is well-papillated; no stomatitis or ulcers.  Lips normal.  THROAT:  Oropharynx without lesions or exudates.  NECK:  Supple with good range of motion; no thyromegaly or masses, no JVD.  No masses appreciated in the right parotid  LYMPHATICS:  No cervical, supraclavicular, axillary or inguinal " adenopathy.  CHEST:  Lungs clear to auscultation. Good airflow.  CARDIAC:  Regular rate and rhythm without murmurs, rubs or gallops. Normal S1,S2.  ABDOMEN:  Soft, nontender with no hepatosplenomegaly or masses.  EXTREMITIES:  No clubbing, cyanosis or edema.  NEUROLOGICAL:  Cranial Nerves II-XII grossly intact.  No focal neurological deficits.  PSYCHIATRIC:  Normal affect and mood.    RECENT LABS:  Hematology WBC   Date Value Ref Range Status   09/10/2019 3.30 (L) 3.40 - 10.80 10*3/mm3 Final   08/29/2019 6.00 3.40 - 10.80 10*3/mm3 Final     RBC   Date Value Ref Range Status   09/10/2019 4.47 3.77 - 5.28 10*6/mm3 Final   08/29/2019 4.50 3.77 - 5.28 10*6/mm3 Final     Hemoglobin   Date Value Ref Range Status   09/10/2019 13.6 12.0 - 15.9 g/dL Final     Hematocrit   Date Value Ref Range Status   09/10/2019 42.9 34.0 - 46.6 % Final     Platelets   Date Value Ref Range Status   09/10/2019 203 140 - 450 10*3/mm3 Final   03/26/2018 180 10*3/mm3 Final          CT CHEST WITH CONTRAST-, CT SOFT TISSUE NECK WITH CONTRAST-     IMPRESSION:  Status post right parotidectomy. There is no evidence of recurrent parotid tumor or metastatic disease within the neck. A couple tiny noncalcified right lower lobe pulmonary nodules remain stable.     This report was finalized on 12/26/2018                        Assessment/Plan   1.  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  Low B12 levels and low ferritin?  Etiology responding to therapy   3.  History of BOOP and lung nodule which have resolved   4.  H. pylori infection and colon polyps   5.  Strong family history of malignancy-genetic counseling planned  plan  1.  Continue B12 now monthly at home  2.  Genetic referral  3.  Return in 12 months for follow-up with CAT scans of the neck and chest annually through her 5-year follow-up

## 2019-09-10 ENCOUNTER — OFFICE VISIT (OUTPATIENT)
Dept: ONCOLOGY | Facility: CLINIC | Age: 69
End: 2019-09-10

## 2019-09-10 ENCOUNTER — INFUSION (OUTPATIENT)
Dept: ONCOLOGY | Facility: HOSPITAL | Age: 69
End: 2019-09-10

## 2019-09-10 ENCOUNTER — LAB (OUTPATIENT)
Dept: LAB | Facility: HOSPITAL | Age: 69
End: 2019-09-10

## 2019-09-10 VITALS
WEIGHT: 145.7 LBS | RESPIRATION RATE: 16 BRPM | TEMPERATURE: 97.8 F | DIASTOLIC BLOOD PRESSURE: 65 MMHG | BODY MASS INDEX: 22.87 KG/M2 | HEIGHT: 67 IN | OXYGEN SATURATION: 98 % | SYSTOLIC BLOOD PRESSURE: 99 MMHG | HEART RATE: 65 BPM

## 2019-09-10 DIAGNOSIS — D50.9 IRON DEFICIENCY ANEMIA, UNSPECIFIED IRON DEFICIENCY ANEMIA TYPE: ICD-10-CM

## 2019-09-10 DIAGNOSIS — C80.1 CARCINOMA (HCC): ICD-10-CM

## 2019-09-10 DIAGNOSIS — D50.9 IRON DEFICIENCY ANEMIA, UNSPECIFIED IRON DEFICIENCY ANEMIA TYPE: Primary | ICD-10-CM

## 2019-09-10 DIAGNOSIS — E53.8 B12 DEFICIENCY: Primary | ICD-10-CM

## 2019-09-10 DIAGNOSIS — C80.1 MUCOEPIDERMOID CARCINOMA (HCC): ICD-10-CM

## 2019-09-10 LAB
ALBUMIN SERPL-MCNC: 4.5 G/DL (ref 3.5–5.2)
ALBUMIN/GLOB SERPL: 1.6 G/DL (ref 1.1–2.4)
ALP SERPL-CCNC: 87 U/L (ref 38–116)
ALT SERPL W P-5'-P-CCNC: 14 U/L (ref 0–33)
ANION GAP SERPL CALCULATED.3IONS-SCNC: 11.6 MMOL/L (ref 5–15)
AST SERPL-CCNC: 19 U/L (ref 0–32)
BASOPHILS # BLD AUTO: 0.02 10*3/MM3 (ref 0–0.2)
BASOPHILS NFR BLD AUTO: 0.6 % (ref 0–1.5)
BILIRUB SERPL-MCNC: 0.4 MG/DL (ref 0.2–1.2)
BUN BLD-MCNC: 18 MG/DL (ref 6–20)
BUN/CREAT SERPL: 22 (ref 7.3–30)
CALCIUM SPEC-SCNC: 9.3 MG/DL (ref 8.5–10.2)
CHLORIDE SERPL-SCNC: 101 MMOL/L (ref 98–107)
CO2 SERPL-SCNC: 28.4 MMOL/L (ref 22–29)
CREAT BLD-MCNC: 0.82 MG/DL (ref 0.6–1.1)
DEPRECATED RDW RBC AUTO: 41.5 FL (ref 37–54)
EOSINOPHIL # BLD AUTO: 0.1 10*3/MM3 (ref 0–0.4)
EOSINOPHIL NFR BLD AUTO: 3 % (ref 0.3–6.2)
ERYTHROCYTE [DISTWIDTH] IN BLOOD BY AUTOMATED COUNT: 11.9 % (ref 12.3–15.4)
FERRITIN SERPL-MCNC: 59.9 NG/ML (ref 13–150)
GFR SERPL CREATININE-BSD FRML MDRD: 69 ML/MIN/1.73
GLOBULIN UR ELPH-MCNC: 2.8 GM/DL (ref 1.8–3.5)
GLUCOSE BLD-MCNC: 93 MG/DL (ref 74–124)
HCT VFR BLD AUTO: 42.9 % (ref 34–46.6)
HGB BLD-MCNC: 13.6 G/DL (ref 12–15.9)
IMM GRANULOCYTES # BLD AUTO: 0.01 10*3/MM3 (ref 0–0.05)
IMM GRANULOCYTES NFR BLD AUTO: 0.3 % (ref 0–0.5)
LYMPHOCYTES # BLD AUTO: 1.15 10*3/MM3 (ref 0.7–3.1)
LYMPHOCYTES NFR BLD AUTO: 34.8 % (ref 19.6–45.3)
MCH RBC QN AUTO: 30.4 PG (ref 26.6–33)
MCHC RBC AUTO-ENTMCNC: 31.7 G/DL (ref 31.5–35.7)
MCV RBC AUTO: 96 FL (ref 79–97)
MONOCYTES # BLD AUTO: 0.31 10*3/MM3 (ref 0.1–0.9)
MONOCYTES NFR BLD AUTO: 9.4 % (ref 5–12)
NEUTROPHILS # BLD AUTO: 1.71 10*3/MM3 (ref 1.7–7)
NEUTROPHILS NFR BLD AUTO: 51.9 % (ref 42.7–76)
NRBC BLD AUTO-RTO: 0 /100 WBC (ref 0–0.2)
PLATELET # BLD AUTO: 203 10*3/MM3 (ref 140–450)
PMV BLD AUTO: 10 FL (ref 6–12)
POTASSIUM BLD-SCNC: 4.5 MMOL/L (ref 3.5–4.7)
PROT SERPL-MCNC: 7.3 G/DL (ref 6.3–8)
RBC # BLD AUTO: 4.47 10*6/MM3 (ref 3.77–5.28)
SODIUM BLD-SCNC: 141 MMOL/L (ref 134–145)
WBC NRBC COR # BLD: 3.3 10*3/MM3 (ref 3.4–10.8)

## 2019-09-10 PROCEDURE — 82728 ASSAY OF FERRITIN: CPT | Performed by: INTERNAL MEDICINE

## 2019-09-10 PROCEDURE — 99214 OFFICE O/P EST MOD 30 MIN: CPT | Performed by: INTERNAL MEDICINE

## 2019-09-10 PROCEDURE — 80053 COMPREHEN METABOLIC PANEL: CPT | Performed by: INTERNAL MEDICINE

## 2019-09-10 PROCEDURE — 25010000002 CYANOCOBALAMIN PER 1000 MCG: Performed by: INTERNAL MEDICINE

## 2019-09-10 PROCEDURE — 96372 THER/PROPH/DIAG INJ SC/IM: CPT

## 2019-09-10 PROCEDURE — 36415 COLL VENOUS BLD VENIPUNCTURE: CPT | Performed by: INTERNAL MEDICINE

## 2019-09-10 PROCEDURE — 85025 COMPLETE CBC W/AUTO DIFF WBC: CPT | Performed by: INTERNAL MEDICINE

## 2019-09-10 RX ORDER — CYANOCOBALAMIN 1000 UG/ML
1000 INJECTION, SOLUTION INTRAMUSCULAR; SUBCUTANEOUS
Status: DISCONTINUED | OUTPATIENT
Start: 2019-09-10 | End: 2019-09-10 | Stop reason: HOSPADM

## 2019-09-10 RX ADMIN — CYANOCOBALAMIN 1000 MCG: 1000 INJECTION, SOLUTION INTRAMUSCULAR at 10:12

## 2019-10-04 ENCOUNTER — OFFICE VISIT (OUTPATIENT)
Dept: INTERNAL MEDICINE | Facility: CLINIC | Age: 69
End: 2019-10-04

## 2019-10-04 VITALS
BODY MASS INDEX: 23.07 KG/M2 | DIASTOLIC BLOOD PRESSURE: 68 MMHG | WEIGHT: 147 LBS | HEART RATE: 66 BPM | HEIGHT: 67 IN | SYSTOLIC BLOOD PRESSURE: 112 MMHG | OXYGEN SATURATION: 97 %

## 2019-10-04 DIAGNOSIS — D50.9 IRON DEFICIENCY ANEMIA, UNSPECIFIED IRON DEFICIENCY ANEMIA TYPE: ICD-10-CM

## 2019-10-04 DIAGNOSIS — C80.1 MUCOEPIDERMOID CARCINOMA (HCC): ICD-10-CM

## 2019-10-04 DIAGNOSIS — E53.8 B12 DEFICIENCY: Primary | ICD-10-CM

## 2019-10-04 PROCEDURE — 96372 THER/PROPH/DIAG INJ SC/IM: CPT | Performed by: INTERNAL MEDICINE

## 2019-10-04 PROCEDURE — 99214 OFFICE O/P EST MOD 30 MIN: CPT | Performed by: INTERNAL MEDICINE

## 2019-10-04 PROCEDURE — G0008 ADMIN INFLUENZA VIRUS VAC: HCPCS | Performed by: INTERNAL MEDICINE

## 2019-10-04 PROCEDURE — 90653 IIV ADJUVANT VACCINE IM: CPT | Performed by: INTERNAL MEDICINE

## 2019-10-04 RX ORDER — CYANOCOBALAMIN 1000 UG/ML
1000 INJECTION, SOLUTION INTRAMUSCULAR; SUBCUTANEOUS
Status: DISCONTINUED | OUTPATIENT
Start: 2019-10-04 | End: 2020-02-11

## 2019-10-04 RX ADMIN — CYANOCOBALAMIN 1000 MCG: 1000 INJECTION, SOLUTION INTRAMUSCULAR; SUBCUTANEOUS at 11:02

## 2019-10-04 NOTE — PROGRESS NOTES
Chief Complaint   Patient presents with   • b12 def   • Anemia       History of Present Illness   Iesha Roldan is a 69 y.o. female presents for follow up evaluation. She was experiencing both an iron and b12 deficiency anemia. With supplementation she is now at a healthy and normal level. She was positive for h pylori and this has cleared. She had a normal cscope w/ a benign polyp that was removed. She is following w/ oncology and gi at this time. She does have an intrinsic factor that is positive. She is now receiving b12 injections monthly and levels are wnl. She has normal ferritin and hgb is now 13.6. She feels very well w/ this replacement.   Patient is a survivor of mucoepidermoid carcinoma/ parotid. No new symptoms at this time.       The following portions of the patient's history were reviewed and updated as appropriate: allergies, current medications, past family history, past medical history, past social history, past surgical history and problem list.  Current Outpatient Medications on File Prior to Visit   Medication Sig Dispense Refill   • fluticasone (FLONASE) 50 MCG/ACT nasal spray 2 sprays into the nostril(s) as directed by provider Daily. (Patient taking differently: 2 sprays into the nostril(s) as directed by provider Daily. seasonal) 1 bottle 5   • loratadine (CLARITIN) 10 MG tablet Take 1 tablet by mouth Daily. 90 tablet 1   • Multiple Vitamin (MULTIVITAMIN) tablet tablet Take 1 tablet by mouth Daily.     • omeprazole (PRILOSEC) 40 MG capsule Take 1 capsule by mouth Daily. 30 capsule 5   • vitamin D (ERGOCALCIFEROL) 57730 units capsule capsule Take 1 capsule by mouth 1 (One) Time Per Week. 12 capsule 1     No current facility-administered medications on file prior to visit.      Review of Systems   Constitutional: Negative.    HENT: Negative.    Eyes: Negative.    Respiratory: Negative.    Cardiovascular: Negative.    Gastrointestinal: Negative.    Endocrine: Negative.    Genitourinary:  "Negative.    Musculoskeletal: Negative.    Skin: Negative.    Allergic/Immunologic: Negative.    Neurological: Negative.    Hematological: Negative.    Psychiatric/Behavioral: Negative.        Objective   Physical Exam   Constitutional: She is oriented to person, place, and time. She appears well-developed and well-nourished.   HENT:   Head: Normocephalic and atraumatic.   Right Ear: External ear normal.   Left Ear: External ear normal.   Nose: Nose normal.   Mouth/Throat: Oropharynx is clear and moist.   Eyes: Conjunctivae and EOM are normal. Pupils are equal, round, and reactive to light.   Neck: Normal range of motion. Neck supple.   Cardiovascular: Normal rate, regular rhythm, normal heart sounds and intact distal pulses.   Pulmonary/Chest: Effort normal and breath sounds normal. No respiratory distress.   Abdominal: Soft. Bowel sounds are normal.   Musculoskeletal: Normal range of motion.   Neurological: She is alert and oriented to person, place, and time.   Skin: Skin is warm and dry.   Psychiatric: She has a normal mood and affect. Her behavior is normal. Judgment and thought content normal.   Nursing note and vitals reviewed.       /68   Pulse 66   Ht 170.2 cm (67\")   Wt 66.7 kg (147 lb)   SpO2 97%   BMI 23.02 kg/m²     Assessment/Plan   Diagnoses and all orders for this visit:    B12 deficiency    Iron deficiency anemia, unspecified iron deficiency anemia type    Mucoepidermoid carcinoma (CMS/HCC)    Other orders  -     Fluad Quad >65 years (2413-8212)      Patient with b12 (pernicous) and iron deficiency anemia. She is now supplemented fully and at a healthy level. She would like to have b12 injections in office and will continue this. She will continue an iron rich diet as well. She will f/u w/ oncology and ENT routinely and f/u here as scheduled.            "

## 2019-11-01 ENCOUNTER — LAB (OUTPATIENT)
Dept: INTERNAL MEDICINE | Facility: CLINIC | Age: 69
End: 2019-11-01

## 2019-11-01 PROCEDURE — 96372 THER/PROPH/DIAG INJ SC/IM: CPT | Performed by: INTERNAL MEDICINE

## 2019-11-01 RX ADMIN — CYANOCOBALAMIN 1000 MCG: 1000 INJECTION, SOLUTION INTRAMUSCULAR; SUBCUTANEOUS at 13:22

## 2019-11-26 ENCOUNTER — OFFICE VISIT (OUTPATIENT)
Dept: INTERNAL MEDICINE | Facility: CLINIC | Age: 69
End: 2019-11-26

## 2019-11-26 VITALS
DIASTOLIC BLOOD PRESSURE: 72 MMHG | OXYGEN SATURATION: 95 % | WEIGHT: 149 LBS | HEIGHT: 67 IN | SYSTOLIC BLOOD PRESSURE: 128 MMHG | TEMPERATURE: 98.8 F | HEART RATE: 90 BPM | BODY MASS INDEX: 23.39 KG/M2

## 2019-11-26 DIAGNOSIS — R10.32 LEFT LOWER QUADRANT ABDOMINAL PAIN: ICD-10-CM

## 2019-11-26 DIAGNOSIS — R52 BODY ACHES: ICD-10-CM

## 2019-11-26 DIAGNOSIS — R50.9 FEVER, UNSPECIFIED FEVER CAUSE: Primary | ICD-10-CM

## 2019-11-26 DIAGNOSIS — D51.9 ANEMIA DUE TO VITAMIN B12 DEFICIENCY, UNSPECIFIED B12 DEFICIENCY TYPE: ICD-10-CM

## 2019-11-26 DIAGNOSIS — K29.50 CHRONIC GASTRITIS WITHOUT BLEEDING, UNSPECIFIED GASTRITIS TYPE: ICD-10-CM

## 2019-11-26 LAB
EXPIRATION DATE: NORMAL
FLUAV AG NPH QL: NEGATIVE
FLUBV AG NPH QL: NEGATIVE
HCT VFR BLDA CALC: 34 % (ref 38–51)
HGB BLDA-MCNC: 10.8 G/DL (ref 12–17)
INTERNAL CONTROL: NORMAL
LYMPHOCYTES # BLD: 21.7 %
Lab: NORMAL
MCH, POC: 29.4
MCHC, POC: 31.8
MCV, POC: 92.5
MONOCYTES # BLD: 3.9 %
PLATELET # BLD: 190 10*3/MM3
PMV BLD: 7.6 FL
POC NEUTROPHIL: 74.4 %
RBC, POC: 3.68
RDW, POC: 12.6
WBC # BLD: 6.4 10*3/UL

## 2019-11-26 PROCEDURE — 96372 THER/PROPH/DIAG INJ SC/IM: CPT | Performed by: INTERNAL MEDICINE

## 2019-11-26 PROCEDURE — 87804 INFLUENZA ASSAY W/OPTIC: CPT | Performed by: INTERNAL MEDICINE

## 2019-11-26 PROCEDURE — 85025 COMPLETE CBC W/AUTO DIFF WBC: CPT | Performed by: INTERNAL MEDICINE

## 2019-11-26 PROCEDURE — 99214 OFFICE O/P EST MOD 30 MIN: CPT | Performed by: INTERNAL MEDICINE

## 2019-11-26 RX ORDER — CIPROFLOXACIN 500 MG/1
500 TABLET, FILM COATED ORAL 2 TIMES DAILY
Qty: 14 TABLET | Refills: 0 | Status: SHIPPED | OUTPATIENT
Start: 2019-11-26 | End: 2019-12-03

## 2019-11-26 RX ORDER — CYANOCOBALAMIN 1000 UG/ML
1000 INJECTION, SOLUTION INTRAMUSCULAR; SUBCUTANEOUS
Status: DISCONTINUED | OUTPATIENT
Start: 2019-11-26 | End: 2020-02-11

## 2019-11-26 RX ORDER — SACCHAROMYCES BOULARDII 250 MG
250 CAPSULE ORAL 2 TIMES DAILY
Qty: 20 CAPSULE | Refills: 3 | Status: SHIPPED | OUTPATIENT
Start: 2019-11-26 | End: 2020-02-11 | Stop reason: SDUPTHER

## 2019-11-26 RX ADMIN — CYANOCOBALAMIN 1000 MCG: 1000 INJECTION, SOLUTION INTRAMUSCULAR; SUBCUTANEOUS at 16:39

## 2019-11-26 NOTE — PROGRESS NOTES
"Chief Complaint   Patient presents with   • Generalized Body Aches   • Fever   • Sore Throat       History of Present Illness   Iesha Roldan is a 69 y.o. female presents for acute care. She reports feeling very achey, sore throat, fever to 100.5. Raspy voice. These symptoms started about 2 days ago.   She also reports loose stooling when she was in [place] 3 weeks ago. This improved in the last week. Still loose \"weird shaped\" but no longer having as strong of urgency. Some low abdominal cramping as well. Has a history of gastritis w/ h pylori s/p treatment. On b12 injection q month. She has positive intrinsic factor. cipro    The following portions of the patient's history were reviewed and updated as appropriate: allergies, current medications, past family history, past medical history, past social history, past surgical history and problem list.  Current Outpatient Medications on File Prior to Visit   Medication Sig Dispense Refill   • fluticasone (FLONASE) 50 MCG/ACT nasal spray 2 sprays into the nostril(s) as directed by provider Daily. (Patient taking differently: 2 sprays into the nostril(s) as directed by provider Daily. seasonal) 1 bottle 5   • loratadine (CLARITIN) 10 MG tablet Take 1 tablet by mouth Daily. 90 tablet 1   • Multiple Vitamin (MULTIVITAMIN) tablet tablet Take 1 tablet by mouth Daily.     • omeprazole (PRILOSEC) 40 MG capsule Take 1 capsule by mouth Daily. 30 capsule 5   • vitamin D (ERGOCALCIFEROL) 73360 units capsule capsule Take 1 capsule by mouth 1 (One) Time Per Week. 12 capsule 1     Current Facility-Administered Medications on File Prior to Visit   Medication Dose Route Frequency Provider Last Rate Last Dose   • cyanocobalamin injection 1,000 mcg  1,000 mcg Intramuscular Q28 Days Camila Mejia MD   1,000 mcg at 11/01/19 1322     Review of Systems   Constitutional: Positive for fatigue and fever.   HENT: Positive for sore throat.    Eyes: Negative.    Respiratory: Negative for " "cough.    Cardiovascular: Negative.    Gastrointestinal: Positive for abdominal pain and diarrhea. Negative for blood in stool.   Endocrine: Negative.    Genitourinary: Negative.    Musculoskeletal: Negative.    Skin: Negative.    Allergic/Immunologic: Negative.    Neurological: Negative.    Hematological: Negative.    Psychiatric/Behavioral: Negative.        Objective   Physical Exam   Constitutional: She is oriented to person, place, and time. She appears well-developed and well-nourished.   HENT:   Head: Normocephalic and atraumatic.   Right Ear: External ear normal.   Left Ear: External ear normal.   Nose: Nose normal.   Mouth/Throat: Oropharynx is clear and moist.   Eyes: EOM are normal. Pupils are equal, round, and reactive to light.   Neck: Neck supple.   Cardiovascular: Normal rate, regular rhythm and normal heart sounds.   Pulmonary/Chest: Effort normal and breath sounds normal. No respiratory distress.   Abdominal: Soft. There is tenderness.   LLQ tender to palpation. Without guard. Without rebound.    Musculoskeletal: Normal range of motion.   Neurological: She is alert and oriented to person, place, and time.   Skin: Skin is warm and dry.   Psychiatric: She has a normal mood and affect. Her behavior is normal. Judgment and thought content normal.   Nursing note and vitals reviewed.     CBC w/ normal wbc. hgb 10.9 down from 13.6.    /72   Pulse 90   Temp 98.8 °F (37.1 °C)   Ht 170.2 cm (67\")   Wt 67.6 kg (149 lb)   SpO2 95%   BMI 23.34 kg/m²     Assessment/Plan   Diagnoses and all orders for this visit:    Fever, unspecified fever cause  -     POC CBC With / Auto Diff    Body aches  -     POC Influenza A / B  -     POC CBC With / Auto Diff    Chronic gastritis without bleeding, unspecified gastritis type  -     Comprehensive Metabolic Panel  -     POC Urea Breath Test    Anemia due to vitamin B12 deficiency, unspecified B12 deficiency type  -     CBC & Differential  -     Vitamin B12  -     " Comprehensive Metabolic Panel  -     POC Urea Breath Test    Left lower quadrant abdominal pain    Other orders  -     ciprofloxacin (CIPRO) 500 MG tablet; Take 1 tablet by mouth 2 (Two) Times a Day for 7 days.  -     saccharomyces boulardii (FLORASTOR) 250 MG capsule; Take 1 capsule by mouth 2 (Two) Times a Day.        Patient with abdominal pain and fever. Recent diarrhea. ? If diverticulitis v colitis. She will take cipro bid x 7 days and florastor bid x 7 days. She has anemia. Acute on prior history. Will increase frequency of b12 injections and retest in 1-2 weeks. She will also have repeat h pylori testing at that time. F/u routinely.

## 2019-12-05 LAB
ALBUMIN SERPL-MCNC: 4.4 G/DL (ref 3.5–5.2)
ALBUMIN/GLOB SERPL: 1.7 G/DL
ALP SERPL-CCNC: 83 U/L (ref 39–117)
ALT SERPL-CCNC: 10 U/L (ref 1–33)
AST SERPL-CCNC: 19 U/L (ref 1–32)
BASOPHILS # BLD AUTO: 0.03 10*3/MM3 (ref 0–0.2)
BASOPHILS NFR BLD AUTO: 0.6 % (ref 0–1.5)
BILIRUB SERPL-MCNC: 0.3 MG/DL (ref 0.2–1.2)
BUN SERPL-MCNC: 24 MG/DL (ref 8–23)
BUN/CREAT SERPL: 30 (ref 7–25)
CALCIUM SERPL-MCNC: 9.6 MG/DL (ref 8.6–10.5)
CHLORIDE SERPL-SCNC: 101 MMOL/L (ref 98–107)
CO2 SERPL-SCNC: 32.9 MMOL/L (ref 22–29)
CREAT SERPL-MCNC: 0.8 MG/DL (ref 0.57–1)
EOSINOPHIL # BLD AUTO: 0.11 10*3/MM3 (ref 0–0.4)
EOSINOPHIL NFR BLD AUTO: 2.1 % (ref 0.3–6.2)
ERYTHROCYTE [DISTWIDTH] IN BLOOD BY AUTOMATED COUNT: 11.8 % (ref 12.3–15.4)
GLOBULIN SER CALC-MCNC: 2.6 GM/DL
GLUCOSE SERPL-MCNC: 117 MG/DL (ref 65–99)
HCT VFR BLD AUTO: 38.9 % (ref 34–46.6)
HGB BLD-MCNC: 12.8 G/DL (ref 12–15.9)
IMM GRANULOCYTES # BLD AUTO: 0.01 10*3/MM3 (ref 0–0.05)
IMM GRANULOCYTES NFR BLD AUTO: 0.2 % (ref 0–0.5)
LYMPHOCYTES # BLD AUTO: 1.36 10*3/MM3 (ref 0.7–3.1)
LYMPHOCYTES NFR BLD AUTO: 26.5 % (ref 19.6–45.3)
MCH RBC QN AUTO: 30.1 PG (ref 26.6–33)
MCHC RBC AUTO-ENTMCNC: 32.9 G/DL (ref 31.5–35.7)
MCV RBC AUTO: 91.5 FL (ref 79–97)
MONOCYTES # BLD AUTO: 0.48 10*3/MM3 (ref 0.1–0.9)
MONOCYTES NFR BLD AUTO: 9.4 % (ref 5–12)
NEUTROPHILS # BLD AUTO: 3.14 10*3/MM3 (ref 1.7–7)
NEUTROPHILS NFR BLD AUTO: 61.2 % (ref 42.7–76)
NRBC BLD AUTO-RTO: 0 /100 WBC (ref 0–0.2)
PLATELET # BLD AUTO: 271 10*3/MM3 (ref 140–450)
POTASSIUM SERPL-SCNC: 3.8 MMOL/L (ref 3.5–5.2)
PROT SERPL-MCNC: 7 G/DL (ref 6–8.5)
RBC # BLD AUTO: 4.25 10*6/MM3 (ref 3.77–5.28)
SODIUM SERPL-SCNC: 143 MMOL/L (ref 136–145)
VIT B12 SERPL-MCNC: 916 PG/ML (ref 211–946)
WBC # BLD AUTO: 5.13 10*3/MM3 (ref 3.4–10.8)

## 2019-12-09 ENCOUNTER — LAB (OUTPATIENT)
Dept: INTERNAL MEDICINE | Facility: CLINIC | Age: 69
End: 2019-12-09

## 2019-12-09 PROCEDURE — 96372 THER/PROPH/DIAG INJ SC/IM: CPT | Performed by: INTERNAL MEDICINE

## 2019-12-09 RX ORDER — MELOXICAM 15 MG/1
15 TABLET ORAL DAILY
Qty: 90 TABLET | Refills: 0 | Status: SHIPPED | OUTPATIENT
Start: 2019-12-09 | End: 2020-04-17 | Stop reason: SDUPTHER

## 2019-12-09 RX ADMIN — CYANOCOBALAMIN 1000 MCG: 1000 INJECTION, SOLUTION INTRAMUSCULAR; SUBCUTANEOUS at 12:46

## 2019-12-31 ENCOUNTER — APPOINTMENT (OUTPATIENT)
Dept: OTHER | Facility: HOSPITAL | Age: 69
End: 2019-12-31

## 2020-01-06 ENCOUNTER — LAB (OUTPATIENT)
Dept: INTERNAL MEDICINE | Facility: CLINIC | Age: 70
End: 2020-01-06

## 2020-01-06 PROCEDURE — 96372 THER/PROPH/DIAG INJ SC/IM: CPT | Performed by: INTERNAL MEDICINE

## 2020-01-06 RX ADMIN — CYANOCOBALAMIN 1000 MCG: 1000 INJECTION, SOLUTION INTRAMUSCULAR; SUBCUTANEOUS at 15:51

## 2020-01-20 ENCOUNTER — LAB (OUTPATIENT)
Dept: INTERNAL MEDICINE | Facility: CLINIC | Age: 70
End: 2020-01-20

## 2020-01-20 PROCEDURE — 96372 THER/PROPH/DIAG INJ SC/IM: CPT | Performed by: INTERNAL MEDICINE

## 2020-01-20 RX ADMIN — CYANOCOBALAMIN 1000 MCG: 1000 INJECTION, SOLUTION INTRAMUSCULAR; SUBCUTANEOUS at 16:22

## 2020-01-24 ENCOUNTER — HOSPITAL ENCOUNTER (OUTPATIENT)
Dept: CT IMAGING | Facility: HOSPITAL | Age: 70
Discharge: HOME | End: 2020-01-24
Admitting: INTERNAL MEDICINE

## 2020-01-24 DIAGNOSIS — D50.9 IRON DEFICIENCY ANEMIA, UNSPECIFIED IRON DEFICIENCY ANEMIA TYPE: ICD-10-CM

## 2020-01-24 DIAGNOSIS — C80.1 MUCOEPIDERMOID CARCINOMA (HCC): ICD-10-CM

## 2020-01-24 LAB — CREAT BLDA-MCNC: 0.7 MG/DL (ref 0.6–1.3)

## 2020-01-24 PROCEDURE — 25010000002 IOPAMIDOL 61 % SOLUTION: Performed by: INTERNAL MEDICINE

## 2020-01-24 PROCEDURE — 71260 CT THORAX DX C+: CPT

## 2020-01-24 PROCEDURE — 70491 CT SOFT TISSUE NECK W/DYE: CPT

## 2020-01-24 PROCEDURE — 82565 ASSAY OF CREATININE: CPT

## 2020-01-24 RX ADMIN — IOPAMIDOL 85 ML: 612 INJECTION, SOLUTION INTRAVENOUS at 10:51

## 2020-01-27 RX ORDER — ERGOCALCIFEROL 1.25 MG/1
CAPSULE ORAL
Qty: 12 CAPSULE | Refills: 0 | Status: SHIPPED | OUTPATIENT
Start: 2020-01-27 | End: 2020-03-11

## 2020-02-04 ENCOUNTER — CONSULT (OUTPATIENT)
Dept: ORTHOPEDIC SURGERY | Facility: CLINIC | Age: 70
End: 2020-02-04

## 2020-02-04 VITALS — HEIGHT: 67 IN | TEMPERATURE: 98.4 F | WEIGHT: 149 LBS | BODY MASS INDEX: 23.39 KG/M2

## 2020-02-04 DIAGNOSIS — M25.552 LEFT HIP PAIN: Primary | ICD-10-CM

## 2020-02-04 PROCEDURE — 73502 X-RAY EXAM HIP UNI 2-3 VIEWS: CPT | Performed by: ORTHOPAEDIC SURGERY

## 2020-02-04 PROCEDURE — 99213 OFFICE O/P EST LOW 20 MIN: CPT | Performed by: ORTHOPAEDIC SURGERY

## 2020-02-04 RX ORDER — SACCHAROMYCES BOULARDII 250 MG
CAPSULE ORAL
COMMUNITY
Start: 2020-01-27 | End: 2020-10-14

## 2020-02-04 RX ORDER — IBUPROFEN 400 MG/1
400 TABLET ORAL EVERY 6 HOURS PRN
COMMUNITY
End: 2020-10-14

## 2020-02-04 NOTE — PROGRESS NOTES
Patient: Iesha Roldan  YOB: 1950 69 y.o. female  Medical Record Number: [id number]    Chief Complaints:   Chief Complaint   Patient presents with   • Left Hip - Pain   • OPSE       History of Present Illness:Iesha Roldan is a 69 y.o. female who presents with left hip pain.  She is about 11 years out from a bipolar hemiarthroplasty performed by Dr. Parks.  She has been very active over the years.  About a year ago she was on a  trip and walked around 7 miles a day.  Shortly after that she began having increasing pain diffusely around the left hip.  She has had a history of hip bursitis years ago and had an injection which did help.  The pain is moderate to at times severe.  It limits her activities.    Allergies:   Allergies   Allergen Reactions   • Erythromycin Nausea And Vomiting       Medications:   Current Outpatient Medications   Medication Sig Dispense Refill   • ibuprofen (ADVIL,MOTRIN) 400 MG tablet Take 400 mg by mouth Every 6 (Six) Hours As Needed for Mild Pain .     • FLORASTOR 250 MG capsule      • fluticasone (FLONASE) 50 MCG/ACT nasal spray 2 sprays into the nostril(s) as directed by provider Daily. (Patient taking differently: 2 sprays into the nostril(s) as directed by provider Daily. seasonal) 1 bottle 5   • loratadine (CLARITIN) 10 MG tablet Take 1 tablet by mouth Daily. 90 tablet 1   • meloxicam (MOBIC) 15 MG tablet Take 1 tablet by mouth Daily. As needed 90 tablet 0   • Multiple Vitamin (MULTIVITAMIN) tablet tablet Take 1 tablet by mouth Daily.     • omeprazole (PRILOSEC) 40 MG capsule Take 1 capsule by mouth Daily. 30 capsule 5   • saccharomyces boulardii (FLORASTOR) 250 MG capsule Take 1 capsule by mouth 2 (Two) Times a Day. 20 capsule 3   • vitamin D (ERGOCALCIFEROL) 1.25 MG (14435 UT) capsule capsule TAKE ONE CAPSULE BY MOUTH ONCE WEEKLY 12 capsule 0     Current Facility-Administered Medications   Medication Dose Route Frequency Provider Last Rate Last Dose  "  • cyanocobalamin injection 1,000 mcg  1,000 mcg Intramuscular Q28 Days Camila Mejia MD   1,000 mcg at 12/09/19 1246   • cyanocobalamin injection 1,000 mcg  1,000 mcg Intramuscular Q28 Days Camila Mejia MD   1,000 mcg at 01/20/20 1622         The following portions of the patient's history were reviewed and updated as appropriate: allergies, current medications, past family history, past medical history, past social history, past surgical history and problem list.    Review of Systems:   A 14 point review of systems was performed. All systems negative except pertinent positives/negative listed in HPI above    Physical Exam:   Vitals:    02/04/20 1555   Temp: 98.4 °F (36.9 °C)   Weight: 67.6 kg (149 lb)   Height: 170.2 cm (67\")       General: A and O x 3, ASA, NAD    SCLERA:    Normal    DENTITION:   Normal  She has mild pain with flexion internal rotation left hip there is mild tenderness about the trochanteric bursa.  The leg lengths are equal she is intact light touch with palpable distal pulses walks with a slight antalgic gait skin about the hip is normal there is a well-healed incision.       Radiology:  Xrays 2views left hip (AP bilateral hips, and lateral of the hip) ordered and reviewed for evaluation of hip pain  demonstrating  a well positioned bipolar hemiarthroplasty with a press-fit Geo [place] stem.  There is evidence of narrowing of the cartilage space in comparison with previous x-rays from a few years ago.  No other abnormalities noted.    Assessment/Plan: Left hip pain she has a bipolar hemiarthroplasty placed 11 years ago she is been very active over that 11-year period.  Pain is now progressed and seems to be localized into the hip region.  She also has some mild bursitis and has had bursitis in the past and responded to injection.  I am going to get a bone scan if there is uptake I think the next step would be conversion to total hip replacement.  If there is not uptake would have to " consider a hip bursa injection versus fluoroscopy guided hip joint injection to help further the diagnosis.      Giorgio Keenan MD  2/4/2020

## 2020-02-11 ENCOUNTER — OFFICE VISIT (OUTPATIENT)
Dept: INTERNAL MEDICINE | Facility: CLINIC | Age: 70
End: 2020-02-11

## 2020-02-11 VITALS
HEIGHT: 67 IN | SYSTOLIC BLOOD PRESSURE: 114 MMHG | WEIGHT: 150 LBS | HEART RATE: 78 BPM | DIASTOLIC BLOOD PRESSURE: 74 MMHG | BODY MASS INDEX: 23.54 KG/M2

## 2020-02-11 DIAGNOSIS — C07 MUCOEPIDERMOID CARCINOMA OF PAROTID GLAND (HCC): Primary | ICD-10-CM

## 2020-02-11 DIAGNOSIS — M25.552 LEFT HIP PAIN: ICD-10-CM

## 2020-02-11 DIAGNOSIS — E53.8 B12 DEFICIENCY: ICD-10-CM

## 2020-02-11 DIAGNOSIS — R19.7 DIARRHEA, UNSPECIFIED TYPE: ICD-10-CM

## 2020-02-11 PROCEDURE — 99214 OFFICE O/P EST MOD 30 MIN: CPT | Performed by: INTERNAL MEDICINE

## 2020-02-11 PROCEDURE — 96372 THER/PROPH/DIAG INJ SC/IM: CPT | Performed by: INTERNAL MEDICINE

## 2020-02-11 RX ORDER — CYANOCOBALAMIN 1000 UG/ML
1000 INJECTION, SOLUTION INTRAMUSCULAR; SUBCUTANEOUS
Status: DISCONTINUED | OUTPATIENT
Start: 2020-02-11 | End: 2020-04-17

## 2020-02-11 RX ADMIN — CYANOCOBALAMIN 1000 MCG: 1000 INJECTION, SOLUTION INTRAMUSCULAR; SUBCUTANEOUS at 09:07

## 2020-02-11 NOTE — PROGRESS NOTES
Chief Complaint   Patient presents with   • Anemia   • history of parotid cancer   • Diarrhea       History of Present Illness   Iesha Roldan is a 69 y.o. female presents for routine f/u evaluation w/ acute concerns. She has a history of parotid cancer. She had recent ct neck, chest,  with no recent worisome findings. She is to repeat the imaging in 1 year.   Patient reports alternating normal and loose bm. She has not identified a trigger recently. She had a colonoscopy last year that was normal.   Patient has a history of trauma w/ surgical repair of her left hip 11 years ago. She is not having increasing pain in the left hip. She went to ortho and was advised additional testing to evaluate for needed to determine if she needs a repeat hip replacement.     The following portions of the patient's history were reviewed and updated as appropriate: allergies, current medications, past family history, past medical history, past social history, past surgical history and problem list.  Current Outpatient Medications on File Prior to Visit   Medication Sig Dispense Refill   • FLORASTOR 250 MG capsule      • fluticasone (FLONASE) 50 MCG/ACT nasal spray 2 sprays into the nostril(s) as directed by provider Daily. (Patient taking differently: 2 sprays into the nostril(s) as directed by provider Daily. seasonal) 1 bottle 5   • ibuprofen (ADVIL,MOTRIN) 400 MG tablet Take 400 mg by mouth Every 6 (Six) Hours As Needed for Mild Pain .     • loratadine (CLARITIN) 10 MG tablet Take 1 tablet by mouth Daily. 90 tablet 1   • meloxicam (MOBIC) 15 MG tablet Take 1 tablet by mouth Daily. As needed 90 tablet 0   • Multiple Vitamin (MULTIVITAMIN) tablet tablet Take 1 tablet by mouth Daily.     • omeprazole (PRILOSEC) 40 MG capsule Take 1 capsule by mouth Daily. 30 capsule 5   • vitamin D (ERGOCALCIFEROL) 1.25 MG (57556 UT) capsule capsule TAKE ONE CAPSULE BY MOUTH ONCE WEEKLY 12 capsule 0   • [DISCONTINUED] saccharomyces boulardii  "(FLORASTOR) 250 MG capsule Take 1 capsule by mouth 2 (Two) Times a Day. 20 capsule 3     Current Facility-Administered Medications on File Prior to Visit   Medication Dose Route Frequency Provider Last Rate Last Dose   • [DISCONTINUED] cyanocobalamin injection 1,000 mcg  1,000 mcg Intramuscular Q28 Days Camila Mejia MD   1,000 mcg at 12/09/19 1246   • [DISCONTINUED] cyanocobalamin injection 1,000 mcg  1,000 mcg Intramuscular Q28 Days Camila Mejia MD   1,000 mcg at 01/20/20 1622     Review of Systems   Constitutional: Negative.    HENT: Negative.    Eyes: Negative.    Respiratory: Negative.    Cardiovascular: Negative.    Gastrointestinal: Negative.    Endocrine: Negative.    Genitourinary: Negative.    Musculoskeletal:        Left hip pain   Skin: Negative.    Allergic/Immunologic: Negative.    Neurological: Negative.    Hematological: Negative.    Psychiatric/Behavioral: Negative.        Objective   Physical Exam   Constitutional: She is oriented to person, place, and time. She appears well-developed and well-nourished.   HENT:   Head: Normocephalic and atraumatic.   Right Ear: External ear normal.   Left Ear: External ear normal.   Nose: Nose normal.   Mouth/Throat: Oropharynx is clear and moist.   Eyes: Pupils are equal, round, and reactive to light. Conjunctivae and EOM are normal.   Neck: Normal range of motion. Neck supple.   Cardiovascular: Normal rate, regular rhythm, normal heart sounds and intact distal pulses.   Pulmonary/Chest: Effort normal and breath sounds normal. No respiratory distress.   Abdominal: Soft. Bowel sounds are normal.   Musculoskeletal: Normal range of motion.   Neurological: She is alert and oriented to person, place, and time.   Skin: Skin is warm and dry.   Psychiatric: She has a normal mood and affect. Her behavior is normal. Judgment and thought content normal.   Nursing note and vitals reviewed.       /74   Pulse 78   Ht 170.2 cm (67\")   Wt 68 kg (150 lb)   BMI " 23.49 kg/m²     Assessment/Plan   Diagnoses and all orders for this visit:    Mucoepidermoid carcinoma of parotid gland (CMS/HCC)    B12 deficiency  -     cyanocobalamin injection 1,000 mcg    Left hip pain    Diarrhea, unspecified type      Patient w/ h/o cancer. Reviewed CT results and these are all stable at this time. Patient is having some left hip pain w/ h/o hip repair. She will get a bone scan as advised and consider injection v sx correction based on results. She is having some alternating bowel activity. She is encouraged to keep a food diary and d/w patient common triggers for loose bm.  She will f/u routinely.

## 2020-02-12 ENCOUNTER — TELEPHONE (OUTPATIENT)
Dept: ORTHOPEDIC SURGERY | Facility: CLINIC | Age: 70
End: 2020-02-12

## 2020-02-12 ENCOUNTER — HOSPITAL ENCOUNTER (OUTPATIENT)
Dept: NUCLEAR MEDICINE | Facility: HOSPITAL | Age: 70
Discharge: HOME | End: 2020-02-12

## 2020-02-12 DIAGNOSIS — M25.552 LEFT HIP PAIN: ICD-10-CM

## 2020-02-12 PROCEDURE — A9503 TC99M MEDRONATE: HCPCS | Performed by: ORTHOPAEDIC SURGERY

## 2020-02-12 PROCEDURE — 0 TECHNETIUM MEDRONATE KIT: Performed by: ORTHOPAEDIC SURGERY

## 2020-02-12 PROCEDURE — 78306 BONE IMAGING WHOLE BODY: CPT

## 2020-02-12 RX ORDER — TC 99M MEDRONATE 20 MG/10ML
20.5 INJECTION, POWDER, LYOPHILIZED, FOR SOLUTION INTRAVENOUS
Status: COMPLETED | OUTPATIENT
Start: 2020-02-12 | End: 2020-02-12

## 2020-02-12 RX ADMIN — Medication 20.5 MILLICURIE: at 11:18

## 2020-02-14 ENCOUNTER — TELEPHONE (OUTPATIENT)
Dept: INTERNAL MEDICINE | Facility: CLINIC | Age: 70
End: 2020-02-14

## 2020-02-14 ENCOUNTER — TELEPHONE (OUTPATIENT)
Dept: ORTHOPEDIC SURGERY | Facility: CLINIC | Age: 70
End: 2020-02-14

## 2020-02-14 NOTE — TELEPHONE ENCOUNTER
Please let patient know that the bone scan did show some arthritic changes of her knee, hip looks okay.  No signs of any other abnormalities.  Would recommend that she follow-up with Dr. Keenan if still having pain

## 2020-02-14 NOTE — TELEPHONE ENCOUNTER
It states no increased activity at the hip. As such I suspect she has bursitis. She should follow up w/ dr. Keenan to verify and get an injection if indicated.

## 2020-02-14 NOTE — TELEPHONE ENCOUNTER
Pt LM about a bone scan she had this week.She has not hear any results. Dr Keenan ordered test. She is just asking if you can give results. Under imaging on 2/4/2020

## 2020-02-20 NOTE — TELEPHONE ENCOUNTER
Please let patient know that the bone scan did show some arthritic changes noted in her knee, otherwise everything looks great, no masses or tumors seen.  Hip replacement looks great.

## 2020-02-21 ENCOUNTER — OFFICE VISIT (OUTPATIENT)
Dept: ORTHOPEDIC SURGERY | Facility: CLINIC | Age: 70
End: 2020-02-21

## 2020-02-21 VITALS — TEMPERATURE: 98.3 F | WEIGHT: 150.2 LBS | HEIGHT: 66 IN | BODY MASS INDEX: 24.14 KG/M2

## 2020-02-21 DIAGNOSIS — M16.12 PRIMARY OSTEOARTHRITIS OF LEFT HIP: Primary | ICD-10-CM

## 2020-02-21 PROCEDURE — 99214 OFFICE O/P EST MOD 30 MIN: CPT | Performed by: ORTHOPAEDIC SURGERY

## 2020-02-21 RX ORDER — VANCOMYCIN HYDROCHLORIDE 1 G/200ML
15 INJECTION, SOLUTION INTRAVENOUS ONCE
Status: CANCELLED | OUTPATIENT
Start: 2020-06-24 | End: 2020-02-21

## 2020-02-21 RX ORDER — PREGABALIN 75 MG/1
150 CAPSULE ORAL ONCE
Status: CANCELLED | OUTPATIENT
Start: 2020-06-24 | End: 2020-02-21

## 2020-02-21 RX ORDER — MELOXICAM 15 MG/1
15 TABLET ORAL ONCE
Status: CANCELLED | OUTPATIENT
Start: 2020-06-24 | End: 2020-02-21

## 2020-02-21 RX ORDER — CEFAZOLIN SODIUM 2 G/100ML
2 INJECTION, SOLUTION INTRAVENOUS ONCE
Status: CANCELLED | OUTPATIENT
Start: 2020-06-24 | End: 2020-02-21

## 2020-02-21 NOTE — PROGRESS NOTES
Patient: Iesha Roldan  YOB: 1950 69 y.o. female  Medical Record Number: [id number]     Chief Complaints:       Chief Complaint   Patient presents with   • Left Hip - Pain   • OPSE         History of Present Illness:Iesha Roldan is a 69 y.o. female who presents with left hip pain.  She is about 11 years out from a bipolar hemiarthroplasty performed by Dr. Parks.  She has been very active over the years.  About a year ago she was on a  trip and walked around 7 miles a day.  Shortly after that she began having increasing pain diffusely around the left hip.  She has had a history of hip bursitis years ago and had an injection which did help.  The pain is moderate to at times severe.  It limits her activities.     Allergies:        Allergies   Allergen Reactions   • Erythromycin Nausea And Vomiting         Medications:          Current Outpatient Medications   Medication Sig Dispense Refill   • ibuprofen (ADVIL,MOTRIN) 400 MG tablet Take 400 mg by mouth Every 6 (Six) Hours As Needed for Mild Pain .       • FLORASTOR 250 MG capsule         • fluticasone (FLONASE) 50 MCG/ACT nasal spray 2 sprays into the nostril(s) as directed by provider Daily. (Patient taking differently: 2 sprays into the nostril(s) as directed by provider Daily. seasonal) 1 bottle 5   • loratadine (CLARITIN) 10 MG tablet Take 1 tablet by mouth Daily. 90 tablet 1   • meloxicam (MOBIC) 15 MG tablet Take 1 tablet by mouth Daily. As needed 90 tablet 0   • Multiple Vitamin (MULTIVITAMIN) tablet tablet Take 1 tablet by mouth Daily.       • omeprazole (PRILOSEC) 40 MG capsule Take 1 capsule by mouth Daily. 30 capsule 5   • saccharomyces boulardii (FLORASTOR) 250 MG capsule Take 1 capsule by mouth 2 (Two) Times a Day. 20 capsule 3   • vitamin D (ERGOCALCIFEROL) 1.25 MG (83462 UT) capsule capsule TAKE ONE CAPSULE BY MOUTH ONCE WEEKLY 12 capsule 0                Current Facility-Administered Medications   Medication Dose Route  "Frequency Provider Last Rate Last Dose   • cyanocobalamin injection 1,000 mcg  1,000 mcg Intramuscular Q28 Days Camila Mejia MD   1,000 mcg at 12/09/19 1246   • cyanocobalamin injection 1,000 mcg  1,000 mcg Intramuscular Q28 Days Camila Mejia MD   1,000 mcg at 01/20/20 1622            The following portions of the patient's history were reviewed and updated as appropriate: allergies, current medications, past family history, past medical history, past social history, past surgical history and problem list.     Review of Systems:   A 14 point review of systems was performed. All systems negative except pertinent positives/negative listed in HPI above     Physical Exam:   Vitals:     02/04/20 1555   Temp: 98.4 °F (36.9 °C)   Weight: 67.6 kg (149 lb)   Height: 170.2 cm (67\")         General: A and O x 3, ASA, NAD                          SCLERA:    Normal                          DENTITION:   Normal  She has mild pain with flexion internal rotation left hip there is mild tenderness about the trochanteric bursa.  The leg lengths are equal she is intact light touch with palpable distal pulses walks with a slight antalgic gait skin about the hip is normal there is a well-healed incision.                    Radiology:  Xrays 2views left hip (AP bilateral hips, and lateral of the hip) ordered and reviewed for evaluation of hip pain  demonstrating  a well positioned bipolar hemiarthroplasty with a press-fit [place] stem.  There is evidence of narrowing of the cartilage space in comparison with previous x-rays from a few years ago.  No other abnormalities noted.     Assessment/Plan: Left hip pain she has a bipolar hemiarthroplasty placed 11 years ago she is been very active over that 11-year period.  Pain is now progressed and seems to be localized into the hip region.  She also has some mild bursitis and has had bursitis in the past and responded to injection.    Most of her symptoms seem to be localized to the " groin.  She did have a bone scan that did not show a lot of uptake but she is having a catching sensation within the groin that happens on a regular basis and is very possible that she has considerable wear between the inner and outer had of her bipolar hemiarthroplasty.  Given her severe pain and her progressive symptoms the plan will be conversion to left total hip replacement.

## 2020-02-24 ENCOUNTER — TELEPHONE (OUTPATIENT)
Dept: ORTHOPEDIC SURGERY | Facility: CLINIC | Age: 70
End: 2020-02-24

## 2020-02-24 ENCOUNTER — LAB (OUTPATIENT)
Dept: INTERNAL MEDICINE | Facility: CLINIC | Age: 70
End: 2020-02-24

## 2020-02-24 DIAGNOSIS — M16.12 PRIMARY OSTEOARTHRITIS OF LEFT HIP: Primary | ICD-10-CM

## 2020-02-24 DIAGNOSIS — M25.552 LEFT HIP PAIN: ICD-10-CM

## 2020-02-24 PROCEDURE — 96372 THER/PROPH/DIAG INJ SC/IM: CPT | Performed by: INTERNAL MEDICINE

## 2020-02-24 RX ADMIN — CYANOCOBALAMIN 1000 MCG: 1000 INJECTION, SOLUTION INTRAMUSCULAR; SUBCUTANEOUS at 15:07

## 2020-03-02 ENCOUNTER — TELEPHONE (OUTPATIENT)
Dept: ORTHOPEDIC SURGERY | Facility: CLINIC | Age: 70
End: 2020-03-02

## 2020-03-03 ENCOUNTER — TELEPHONE (OUTPATIENT)
Dept: ORTHOPEDIC SURGERY | Facility: CLINIC | Age: 70
End: 2020-03-03

## 2020-03-03 DIAGNOSIS — M25.562 ACUTE PAIN OF LEFT KNEE: Primary | ICD-10-CM

## 2020-03-03 NOTE — TELEPHONE ENCOUNTER
PATIENT NEEDS TO CX HER SX ON HER LEFT HIP. WANTS TO KNOW IF [facility] CAN DO A L HIP INJECTION TO HELP HER UNTIL SHE CAN HAVE SX. HER  JUST FOUND OUT HE NEEDS SX RIGHT AWAY SO SHE IS WAITING UNTIL HIS IS DONE.

## 2020-03-04 DIAGNOSIS — M16.12 PRIMARY OSTEOARTHRITIS OF LEFT HIP: Primary | ICD-10-CM

## 2020-03-05 ENCOUNTER — TELEPHONE (OUTPATIENT)
Dept: ORTHOPEDIC SURGERY | Facility: CLINIC | Age: 70
End: 2020-03-05

## 2020-03-05 DIAGNOSIS — M16.12 PRIMARY OSTEOARTHRITIS OF LEFT HIP: Primary | ICD-10-CM

## 2020-03-11 ENCOUNTER — TREATMENT (OUTPATIENT)
Dept: PHYSICAL THERAPY | Facility: CLINIC | Age: 70
End: 2020-03-11

## 2020-03-11 DIAGNOSIS — G89.29 CHRONIC PAIN OF LEFT KNEE: ICD-10-CM

## 2020-03-11 DIAGNOSIS — R26.2 DIFFICULTY WALKING: ICD-10-CM

## 2020-03-11 DIAGNOSIS — M25.552 LEFT HIP PAIN: Primary | ICD-10-CM

## 2020-03-11 DIAGNOSIS — M25.562 CHRONIC PAIN OF LEFT KNEE: ICD-10-CM

## 2020-03-11 PROCEDURE — 97162 PT EVAL MOD COMPLEX 30 MIN: CPT | Performed by: PHYSICAL THERAPIST

## 2020-03-11 PROCEDURE — 97110 THERAPEUTIC EXERCISES: CPT | Performed by: PHYSICAL THERAPIST

## 2020-03-11 RX ORDER — ERGOCALCIFEROL 1.25 MG/1
CAPSULE ORAL
Qty: 12 CAPSULE | Refills: 0 | Status: SHIPPED | OUTPATIENT
Start: 2020-03-11 | End: 2020-04-29 | Stop reason: SDUPTHER

## 2020-03-11 NOTE — PROGRESS NOTES
Physical Therapy Initial Evaluation and Plan of Care    Patient: Iesha Roldan   : 1950  Diagnosis/ICD-10 Code:  Left hip pain [M25.552]  Referring practitioner: Giorgio Keenan MD  Past Medical History Reviewed: 3/11/2020    PLOF: Independent and likes to walk    Subjective Evaluation    History of Present Illness  Date of onset: 3/11/2019  Mechanism of injury: Nothing really happened, but the hip has gotten worse over the past year. I have put off the left hip revision. I would like to do injection and PT first. I had a partial hip replacement . Now I am having outside of the hip and outside of the knee pain. The knee pain has gotten worse with the hip. Sometimes it hurts for me to put my foot on the floor. It is tender to the touch on the outside of the hip. Sometimes the right hurts because I favor that side. I went to [facility] in December and I was fine until I sit down.   Sometimes at night my leg will tingle a bit.  I feel like I have lost some strength in the left side.   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             Objective       Palpation   Left   Tenderness of the iliopsoas, lumbar paraspinals, piriformis, quadratus lumborum and TFL.     Tenderness     Left Hip   Tenderness in the ASIS, PSIS, greater trochanter and sacroiliac joint.   Left Knee   Tenderness in the lateral joint line.     Additional Tenderness Details  (L) IT band  (L) greater trochanter    Active Range of Motion   Left Knee   Flexion: 145 degrees   Extension: 0 degrees     Right Knee   Flexion: 140 degrees   Extension: 0 degrees     Strength/Myotome Testing     Left Hip   Planes of Motion    Flexion: 4-  Abduction: 3+  External rotation: 4-  Internal rotation: 4+    Right Hip   Planes of Motion   Flexion: 5  Abduction: 4+  External rotation: 5  Internal rotation: 5    Left Knee   Flexion: 5  Extension: 5    Right Knee   Flexion: 5  Extension: 5    Left Ankle/Foot   Dorsiflexion: 5    Right Ankle/Foot   Dorsiflexion: 5    Tests     Lumbar     Left   Negative crossed SLR and passive SLR.     Left Pelvic Girdle/Sacrum   Negative: sacral spring.     Ambulation     Ambulation: Stairs   Pattern: non-reciprocal  Pattern: non-reciprocal    Functional Assessment     Single Leg Stance   Left: 6 seconds  Right: 10 seconds         Assessment & Plan     Assessment  Impairments: abnormal gait, abnormal or restricted ROM, activity intolerance, impaired balance, impaired physical strength, lacks appropriate home exercise program and pain with function  Assessment details: Pt presents to PT with symptoms consistent with (L) trochanteric bursitis, hip joint impingement and SI joint dysfunction secondary to (L) sided hip and LE weakness, and tightness in posterior hip musculature.  Pt would benefit from skilled PT intervention to address the deficits noted.   Prognosis: good  Functional Limitations: sleeping, walking, uncomfortable because of pain, sitting and standing  Goals  Plan Goals:  SHORT TERM GOALS: 4-5 visits  1.  Patient to be compliant with HEP and demo good efficiency with TE  2.  Report < 2 sleep disturbances 2° hip pain.     3.  Increased Lumbar and SIJ mobility to allow for improved pelvic alignment and gait mechanics (equal step length and level pelvis throughout gait).    4.  Increased hip ER/IR ROM to WFL (IR to 30°) degrees to allow for increased ease with bed mobility and squatting.  5. Pt will report minimal-no tenderness to palpation with firm pressure.   6. Pt. Able to ambulate up to 30 min with pain < 4/10 with acceptable pattern.      LONG TERM GOALS: 8-10 visits  1.  Pt. to score > 55/80  perceived ability on LEFS  2.  Pain level < 3/10 in hips with all activities including sitting > 1 hr continuously.   3.  Hip  AROM to WNL to allow for return to ADL's/IADLS and functional activities without increased symptoms  4. Hip strength to 4+/5  to allow for pushing, pulling and more strenuous activities to occur without pain.  Walk > 60 min.  no pain  5. No palpable tenderness to the hip.         Plan  Therapy options: will be seen for skilled physical therapy services  Planned modality interventions: cryotherapy, electrical stimulation/Russian stimulation, iontophoresis, TENS, thermotherapy (hydrocollator packs), traction and ultrasound  Other planned modality interventions: Dry Needling  Planned therapy interventions: abdominal trunk stabilization, ADL retraining, body mechanics training, balance/weight-bearing training, flexibility, functional ROM exercises, gait training, home exercise program, joint mobilization, manual therapy, neuromuscular re-education, postural training, soft tissue mobilization, spinal/joint mobilization, strengthening, stretching and therapeutic activities  Duration in visits: 14  Treatment plan discussed with: patient        Manual Therapy:    -     mins  15780;  Therapeutic Exercise:    10     mins  48998;     Neuromuscular Sara:    -    mins  82784;    Therapeutic Activity:     -     mins  51243;     Gait Training:      -     mins  37100;     Ultrasound:     -     mins  58115;    Electrical Stimulation:    -     mins  68240 ( );  Dry Needling     -     mins self-pay    Timed Treatment:   10   mins   Total Treatment:     50   mins      PT SIGNATURE: Sangeeta Lezama PT   DATE TREATMENT INITIATED: 3/11/2020    Medicare Initial Certification  Certification Period: 6/9/2020  I certify that the therapy services are furnished while this patient is under my care.  The services outlined above are required by this patient, and will be reviewed every 90 days.     PHYSICIAN: Shlomo  Giorgio MALHOTRA MD      DATE:     Please sign and return via fax to [phone].. Thank you, [facility].

## 2020-03-13 ENCOUNTER — LAB (OUTPATIENT)
Dept: INTERNAL MEDICINE | Facility: CLINIC | Age: 70
End: 2020-03-13

## 2020-03-13 PROCEDURE — 96372 THER/PROPH/DIAG INJ SC/IM: CPT | Performed by: INTERNAL MEDICINE

## 2020-03-13 RX ADMIN — CYANOCOBALAMIN 1000 MCG: 1000 INJECTION, SOLUTION INTRAMUSCULAR; SUBCUTANEOUS at 14:33

## 2020-03-17 ENCOUNTER — HOSPITAL ENCOUNTER (OUTPATIENT)
Dept: GENERAL RADIOLOGY | Facility: HOSPITAL | Age: 70
End: 2020-03-17

## 2020-04-01 ENCOUNTER — TELEMEDICINE (OUTPATIENT)
Dept: GASTROENTEROLOGY | Facility: CLINIC | Age: 70
End: 2020-04-01

## 2020-04-01 ENCOUNTER — TELEPHONE (OUTPATIENT)
Dept: GASTROENTEROLOGY | Facility: CLINIC | Age: 70
End: 2020-04-01

## 2020-04-01 DIAGNOSIS — K57.90 DIVERTICULOSIS: ICD-10-CM

## 2020-04-01 DIAGNOSIS — K59.1 FUNCTIONAL DIARRHEA: Primary | ICD-10-CM

## 2020-04-01 PROCEDURE — 99212 OFFICE O/P EST SF 10 MIN: CPT | Performed by: INTERNAL MEDICINE

## 2020-04-01 NOTE — PROGRESS NOTES
"Subjective   Chief Complaint   Patient presents with   • Diarrhea       Iesha Roldan is a  69 y.o. female with new complaint of diarrhea.    Encounter was achieved via telephone call.    Patient called with complaints of diarrhea that have been present for 5 to 6 weeks.  After about 3 weeks of symptoms she saw her primary care physician who advised her to keep a food journal and try to find any relationship between things that she was eating and her symptoms.  She reports that she can find no association.  It does not seem to matter what she eats.  She reports 1-2 loose bowel movements daily.  Occasionally she will have some consistency.  She will have bowel movements associated with urgency and sometimes fecal incontinence.  She describes this as a \"mud slide\".  She is had no recent fevers or chills.  She occasionally has an upset stomach.  She has had a loss of appetite but she does not feel like she is lost any weight.  She denies any recent antibiotics or new medications.    She had an EGD and colonoscopy in March 2019 that was notable for H pylori chronic active gastritis, a single tubular adenoma in the colon and diverticulosis.  HPI  Past Medical History:   Diagnosis Date   • Acute bronchitis    • Anemia    • Arthritis    • BOOP (bronchiolitis obliterans with organizing pneumonia) (CMS/HCC) 2000    Resolved   • Fracture, hip (CMS/HCC)    • Gastroenteritis    • H/O Lung nodule    • Herpes zoster 2015   • 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   • Neck pain    • Osteopenia    • Pharyngitis      Past Surgical History:   Procedure Laterality Date   • COLONOSCOPY  2017   • COLONOSCOPY  2019    Tubular adenoma, diverticulosis   • HIP SURGERY Left 2009    Joint replacement   • PAROTIDECTOMY Right 1/31/2017    Procedure: RIGHT SUPERFICIAL PAROTIDECTOMY EXCISION PAROTID MASS ;  Surgeon: Candelario Calderon MD;  [place] [facility] [place] [facility] [place]:    • TUBAL ABDOMINAL LIGATION  In 1989       Current " Outpatient Medications:   •  FLORASTOR 250 MG capsule, , Disp: , Rfl:   •  fluticasone (FLONASE) 50 MCG/ACT nasal spray, 2 sprays into the nostril(s) as directed by provider Daily. (Patient taking differently: 2 sprays into the nostril(s) as directed by provider Daily. seasonal), Disp: 1 bottle, Rfl: 5  •  ibuprofen (ADVIL,MOTRIN) 400 MG tablet, Take 400 mg by mouth Every 6 (Six) Hours As Needed for Mild Pain ., Disp: , Rfl:   •  loratadine (CLARITIN) 10 MG tablet, Take 1 tablet by mouth Daily., Disp: 90 tablet, Rfl: 1  •  meloxicam (MOBIC) 15 MG tablet, Take 1 tablet by mouth Daily. As needed, Disp: 90 tablet, Rfl: 0  •  Multiple Vitamin (MULTIVITAMIN) tablet tablet, Take 1 tablet by mouth Daily., Disp: , Rfl:   •  omeprazole (PRILOSEC) 40 MG capsule, Take 1 capsule by mouth Daily., Disp: 30 capsule, Rfl: 5  •  vitamin D (ERGOCALCIFEROL) 1.25 MG (92392 UT) capsule capsule, TAKE 1 CAPSULE BY MOUTH ONCE WEEKLY, Disp: 12 capsule, Rfl: 0    Current Facility-Administered Medications:   •  cyanocobalamin injection 1,000 mcg, 1,000 mcg, Intramuscular, Q28 Days, Camila Mejia MD, 1,000 mcg at 03/13/20 1433  PRN Meds:.  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   Tobacco Use   • Smoking status: Never Smoker   • Smokeless tobacco: Never Used   Substance and Sexual Activity   • Alcohol use: Yes     Comment: SOCIALLY   • Drug use: No   • Sexual activity: Yes     Partners: Male     Birth control/protection: Post-menopausal, Tubal ligation     Comment: spouse = FRANC     Family History   Problem Relation Age of Onset   • Peripheral vascular disease Mother    • Pancreatic cancer Father 91   • Heart disease Father    • Hypertension  Father    • Rectal cancer Sister 36   • Diabetes Sister    • No Known Problems Brother    • No Known Problems Daughter    • No Known Problems Son    • Colon cancer Maternal Grandmother    • No Known Problems Paternal Grandmother    • No Known Problems Maternal Aunt    • No Known Problems Paternal Aunt    • Cancer Paternal Uncle 38        Mucoepidermoid CA of the parotid gland   • BRCA 1/2 Neg Hx    • Breast cancer Neg Hx    • Endometrial cancer Neg Hx    • Ovarian cancer Neg Hx      Review of Systems   Constitutional: Positive for appetite change. Negative for fatigue, fever and unexpected weight change.   Gastrointestinal: Positive for diarrhea. Negative for abdominal distention and blood in stool.   All other systems reviewed and are negative.    There were no vitals filed for this visit.  There were no vitals filed for this visit.    Objective   No radiology results for the last 7 days    Assessment/Plan   Diagnoses and all orders for this visit:    Functional diarrhea    Diverticulosis      Plan:  · No obvious precipitant for her recent issues.  · Given her known diverticulosis, recommend trial of fiber as a bulking agent.  She is not having a large volume of stools but the consistency seems to be an issue in terms of causing urgency and incontinence.  Hopefully a bulking agent will help with these issues.  · Asked patient to start this this week and give me a call or send me a message next week with a progress report.  We will make modifications based on her clinical course    Time of call was 11 minutes.

## 2020-04-01 NOTE — TELEPHONE ENCOUNTER
----- Message from Prasad Pena Rep sent at 4/1/2020  8:43 AM EDT -----  Regarding: Question  Contact: [phone]  Pt stated she went 3 weeks ago to see her PCP, Camila Mejia with consistent diarrhea. She told her to log her food to see if anything makes it worse than others. She states it's the same no matter what she eats. States she goes about twice a day and it's been happening for about 5 weeks total. Her PCP recommended she call her Gastro to see what she can possibly do.

## 2020-04-01 NOTE — TELEPHONE ENCOUNTER
"Call to pt.  Reiterates h/o as per intake call.     States diarrhea 2x/day - sometimes no prior sensation causing incontinence.  Describes stool as either burst of diarrhea;  sometimes has consistency; sometimes \"like a mudslide\".      Denies blood, fever, or recent abx.  States doesn't feel bad - occasional slight cramping prior to BM, slight bloating.     Asking how to manage ongoing symptoms.     Message to DR Conteh   "

## 2020-04-14 ENCOUNTER — TELEPHONE (OUTPATIENT)
Dept: INTERNAL MEDICINE | Facility: CLINIC | Age: 70
End: 2020-04-14

## 2020-04-17 ENCOUNTER — LAB (OUTPATIENT)
Dept: INTERNAL MEDICINE | Facility: CLINIC | Age: 70
End: 2020-04-17

## 2020-04-17 DIAGNOSIS — E53.8 B12 DEFICIENCY: ICD-10-CM

## 2020-04-17 PROCEDURE — 96372 THER/PROPH/DIAG INJ SC/IM: CPT | Performed by: INTERNAL MEDICINE

## 2020-04-17 RX ORDER — CYANOCOBALAMIN 1000 UG/ML
1000 INJECTION, SOLUTION INTRAMUSCULAR; SUBCUTANEOUS
Status: DISCONTINUED | OUTPATIENT
Start: 2020-04-17 | End: 2021-06-08

## 2020-04-17 RX ORDER — MELOXICAM 15 MG/1
15 TABLET ORAL DAILY
Qty: 90 TABLET | Refills: 0 | Status: SHIPPED | OUTPATIENT
Start: 2020-04-17 | End: 2020-10-14

## 2020-04-17 RX ADMIN — CYANOCOBALAMIN 1000 MCG: 1000 INJECTION, SOLUTION INTRAMUSCULAR; SUBCUTANEOUS at 15:57

## 2020-04-29 RX ORDER — ERGOCALCIFEROL 1.25 MG/1
50000 CAPSULE ORAL WEEKLY
Qty: 12 CAPSULE | Refills: 0 | Status: SHIPPED | OUTPATIENT
Start: 2020-04-29 | End: 2020-07-14

## 2020-05-05 ENCOUNTER — LAB (OUTPATIENT)
Dept: INTERNAL MEDICINE | Facility: CLINIC | Age: 70
End: 2020-05-05

## 2020-05-05 PROCEDURE — 96372 THER/PROPH/DIAG INJ SC/IM: CPT | Performed by: INTERNAL MEDICINE

## 2020-05-05 RX ADMIN — CYANOCOBALAMIN 1000 MCG: 1000 INJECTION, SOLUTION INTRAMUSCULAR; SUBCUTANEOUS at 14:29

## 2020-05-19 ENCOUNTER — OFFICE VISIT (OUTPATIENT)
Dept: INTERNAL MEDICINE | Facility: CLINIC | Age: 70
End: 2020-05-19

## 2020-05-19 VITALS
BODY MASS INDEX: 24.11 KG/M2 | WEIGHT: 150 LBS | RESPIRATION RATE: 16 BRPM | HEIGHT: 66 IN | SYSTOLIC BLOOD PRESSURE: 122 MMHG | OXYGEN SATURATION: 97 % | HEART RATE: 64 BPM | DIASTOLIC BLOOD PRESSURE: 75 MMHG | TEMPERATURE: 96.4 F
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
## 2020-05-19 DIAGNOSIS — E53.8 B12 DEFICIENCY: ICD-10-CM

## 2020-05-19 DIAGNOSIS — R06.02 SHORTNESS OF BREATH: Primary | ICD-10-CM

## 2020-05-19 DIAGNOSIS — D50.9 IRON DEFICIENCY ANEMIA, UNSPECIFIED IRON DEFICIENCY ANEMIA TYPE: ICD-10-CM

## 2020-05-19 DIAGNOSIS — S00.06XA INSECT BITE OF SCALP, INITIAL ENCOUNTER: ICD-10-CM

## 2020-05-19 DIAGNOSIS — R53.83 FATIGUE, UNSPECIFIED TYPE: ICD-10-CM

## 2020-05-19 LAB
ALBUMIN SERPL-MCNC: 4.5 G/DL (ref 3.5–5.2)
ALBUMIN/GLOB SERPL: 2 G/DL
ALP SERPL-CCNC: 81 U/L (ref 39–117)
ALT SERPL W P-5'-P-CCNC: 12 U/L (ref 1–33)
ANION GAP SERPL CALCULATED.3IONS-SCNC: 11.3 MMOL/L (ref 5–15)
AST SERPL-CCNC: 23 U/L (ref 1–32)
BASOPHILS # BLD AUTO: 0.01 10*3/MM3 (ref 0–0.2)
BASOPHILS NFR BLD AUTO: 0.4 % (ref 0–1.5)
BILIRUB SERPL-MCNC: 0.5 MG/DL (ref 0.2–1.2)
BUN BLD-MCNC: 17 MG/DL (ref 8–23)
BUN/CREAT SERPL: 21.8 (ref 7–25)
CALCIUM SPEC-SCNC: 9.3 MG/DL (ref 8.6–10.5)
CHLORIDE SERPL-SCNC: 101 MMOL/L (ref 98–107)
CO2 SERPL-SCNC: 29.7 MMOL/L (ref 22–29)
CREAT BLD-MCNC: 0.78 MG/DL (ref 0.57–1)
DEPRECATED RDW RBC AUTO: 38.1 FL (ref 37–54)
EOSINOPHIL # BLD AUTO: 0.06 10*3/MM3 (ref 0–0.4)
EOSINOPHIL NFR BLD AUTO: 2.2 % (ref 0.3–6.2)
ERYTHROCYTE [DISTWIDTH] IN BLOOD BY AUTOMATED COUNT: 11.8 % (ref 12.3–15.4)
GFR SERPL CREATININE-BSD FRML MDRD: 73 ML/MIN/1.73
GLOBULIN UR ELPH-MCNC: 2.3 GM/DL
GLUCOSE BLD-MCNC: 77 MG/DL (ref 65–99)
HCT VFR BLD AUTO: 39.4 % (ref 34–46.6)
HGB BLD-MCNC: 13.1 G/DL (ref 12–15.9)
IMM GRANULOCYTES # BLD AUTO: 0 10*3/MM3 (ref 0–0.05)
IMM GRANULOCYTES NFR BLD AUTO: 0 % (ref 0–0.5)
IRON 24H UR-MRATE: 85 MCG/DL (ref 37–145)
IRON SATN MFR SERPL: 22 % (ref 20–50)
LYMPHOCYTES # BLD AUTO: 0.94 10*3/MM3 (ref 0.7–3.1)
LYMPHOCYTES NFR BLD AUTO: 34.2 % (ref 19.6–45.3)
MCH RBC QN AUTO: 29.6 PG (ref 26.6–33)
MCHC RBC AUTO-ENTMCNC: 33.2 G/DL (ref 31.5–35.7)
MCV RBC AUTO: 88.9 FL (ref 79–97)
MONOCYTES # BLD AUTO: 0.28 10*3/MM3 (ref 0.1–0.9)
MONOCYTES NFR BLD AUTO: 10.2 % (ref 5–12)
NEUTROPHILS # BLD AUTO: 1.46 10*3/MM3 (ref 1.7–7)
NEUTROPHILS NFR BLD AUTO: 53 % (ref 42.7–76)
NRBC BLD AUTO-RTO: 0 /100 WBC (ref 0–0.2)
PLATELET # BLD AUTO: 195 10*3/MM3 (ref 140–450)
PMV BLD AUTO: 11.3 FL (ref 6–12)
POTASSIUM BLD-SCNC: 4.6 MMOL/L (ref 3.5–5.2)
PROT SERPL-MCNC: 6.8 G/DL (ref 6–8.5)
RBC # BLD AUTO: 4.43 10*6/MM3 (ref 3.77–5.28)
SODIUM BLD-SCNC: 142 MMOL/L (ref 136–145)
TIBC SERPL-MCNC: 393 MCG/DL (ref 298–536)
TRANSFERRIN SERPL-MCNC: 264 MG/DL (ref 200–360)
TSH SERPL DL<=0.05 MIU/L-ACNC: 2.3 UIU/ML (ref 0.27–4.2)
VIT B12 BLD-MCNC: 834 PG/ML (ref 211–946)
WBC NRBC COR # BLD: 2.75 10*3/MM3 (ref 3.4–10.8)

## 2020-05-19 PROCEDURE — 84443 ASSAY THYROID STIM HORMONE: CPT | Performed by: INTERNAL MEDICINE

## 2020-05-19 PROCEDURE — 83540 ASSAY OF IRON: CPT | Performed by: INTERNAL MEDICINE

## 2020-05-19 PROCEDURE — 85025 COMPLETE CBC W/AUTO DIFF WBC: CPT | Performed by: INTERNAL MEDICINE

## 2020-05-19 PROCEDURE — 80053 COMPREHEN METABOLIC PANEL: CPT | Performed by: INTERNAL MEDICINE

## 2020-05-19 PROCEDURE — 84466 ASSAY OF TRANSFERRIN: CPT | Performed by: INTERNAL MEDICINE

## 2020-05-19 PROCEDURE — 99214 OFFICE O/P EST MOD 30 MIN: CPT | Performed by: INTERNAL MEDICINE

## 2020-05-19 PROCEDURE — 36415 COLL VENOUS BLD VENIPUNCTURE: CPT | Performed by: INTERNAL MEDICINE

## 2020-05-19 PROCEDURE — 96372 THER/PROPH/DIAG INJ SC/IM: CPT | Performed by: INTERNAL MEDICINE

## 2020-05-19 PROCEDURE — 82607 VITAMIN B-12: CPT | Performed by: INTERNAL MEDICINE

## 2020-05-19 RX ADMIN — CYANOCOBALAMIN 1000 MCG: 1000 INJECTION, SOLUTION INTRAMUSCULAR; SUBCUTANEOUS at 12:04

## 2020-05-19 NOTE — PROGRESS NOTES
Chief Complaint   Patient presents with   • Shortness of Breath     on and off x 3 days    • nodule       History of Present Illness   Iesha Roldan is a 69 y.o. female presents for acute needs. She has noticed a small nodule at the base of her skull.   Patient has noticed some dyspnea. She was talking on the phone and became winded. She has a history of anemia and is concerned this could be occurring again. However, she walked 3-4 miles two days ago w/ no dyspnea. She has sinus allergies and has been gardening.     The following portions of the patient's history were reviewed and updated as appropriate: allergies, current medications, past family history, past medical history, past social history, past surgical history and problem list.  Current Outpatient Medications on File Prior to Visit   Medication Sig Dispense Refill   • FLORASTOR 250 MG capsule      • fluticasone (FLONASE) 50 MCG/ACT nasal spray 2 sprays into the nostril(s) as directed by provider Daily. (Patient taking differently: 2 sprays into the nostril(s) as directed by provider Daily. seasonal) 1 bottle 5   • ibuprofen (ADVIL,MOTRIN) 400 MG tablet Take 400 mg by mouth Every 6 (Six) Hours As Needed for Mild Pain .     • loratadine (CLARITIN) 10 MG tablet Take 1 tablet by mouth Daily. 90 tablet 1   • meloxicam (MOBIC) 15 MG tablet Take 1 tablet by mouth Daily. As needed 90 tablet 0   • Multiple Vitamin (MULTIVITAMIN) tablet tablet Take 1 tablet by mouth Daily.     • omeprazole (PRILOSEC) 40 MG capsule Take 1 capsule by mouth Daily. 30 capsule 5   • vitamin D (ERGOCALCIFEROL) 1.25 MG (75974 UT) capsule capsule Take 1 capsule by mouth 1 (One) Time Per Week. 12 capsule 0     Current Facility-Administered Medications on File Prior to Visit   Medication Dose Route Frequency Provider Last Rate Last Dose   • cyanocobalamin injection 1,000 mcg  1,000 mcg Intramuscular Q28 Days Camila Mejia MD   1,000 mcg at 05/19/20 1204     Review of Systems  "  Constitutional: Positive for fatigue.   HENT: Positive for postnasal drip.    Eyes: Negative.    Respiratory: Positive for shortness of breath.    Cardiovascular: Negative for chest pain and palpitations.   Gastrointestinal: Negative.    Endocrine: Negative.    Genitourinary: Negative.    Musculoskeletal: Negative.    Skin: Negative.    Allergic/Immunologic: Negative.    Neurological: Negative.    Hematological: Negative.    Psychiatric/Behavioral: Negative.        Objective   Physical Exam   Constitutional: She is oriented to person, place, and time. She appears well-developed and well-nourished.   HENT:   Head: Normocephalic and atraumatic.   Right Ear: External ear normal.   Left Ear: External ear normal.   Mouth/Throat: Oropharynx is clear and moist.   Eyes: Pupils are equal, round, and reactive to light. EOM are normal.   Neck: Normal range of motion. Neck supple.   Cardiovascular: Normal rate and regular rhythm.   Pulmonary/Chest: Effort normal and breath sounds normal.   Musculoskeletal: Normal range of motion.   Neurological: She is alert and oriented to person, place, and time.   Skin: Skin is warm and dry.   Inflamed Erythematous papule at site of concern base of skull under hair.    Psychiatric: She has a normal mood and affect. Her behavior is normal. Judgment and thought content normal.   Nursing note and vitals reviewed.   for dyspnea    CXR nad. Unchanged from prior.   EKG sinus 56 bpm. No st changes. Unchanged from prior.     /75   Pulse 64   Temp 96.4 °F (35.8 °C) (Temporal)   Resp 16   Ht 167.6 cm (66\")   Wt 68 kg (150 lb)   SpO2 97%   BMI 24.21 kg/m²     Assessment/Plan   Diagnoses and all orders for this visit:    Shortness of breath  -     Cancel: POC CBC  -     Comprehensive Metabolic Panel  -     Iron Profile  -     ECG 12 Lead  -     XR Chest PA & Lateral (In Office)    Iron deficiency anemia, unspecified iron deficiency anemia type  -     Cancel: POC CBC  -     Iron Profile  - "     CBC & Differential    Fatigue, unspecified type  -     Comprehensive Metabolic Panel  -     TSH  -     ECG 12 Lead    B12 deficiency  -     Cancel: POC CBC  -     Vitamin B12    Insect bite of scalp, initial encounter    Other orders  -     mupirocin (Bactroban) 2 % ointment; Apply  topically to the appropriate area as directed 3 (Three) Times a Day.    patient w/ new onset dyspnea. Encouraged that she can walk for miles. ekg is wnl. Will test cbc now as well as b12 and iron levels. She will receive a b12 injection as well. She has an erythematous raised bite appearance w/ some soft tissue swelling beneath. Will apply mupirocin ointment and take an oral antihistamine to help w/ sinus congestion and itch. She will also start a nasal steroid. Dyspnea may be from sinus allergies and she is to be proactive in treating these. If persists, consider singulair v pft. To f/u on soft tissue swelling if persists.

## 2020-05-29 ENCOUNTER — DOCUMENTATION (OUTPATIENT)
Dept: INTERNAL MEDICINE | Facility: CLINIC | Age: 70
End: 2020-05-29

## 2020-05-29 DIAGNOSIS — D72.819 LEUKOPENIA, UNSPECIFIED TYPE: Primary | ICD-10-CM

## 2020-05-29 NOTE — PROGRESS NOTES
Patient with recent shortness of breath. Her hgb is normal. However, her wbc count is suppressed. Will repeat this next week. She will f/u if remains suppressed.   SONIDO

## 2020-06-01 ENCOUNTER — LAB (OUTPATIENT)
Dept: INTERNAL MEDICINE | Facility: CLINIC | Age: 70
End: 2020-06-01

## 2020-06-01 PROCEDURE — 96372 THER/PROPH/DIAG INJ SC/IM: CPT | Performed by: INTERNAL MEDICINE

## 2020-06-01 RX ADMIN — CYANOCOBALAMIN 1000 MCG: 1000 INJECTION, SOLUTION INTRAMUSCULAR; SUBCUTANEOUS at 14:01

## 2020-06-02 LAB
BASOPHILS # BLD AUTO: 0.03 10*3/MM3 (ref 0–0.2)
BASOPHILS NFR BLD AUTO: 0.6 % (ref 0–1.5)
EOSINOPHIL # BLD AUTO: 0.09 10*3/MM3 (ref 0–0.4)
EOSINOPHIL NFR BLD AUTO: 1.8 % (ref 0.3–6.2)
ERYTHROCYTE [DISTWIDTH] IN BLOOD BY AUTOMATED COUNT: 11.9 % (ref 12.3–15.4)
HCT VFR BLD AUTO: 38.7 % (ref 34–46.6)
HGB BLD-MCNC: 12.9 G/DL (ref 12–15.9)
IMM GRANULOCYTES # BLD AUTO: 0.01 10*3/MM3 (ref 0–0.05)
IMM GRANULOCYTES NFR BLD AUTO: 0.2 % (ref 0–0.5)
LYMPHOCYTES # BLD AUTO: 1.49 10*3/MM3 (ref 0.7–3.1)
LYMPHOCYTES NFR BLD AUTO: 30.5 % (ref 19.6–45.3)
MCH RBC QN AUTO: 29.5 PG (ref 26.6–33)
MCHC RBC AUTO-ENTMCNC: 33.3 G/DL (ref 31.5–35.7)
MCV RBC AUTO: 88.4 FL (ref 79–97)
MONOCYTES # BLD AUTO: 0.44 10*3/MM3 (ref 0.1–0.9)
MONOCYTES NFR BLD AUTO: 9 % (ref 5–12)
NEUTROPHILS # BLD AUTO: 2.82 10*3/MM3 (ref 1.7–7)
NEUTROPHILS NFR BLD AUTO: 57.9 % (ref 42.7–76)
NRBC BLD AUTO-RTO: 0 /100 WBC (ref 0–0.2)
PLATELET # BLD AUTO: 216 10*3/MM3 (ref 140–450)
RBC # BLD AUTO: 4.38 10*6/MM3 (ref 3.77–5.28)
WBC # BLD AUTO: 4.88 10*3/MM3 (ref 3.4–10.8)

## 2020-06-15 ENCOUNTER — LAB (OUTPATIENT)
Dept: INTERNAL MEDICINE | Facility: CLINIC | Age: 70
End: 2020-06-15

## 2020-06-15 RX ADMIN — CYANOCOBALAMIN 1000 MCG: 1000 INJECTION, SOLUTION INTRAMUSCULAR; SUBCUTANEOUS at 14:35

## 2020-07-02 ENCOUNTER — LAB (OUTPATIENT)
Dept: INTERNAL MEDICINE | Facility: CLINIC | Age: 70
End: 2020-07-02

## 2020-07-02 PROCEDURE — 96372 THER/PROPH/DIAG INJ SC/IM: CPT | Performed by: INTERNAL MEDICINE

## 2020-07-02 RX ADMIN — CYANOCOBALAMIN 1000 MCG: 1000 INJECTION, SOLUTION INTRAMUSCULAR; SUBCUTANEOUS at 13:13

## 2020-07-14 RX ORDER — ERGOCALCIFEROL 1.25 MG/1
CAPSULE ORAL
Qty: 12 CAPSULE | Refills: 0 | Status: SHIPPED | OUTPATIENT
Start: 2020-07-14 | End: 2021-05-27 | Stop reason: SDUPTHER

## 2020-07-16 ENCOUNTER — LAB (OUTPATIENT)
Dept: INTERNAL MEDICINE | Facility: CLINIC | Age: 70
End: 2020-07-16

## 2020-07-16 PROCEDURE — 96372 THER/PROPH/DIAG INJ SC/IM: CPT | Performed by: INTERNAL MEDICINE

## 2020-07-16 RX ADMIN — CYANOCOBALAMIN 1000 MCG: 1000 INJECTION, SOLUTION INTRAMUSCULAR; SUBCUTANEOUS at 15:47

## 2020-07-31 ENCOUNTER — LAB (OUTPATIENT)
Dept: INTERNAL MEDICINE | Facility: CLINIC | Age: 70
End: 2020-07-31

## 2020-08-18 ENCOUNTER — LAB (OUTPATIENT)
Dept: INTERNAL MEDICINE | Facility: CLINIC | Age: 70
End: 2020-08-18

## 2020-08-18 PROCEDURE — 96372 THER/PROPH/DIAG INJ SC/IM: CPT | Performed by: INTERNAL MEDICINE

## 2020-08-18 RX ADMIN — CYANOCOBALAMIN 1000 MCG: 1000 INJECTION, SOLUTION INTRAMUSCULAR; SUBCUTANEOUS at 14:02

## 2020-09-04 ENCOUNTER — LAB (OUTPATIENT)
Dept: INTERNAL MEDICINE | Facility: CLINIC | Age: 70
End: 2020-09-04

## 2020-09-04 PROCEDURE — 96372 THER/PROPH/DIAG INJ SC/IM: CPT | Performed by: INTERNAL MEDICINE

## 2020-09-04 RX ADMIN — CYANOCOBALAMIN 1000 MCG: 1000 INJECTION, SOLUTION INTRAMUSCULAR; SUBCUTANEOUS at 14:37

## 2020-09-10 ENCOUNTER — APPOINTMENT (OUTPATIENT)
Dept: LAB | Facility: HOSPITAL | Age: 70
End: 2020-09-10

## 2020-10-09 ENCOUNTER — LAB (OUTPATIENT)
Dept: INTERNAL MEDICINE | Facility: CLINIC | Age: 70
End: 2020-10-09

## 2020-10-09 PROCEDURE — 96372 THER/PROPH/DIAG INJ SC/IM: CPT | Performed by: INTERNAL MEDICINE

## 2020-10-09 RX ADMIN — CYANOCOBALAMIN 1000 MCG: 1000 INJECTION, SOLUTION INTRAMUSCULAR; SUBCUTANEOUS at 14:29

## 2020-10-13 NOTE — PROGRESS NOTES
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  Anemia due to B12 and iron deficiency                             REQUESTING PHYSICIAN:  Camila Mejia M.D.    History of Present Illness patient is 68-year-old lady with a parotid carcinoma resected 3 years ago and anemia here for review    CAT scans done in January of this year showed no evidence of recurrence of her parotid mucoepidermoid carcinoma and nodes no adenopathy in the neck and no lung nodules we will continue annual visits to evaluate this    Her hemoglobin is improve and she is been off iron    She continues with monthly B12 injections in Dr. Mejia's office    She did not make her appointment to the genetic clinic and I recommended she go see the genetic counselor because of her family history of cancer      Past Medical History:   Diagnosis Date   • Acute bronchitis    • Anemia    • Arthritis    • BOOP (bronchiolitis obliterans with organizing pneumonia) (CMS/HCC) 2000    Resolved   • Fracture, hip (CMS/HCC)    • Gastroenteritis    • H/O Lung nodule    • Herpes zoster 2015   • 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   • Neck pain    • Osteopenia    • Pharyngitis         Past Surgical History:   Procedure Laterality Date   • COLONOSCOPY  2017   • COLONOSCOPY  2019    Tubular adenoma, diverticulosis   • HIP SURGERY Left 2009    Joint replacement   • PAROTIDECTOMY Right 1/31/2017    Procedure: RIGHT SUPERFICIAL PAROTIDECTOMY EXCISION PAROTID MASS ;  Surgeon: Candelario Calderon MD;  Location: [facility] [place] [facility] [place]  Service:    • TUBAL ABDOMINAL LIGATION  In 1989      HEME ONC HISTORY:   patient is a 68-year-old lady seen here for a brief follow-up visit for her UR carcinoma the right parotid which was resected 2 years ago.  She was treated with postoperative radiation had some residual findings on CAT scan and PET scan that were felt to be  benign.  She went to [facility] who followed up and told her this was indeed scar tissue in the parotid and did not need any further imaging.  She returned to [place] and has no follow-up here and came to my office to follow up on her parotid cancer.  She is doing well except for fatigue which is fairly new onset.  No weight loss change in bowel habits hematemesis melena hematochezia.  She does report reflux symptoms and wonders if she has gastritis    I had plan to see her back for complete follow-up in a month because I had a previous engagement but noted on her blood work that she is anemic with a hemoglobin of 8.8 and borderline macrocytosis and we have ordered blood work to assess her anemia with plans to see her back in 2-3 weeks for complete exam in follow-up of her parotid cancer in evaluation of anemia  12/18    CAT scans were done of the neck and chest and thankfully this shows no evidence of recurrent cancer  Her anemia workup shows B12 and iron deficiency although her methylmalonic acid levels were not elevated her anti-intrinsic factor antibodies were elevated and this may very well be a pernicious anemia    She has had an improvement in her hemoglobin since starting the B12 injections and she is taking over-the-counter iron but I prescribed ferrous sulfate for is I think this will work with car    She is up-to-date with colonoscopies but is scheduled to see a gastroenterologist after upper endoscopy done and we will wait to see the results of this  3/19  She is here after having upper endoscopy because of persistent iron deficiency.  Biopsy showed H. pylori infection and she is going back to see her gastroenterologist to discuss treatment  Colonoscopy showed one adenomatous polyp  and I reviewed her path reports with her    At this point iron deficiency appears to be improved and her ferritin is up to 58  And she will stop the iron pills in 6 months  To continue B12 monthly in our office    He  tells me 1 of her sisters had rectal cancer at age 38 she never had any genetic testing and I recommended strongly that her sister get genetic testing because her family history is fairly significant-obviously if a mutation is found she will also qualify for genetic testing      Current Outpatient Medications on File Prior to Visit   Medication Sig Dispense Refill   • fluticasone (FLONASE) 50 MCG/ACT nasal spray 2 sprays into the nostril(s) as directed by provider Daily. (Patient taking differently: 2 sprays into the nostril(s) as directed by provider Daily. seasonal) 1 bottle 5   • Multiple Vitamin (MULTIVITAMIN) tablet tablet Take 1 tablet by mouth Daily.     • mupirocin (Bactroban) 2 % ointment Apply  topically to the appropriate area as directed 3 (Three) Times a Day. 15 g 0   • omeprazole (PRILOSEC) 40 MG capsule Take 1 capsule by mouth Daily. 30 capsule 5   • vitamin D (ERGOCALCIFEROL) 1.25 MG (68094 UT) capsule capsule TAKE ONE CAPSULE BY MOUTH ONCE WEEKLY 12 capsule 0   • FLORASTOR 250 MG capsule      • ibuprofen (ADVIL,MOTRIN) 400 MG tablet Take 400 mg by mouth Every 6 (Six) Hours As Needed for Mild Pain .     • loratadine (CLARITIN) 10 MG tablet Take 1 tablet by mouth Daily. 90 tablet 1   • meloxicam (MOBIC) 15 MG tablet Take 1 tablet by mouth Daily. 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   Tobacco Use   •  "Smoking status: Never Smoker   • Smokeless tobacco: Never Used   Substance and Sexual Activity   • Alcohol use: Yes     Comment: SOCIALLY   • Drug use: No   • Sexual activity: Yes     Partners: Male     Birth control/protection: Post-menopausal, Tubal ligation     Comment: spouse = FRANC        Family History   Problem Relation Age of Onset   • Peripheral vascular disease Mother    • Pancreatic cancer Father 91   • Heart disease Father    • Hypertension Father    • Rectal cancer Sister 36   • Diabetes Sister    • No Known Problems Brother    • No Known Problems Daughter    • No Known Problems Son    • Colon cancer Maternal Grandmother    • No Known Problems Paternal Grandmother    • No Known Problems Maternal Aunt    • No Known Problems Paternal Aunt    • Cancer Paternal Uncle 38        Mucoepidermoid CA of the parotid gland   • BRCA 1/2 Neg Hx    • Breast cancer Neg Hx    • Endometrial cancer Neg Hx    • Ovarian cancer Neg Hx         Review of Systems   Constitutional: Negative for activity change, appetite change, fever and unexpected weight change.   Respiratory: Negative for chest tightness.    Cardiovascular: Negative for chest pain.   Gastrointestinal: Positive for diarrhea (occasionally). Negative for constipation, nausea and vomiting.   Genitourinary: Negative for difficulty urinating.   Musculoskeletal: Negative for arthralgias, back pain, gait problem and joint swelling.   Neurological: Negative for light-headedness, numbness and headaches.   Psychiatric/Behavioral: The patient is not nervous/anxious.    All other systems reviewed and are negative.   I have reviewed and confirmed the accuracy of the ROS as documented by the MA/LPN/RN Sreedhar Tran MD        Objective     Vitals:    10/14/20 0908   BP: 121/77   Pulse: 60   Resp: 17   Temp: 97.1 °F (36.2 °C)   TempSrc: Skin   SpO2: 98%   Weight: 67.5 kg (148 lb 12.8 oz)   Height: 167.6 cm (65.98\")  Comment: New Ht No Shoes On   PainSc: 0-No pain "     Current Status 10/14/2020   ECOG score 0       Physical Exam    GENERAL:  Well-developed, well-nourished in no acute distress.   SKIN:  Warm, dry without rashes, purpura or petechiae.  EYES:  Pupils equal, round and reactive to light.  EOMs intact.  Conjunctivae normal.  EARS:  Hearing intact.  NOSE:  Septum midline.  No excoriations or nasal discharge.  MOUTH:  Tongue is well-papillated; no stomatitis or ulcers.  Lips normal.  THROAT:  Oropharynx without lesions or exudates.  NECK:  Supple with good range of motion; no thyromegaly or masses, no JVD.  No masses appreciated in the right parotid  LYMPHATICS:  No cervical, supraclavicular, axillary or inguinal adenopathy.  CHEST:  Lungs clear to auscultation. Good airflow.  CARDIAC:  Regular rate and rhythm without murmurs, rubs or gallops. Normal S1,S2.  ABDOMEN:  Soft, nontender with no hepatosplenomegaly or masses.  EXTREMITIES:  No clubbing, cyanosis or edema.  NEUROLOGICAL:  Cranial Nerves II-XII grossly intact.  No focal neurological deficits.  PSYCHIATRIC:  Normal affect and mood.  I have reexamined the patient and the results are consistent with the previously documented exam. Sreedhar Tran MD     RECENT LABS:  Hematology WBC   Date Value Ref Range Status   10/14/2020 3.71 3.40 - 10.80 10*3/mm3 Final   06/01/2020 4.88 3.40 - 10.80 10*3/mm3 Final     RBC   Date Value Ref Range Status   10/14/2020 4.35 3.77 - 5.28 10*6/mm3 Final   06/01/2020 4.38 3.77 - 5.28 10*6/mm3 Final     Hemoglobin   Date Value Ref Range Status   10/14/2020 12.8 12.0 - 15.9 g/dL Final     Hematocrit   Date Value Ref Range Status   10/14/2020 40.3 34.0 - 46.6 % Final     Platelets   Date Value Ref Range Status   10/14/2020 194 140 - 450 10*3/mm3 Final   11/26/2019 190 10*3/mm3 Final          CT CHEST WITH CONTRAST-, CT SOFT TISSUE NECK WITH CONTRAST-     IMPRESSION:  Status post right parotidectomy. There is no evidence of recurrent parotid tumor or metastatic disease within the  neck. A couple tiny noncalcified right lower lobe pulmonary nodules remain stable.     This report was finalized on 12/26/2018 02/12/2020  WHOLE BODY BONE SCAN  IMPRESSION:  Focal uptake along the lateral side of the left knee most  likely represents degenerative change. No abnormal activity is observed  around the left hip hemiarthroplasty. There is no scintigraphic evidence  of osseous metastasis.    01//24/2020  CT OF THE NECK WITH CONTRAST AND CT OF THE CHEST WITH CONTRAST  01/24/2020  IMPRESSION:  1. Status post right parotidectomy.  2. No evidence of recurrent or residual parotid region mass or evidence  of metastatic disease in the neck.    01/24/2020   CT OF THE NECK WITH CONTRAST AND CT OF THE CHEST WITH CONTRAST  01/24/2020  IMPRESSION:  1. Status post right parotidectomy.  2. No evidence of recurrent or residual parotid region mass or evidence  of metastatic disease in the neck.        Assessment/Plan   1.  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  Low B12 levels and low ferritin?  Etiology responding to therapy   3.  History of BOOP and lung nodule which have resolved   4.  H. pylori infection and colon polyps   5.  Strong family history of malignancy-genetic counseling planned  6.  Mild stable leukopenia    plan  1.  Continue [id number] at home  2.  Genetic referral to be be done  3.  Return in 12 months for follow-up with CAT scans of the neck and chest annually through her 5-year follow-up

## 2020-10-14 ENCOUNTER — OFFICE VISIT (OUTPATIENT)
Dept: ONCOLOGY | Facility: CLINIC | Age: 70
End: 2020-10-14

## 2020-10-14 ENCOUNTER — LAB (OUTPATIENT)
Dept: LAB | Facility: HOSPITAL | Age: 70
End: 2020-10-14

## 2020-10-14 VITALS
RESPIRATION RATE: 17 BRPM | WEIGHT: 148.8 LBS | HEART RATE: 60 BPM | SYSTOLIC BLOOD PRESSURE: 121 MMHG | DIASTOLIC BLOOD PRESSURE: 77 MMHG | TEMPERATURE: 97.1 F | OXYGEN SATURATION: 98 % | BODY MASS INDEX: 23.91 KG/M2 | HEIGHT: 66 IN

## 2020-10-14 DIAGNOSIS — C80.1 CARCINOMA (HCC): Primary | ICD-10-CM

## 2020-10-14 DIAGNOSIS — C07 MUCOEPIDERMOID CARCINOMA OF PAROTID GLAND (HCC): ICD-10-CM

## 2020-10-14 DIAGNOSIS — C80.1 MUCOEPIDERMOID CARCINOMA (HCC): Primary | ICD-10-CM

## 2020-10-14 LAB
ALBUMIN SERPL-MCNC: 4.3 G/DL (ref 3.5–5.2)
ALBUMIN/GLOB SERPL: 1.6 G/DL (ref 1.1–2.4)
ALP SERPL-CCNC: 80 U/L (ref 38–116)
ALT SERPL W P-5'-P-CCNC: 13 U/L (ref 0–33)
ANION GAP SERPL CALCULATED.3IONS-SCNC: 8.1 MMOL/L (ref 5–15)
AST SERPL-CCNC: 25 U/L (ref 0–32)
BASOPHILS # BLD AUTO: 0.02 10*3/MM3 (ref 0–0.2)
BASOPHILS NFR BLD AUTO: 0.5 % (ref 0–1.5)
BILIRUB SERPL-MCNC: 0.6 MG/DL (ref 0.2–1.2)
BUN SERPL-MCNC: 20 MG/DL (ref 6–20)
BUN/CREAT SERPL: 26 (ref 7.3–30)
CALCIUM SPEC-SCNC: 9.6 MG/DL (ref 8.5–10.2)
CHLORIDE SERPL-SCNC: 103 MMOL/L (ref 98–107)
CO2 SERPL-SCNC: 28.9 MMOL/L (ref 22–29)
CREAT SERPL-MCNC: 0.77 MG/DL (ref 0.6–1.1)
DEPRECATED RDW RBC AUTO: 42.5 FL (ref 37–54)
EOSINOPHIL # BLD AUTO: 0.08 10*3/MM3 (ref 0–0.4)
EOSINOPHIL NFR BLD AUTO: 2.2 % (ref 0.3–6.2)
ERYTHROCYTE [DISTWIDTH] IN BLOOD BY AUTOMATED COUNT: 12.4 % (ref 12.3–15.4)
GFR SERPL CREATININE-BSD FRML MDRD: 74 ML/MIN/1.73
GLOBULIN UR ELPH-MCNC: 2.7 GM/DL (ref 1.8–3.5)
GLUCOSE SERPL-MCNC: 92 MG/DL (ref 74–124)
HCT VFR BLD AUTO: 40.3 % (ref 34–46.6)
HGB BLD-MCNC: 12.8 G/DL (ref 12–15.9)
IMM GRANULOCYTES # BLD AUTO: 0.01 10*3/MM3 (ref 0–0.05)
IMM GRANULOCYTES NFR BLD AUTO: 0.3 % (ref 0–0.5)
LYMPHOCYTES # BLD AUTO: 1.02 10*3/MM3 (ref 0.7–3.1)
LYMPHOCYTES NFR BLD AUTO: 27.5 % (ref 19.6–45.3)
MCH RBC QN AUTO: 29.4 PG (ref 26.6–33)
MCHC RBC AUTO-ENTMCNC: 31.8 G/DL (ref 31.5–35.7)
MCV RBC AUTO: 92.6 FL (ref 79–97)
MONOCYTES # BLD AUTO: 0.36 10*3/MM3 (ref 0.1–0.9)
MONOCYTES NFR BLD AUTO: 9.7 % (ref 5–12)
NEUTROPHILS NFR BLD AUTO: 2.22 10*3/MM3 (ref 1.7–7)
NEUTROPHILS NFR BLD AUTO: 59.8 % (ref 42.7–76)
NRBC BLD AUTO-RTO: 0 /100 WBC (ref 0–0.2)
PLATELET # BLD AUTO: 194 10*3/MM3 (ref 140–450)
PMV BLD AUTO: 9.8 FL (ref 6–12)
POTASSIUM SERPL-SCNC: 4.2 MMOL/L (ref 3.5–4.7)
PROT SERPL-MCNC: 7 G/DL (ref 6.3–8)
RBC # BLD AUTO: 4.35 10*6/MM3 (ref 3.77–5.28)
SODIUM SERPL-SCNC: 140 MMOL/L (ref 134–145)
WBC # BLD AUTO: 3.71 10*3/MM3 (ref 3.4–10.8)

## 2020-10-14 PROCEDURE — 36415 COLL VENOUS BLD VENIPUNCTURE: CPT

## 2020-10-14 PROCEDURE — 80053 COMPREHEN METABOLIC PANEL: CPT

## 2020-10-14 PROCEDURE — 85025 COMPLETE CBC W/AUTO DIFF WBC: CPT

## 2020-10-14 PROCEDURE — 99214 OFFICE O/P EST MOD 30 MIN: CPT | Performed by: INTERNAL MEDICINE

## 2020-10-21 ENCOUNTER — FLU SHOT (OUTPATIENT)
Dept: INTERNAL MEDICINE | Facility: CLINIC | Age: 70
End: 2020-10-21

## 2020-10-21 DIAGNOSIS — E53.8 B12 DEFICIENCY: Primary | ICD-10-CM

## 2020-10-21 PROCEDURE — G0008 ADMIN INFLUENZA VIRUS VAC: HCPCS | Performed by: INTERNAL MEDICINE

## 2020-10-21 PROCEDURE — 96372 THER/PROPH/DIAG INJ SC/IM: CPT | Performed by: INTERNAL MEDICINE

## 2020-10-21 PROCEDURE — 90694 VACC AIIV4 NO PRSRV 0.5ML IM: CPT | Performed by: INTERNAL MEDICINE

## 2020-10-21 RX ORDER — CYANOCOBALAMIN 1000 UG/ML
1000 INJECTION, SOLUTION INTRAMUSCULAR; SUBCUTANEOUS
Status: DISCONTINUED | OUTPATIENT
Start: 2020-10-21 | End: 2021-06-08

## 2020-10-21 RX ADMIN — CYANOCOBALAMIN 1000 MCG: 1000 INJECTION, SOLUTION INTRAMUSCULAR; SUBCUTANEOUS at 14:41

## 2020-11-17 ENCOUNTER — LAB (OUTPATIENT)
Dept: INTERNAL MEDICINE | Facility: CLINIC | Age: 70
End: 2020-11-17

## 2020-11-20 PROCEDURE — 96372 THER/PROPH/DIAG INJ SC/IM: CPT | Performed by: INTERNAL MEDICINE

## 2020-11-20 RX ADMIN — CYANOCOBALAMIN 1000 MCG: 1000 INJECTION, SOLUTION INTRAMUSCULAR; SUBCUTANEOUS at 11:09

## 2020-11-23 ENCOUNTER — OFFICE VISIT (OUTPATIENT)
Dept: INTERNAL MEDICINE | Facility: CLINIC | Age: 70
End: 2020-11-23

## 2020-11-23 VITALS
BODY MASS INDEX: 23.78 KG/M2 | HEIGHT: 66 IN | HEART RATE: 80 BPM | WEIGHT: 148 LBS | SYSTOLIC BLOOD PRESSURE: 96 MMHG | DIASTOLIC BLOOD PRESSURE: 62 MMHG

## 2020-11-23 DIAGNOSIS — F41.9 ANXIETY: ICD-10-CM

## 2020-11-23 DIAGNOSIS — Z12.39 ENCOUNTER FOR BREAST CANCER SCREENING OTHER THAN MAMMOGRAM: ICD-10-CM

## 2020-11-23 DIAGNOSIS — Z00.00 HEALTHCARE MAINTENANCE: Primary | ICD-10-CM

## 2020-11-23 DIAGNOSIS — N95.2 VAGINAL ATROPHY: ICD-10-CM

## 2020-11-23 DIAGNOSIS — E53.8 B12 DEFICIENCY: ICD-10-CM

## 2020-11-23 DIAGNOSIS — R35.0 URINARY FREQUENCY: ICD-10-CM

## 2020-11-23 DIAGNOSIS — Z12.31 ENCOUNTER FOR SCREENING MAMMOGRAM FOR BREAST CANCER: ICD-10-CM

## 2020-11-23 DIAGNOSIS — E55.9 VITAMIN D DEFICIENCY: ICD-10-CM

## 2020-11-23 DIAGNOSIS — R53.83 FATIGUE, UNSPECIFIED TYPE: ICD-10-CM

## 2020-11-23 DIAGNOSIS — M25.561 ACUTE PAIN OF BOTH KNEES: ICD-10-CM

## 2020-11-23 DIAGNOSIS — M25.562 ACUTE PAIN OF BOTH KNEES: ICD-10-CM

## 2020-11-23 PROCEDURE — G0439 PPPS, SUBSEQ VISIT: HCPCS | Performed by: INTERNAL MEDICINE

## 2020-11-23 PROCEDURE — 99214 OFFICE O/P EST MOD 30 MIN: CPT | Performed by: INTERNAL MEDICINE

## 2020-11-23 RX ORDER — ALPRAZOLAM 0.25 MG/1
0.25 TABLET ORAL 2 TIMES DAILY PRN
Qty: 20 TABLET | Refills: 1 | Status: SHIPPED | OUTPATIENT
Start: 2020-11-23 | End: 2021-08-24 | Stop reason: SDUPTHER

## 2020-11-23 NOTE — PROGRESS NOTES
The ABCs of the Annual Wellness Visit  Subsequent Medicare Wellness Visit    Chief Complaint   Patient presents with   • Annual Exam   • Urinary Frequency   • Vaginal Pain       Subjective   History of Present Illness:  Iesha Roldan is a 70 y.o. female who presents for a Subsequent Medicare Wellness Visit, to discuss chronic issues, and to address any new concerns. She reports that about 3 week ago she noted increased urinary frequency, burning.   One month ago she noted pain w/ intercourse. There is a tender spot in her labial region that she noted in the shower (R>L). Some bowell urgency. She does follow w/ a gastroenterologist.   Notes some anxious symptoms. She notes ruminating at times. She is not interested in a daily medication but does note success w/ xanax in the past.   Patient has reduced her counseling practice to about 4 days 1/2 days.       HEALTH RISK ASSESSMENT    Recent Hospitalizations:  No hospitalization(s) within the last year.    Current Medical Providers:  Patient Care Team:  Camila Mejia MD as PCP - General (Internal Medicine)  Candelario Calderon MD as Consulting Physician (Otolaryngology)  Fred Love MD (Inactive) as Consulting Physician (Radiation Oncology)  Camila Mejia MD as Referring Physician (Internal Medicine)  Desiree Isabel MD (Inactive) as Consulting Physician (Hematology and Oncology)  Camila Mejia MD Joseph, Udaya Geetha, MD as Consulting Physician (Hematology and Oncology)    Smoking Status:  Social History     Tobacco Use   Smoking Status Never Smoker   Smokeless Tobacco Never Used       Alcohol Consumption:  Social History     Substance and Sexual Activity   Alcohol Use Yes    Comment: SOCIALLY       Depression Screen:   PHQ-2/PHQ-9 Depression Screening 11/23/2020   Little interest or pleasure in doing things 0   Feeling down, depressed, or hopeless 0   Trouble falling or staying asleep, or sleeping too much -   Feeling tired or having little  energy -   Poor appetite or overeating -   Feeling bad about yourself - or that you are a failure or have let yourself or your family down -   Trouble concentrating on things, such as reading the newspaper or watching television -   Moving or speaking so slowly that other people could have noticed. Or the opposite - being so fidgety or restless that you have been moving around a lot more than usual -   Thoughts that you would be better off dead, or of hurting yourself in some way -   Total Score 0       Fall Risk Screen:  STEADI Fall Risk Assessment was completed, and patient is at LOW risk for falls.Assessment completed on:11/23/2020    Health Habits and Functional and Cognitive Screening:  Functional & Cognitive Status 11/23/2020   Do you have difficulty preparing food and eating? No   Do you have difficulty bathing yourself, getting dressed or grooming yourself? No   Do you have difficulty using the toilet? No   Do you have difficulty moving around from place to place? No   Do you have trouble with steps or getting out of a bed or a chair? No   Current Diet Well Balanced Diet   Dental Exam Up to date   Eye Exam Up to date   Do you need help using the phone?  No   Are you deaf or do you have serious difficulty hearing?  No   Do you need help with transportation? No   Do you need help shopping? No   Do you need help preparing meals?  No   Do you need help with housework?  No   Do you need help with laundry? No   Do you need help taking your medications? No   Do you need help managing money? No   Do you ever drive or ride in a car without wearing a seat belt? No   Have you felt unusual stress, anger or loneliness in the last month? No   Who do you live with? Spouse   If you need help, do you have trouble finding someone available to you? No   Have you been bothered in the last four weeks by sexual problems? No   Do you have difficulty concentrating, remembering or making decisions? No         Does the patient have  evidence of cognitive impairment? NO    Asprin use counseling:Does not need ASA (and currently is not on it)    Age-appropriate Screening Schedule:  Refer to the list below for future screening recommendations based on patient's age, sex and/or medical conditions. Orders for these recommended tests are listed in the plan section. The patient has been provided with a written plan.    Health Maintenance   Topic Date Due   • ZOSTER VACCINE (1 of 2) 06/05/2001   • MAMMOGRAM  08/08/2021   • DXA SCAN  08/08/2021   • PAP SMEAR  01/01/2023   • COLONOSCOPY  03/29/2024   • TDAP/TD VACCINES (2 - Td) 04/18/2026   • INFLUENZA VACCINE  Completed          The following portions of the patient's history were reviewed and updated as appropriate: allergies, current medications, past family history, past medical history, past social history, past surgical history and problem list.    Outpatient Medications Prior to Visit   Medication Sig Dispense Refill   • fluticasone (FLONASE) 50 MCG/ACT nasal spray 2 sprays into the nostril(s) as directed by provider Daily. (Patient taking differently: 2 sprays into the nostril(s) as directed by provider Daily. seasonal) 1 bottle 5   • Multiple Vitamin (MULTIVITAMIN) tablet tablet Take 1 tablet by mouth Daily.     • mupirocin (Bactroban) 2 % ointment Apply  topically to the appropriate area as directed 3 (Three) Times a Day. 15 g 0   • omeprazole (PRILOSEC) 40 MG capsule Take 1 capsule by mouth Daily. 30 capsule 5   • vitamin D (ERGOCALCIFEROL) 1.25 MG (50386 UT) capsule capsule TAKE ONE CAPSULE BY MOUTH ONCE WEEKLY 12 capsule 0     Facility-Administered Medications Prior to Visit   Medication Dose Route Frequency Provider Last Rate Last Admin   • cyanocobalamin injection 1,000 mcg  1,000 mcg Intramuscular Q28 Days Camila Mejia MD   1,000 mcg at 10/09/20 1429   • cyanocobalamin injection 1,000 mcg  1,000 mcg Intramuscular Q28 Days Camila Mejia MD   1,000 mcg at 11/20/20 1109       Patient  "Active Problem List   Diagnosis   • ADD (attention deficit disorder)   • Fatigue   • Mucoepidermoid carcinoma (CMS/HCC)   • Abnormal MRI, cervical spine, not thoracic; C4 to be specific   • Iron deficiency anemia   • B12 deficiency   • Mucoepidermoid carcinoma of parotid gland (CMS/HCC)   • Primary osteoarthritis of left hip       Advanced Care Planning:  ACP discussion was held with the patient during this visit. Patient has an advance directive (not in EMR), copy requested.    Review of Systems   Constitutional: Negative.    HENT: Negative.    Eyes: Negative.    Cardiovascular: Negative.    Gastrointestinal: Negative.    Endocrine: Negative.    Genitourinary: Positive for frequency and vaginal pain.        See hpi   Musculoskeletal: Negative.    Skin: Negative.    Allergic/Immunologic: Negative.    Neurological: Negative.    Hematological: Negative.    Psychiatric/Behavioral: Negative.        Compared to one year ago, the patient feels her physical health is the same.  Compared to one year ago, the patient feels her mental health is the same.    Reviewed chart for potential of high risk medication in the elderly: yes  Reviewed chart for potential of harmful drug interactions in the elderly:yes    Objective         Vitals:    11/23/20 1320   BP: 96/62   Pulse: 80   Weight: 67.1 kg (148 lb)   Height: 167.6 cm (66\")       Body mass index is 23.89 kg/m².  Discussed the patient's BMI with her. The BMI is in the acceptable range.    Physical Exam  Vitals signs and nursing note reviewed.   Constitutional:       Appearance: Normal appearance.   HENT:      Head: Normocephalic and atraumatic.      Right Ear: Tympanic membrane normal.      Left Ear: Tympanic membrane normal.      Nose: Nose normal.      Mouth/Throat:      Mouth: Mucous membranes are moist.   Eyes:      Extraocular Movements: Extraocular movements intact.      Pupils: Pupils are equal, round, and reactive to light.   Neck:      Musculoskeletal: Normal range " of motion and neck supple.   Cardiovascular:      Rate and Rhythm: Normal rate and regular rhythm.      Pulses: Normal pulses.      Heart sounds: Normal heart sounds.   Pulmonary:      Effort: Pulmonary effort is normal.      Breath sounds: Normal breath sounds.   Abdominal:      General: Abdomen is flat. Bowel sounds are normal.      Palpations: Abdomen is soft.   Musculoskeletal: Normal range of motion.   Skin:     General: Skin is warm and dry.   Neurological:      General: No focal deficit present.      Mental Status: She is alert and oriented to person, place, and time.   Psychiatric:         Mood and Affect: Mood normal.         Behavior: Behavior normal.         Thought Content: Thought content normal.         Judgment: Judgment normal.               Assessment/Plan   Medicare Risks and Personalized Health Plan  CMS Preventative Services Quick Reference  Abdominal Aortic Aneurysm Screening  Advance Directive Discussion  Breast Cancer/Mammogram Screening  Cardiovascular risk  Immunizations Discussed/Encouraged (specific immunizations; Hepatitis A Vaccine/Series and Shingrix )  Osteoprorosis Risk    The above risks/problems have been discussed with the patient.  Pertinent information has been shared with the patient in the After Visit Summary.  Follow up plans and orders are seen below in the Assessment/Plan Section.    Diagnoses and all orders for this visit:    1. Healthcare maintenance (Primary)    2. Acute pain of both knees  -     Ambulatory Referral to Physical Therapy Evaluate and treat    3. Anxiety  -     ALPRAZolam (Xanax) 0.25 MG tablet; Take 1 tablet by mouth 2 (Two) Times a Day As Needed for Anxiety.  Dispense: 20 tablet; Refill: 1    4. Vaginal atrophy    5. Urinary frequency    Other orders  -     betamethasone valerate (VALISONE) 0.1 % ointment; Apply  topically to the appropriate area as directed 2 (Two) Times a Day.  Dispense: 45 g; Refill: 1      Follow Up:  No follow-ups on file.     An  After Visit Summary and PPPS were given to the patient.       Patient w/ B knee pain. 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 She will be referred to physcial therapy for this. She has pain w/ intercourse. Has atrophic vaginal changes and possibly early lichen. Will start bid steroid oint and scale down to qod aver several weeks. F/u w/ gyn. She will take xanax prn anxious sx. She is aware of the risks and benefits of this medication. Has urinary frequency. Will test u/a c and s. She will have listed labs and f/u in 4 months or prn.

## 2020-11-25 LAB
25(OH)D3+25(OH)D2 SERPL-MCNC: 32.1 NG/ML (ref 30–100)
ALBUMIN SERPL-MCNC: 4.6 G/DL (ref 3.5–5.2)
ALBUMIN/GLOB SERPL: 1.9 G/DL
ALP SERPL-CCNC: 88 U/L (ref 39–117)
ALT SERPL-CCNC: 17 U/L (ref 1–33)
APPEARANCE UR: CLEAR
AST SERPL-CCNC: 21 U/L (ref 1–32)
BACTERIA #/AREA URNS HPF: NORMAL /HPF
BACTERIA UR CULT: NORMAL
BACTERIA UR CULT: NORMAL
BASOPHILS # BLD AUTO: 0.03 10*3/MM3 (ref 0–0.2)
BASOPHILS NFR BLD AUTO: 0.7 % (ref 0–1.5)
BILIRUB SERPL-MCNC: 0.3 MG/DL (ref 0–1.2)
BILIRUB UR QL STRIP: NEGATIVE
BUN SERPL-MCNC: 22 MG/DL (ref 8–23)
BUN/CREAT SERPL: 31.4 (ref 7–25)
CALCIUM SERPL-MCNC: 9.2 MG/DL (ref 8.6–10.5)
CHLORIDE SERPL-SCNC: 99 MMOL/L (ref 98–107)
CHOLEST SERPL-MCNC: 178 MG/DL (ref 0–200)
CO2 SERPL-SCNC: 32.3 MMOL/L (ref 22–29)
COLOR UR: YELLOW
CREAT SERPL-MCNC: 0.7 MG/DL (ref 0.57–1)
EOSINOPHIL # BLD AUTO: 0.11 10*3/MM3 (ref 0–0.4)
EOSINOPHIL NFR BLD AUTO: 2.6 % (ref 0.3–6.2)
EPI CELLS #/AREA URNS HPF: NORMAL /HPF (ref 0–10)
ERYTHROCYTE [DISTWIDTH] IN BLOOD BY AUTOMATED COUNT: 12.2 % (ref 12.3–15.4)
GLOBULIN SER CALC-MCNC: 2.4 GM/DL
GLUCOSE SERPL-MCNC: 84 MG/DL (ref 65–99)
GLUCOSE UR QL: NEGATIVE
HCT VFR BLD AUTO: 37.9 % (ref 34–46.6)
HDLC SERPL-MCNC: 75 MG/DL (ref 40–60)
HGB BLD-MCNC: 12.9 G/DL (ref 12–15.9)
HGB UR QL STRIP: NEGATIVE
IMM GRANULOCYTES # BLD AUTO: 0 10*3/MM3 (ref 0–0.05)
IMM GRANULOCYTES NFR BLD AUTO: 0 % (ref 0–0.5)
KETONES UR QL STRIP: ABNORMAL
LDLC SERPL CALC-MCNC: 89 MG/DL (ref 0–100)
LDLC/HDLC SERPL: 1.18 {RATIO}
LEUKOCYTE ESTERASE UR QL STRIP: ABNORMAL
LYMPHOCYTES # BLD AUTO: 1.28 10*3/MM3 (ref 0.7–3.1)
LYMPHOCYTES NFR BLD AUTO: 30.6 % (ref 19.6–45.3)
MCH RBC QN AUTO: 30.2 PG (ref 26.6–33)
MCHC RBC AUTO-ENTMCNC: 34 G/DL (ref 31.5–35.7)
MCV RBC AUTO: 88.8 FL (ref 79–97)
MICRO URNS: ABNORMAL
MONOCYTES # BLD AUTO: 0.38 10*3/MM3 (ref 0.1–0.9)
MONOCYTES NFR BLD AUTO: 9.1 % (ref 5–12)
MUCOUS THREADS URNS QL MICRO: PRESENT /HPF
NEUTROPHILS # BLD AUTO: 2.38 10*3/MM3 (ref 1.7–7)
NEUTROPHILS NFR BLD AUTO: 57 % (ref 42.7–76)
NITRITE UR QL STRIP: NEGATIVE
NRBC BLD AUTO-RTO: 0 /100 WBC (ref 0–0.2)
PH UR STRIP: 5 [PH] (ref 5–7.5)
PLATELET # BLD AUTO: 184 10*3/MM3 (ref 140–450)
POTASSIUM SERPL-SCNC: 4.3 MMOL/L (ref 3.5–5.2)
PROT SERPL-MCNC: 7 G/DL (ref 6–8.5)
PROT UR QL STRIP: NEGATIVE
RBC # BLD AUTO: 4.27 10*6/MM3 (ref 3.77–5.28)
RBC #/AREA URNS HPF: NORMAL /HPF (ref 0–2)
SODIUM SERPL-SCNC: 135 MMOL/L (ref 136–145)
SP GR UR: 1.03 (ref 1–1.03)
TRIGL SERPL-MCNC: 74 MG/DL (ref 0–150)
TSH SERPL DL<=0.005 MIU/L-ACNC: 2.82 UIU/ML (ref 0.27–4.2)
URINALYSIS REFLEX: ABNORMAL
UROBILINOGEN UR STRIP-MCNC: 0.2 MG/DL (ref 0.2–1)
VIT B12 SERPL-MCNC: 948 PG/ML (ref 211–946)
VLDLC SERPL CALC-MCNC: 14 MG/DL (ref 5–40)
WBC # BLD AUTO: 4.18 10*3/MM3 (ref 3.4–10.8)
WBC #/AREA URNS HPF: NORMAL /HPF (ref 0–5)

## 2020-12-07 ENCOUNTER — LAB (OUTPATIENT)
Dept: INTERNAL MEDICINE | Facility: CLINIC | Age: 70
End: 2020-12-07

## 2020-12-07 PROCEDURE — 96372 THER/PROPH/DIAG INJ SC/IM: CPT | Performed by: INTERNAL MEDICINE

## 2020-12-07 RX ADMIN — CYANOCOBALAMIN 1000 MCG: 1000 INJECTION, SOLUTION INTRAMUSCULAR; SUBCUTANEOUS at 09:00

## 2020-12-22 ENCOUNTER — LAB (OUTPATIENT)
Dept: INTERNAL MEDICINE | Facility: CLINIC | Age: 70
End: 2020-12-22

## 2021-01-04 ENCOUNTER — LAB (OUTPATIENT)
Dept: INTERNAL MEDICINE | Facility: CLINIC | Age: 71
End: 2021-01-04

## 2021-01-04 PROCEDURE — 96372 THER/PROPH/DIAG INJ SC/IM: CPT | Performed by: INTERNAL MEDICINE

## 2021-01-04 RX ADMIN — CYANOCOBALAMIN 1000 MCG: 1000 INJECTION, SOLUTION INTRAMUSCULAR; SUBCUTANEOUS at 15:39

## 2021-01-05 ENCOUNTER — CLINICAL SUPPORT (OUTPATIENT)
Dept: OTHER | Facility: HOSPITAL | Age: 71
End: 2021-01-05

## 2021-01-05 DIAGNOSIS — Z80.0 FAMILY HISTORY OF COLON CANCER: ICD-10-CM

## 2021-01-05 DIAGNOSIS — C07 PRIMARY CANCER OF PAROTID GLAND (HCC): Primary | ICD-10-CM

## 2021-01-05 PROCEDURE — 99203 OFFICE O/P NEW LOW 30 MIN: CPT | Performed by: MEDICAL GENETICS

## 2021-01-05 NOTE — PATIENT INSTRUCTIONS
Pt seen by Dr. Conklin for genetic counseling and testing. Lab drawn with 21g butterfly needle in Right AC x 1 attempt. Sent to [facility]. Pt informed she would receive a phone call when test results.

## 2021-01-08 NOTE — PROGRESS NOTES
[facility]    Iesha Roldan was seen for genetic counseling at the [facility].  Her referral was initiated by Dr. Tran.  In 2017 she was diagnosed with a parotid tumor and she has a family history of cancer raising the possibility of a hereditary basis for the malignancies.    Review of Ms. Roldan's health history reveals that she was diagnosed with a parotid mucoepidermoid carcinoma after feeling a lump below the right ear when she was 67 years of age.  The tumor was resected and followed with radiation postoperatively.  She has had a bilateral oophorectomy, and 2  sections.  She has a history of B12 and iron deficiency anemia and and in  she had a colonoscopy and then she remembers was normal.    Review of the family history and pedigree indicates that Ms. Roldan is 70 years of age.  She has a son 33 and daughter 31.  A sister Ms. Roldan had rectal cancer at 36 and her father had pancreatic cancer and 92.  A paternal uncle had a parotid cancer and another paternal uncle melanoma.  A paternal aunt had cancer and her maternal grandmother had colon cancer in her 40s.  The rest family history was noncontributory and there was no Ashkenazi Amish descent.    The remainder the genetic counseling session focused on Ms. Roldan's diagnosis and family history.  She was told in general that about 70 to 75% of cancers are sporadic in nature, typically occurring later in life and not associated with a family history of disease.  In about 20 to 25% of cases familial clustering occurs, in which there is an increased genetic predisposition for that tumor with a risk that exceeds the background risk in the general population.  In about 5 to 10% of cases, a hereditary basis for the malignancy is present in which a pathogenic variant in a gene significantly increases the risk for that cancer, may increase the risk for second  primary and other malignancies and places first-degree relatives at a 50% risk for being similarly affected unless they have not inherited the gene resulting in a risk for that cancer similar to the risk in the general population.    Parotid malignancies are rare and may make up about 3 to 5% of head neck tumors.  A small portion of those are likely to be hereditary, although I am not aware of any specific genes that have been discovered to date that are associated with an increased predisposition for carotid cancer.  The presence of a parotid tumor and a paternal aunt raises the possibility of a slightly increased risk for this malignancy in the family because of a possible familial predisposition.  There is also a family history of colon and pancreatic cancer can be associated with Woods syndrome formally known as hereditary nonpolyposis colorectal cancer.  The hereditary colon cancer predisposition syndrome which is due to a pathogenic variant in a mismatch repair gene, and results in a significantly increased lifetime risk for colon and endometrial cancer as well as an increased risk for a variety of other malignancies and in reticular ovarian and gastric cancer others.  The disorder is transmitted in an autosomal dominant fashion.  First-degree relatives of an affected individual are at 50% risk for also being affected and at increased risk for these malignancies.    Based on Ms. Roldan's diagnosis and family history genetic testing will be pursued.  A blood sample will be sent to [facility] to undergo their 84 gene multi cancer panel as well as additional genes not on that panel on the breast and gynecologic cancer panel, colorectal cancer panel, melanoma panel, and pancreatic cancer panel.  Should the out-of-pocket cost to her not exceed $100, [facility] will proceed with genetic testing and results will be communicated to her 2 to 3 weeks.  If insurance coverage is denied the maximum out-of-pocket cost to  proceed with genetic testing would be $250.  Results are reported either as negative in which no pathogenic variant has been found, positive in which a pathogenic variant has been detected as well as identification of a variant of uncertain significance in which a change in a gene is found but data currently is not available to classified either is benign or pathogenic.  In most instances eventually a variant of uncertain significance is reclassified as benign although this is not always the case, and pathogenicity cannot be ruled out at this time.  If Ms. Roldan has any questions in the interim period of time she can contact me at [phone].    The encounter was 30 minutes and all 30 minutes focused on genetic counseling.

## 2021-01-15 ENCOUNTER — TELEPHONE (OUTPATIENT)
Dept: INTERNAL MEDICINE | Facility: CLINIC | Age: 71
End: 2021-01-15

## 2021-01-16 ENCOUNTER — DOCUMENTATION (OUTPATIENT)
Dept: OTHER | Facility: CLINIC | Age: 71
End: 2021-01-16

## 2021-01-17 NOTE — PROGRESS NOTES
Genetic testing was performed on Ms. Roldan.  [employer] analyzed 102 genes.the result was negative.  No pathogenic sequence variant or deletion/duplication was detected.  The normal results lessen the likelihood of Ms. Roldan carrying a mutated hereditary cancer susceptibility gene, although a pathogenic variant cannot be completely ruled out since there is the small possibility that an abnormality in one of the genes analyzed cannot be detected by present genetic technology or that other genes associated with increased risk for cancer have yet to be discovered.  Given the fact that her sister was diagnosed with rectal cancer at 35 years of age, consideration should be given to performing genetic testing on her if not done previously.  We are available to help facilitate genetic testing in her and we can be reached at [phone].  In addition Ms. Roldan should keep in touch with us periodically to see if any new genetic testing becomes available and that she can pursue.

## 2021-01-20 ENCOUNTER — APPOINTMENT (OUTPATIENT)
Dept: MAMMOGRAPHY | Facility: HOSPITAL | Age: 71
End: 2021-01-20

## 2021-01-22 ENCOUNTER — HOSPITAL ENCOUNTER (OUTPATIENT)
Dept: CT IMAGING | Facility: HOSPITAL | Age: 71
Discharge: HOME | End: 2021-01-22
Admitting: INTERNAL MEDICINE

## 2021-01-22 DIAGNOSIS — C80.1 MUCOEPIDERMOID CARCINOMA (HCC): ICD-10-CM

## 2021-01-22 LAB — CREAT BLDA-MCNC: 0.9 MG/DL (ref 0.6–1.3)

## 2021-01-22 PROCEDURE — 71260 CT THORAX DX C+: CPT

## 2021-01-22 PROCEDURE — 82565 ASSAY OF CREATININE: CPT

## 2021-01-22 PROCEDURE — 25010000002 IOPAMIDOL 61 % SOLUTION: Performed by: INTERNAL MEDICINE

## 2021-01-22 PROCEDURE — 70491 CT SOFT TISSUE NECK W/DYE: CPT

## 2021-01-22 RX ADMIN — IOPAMIDOL 100 ML: 612 INJECTION, SOLUTION INTRAVENOUS at 15:46

## 2021-01-26 ENCOUNTER — LAB (OUTPATIENT)
Dept: INTERNAL MEDICINE | Facility: CLINIC | Age: 71
End: 2021-01-26

## 2021-02-02 ENCOUNTER — TELEPHONE (OUTPATIENT)
Dept: ONCOLOGY | Facility: CLINIC | Age: 71
End: 2021-02-02

## 2021-02-02 NOTE — TELEPHONE ENCOUNTER
PATIENT CALLING    PATIENT WOULD LIKE HER CT SCAN RESULTS FROM 1-22-21, PLEASE ADVISE?    PT CALL BACK # [phone]

## 2021-02-03 DIAGNOSIS — C80.1 MUCOEPIDERMOID CARCINOMA (HCC): Primary | ICD-10-CM

## 2021-02-09 ENCOUNTER — LAB (OUTPATIENT)
Dept: INTERNAL MEDICINE | Facility: CLINIC | Age: 71
End: 2021-02-09

## 2021-02-09 PROCEDURE — 96372 THER/PROPH/DIAG INJ SC/IM: CPT | Performed by: INTERNAL MEDICINE

## 2021-02-09 RX ADMIN — CYANOCOBALAMIN 1000 MCG: 1000 INJECTION, SOLUTION INTRAMUSCULAR; SUBCUTANEOUS at 10:37

## 2021-02-23 ENCOUNTER — OFFICE VISIT (OUTPATIENT)
Dept: INTERNAL MEDICINE | Facility: CLINIC | Age: 71
End: 2021-02-23

## 2021-02-23 VITALS
BODY MASS INDEX: 24.69 KG/M2 | HEART RATE: 71 BPM | SYSTOLIC BLOOD PRESSURE: 124 MMHG | WEIGHT: 153 LBS | DIASTOLIC BLOOD PRESSURE: 68 MMHG

## 2021-02-23 DIAGNOSIS — M25.562 ACUTE PAIN OF LEFT KNEE: ICD-10-CM

## 2021-02-23 DIAGNOSIS — E53.8 B12 DEFICIENCY: ICD-10-CM

## 2021-02-23 DIAGNOSIS — M25.552 LEFT HIP PAIN: Primary | ICD-10-CM

## 2021-02-23 DIAGNOSIS — C07 MUCOEPIDERMOID CARCINOMA OF PAROTID GLAND (HCC): ICD-10-CM

## 2021-02-23 PROCEDURE — 96372 THER/PROPH/DIAG INJ SC/IM: CPT | Performed by: INTERNAL MEDICINE

## 2021-02-23 PROCEDURE — 99214 OFFICE O/P EST MOD 30 MIN: CPT | Performed by: INTERNAL MEDICINE

## 2021-02-23 RX ORDER — CYANOCOBALAMIN 1000 UG/ML
1000 INJECTION, SOLUTION INTRAMUSCULAR; SUBCUTANEOUS
Status: DISCONTINUED | OUTPATIENT
Start: 2021-02-23 | End: 2022-02-24 | Stop reason: HOSPADM

## 2021-02-23 RX ADMIN — CYANOCOBALAMIN 1000 MCG: 1000 INJECTION, SOLUTION INTRAMUSCULAR; SUBCUTANEOUS at 10:59

## 2021-02-23 NOTE — PROGRESS NOTES
Chief Complaint   Patient presents with   • b12 deficiency   • submandibular cancer   • Anxiety   • Hip Pain   • Knee Pain       History of Present Illness   Iesha Roldan is a 70 y.o. female presents for routine follow up evaluation. She is doing fairly well today. She has a history of submandibular cancer. She had ct neck that was clear. Was noted to have sub 6 mm pulmonary nodules noted. She had scan compared to prior imaging at [facility] and felt to be unchanged in nature. She will repeat in 3 months. Feeling well. Improved energy since starting b12 injections. Level was healthy on last testing 3 months ago. She is no longer anemic with this supplementation.   She has knee pain. Was referred to PT but did not go related to pandemic then weather. She is planning to attend in near future. She has been to Ortho regarding left hip and knee pain. She did get temporary relief w/ an injection. Had a partial hip repair 2009. May need THR in the future.       The following portions of the patient's history were reviewed and updated as appropriate: allergies, current medications, past family history, past medical history, past social history, past surgical history and problem list.  Current Outpatient Medications on File Prior to Visit   Medication Sig Dispense Refill   • ALPRAZolam (Xanax) 0.25 MG tablet Take 1 tablet by mouth 2 (Two) Times a Day As Needed for Anxiety. 20 tablet 1   • fluticasone (FLONASE) 50 MCG/ACT nasal spray 2 sprays into the nostril(s) as directed by provider Daily. (Patient taking differently: 2 sprays into the nostril(s) as directed by provider Daily. seasonal) 1 bottle 5   • Multiple Vitamin (MULTIVITAMIN) tablet tablet Take 1 tablet by mouth Daily.     • omeprazole (PRILOSEC) 40 MG capsule Take 1 capsule by mouth Daily. 30 capsule 5   • vitamin D (ERGOCALCIFEROL) 1.25 MG (77093 UT) capsule capsule TAKE ONE CAPSULE BY MOUTH ONCE WEEKLY 12 capsule 0   • betamethasone valerate (VALISONE) 0.1  % ointment Apply  topically to the appropriate area as directed 2 (Two) Times a Day. 45 g 1   • mupirocin (Bactroban) 2 % ointment Apply  topically to the appropriate area as directed 3 (Three) Times a Day. 15 g 0     Current Facility-Administered Medications on File Prior to Visit   Medication Dose Route Frequency Provider Last Rate Last Admin   • cyanocobalamin injection 1,000 mcg  1,000 mcg Intramuscular Q28 Days Camila Mejia MD   1,000 mcg at 01/04/21 1539   • cyanocobalamin injection 1,000 mcg  1,000 mcg Intramuscular Q28 Days Camila Mejia MD   1,000 mcg at 12/07/20 0900     Review of Systems   Constitutional: Negative.    HENT: Negative.    Respiratory: Negative.    Cardiovascular: Negative.    Gastrointestinal: Negative.    Endocrine: Negative.    Genitourinary: Negative.    Musculoskeletal: Positive for arthralgias.   Skin: Negative.    Allergic/Immunologic: Negative.    Neurological: Negative.    Hematological: Negative.    Psychiatric/Behavioral: Negative.        Objective   Physical Exam  Vitals signs and nursing note reviewed.   Constitutional:       Appearance: Normal appearance. She is well-developed.   HENT:      Head: Normocephalic and atraumatic.      Right Ear: Tympanic membrane and external ear normal.      Left Ear: Tympanic membrane and external ear normal.      Nose: Nose normal.      Mouth/Throat:      Mouth: Mucous membranes are moist.   Eyes:      Extraocular Movements: Extraocular movements intact.      Pupils: Pupils are equal, round, and reactive to light.   Neck:      Musculoskeletal: Normal range of motion and neck supple.   Cardiovascular:      Rate and Rhythm: Normal rate and regular rhythm.      Pulses: Normal pulses.      Heart sounds: Normal heart sounds.   Pulmonary:      Effort: Pulmonary effort is normal. No respiratory distress.      Breath sounds: Normal breath sounds.   Abdominal:      General: Abdomen is flat.      Palpations: Abdomen is soft.   Musculoskeletal: Normal  range of motion.   Skin:     General: Skin is warm and dry.   Neurological:      General: No focal deficit present.      Mental Status: She is alert and oriented to person, place, and time.   Psychiatric:         Mood and Affect: Mood normal.         Behavior: Behavior normal.         Thought Content: Thought content normal.         Judgment: Judgment normal.          /68   Pulse 71   Wt 69.4 kg (153 lb)   BMI 24.69 kg/m²     Assessment/Plan   Diagnoses and all orders for this visit:    Left hip pain  -     Ambulatory Referral to Physical Therapy Evaluate and treat  -     Ambulatory Referral to Orthopedic Surgery    Acute pain of left knee  -     Ambulatory Referral to Physical Therapy Evaluate and treat  -     Ambulatory Referral to Orthopedic Surgery    Mucoepidermoid carcinoma of parotid gland (CMS/HCC)    B12 deficiency      Patient with history parotid carcinoma. She will have repeat imaging as ordered. She has knee and hip pain. Will f/u w/ ortho and try physical therapy. voltaren gel qid. Labs reviewed w/ patient. She is encouraged supportive footwear at all times. She will f/u here in 6 months or prn.

## 2021-03-10 ENCOUNTER — APPOINTMENT (OUTPATIENT)
Dept: MAMMOGRAPHY | Facility: HOSPITAL | Age: 71
End: 2021-03-10

## 2021-03-11 ENCOUNTER — HOSPITAL ENCOUNTER (OUTPATIENT)
Dept: MAMMOGRAPHY | Facility: HOSPITAL | Age: 71
Discharge: HOME | End: 2021-03-11
Admitting: INTERNAL MEDICINE

## 2021-03-11 ENCOUNTER — APPOINTMENT (OUTPATIENT)
Dept: MAMMOGRAPHY | Facility: HOSPITAL | Age: 71
End: 2021-03-11

## 2021-03-11 DIAGNOSIS — Z12.31 ENCOUNTER FOR SCREENING MAMMOGRAM FOR BREAST CANCER: ICD-10-CM

## 2021-03-11 PROCEDURE — 77063 BREAST TOMOSYNTHESIS BI: CPT

## 2021-03-11 PROCEDURE — 77067 SCR MAMMO BI INCL CAD: CPT

## 2021-03-18 ENCOUNTER — LAB (OUTPATIENT)
Dept: INTERNAL MEDICINE | Facility: CLINIC | Age: 71
End: 2021-03-18

## 2021-03-19 ENCOUNTER — TREATMENT (OUTPATIENT)
Dept: PHYSICAL THERAPY | Facility: CLINIC | Age: 71
End: 2021-03-19

## 2021-03-19 DIAGNOSIS — M25.552 LEFT HIP PAIN: Primary | ICD-10-CM

## 2021-03-19 DIAGNOSIS — R26.2 DIFFICULTY WALKING: ICD-10-CM

## 2021-03-19 DIAGNOSIS — M25.562 CHRONIC PAIN OF LEFT KNEE: ICD-10-CM

## 2021-03-19 DIAGNOSIS — G89.29 CHRONIC PAIN OF LEFT KNEE: ICD-10-CM

## 2021-03-19 PROCEDURE — 97161 PT EVAL LOW COMPLEX 20 MIN: CPT | Performed by: PHYSICAL THERAPIST

## 2021-03-19 PROCEDURE — 97530 THERAPEUTIC ACTIVITIES: CPT | Performed by: PHYSICAL THERAPIST

## 2021-03-19 PROCEDURE — 97110 THERAPEUTIC EXERCISES: CPT | Performed by: PHYSICAL THERAPIST

## 2021-03-19 NOTE — PROGRESS NOTES
Physical Therapy Initial Evaluation and Plan of Care    Patient: Iesha Roldan   : 1950  Diagnosis/ICD-10 Code:  Left hip pain [M25.552]  Referring practitioner: Camila Mejia MD  Past medical Hx reviewed: 3/19/2021    Subjective Evaluation    History of Present Illness  Onset date: 1 year ago.  Mechanism of injury: In  I had an accidental fall and broke my hip.  I had a partial hip replacement and since the surgery, it has always felt weird.  The hip gets very sore.  The hip is beginning to click and catch.  The pain can get so intense that it stops me in my tracks.  I feel like the knee is because of the hip.  The pain is mostly behind the L knee.  It seems to catch and pull when I try to straiten it out.  Stooping to the ground and coming back up is all the sudden very difficult.  I have to use my arms to brace myself when getting up and that is concerning.    I did see doctor sae and he said that structurally everything looked fine but is questioning if the inside of the joint is worn down.  We considered going in and replacing the whole joint.    Currently, I can still walk 3-4 miles but when I go to sit, I can really feel the deep pain and stiffness that sets in after I sit for a little bit.  Bending over to get things out of the cabinets is more noticeable.  My balance seems fine but standing too long can bother me.  Driving > 1.5 hours the hip stiffens up to get out of the car.  Lying on the L wakes me up at night.  I feel like my posture has lessened as well.  Not sure why.       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  (was previously full-time and prolonged sitting was hard).   Quality of life: good    Pain  Current pain ratin (hip and knee)  At best pain ratin (sometimes through the day.  in the AM)  At worst pain ratin (sharp pain that moves to ache for a while. )  Location: L lateral hip down the side of the thigh.    Quality: dull ache, sharp  "and pulling  Relieving factors: medications (received presription of anti-inflammatory cream.  Mobic every now and then.   Some stretching)  Aggravating factors: sleeping, stairs, ambulation, squatting, prolonged positioning, lifting and standing  Progression: worsening    Social Support  Lives in: multiple-level home  Lives with: spouse    Diagnostic Tests  X-ray: normal    Treatments  Previous treatment: physical therapy  Patient Goals  Patient goals for therapy: increased strength, decreased pain, increased motion and independence with ADLs/IADLs      PLOF: Independent  Objective          Static Posture   General Observations  Left leg length discrepancy and shifted right.     Knee   Genu valgus.   Knee (Left): Flexed (Slight with discomfort with over pressur).     Comments  Slight L knee swelling noted.  Slight valgus in L knee.    Mild L shoulder elevation noted.      Active Range of Motion   Left Hip   Flexion: 120 degrees   Extension: 10 degrees   Abduction: 25 degrees   External rotation (90/90): 40 degrees   Internal rotation (90/90): 20 degrees     Right Hip   Flexion: 132 degrees   Abduction: 55 degrees   External rotation (90/90): 50 degrees   Internal rotation (90/90): 32 degrees   Left Knee   Flexion: 145 degrees   Extensor lag: 3 degrees     Additional Active Range of Motion Details  Pt can actively extend to full extension but with discomfort behind the knee.      Strength/Myotome Testing     Left Hip   Planes of Motion   Flexion: 4-  Extension: 3+  Abduction: 3+  Adduction: 4-  External rotation: 3+  Internal rotation: 3+    Left Knee   Flexion: 3+  Extension: 4-  Quadriceps contraction: fair    Tests     Left Knee   Negative anterior drawer and posterior drawer (but painfult to test). Valgus stress test at 0 degrees positive: pain      Functional Assessment     Forward Step Up 8\"   Left Leg  Pain, valgus and increased contralateral push off.     Forward Step Down 8\"   Left Leg  Pain (mild pain), " valgus and preload.     Single Leg Stance   Left: 7 (Lateral knee pain reported.  ) seconds  Right: 20 seconds    Comments  30 sec sit to stand: 8 x without UE.  Reports increased L knee pain at 22 sec rene.  Pain on lateral knee (5-6/10).      Assessment & Plan     Assessment  Impairments: abnormal gait, abnormal or restricted ROM, activity intolerance, impaired balance, impaired physical strength, lacks appropriate home exercise program and pain with function  Assessment details: Pt presents to PT with symptoms consistent with post-surgical hip impairments of weakness, joint restriction and soft tissue inflammation.  It appears that she has a slight leg length discrepancy and altered posture and gait.  Genu valgus is also becoming more problematic.  Pt would benefit from skilled PT intervention to address the deficits noted.   Prognosis: good  Functional Limitations: lifting, sleeping, walking, uncomfortable because of pain, sitting, standing, stooping and unable to perform repetitive tasks  Goals  Plan Goals:  SHORT TERM GOALS: 3-4 weeks   1.  Patient to be compliant with HEP and demo good efficiency with TE  2.  Report < 2 sleep disturbances 2° hip pain.     3.  Increased Lumbar and SIJ mobility to allow for improved pelvic alignment and gait mechanics (equal step length and level pelvis throughout gait).    4.  Increased hip ER/IR ROM to WFL (ER to 40°) degrees to allow for increased ease with bed mobility and squatting.  5. Pt will report minimal-no tenderness to palpation with firm pressure.   6. Pt. Able to ambulate up to 60 min with pain < 3/10 with acceptable pattern.      LONG TERM GOALS:  1.  Pt. to score > 15% perceived ability on LEFS  2.  Pain level < 2/10 in hips with all activities including sitting > 1 hr continuously and then standing.   3.  Hip  AROM to WFL (ER: 55 deg, IR: 25 deg, ABD: 35 deg, Ext: 20 deg. to to allow for return to ADL's/IADLS and functional activities without increased  symptoms  4. Hip /Knee strength to 4+/5  to allow for pushing, pulling and more strenuous activities to occur without pain.   5. 30 sec sit to stand > 10 x with even foot placment and good mechanics.    6.  Hip stability improvement to increase SLS to at least 20 sec.      Plan  Therapy options: will be seen for skilled physical therapy services  Planned modality interventions: cryotherapy, electrical stimulation/Russian stimulation, iontophoresis, TENS, thermotherapy (hydrocollator packs), traction and ultrasound  Other planned modality interventions: Dry Needling  Planned therapy interventions: abdominal trunk stabilization, ADL retraining, body mechanics training, balance/weight-bearing training, flexibility, functional ROM exercises, gait training, home exercise program, joint mobilization, manual therapy, neuromuscular re-education, postural training, soft tissue mobilization, spinal/joint mobilization, strengthening, stretching and therapeutic activities  Frequency: 2x week  Duration in visits: 12  Treatment plan discussed with: patient      Manual Therapy:    -     mins  50453;  Therapeutic Exercise:    18     mins  20586;     Neuromuscular Sara:    -    mins  10502;    Therapeutic Activity:     15     mins  75808;   Pt education, anatomy and Phys., postures and mechanical impairments.   Gait Training:      -     mins  26203;     Ultrasound:     -     mins  74358;    Electrical Stimulation:    -     mins  49373 ( );  Dry Needling     -     mins self-pay    Timed Treatment:   33   mins   Total Treatment:     58   mins    PT SIGNATURE:  Gilbert Ochoa DPT, PT     MITA Mccarthy License #: [id number]    DATE TREATMENT INITIATED: 3/19/2021    Medicare Initial Certification  Certification Period: 6/17/2021  I certify that the therapy services are furnished while this patient is under my care.  The services outlined above are required by this patient, and will be reviewed every 90 days.     PHYSICIAN:  Camila Mejia MD      DATE:     Please sign and return via fax to [phone].. Thank you, [facility].

## 2021-03-24 ENCOUNTER — TREATMENT (OUTPATIENT)
Dept: PHYSICAL THERAPY | Facility: CLINIC | Age: 71
End: 2021-03-24

## 2021-03-24 DIAGNOSIS — R26.2 DIFFICULTY WALKING: ICD-10-CM

## 2021-03-24 DIAGNOSIS — M25.552 LEFT HIP PAIN: Primary | ICD-10-CM

## 2021-03-24 DIAGNOSIS — M25.562 CHRONIC PAIN OF LEFT KNEE: ICD-10-CM

## 2021-03-24 DIAGNOSIS — G89.29 CHRONIC PAIN OF LEFT KNEE: ICD-10-CM

## 2021-03-24 PROCEDURE — 97110 THERAPEUTIC EXERCISES: CPT | Performed by: PHYSICAL THERAPIST

## 2021-03-24 PROCEDURE — 97140 MANUAL THERAPY 1/> REGIONS: CPT | Performed by: PHYSICAL THERAPIST

## 2021-03-24 NOTE — PATIENT INSTRUCTIONS
Access Code: [email or web address]  URL: [email or web address]  Date: 03/24/2021  Prepared by: Tram Ballesteros    Exercises  Seated Long Arc Quad - 1 x daily - 2 sets - 10 reps - 5 hold  Supine Piriformis Stretch - 1 x daily - 2 reps - 1 sets - 20 hold  Supine Hip Adduction Isometric with Ball - 1 x daily - 1 sets - 20 reps - 5 hold

## 2021-03-24 NOTE — PROGRESS NOTES
Physical Therapy Daily Progress Note    Visit # : 3  Iesha Roldan reports: I've been doing my HEP and the exercises feel good.  Still having some difficulty with stairs and pain in L hip/knee.     Subjective     Objective   See Exercise, Manual, and Modality Logs for complete treatment.     Assessment/Plan  Added some low step activities though pt did note some mild knee discomfort; she was able to maintain good knee alignment without cuing. Attempted s/l hip abduction and pt noted significant lat hip pain after 10 reps; attempted to modify to supine but continued to aggravate her lateral hip. Pt was able to tolerate other ex progressions well and given handouts for compliance/recall. Added some gentle manual interventions and ice to address symptoms.    Progress strengthening /stabilization /functional activity       Timed:  Manual Therapy:    5     mins  02660;  Therapeutic Exercise:    35     mins  63640;     Neuromuscular Sara:    -    mins  38836;    Therapeutic Activity:     -     mins  96924;     Gait Training:      -     mins  72742;     Ultrasound:     -     mins  28829;    Iontophoresis                 -     mins 00055    Timed Treatment:   40   mins direct  Total Treatment:     60   mins      Tram Ballesteros PT  Physical Therapist  [place] License # [id number]

## 2021-03-26 ENCOUNTER — TREATMENT (OUTPATIENT)
Dept: PHYSICAL THERAPY | Facility: CLINIC | Age: 71
End: 2021-03-26

## 2021-03-26 DIAGNOSIS — M25.562 CHRONIC PAIN OF LEFT KNEE: ICD-10-CM

## 2021-03-26 DIAGNOSIS — M25.552 LEFT HIP PAIN: Primary | ICD-10-CM

## 2021-03-26 DIAGNOSIS — G89.29 CHRONIC PAIN OF LEFT KNEE: ICD-10-CM

## 2021-03-26 DIAGNOSIS — R26.2 DIFFICULTY WALKING: ICD-10-CM

## 2021-03-26 PROCEDURE — 97530 THERAPEUTIC ACTIVITIES: CPT | Performed by: PHYSICAL THERAPIST

## 2021-03-26 PROCEDURE — 97110 THERAPEUTIC EXERCISES: CPT | Performed by: PHYSICAL THERAPIST

## 2021-03-26 NOTE — PROGRESS NOTES
Physical Therapy Daily Progress Note    Visit # : 4  Iesha Roldan reports: my hip was sore after last treatment but feeling better today.      Subjective     Objective   See Exercise, Manual, and Modality Logs for complete treatment.     Assessment/Plan  Improved tolerance to ex program today with lowered step height and functional glut med regression.  Pt given handout of HEP for compliance/recall  Progress strengthening /stabilization /functional activity       Timed:  Manual Therapy:    -     mins  19122;  Therapeutic Exercise:    22     mins  73722;     Neuromuscular Sara:    -    mins  01455;    Therapeutic Activity:     8     mins  79057;     Gait Training:      -     mins  28244;     Ultrasound:     -     mins  39133;    Iontophoresis                 -     mins 31301      Timed Treatment:   30   mins direct  Total Treatment:     50   mins      Tram Ballesteros PT  Physical Therapist  [place] License # [id number]

## 2021-03-26 NOTE — PATIENT INSTRUCTIONS
Access Code: [email or web address]  URL: [email or web address]  Date: 03/26/2021  Prepared by: Tram Ballesteros    Exercises  Supine Quadricep Sets - 3 x daily - 1 sets - 15 reps - 5 hold  Side Stepping with Resistance at Thighs and Counter Support - 1 x daily - 3 sets - 8 reps

## 2021-03-31 ENCOUNTER — TREATMENT (OUTPATIENT)
Dept: PHYSICAL THERAPY | Facility: CLINIC | Age: 71
End: 2021-03-31

## 2021-03-31 DIAGNOSIS — M25.562 CHRONIC PAIN OF LEFT KNEE: ICD-10-CM

## 2021-03-31 DIAGNOSIS — R26.2 DIFFICULTY WALKING: ICD-10-CM

## 2021-03-31 DIAGNOSIS — M25.552 LEFT HIP PAIN: Primary | ICD-10-CM

## 2021-03-31 DIAGNOSIS — G89.29 CHRONIC PAIN OF LEFT KNEE: ICD-10-CM

## 2021-03-31 PROCEDURE — 97110 THERAPEUTIC EXERCISES: CPT | Performed by: PHYSICAL THERAPIST

## 2021-03-31 PROCEDURE — 97530 THERAPEUTIC ACTIVITIES: CPT | Performed by: PHYSICAL THERAPIST

## 2021-03-31 NOTE — PROGRESS NOTES
Physical Therapy Daily Progress Note  Visit # 5           Patient: Iesha Roldan   : 1950  Diagnosis/ICD-10 Code:  Left hip pain [M25.552]  Referring practitioner: Camila Mejia MD  Date of Initial Evaluation:  Type: THERAPY  Noted: 3/11/2020      Subjective  Iesha Roldan reports:   Some days it feels better, some worse.  Today I'm not having any pain in either the knee or the hip right now.        Objective   See Exercise, Manual, and Modality Logs for complete treatment.     Reviewed current HEP.      Assessment & Plan     Assessment  Assessment details:   Tolerated continued progression of therapeutic exercise/activity well today, no increased pain reported during or after exercises.  Discussed OA offloader brace as a possibility to further address some knee symptoms, pt to discuss with evaluating PT over next few visits.              Progress per Plan of Care and Progress strengthening /stabilization /functional activity           Timed:         Manual Therapy:         mins  97587     Therapeutic Exercise:     25    mins  27387     Neuromuscular Sara:        mins  81203    Therapeutic Activity:      14    mins  40947     Gait Training:           mins  50379     Ultrasound:          mins  96042    Ionto                                   mins  64399  Self Care                            mins  57825    Un-Timed:  Electrical Stimulation:         mins 01112 ( )  Traction          mins 71693    Timed Treatment:   39   mins   Total Treatment:     45   mins    MICHELLE Tomlin License #[id number]  Physical Therapist Assistant

## 2021-04-02 ENCOUNTER — LAB (OUTPATIENT)
Dept: INTERNAL MEDICINE | Facility: CLINIC | Age: 71
End: 2021-04-02

## 2021-04-02 PROCEDURE — 96372 THER/PROPH/DIAG INJ SC/IM: CPT | Performed by: INTERNAL MEDICINE

## 2021-04-02 RX ADMIN — CYANOCOBALAMIN 1000 MCG: 1000 INJECTION, SOLUTION INTRAMUSCULAR; SUBCUTANEOUS at 15:40

## 2021-04-09 ENCOUNTER — TELEPHONE (OUTPATIENT)
Dept: PHYSICAL THERAPY | Facility: CLINIC | Age: 71
End: 2021-04-09

## 2021-04-13 ENCOUNTER — TREATMENT (OUTPATIENT)
Dept: PHYSICAL THERAPY | Facility: CLINIC | Age: 71
End: 2021-04-13

## 2021-04-13 DIAGNOSIS — R26.2 DIFFICULTY WALKING: ICD-10-CM

## 2021-04-13 DIAGNOSIS — M25.562 CHRONIC PAIN OF LEFT KNEE: ICD-10-CM

## 2021-04-13 DIAGNOSIS — M25.552 LEFT HIP PAIN: Primary | ICD-10-CM

## 2021-04-13 DIAGNOSIS — G89.29 CHRONIC PAIN OF LEFT KNEE: ICD-10-CM

## 2021-04-13 PROCEDURE — 97140 MANUAL THERAPY 1/> REGIONS: CPT | Performed by: PHYSICAL THERAPIST

## 2021-04-13 PROCEDURE — 97110 THERAPEUTIC EXERCISES: CPT | Performed by: PHYSICAL THERAPIST

## 2021-04-13 NOTE — PROGRESS NOTES
Physical Therapy Daily Progress Note      Patient: Iesha Roldan   : 1950  Diagnosis/ICD-10 Code:  Left hip pain [M25.552],   Referring practitioner: Camila Mejia MD  Date of Initial Visit: Type: THERAPY  Noted: 3/11/2020  Today's Date: 2021  Patient seen for 6 sessions    Subjective : Iesha Roldan reports: Doing okay today but still having a hard time determining what makes some days more uncomfortable than others.  I can get like a muscle burning in the muscles on the side of the hip and toward the knee.      Objective:   See Exercise, Manual, and Modality Logs for complete treatment.     Assessment/Plan:Pt tolerated treatment well and we did discuss the concept of delayed onset muscle soreness for the reason of some hip discomfort without direct correlation to activity of the day.  She has been asked to consider activities that are performed the day before or even 2 days prior.  She doesn't report any back discomfort or issues that may contribute to leg /hip pain.     Progress per Plan of Care and Progress strengthening /stabilization /functional activity     Manual Therapy:    8     mins  15068;  Therapeutic Exercise:    30     mins  13165;     Neuromuscular Sara:    6    mins  32273;    Therapeutic Activity:     -     mins  45926;     Gait Training:      -     mins  80654;     Ultrasound:     -     mins  95983;    Electrical Stimulation:    -     mins  85488 ( );  Dry Needling     -     mins self-pay    Timed Treatment:   44   mins   Total Treatment:     60   mins      MITA Mccarthy License #[id number]    Physical Therapist

## 2021-04-16 ENCOUNTER — LAB (OUTPATIENT)
Dept: INTERNAL MEDICINE | Facility: CLINIC | Age: 71
End: 2021-04-16

## 2021-04-16 ENCOUNTER — TREATMENT (OUTPATIENT)
Dept: PHYSICAL THERAPY | Facility: CLINIC | Age: 71
End: 2021-04-16

## 2021-04-16 DIAGNOSIS — G89.29 CHRONIC PAIN OF LEFT KNEE: ICD-10-CM

## 2021-04-16 DIAGNOSIS — M25.562 CHRONIC PAIN OF LEFT KNEE: ICD-10-CM

## 2021-04-16 DIAGNOSIS — R26.2 DIFFICULTY WALKING: ICD-10-CM

## 2021-04-16 DIAGNOSIS — M25.552 LEFT HIP PAIN: Primary | ICD-10-CM

## 2021-04-16 PROCEDURE — 97140 MANUAL THERAPY 1/> REGIONS: CPT | Performed by: PHYSICAL THERAPIST

## 2021-04-16 PROCEDURE — 97110 THERAPEUTIC EXERCISES: CPT | Performed by: PHYSICAL THERAPIST

## 2021-04-16 NOTE — PROGRESS NOTES
Physical Therapy Daily Progress Note      Patient: Iesha Roldan   : 1950  Diagnosis/ICD-10 Code:  Left hip pain [M25.552]  Referring practitioner: Camila Mejia MD  Date of Initial Visit: Type: THERAPY  Noted: 3/11/2020  Today's Date: 2021  Patient seen for 7 sessions    Subjective : Iesha Roldan reports: Doing pretty well today.  The hip is getting stronger but still can feel tight, especially if I sit still too long.      Objective:   See Exercise, Manual, and Modality Logs for complete treatment.     Assessment/Plan: Pt continues to respond well to PT intervention and we continue to progress with WBing activity tolerances and tasks.     Progress per Plan of Care and Progress strengthening /stabilization /functional activity     Manual Therapy:    8     mins  29409;  Therapeutic Exercise:    40    mins  03543;     Neuromuscular Sara:    -    mins  41416;    Therapeutic Activity:     -     mins  04570;     Gait Training:      -     mins  51568;     Ultrasound:     -     mins  25047;    Electrical Stimulation:    -     mins  66421 ( );  Dry Needling     -     mins self-pay    Timed Treatment:   48   mins   Total Treatment:     60   mins      MITA Mccarthy License #[id number]    Physical Therapist

## 2021-04-21 ENCOUNTER — APPOINTMENT (OUTPATIENT)
Dept: PET IMAGING | Facility: HOSPITAL | Age: 71
End: 2021-04-21

## 2021-04-22 ENCOUNTER — TELEPHONE (OUTPATIENT)
Dept: ONCOLOGY | Facility: CLINIC | Age: 71
End: 2021-04-22

## 2021-04-22 NOTE — TELEPHONE ENCOUNTER
Called pt to follow up as she cancelled her CT scans. States she is out of town due to a death in her family. She is not sure exactly when she will be returning to [place], but will call to reschedule scans and appointment with Dr. Tran when she returns.

## 2021-04-23 ENCOUNTER — TREATMENT (OUTPATIENT)
Dept: PHYSICAL THERAPY | Facility: CLINIC | Age: 71
End: 2021-04-23

## 2021-04-23 DIAGNOSIS — M25.552 LEFT HIP PAIN: Primary | ICD-10-CM

## 2021-04-23 DIAGNOSIS — M25.562 CHRONIC PAIN OF LEFT KNEE: ICD-10-CM

## 2021-04-23 DIAGNOSIS — R26.2 DIFFICULTY WALKING: ICD-10-CM

## 2021-04-23 DIAGNOSIS — G89.29 CHRONIC PAIN OF LEFT KNEE: ICD-10-CM

## 2021-04-23 PROCEDURE — 97140 MANUAL THERAPY 1/> REGIONS: CPT | Performed by: PHYSICAL THERAPIST

## 2021-04-23 PROCEDURE — 97112 NEUROMUSCULAR REEDUCATION: CPT | Performed by: PHYSICAL THERAPIST

## 2021-04-23 PROCEDURE — 97110 THERAPEUTIC EXERCISES: CPT | Performed by: PHYSICAL THERAPIST

## 2021-04-23 NOTE — PROGRESS NOTES
Physical Therapy Daily Progress Note      Patient: Iesha Roldan   : 1950  Diagnosis/ICD-10 Code:  No primary diagnosis found.  Referring practitioner: Camila Mejia MD  Date of Initial Visit: Type: THERAPY  Noted: 3/11/2020  Today's Date: 2021  Patient seen for 8 sessions    Subjective : Iesha Roldan reports: Doing pretty good today.  I've been out and about prior to coming to PT so I am warmed up.      Objective:   See Exercise, Manual, and Modality Logs for complete treatment.     Assessment/Plan:Pt continues to respond positively to manual intervention including STM and gentle joint oscillations.  We did progress her WBing and stability training activity without any issues.  Strength is improving and we discussed early discharge if symptoms remain controlled over the next week.      Progress per Plan of Care and Progress strengthening /stabilization /functional activity       Manual Therapy:    12     mins  01631;  Therapeutic Exercise:    35     mins  03671;     Neuromuscular Sara:    10    mins  21307;    Therapeutic Activity:     --     mins  76798;     Gait Training:      -     mins  72474;     Ultrasound:     --     mins  66180;    Electrical Stimulation:    -     mins  06434 ( );  Dry Needling     -     mins self-pay    Timed Treatment:   57   mins   Total Treatment:     63   mins      Gilbert Ochoa PT  [place] License #[id number]    Physical Therapist

## 2021-04-29 ENCOUNTER — APPOINTMENT (OUTPATIENT)
Dept: LAB | Facility: HOSPITAL | Age: 71
End: 2021-04-29

## 2021-04-30 ENCOUNTER — LAB (OUTPATIENT)
Dept: INTERNAL MEDICINE | Facility: CLINIC | Age: 71
End: 2021-04-30

## 2021-04-30 PROCEDURE — 96372 THER/PROPH/DIAG INJ SC/IM: CPT | Performed by: INTERNAL MEDICINE

## 2021-04-30 RX ADMIN — CYANOCOBALAMIN 1000 MCG: 1000 INJECTION, SOLUTION INTRAMUSCULAR; SUBCUTANEOUS at 13:26

## 2021-05-04 ENCOUNTER — TELEPHONE (OUTPATIENT)
Dept: PHYSICAL THERAPY | Facility: CLINIC | Age: 71
End: 2021-05-04

## 2021-05-17 ENCOUNTER — LAB (OUTPATIENT)
Dept: INTERNAL MEDICINE | Facility: CLINIC | Age: 71
End: 2021-05-17

## 2021-05-17 PROCEDURE — 96372 THER/PROPH/DIAG INJ SC/IM: CPT | Performed by: INTERNAL MEDICINE

## 2021-05-17 RX ADMIN — CYANOCOBALAMIN 1000 MCG: 1000 INJECTION, SOLUTION INTRAMUSCULAR; SUBCUTANEOUS at 13:22

## 2021-05-27 RX ORDER — ERGOCALCIFEROL 1.25 MG/1
50000 CAPSULE ORAL WEEKLY
Qty: 12 CAPSULE | Refills: 0 | Status: SHIPPED | OUTPATIENT
Start: 2021-05-27 | End: 2021-11-29

## 2021-06-03 ENCOUNTER — LAB (OUTPATIENT)
Dept: INTERNAL MEDICINE | Facility: CLINIC | Age: 71
End: 2021-06-03

## 2021-06-03 PROCEDURE — 96372 THER/PROPH/DIAG INJ SC/IM: CPT | Performed by: INTERNAL MEDICINE

## 2021-06-03 RX ADMIN — CYANOCOBALAMIN 1000 MCG: 1000 INJECTION, SOLUTION INTRAMUSCULAR; SUBCUTANEOUS at 12:20

## 2021-06-16 ENCOUNTER — LAB (OUTPATIENT)
Dept: INTERNAL MEDICINE | Facility: CLINIC | Age: 71
End: 2021-06-16

## 2021-06-16 PROCEDURE — 96372 THER/PROPH/DIAG INJ SC/IM: CPT | Performed by: INTERNAL MEDICINE

## 2021-06-16 RX ADMIN — CYANOCOBALAMIN 1000 MCG: 1000 INJECTION, SOLUTION INTRAMUSCULAR; SUBCUTANEOUS at 10:05

## 2021-07-01 ENCOUNTER — LAB (OUTPATIENT)
Dept: INTERNAL MEDICINE | Facility: CLINIC | Age: 71
End: 2021-07-01

## 2021-07-01 PROCEDURE — 96372 THER/PROPH/DIAG INJ SC/IM: CPT | Performed by: INTERNAL MEDICINE

## 2021-07-06 RX ADMIN — CYANOCOBALAMIN 1000 MCG: 1000 INJECTION, SOLUTION INTRAMUSCULAR; SUBCUTANEOUS at 09:17

## 2021-07-21 ENCOUNTER — LAB (OUTPATIENT)
Dept: INTERNAL MEDICINE | Facility: CLINIC | Age: 71
End: 2021-07-21

## 2021-07-21 PROCEDURE — 96372 THER/PROPH/DIAG INJ SC/IM: CPT | Performed by: INTERNAL MEDICINE

## 2021-07-21 RX ADMIN — CYANOCOBALAMIN 1000 MCG: 1000 INJECTION, SOLUTION INTRAMUSCULAR; SUBCUTANEOUS at 13:52

## 2021-08-11 DIAGNOSIS — E53.8 B12 DEFICIENCY: Primary | ICD-10-CM

## 2021-08-11 DIAGNOSIS — E78.5 ELEVATED LIPIDS: ICD-10-CM

## 2021-08-11 DIAGNOSIS — D50.9 IRON DEFICIENCY ANEMIA, UNSPECIFIED IRON DEFICIENCY ANEMIA TYPE: ICD-10-CM

## 2021-08-17 ENCOUNTER — LAB (OUTPATIENT)
Dept: INTERNAL MEDICINE | Facility: CLINIC | Age: 71
End: 2021-08-17

## 2021-08-17 PROCEDURE — 96372 THER/PROPH/DIAG INJ SC/IM: CPT | Performed by: INTERNAL MEDICINE

## 2021-08-17 RX ADMIN — CYANOCOBALAMIN 1000 MCG: 1000 INJECTION, SOLUTION INTRAMUSCULAR; SUBCUTANEOUS at 11:32

## 2021-08-19 LAB
ALBUMIN SERPL-MCNC: 4.1 G/DL (ref 3.5–5.2)
ALBUMIN/GLOB SERPL: 1.8 G/DL
ALP SERPL-CCNC: 76 U/L (ref 39–117)
ALT SERPL-CCNC: 13 U/L (ref 1–33)
AST SERPL-CCNC: 25 U/L (ref 1–32)
BASOPHILS # BLD AUTO: 0.02 10*3/MM3 (ref 0–0.2)
BASOPHILS NFR BLD AUTO: 0.7 % (ref 0–1.5)
BILIRUB SERPL-MCNC: 0.6 MG/DL (ref 0–1.2)
BUN SERPL-MCNC: 11 MG/DL (ref 8–23)
BUN/CREAT SERPL: 13.6 (ref 7–25)
CALCIUM SERPL-MCNC: 9.6 MG/DL (ref 8.6–10.5)
CHLORIDE SERPL-SCNC: 105 MMOL/L (ref 98–107)
CHOLEST SERPL-MCNC: 189 MG/DL (ref 0–200)
CO2 SERPL-SCNC: 31.1 MMOL/L (ref 22–29)
CREAT SERPL-MCNC: 0.81 MG/DL (ref 0.57–1)
EOSINOPHIL # BLD AUTO: 0.11 10*3/MM3 (ref 0–0.4)
EOSINOPHIL NFR BLD AUTO: 3.6 % (ref 0.3–6.2)
ERYTHROCYTE [DISTWIDTH] IN BLOOD BY AUTOMATED COUNT: 12.2 % (ref 12.3–15.4)
GLOBULIN SER CALC-MCNC: 2.3 GM/DL
GLUCOSE SERPL-MCNC: 86 MG/DL (ref 65–99)
HCT VFR BLD AUTO: 37.4 % (ref 34–46.6)
HDLC SERPL-MCNC: 55 MG/DL (ref 40–60)
HGB BLD-MCNC: 12.5 G/DL (ref 12–15.9)
IMM GRANULOCYTES # BLD AUTO: 0 10*3/MM3 (ref 0–0.05)
IMM GRANULOCYTES NFR BLD AUTO: 0 % (ref 0–0.5)
LDLC SERPL CALC-MCNC: 116 MG/DL (ref 0–100)
LYMPHOCYTES # BLD AUTO: 0.95 10*3/MM3 (ref 0.7–3.1)
LYMPHOCYTES NFR BLD AUTO: 30.9 % (ref 19.6–45.3)
MCH RBC QN AUTO: 29.9 PG (ref 26.6–33)
MCHC RBC AUTO-ENTMCNC: 33.4 G/DL (ref 31.5–35.7)
MCV RBC AUTO: 89.5 FL (ref 79–97)
MONOCYTES # BLD AUTO: 0.31 10*3/MM3 (ref 0.1–0.9)
MONOCYTES NFR BLD AUTO: 10.1 % (ref 5–12)
NEUTROPHILS # BLD AUTO: 1.68 10*3/MM3 (ref 1.7–7)
NEUTROPHILS NFR BLD AUTO: 54.7 % (ref 42.7–76)
NRBC BLD AUTO-RTO: 0.3 /100 WBC (ref 0–0.2)
PLATELET # BLD AUTO: 197 10*3/MM3 (ref 140–450)
POTASSIUM SERPL-SCNC: 4.6 MMOL/L (ref 3.5–5.2)
PROT SERPL-MCNC: 6.4 G/DL (ref 6–8.5)
RBC # BLD AUTO: 4.18 10*6/MM3 (ref 3.77–5.28)
SODIUM SERPL-SCNC: 142 MMOL/L (ref 136–145)
TRIGL SERPL-MCNC: 102 MG/DL (ref 0–150)
VIT B12 SERPL-MCNC: >2000 PG/ML (ref 211–946)
VLDLC SERPL CALC-MCNC: 18 MG/DL (ref 5–40)
WBC # BLD AUTO: 3.07 10*3/MM3 (ref 3.4–10.8)

## 2021-08-24 ENCOUNTER — OFFICE VISIT (OUTPATIENT)
Dept: INTERNAL MEDICINE | Facility: CLINIC | Age: 71
End: 2021-08-24

## 2021-08-24 VITALS
OXYGEN SATURATION: 98 % | TEMPERATURE: 96.9 F | HEIGHT: 66 IN | SYSTOLIC BLOOD PRESSURE: 122 MMHG | DIASTOLIC BLOOD PRESSURE: 76 MMHG | BODY MASS INDEX: 23.78 KG/M2 | HEART RATE: 91 BPM | WEIGHT: 148 LBS

## 2021-08-24 DIAGNOSIS — D51.9 ANEMIA DUE TO VITAMIN B12 DEFICIENCY, UNSPECIFIED B12 DEFICIENCY TYPE: ICD-10-CM

## 2021-08-24 DIAGNOSIS — C80.1 MUCOEPIDERMOID CARCINOMA (HCC): Primary | ICD-10-CM

## 2021-08-24 DIAGNOSIS — F41.9 ANXIETY: ICD-10-CM

## 2021-08-24 PROCEDURE — 99214 OFFICE O/P EST MOD 30 MIN: CPT | Performed by: INTERNAL MEDICINE

## 2021-08-24 RX ORDER — OMEPRAZOLE 20 MG/1
20 CAPSULE, DELAYED RELEASE ORAL DAILY
Qty: 90 CAPSULE | Refills: 2 | Status: SHIPPED | OUTPATIENT
Start: 2021-08-24 | End: 2022-02-14

## 2021-08-24 RX ORDER — FLUTICASONE PROPIONATE 50 MCG
2 SPRAY, SUSPENSION (ML) NASAL DAILY
Qty: 16 G | Refills: 5 | Status: SHIPPED | OUTPATIENT
Start: 2021-08-24 | End: 2023-03-29 | Stop reason: SDUPTHER

## 2021-08-24 RX ORDER — ALPRAZOLAM 0.25 MG/1
0.25 TABLET ORAL 2 TIMES DAILY PRN
Qty: 20 TABLET | Refills: 1 | Status: SHIPPED | OUTPATIENT
Start: 2021-08-24 | End: 2021-11-23

## 2021-08-24 RX ORDER — FLUTICASONE PROPIONATE 50 MCG
2 SPRAY, SUSPENSION (ML) NASAL DAILY
Status: CANCELLED | OUTPATIENT
Start: 2021-08-24

## 2021-08-24 NOTE — PROGRESS NOTES
Chief Complaint   Patient presents with   • Anemia     b12 def/ also leukopenia   • parotid carcinoma       History of Present Illness   Iesha Roldan is a 71 y.o. female presents for follow up evaluation. She is feeling well today. She has history of mucinoid parotid carcinoma. She has not noted any changes. Energy is good. No longer anemic. b12 slightly oversupplemented and she is injecting qowk. She is scheduled to see oncology at [facility] this week. Labs to reveal slightly low wbc 3.07. she does fluctuate.   Some anxious sympotms. Rare usage of xanax prn.     The following portions of the patient's history were reviewed and updated as appropriate: allergies, current medications, past family history, past medical history, past social history, past surgical history and problem list.  Current Outpatient Medications on File Prior to Visit   Medication Sig Dispense Refill   • betamethasone valerate (VALISONE) 0.1 % ointment Apply  topically to the appropriate area as directed 2 (Two) Times a Day. 45 g 1   • Multiple Vitamin (MULTIVITAMIN) tablet tablet Take 1 tablet by mouth Daily.     • mupirocin (Bactroban) 2 % ointment Apply  topically to the appropriate area as directed 3 (Three) Times a Day. 15 g 0   • vitamin D (ERGOCALCIFEROL) 1.25 MG (32679 UT) capsule capsule Take 1 capsule by mouth 1 (One) Time Per Week. 12 capsule 0   • [DISCONTINUED] fluticasone (FLONASE) 50 MCG/ACT nasal spray 2 sprays into the nostril(s) as directed by provider Daily. (Patient taking differently: 2 sprays into the nostril(s) as directed by provider Daily. seasonal) 1 bottle 5   • [DISCONTINUED] ALPRAZolam (Xanax) 0.25 MG tablet Take 1 tablet by mouth 2 (Two) Times a Day As Needed for Anxiety. 20 tablet 1   • [DISCONTINUED] omeprazole (PRILOSEC) 40 MG capsule Take 1 capsule by mouth Daily. 30 capsule 5     Current Facility-Administered Medications on File Prior to Visit   Medication Dose Route Frequency Provider Last Rate Last  "Admin   • cyanocobalamin injection 1,000 mcg  1,000 mcg Intramuscular Q28 Days Camila Mejia MD   1,000 mcg at 08/17/21 1132     Review of Systems   Constitutional: Negative.    HENT: Negative.    Eyes: Negative.    Respiratory: Negative.    Cardiovascular: Negative.    Gastrointestinal: Negative.    Endocrine: Negative.    Genitourinary: Negative.    Musculoskeletal: Negative.    Skin: Negative.    Allergic/Immunologic: Negative.    Neurological: Negative.    Hematological: Negative.    Psychiatric/Behavioral: Negative.        Objective   Physical Exam  Vitals and nursing note reviewed.   Constitutional:       Appearance: Normal appearance.   HENT:      Head: Normocephalic and atraumatic.      Right Ear: Tympanic membrane normal.      Left Ear: Tympanic membrane normal.      Nose: Nose normal.      Mouth/Throat:      Mouth: Mucous membranes are moist.   Eyes:      Extraocular Movements: Extraocular movements intact.      Conjunctiva/sclera: Conjunctivae normal.      Pupils: Pupils are equal, round, and reactive to light.   Cardiovascular:      Rate and Rhythm: Normal rate and regular rhythm.      Pulses: Normal pulses.      Heart sounds: Normal heart sounds.   Pulmonary:      Effort: Pulmonary effort is normal.      Breath sounds: Normal breath sounds.   Abdominal:      General: Abdomen is flat. Bowel sounds are normal.      Palpations: Abdomen is soft.   Musculoskeletal:         General: Normal range of motion.      Cervical back: Normal range of motion.   Skin:     General: Skin is warm and dry.   Neurological:      General: No focal deficit present.      Mental Status: She is alert and oriented to person, place, and time.   Psychiatric:         Mood and Affect: Mood normal.         Behavior: Behavior normal.         Thought Content: Thought content normal.         Judgment: Judgment normal.          /76   Pulse 91   Temp 96.9 °F (36.1 °C) (Temporal)   Ht 167.6 cm (66\")   Wt 67.1 kg (148 lb)   SpO2 " 98%   BMI 23.89 kg/m²     Assessment/Plan   Diagnoses and all orders for this visit:    Mucoepidermoid carcinoma (CMS/HCC)    Anxiety  -     ALPRAZolam (Xanax) 0.25 MG tablet; Take 1 tablet by mouth 2 (Two) Times a Day As Needed for Anxiety.    Anemia due to vitamin B12 deficiency, unspecified B12 deficiency type    Other orders  -     Cancel: fluticasone (Flonase) 50 MCG/ACT nasal spray; 2 sprays into the nostril(s) as directed by provider Daily.  -     fluticasone (Flonase) 50 MCG/ACT nasal spray; 2 sprays into the nostril(s) as directed by provider Daily.  -     omeprazole (PrilOSEC) 20 MG capsule; Take 1 capsule by mouth Daily.        Patient w/ parotic cancer s/p resection. To oncology next week. She has seasonal allergic rhinitis. Start flonase 2 spray each nostril daily. She has florina. Will reduce prilosec to 20 mg daily. Monitor dexa and renal function on this medication. She has b12 deficiency. Will continue supplementation but extend to every 3 week dosing. Mild leukopenia and will f/u w/ oncology as scheduled. She will follow up routinely.

## 2021-09-17 ENCOUNTER — LAB (OUTPATIENT)
Dept: INTERNAL MEDICINE | Facility: CLINIC | Age: 71
End: 2021-09-17

## 2021-10-13 ENCOUNTER — APPOINTMENT (OUTPATIENT)
Dept: LAB | Facility: HOSPITAL | Age: 71
End: 2021-10-13

## 2021-10-15 ENCOUNTER — LAB (OUTPATIENT)
Dept: INTERNAL MEDICINE | Facility: CLINIC | Age: 71
End: 2021-10-15

## 2021-10-15 PROCEDURE — 96372 THER/PROPH/DIAG INJ SC/IM: CPT | Performed by: INTERNAL MEDICINE

## 2021-10-15 RX ADMIN — CYANOCOBALAMIN 1000 MCG: 1000 INJECTION, SOLUTION INTRAMUSCULAR; SUBCUTANEOUS at 13:42

## 2021-11-18 DIAGNOSIS — Z13.220 LIPID SCREENING: ICD-10-CM

## 2021-11-18 DIAGNOSIS — Z13.29 THYROID DISORDER SCREENING: ICD-10-CM

## 2021-11-18 DIAGNOSIS — R53.83 FATIGUE, UNSPECIFIED TYPE: ICD-10-CM

## 2021-11-18 DIAGNOSIS — D50.8 IRON DEFICIENCY ANEMIA SECONDARY TO INADEQUATE DIETARY IRON INTAKE: Primary | ICD-10-CM

## 2021-11-18 DIAGNOSIS — E78.5 ELEVATED LIPIDS: ICD-10-CM

## 2021-11-22 ENCOUNTER — CLINICAL SUPPORT (OUTPATIENT)
Dept: INTERNAL MEDICINE | Facility: CLINIC | Age: 71
End: 2021-11-22

## 2021-11-22 DIAGNOSIS — E53.8 B12 DEFICIENCY: Primary | ICD-10-CM

## 2021-11-22 PROCEDURE — 96372 THER/PROPH/DIAG INJ SC/IM: CPT | Performed by: INTERNAL MEDICINE

## 2021-11-22 RX ADMIN — CYANOCOBALAMIN 1000 MCG: 1000 INJECTION, SOLUTION INTRAMUSCULAR; SUBCUTANEOUS at 10:12

## 2021-11-23 ENCOUNTER — OFFICE VISIT (OUTPATIENT)
Dept: ORTHOPEDIC SURGERY | Facility: CLINIC | Age: 71
End: 2021-11-23

## 2021-11-23 VITALS — BODY MASS INDEX: 24.33 KG/M2 | HEIGHT: 67 IN | WEIGHT: 155 LBS | TEMPERATURE: 98 F

## 2021-11-23 DIAGNOSIS — E55.9 VITAMIN D DEFICIENCY: Primary | ICD-10-CM

## 2021-11-23 DIAGNOSIS — M25.552 LEFT HIP PAIN: Primary | ICD-10-CM

## 2021-11-23 LAB
ALBUMIN SERPL-MCNC: 4.5 G/DL (ref 3.8–4.8)
ALBUMIN/GLOB SERPL: 1.9 {RATIO} (ref 1.2–2.2)
ALP SERPL-CCNC: 82 IU/L (ref 44–121)
ALT SERPL-CCNC: 16 IU/L (ref 0–32)
APPEARANCE UR: CLEAR
AST SERPL-CCNC: 25 IU/L (ref 0–40)
BACTERIA #/AREA URNS HPF: NORMAL /[HPF]
BASOPHILS # BLD AUTO: 0 X10E3/UL (ref 0–0.2)
BASOPHILS NFR BLD AUTO: 1 %
BILIRUB SERPL-MCNC: 0.6 MG/DL (ref 0–1.2)
BILIRUB UR QL STRIP: NEGATIVE
BUN SERPL-MCNC: 17 MG/DL (ref 8–27)
BUN/CREAT SERPL: 21 (ref 12–28)
CALCIUM SERPL-MCNC: 9.5 MG/DL (ref 8.7–10.3)
CASTS URNS QL MICRO: NORMAL /LPF
CHLORIDE SERPL-SCNC: 103 MMOL/L (ref 96–106)
CHOLEST SERPL-MCNC: 190 MG/DL (ref 100–199)
CO2 SERPL-SCNC: 25 MMOL/L (ref 20–29)
COLOR UR: YELLOW
CREAT SERPL-MCNC: 0.82 MG/DL (ref 0.57–1)
EOSINOPHIL # BLD AUTO: 0.1 X10E3/UL (ref 0–0.4)
EOSINOPHIL NFR BLD AUTO: 2 %
EPI CELLS #/AREA URNS HPF: NORMAL /HPF (ref 0–10)
ERYTHROCYTE [DISTWIDTH] IN BLOOD BY AUTOMATED COUNT: 11.8 % (ref 11.7–15.4)
GLOBULIN SER CALC-MCNC: 2.4 G/DL (ref 1.5–4.5)
GLUCOSE SERPL-MCNC: 88 MG/DL (ref 65–99)
GLUCOSE UR QL: NEGATIVE
HCT VFR BLD AUTO: 39 % (ref 34–46.6)
HDLC SERPL-MCNC: 70 MG/DL
HGB BLD-MCNC: 13.4 G/DL (ref 11.1–15.9)
HGB UR QL STRIP: NEGATIVE
IMM GRANULOCYTES # BLD AUTO: 0 X10E3/UL (ref 0–0.1)
IMM GRANULOCYTES NFR BLD AUTO: 0 %
KETONES UR QL STRIP: NEGATIVE
LDLC SERPL CALC-MCNC: 108 MG/DL (ref 0–99)
LEUKOCYTE ESTERASE UR QL STRIP: ABNORMAL
LYMPHOCYTES # BLD AUTO: 1.2 X10E3/UL (ref 0.7–3.1)
LYMPHOCYTES NFR BLD AUTO: 31 %
MCH RBC QN AUTO: 30.4 PG (ref 26.6–33)
MCHC RBC AUTO-ENTMCNC: 34.4 G/DL (ref 31.5–35.7)
MCV RBC AUTO: 88 FL (ref 79–97)
MICRO URNS: ABNORMAL
MONOCYTES # BLD AUTO: 0.4 X10E3/UL (ref 0.1–0.9)
MONOCYTES NFR BLD AUTO: 10 %
NEUTROPHILS # BLD AUTO: 2.1 X10E3/UL (ref 1.4–7)
NEUTROPHILS NFR BLD AUTO: 56 %
NITRITE UR QL STRIP: NEGATIVE
PH UR STRIP: 5.5 [PH] (ref 5–7.5)
PLATELET # BLD AUTO: 210 X10E3/UL (ref 150–450)
POTASSIUM SERPL-SCNC: 4.1 MMOL/L (ref 3.5–5.2)
PROT SERPL-MCNC: 6.9 G/DL (ref 6–8.5)
PROT UR QL STRIP: NEGATIVE
RBC # BLD AUTO: 4.41 X10E6/UL (ref 3.77–5.28)
RBC #/AREA URNS HPF: NORMAL /HPF (ref 0–2)
SODIUM SERPL-SCNC: 143 MMOL/L (ref 134–144)
SP GR UR: 1.02 (ref 1–1.03)
TRIGL SERPL-MCNC: 63 MG/DL (ref 0–149)
TSH SERPL DL<=0.005 MIU/L-ACNC: 3.03 UIU/ML (ref 0.45–4.5)
UROBILINOGEN UR STRIP-MCNC: 0.2 MG/DL (ref 0.2–1)
VLDLC SERPL CALC-MCNC: 12 MG/DL (ref 5–40)
WBC # BLD AUTO: 3.8 X10E3/UL (ref 3.4–10.8)
WBC #/AREA URNS HPF: NORMAL /HPF (ref 0–5)

## 2021-11-23 PROCEDURE — 73502 X-RAY EXAM HIP UNI 2-3 VIEWS: CPT | Performed by: ORTHOPAEDIC SURGERY

## 2021-11-23 PROCEDURE — 99214 OFFICE O/P EST MOD 30 MIN: CPT | Performed by: ORTHOPAEDIC SURGERY

## 2021-11-23 RX ORDER — MELOXICAM 15 MG/1
15 TABLET ORAL ONCE
Status: CANCELLED | OUTPATIENT
Start: 2022-02-23 | End: 2021-11-23

## 2021-11-23 RX ORDER — PREGABALIN 75 MG/1
150 CAPSULE ORAL ONCE
Status: CANCELLED | OUTPATIENT
Start: 2022-02-23 | End: 2021-11-23

## 2021-11-23 RX ORDER — CHLORHEXIDINE GLUCONATE 500 MG/1
CLOTH TOPICAL 2 TIMES DAILY
Status: CANCELLED | OUTPATIENT
Start: 2021-11-23

## 2021-11-23 RX ORDER — ACETAMINOPHEN 160 MG
2000 TABLET,DISINTEGRATING ORAL DAILY
Qty: 90 CAPSULE | Refills: 3 | Status: SHIPPED | OUTPATIENT
Start: 2021-11-23 | End: 2022-02-24 | Stop reason: HOSPADM

## 2021-11-23 RX ORDER — CEFAZOLIN SODIUM 2 G/100ML
2 INJECTION, SOLUTION INTRAVENOUS ONCE
Status: CANCELLED | OUTPATIENT
Start: 2022-02-23 | End: 2021-11-23

## 2021-11-23 RX ORDER — VANCOMYCIN HYDROCHLORIDE 1 G/200ML
15 INJECTION, SOLUTION INTRAVENOUS ONCE
Status: CANCELLED | OUTPATIENT
Start: 2022-02-23 | End: 2021-11-23

## 2021-11-23 RX ORDER — ALPRAZOLAM 0.5 MG/1
TABLET ORAL
COMMUNITY
End: 2021-11-29 | Stop reason: DRUGHIGH

## 2021-11-23 NOTE — PROGRESS NOTES
Patient: Iesha Roldan  YOB: 1950 71 y.o. female  Medical Record Number: [id number]    Chief Complaint:   Chief Complaint   Patient presents with   • Left Hip - Follow-up       History of Present Illness:Iesha Roladn is a 71 y.o. 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 Parks - has been active and now c/o pain in the groin with catching and activity limiting sx.     Allergies:   Allergies   Allergen Reactions   • Erythromycin Nausea And Vomiting       Medications:   Current Outpatient Medications   Medication Sig Dispense Refill   • Multiple Vitamin (MULTIVITAMIN) tablet tablet Take 1 tablet by mouth Daily.     • ALPRAZolam (XANAX) 0.5 MG tablet      • betamethasone valerate (VALISONE) 0.1 % ointment Apply  topically to the appropriate area as directed 2 (Two) Times a Day. 45 g 1   • fluticasone (Flonase) 50 MCG/ACT nasal spray 2 sprays into the nostril(s) as directed by provider Daily. 16 g 5   • hydrocortisone 2.5 % cream      • mupirocin (Bactroban) 2 % ointment Apply  topically to the appropriate area as directed 3 (Three) Times a Day. 15 g 0   • omeprazole (PrilOSEC) 20 MG capsule Take 1 capsule by mouth Daily. 90 capsule 2   • vitamin D (ERGOCALCIFEROL) 1.25 MG (83814 UT) capsule capsule Take 1 capsule by mouth 1 (One) Time Per Week. 12 capsule 0     Current Facility-Administered Medications   Medication Dose Route Frequency Provider Last Rate Last Admin   • cyanocobalamin injection 1,000 mcg  1,000 mcg Intramuscular Q28 Days Camila Mejia MD   1,000 mcg at 11/22/21 1012         The following portions of the patient's history were reviewed and updated as appropriate: allergies, current medications, past family history, past medical history, past social history, past surgical history and problem list.    Review of Systems:   A 14 point review of systems was performed. All systems negative except pertinent positives/negative listed in HPI  "above    Physical Exam:   Vitals:    11/23/21 0836   Temp: 98 °F (36.7 °C)   Weight: 70.3 kg (155 lb)   Height: 170.2 cm (67\")       General: A and O x 3, ASA, NAD    SCLERA:    Normal    DENTITION:   Normal  Hip:  left    LEG ALIGNMENT:     Neutral        LEG LENGTH DISCREPANCY   :    right longer by 0.5 cm    GAIT:     Slight Antalgic    SKIN:     No abnormality    RANGE OF MOTION:     Limited by joint irritability    STRENGTH:     Limited by joint irratibility    DISTAL PULSES:    Paplable    DISTAL SENSATION :   Intact    LYMPHATICS:     No   lymphadenopathy    OTHER:          +   Stinchfeld test      -    log roll      -   Tenderness to palpation trochanteric bursa     Radiology:    Xrays 2views left hip  (AP bilateral hips and lateral hip) were ordered and reviewed for evaluation of hip pain demonstrating   a well positioned bipolar hemiarthroplasty with a press-fit Geo Sparks stem.  There is evidence of narrowing of the cartilage space in comparison with previous x-rays from a few years ago.  No other abnormalities noted.     Comparison views: todays xrays were compared to previous xrays and demonstrate no change    Assessment/Plan:  Left bipolr with acetABULAR WEAR.  Continuation of conservative management vs. Conversion ROOPA discussed.  The patient wishes to proceed with total hip replacement.  At this point the patient has failed the full gamut of conservative treatment and stating complete understanding of the risks/benefits/ anternatives wishes to proceed with surgical treatment.    Risk and benefits of surgery were reviewed.  Including, but not limited to, blood clots, anesthesia risk, infection, leg length discrepancy, fracture, skin/leg numbness, failure of the implant, need for future surgeries, continued pain, hematoma, need for transfusion, and death, among others.  The patient understands and wishes to proceed.     The spectrum of treatment options were discussed with the patient in detail " including both the nonoperative and operative treatment modalities and their respective risks and benefits.  After thorough discussion, the patient has elected to undergo surgical treatment.  The details of the surgical procedure were explained including the location of probable incisions and a description of the likely implants to be used.  Models and diagrams were used as educational resources. The patient understands the likely convalescence after surgery, as well as the rehabilitation required.  We thoroughly discussed the risks, benefits, and alternatives to surgery.  The risks include but are not limited to the risk of infection, joint stiffness, blood clots (including DVT and/or pulmonary embolus along with the risk of death), neurologic and/or vascular injury, fracture, dislocation, nonunion, malunion, need for further surgery including hardware failure requiring revision, and continued pain.  It was explained that if tissue has been repaired or reconstructed, there is also a chance of failure which may require further management.  Following the completion of the discussion, the patient expressed understanding of this planned course of care, all their questions were answered and consent will be obtained preoperatively.    Operative Plan: Posterior approach CONVERSION TO Total Hip Replacement  Outpatient          Giorgio Keenan MD  11/23/2021

## 2021-11-29 ENCOUNTER — OFFICE VISIT (OUTPATIENT)
Dept: INTERNAL MEDICINE | Facility: CLINIC | Age: 71
End: 2021-11-29

## 2021-11-29 VITALS
BODY MASS INDEX: 23.23 KG/M2 | DIASTOLIC BLOOD PRESSURE: 68 MMHG | HEIGHT: 67 IN | HEART RATE: 65 BPM | WEIGHT: 148 LBS | SYSTOLIC BLOOD PRESSURE: 138 MMHG

## 2021-11-29 DIAGNOSIS — Z00.00 HEALTHCARE MAINTENANCE: Primary | ICD-10-CM

## 2021-11-29 DIAGNOSIS — F41.9 ANXIETY: ICD-10-CM

## 2021-11-29 DIAGNOSIS — M16.12 PRIMARY OSTEOARTHRITIS OF LEFT HIP: ICD-10-CM

## 2021-11-29 DIAGNOSIS — C80.1 MUCOEPIDERMOID CARCINOMA (HCC): ICD-10-CM

## 2021-11-29 DIAGNOSIS — K21.9 GASTROESOPHAGEAL REFLUX DISEASE, UNSPECIFIED WHETHER ESOPHAGITIS PRESENT: ICD-10-CM

## 2021-11-29 DIAGNOSIS — M25.552 LEFT HIP PAIN: ICD-10-CM

## 2021-11-29 PROCEDURE — 1170F FXNL STATUS ASSESSED: CPT | Performed by: INTERNAL MEDICINE

## 2021-11-29 PROCEDURE — G0008 ADMIN INFLUENZA VIRUS VAC: HCPCS | Performed by: INTERNAL MEDICINE

## 2021-11-29 PROCEDURE — 93000 ELECTROCARDIOGRAM COMPLETE: CPT | Performed by: INTERNAL MEDICINE

## 2021-11-29 PROCEDURE — G0439 PPPS, SUBSEQ VISIT: HCPCS | Performed by: INTERNAL MEDICINE

## 2021-11-29 PROCEDURE — 90662 IIV NO PRSV INCREASED AG IM: CPT | Performed by: INTERNAL MEDICINE

## 2021-11-29 PROCEDURE — 1159F MED LIST DOCD IN RCRD: CPT | Performed by: INTERNAL MEDICINE

## 2021-11-29 PROCEDURE — 99213 OFFICE O/P EST LOW 20 MIN: CPT | Performed by: INTERNAL MEDICINE

## 2021-11-29 RX ORDER — ALPRAZOLAM 0.25 MG/1
0.25 TABLET ORAL 2 TIMES DAILY PRN
Qty: 20 TABLET | Refills: 2 | Status: SHIPPED | OUTPATIENT
Start: 2021-11-29 | End: 2022-09-08

## 2021-11-29 NOTE — PROGRESS NOTES
The ABCs of the Annual Wellness Visit  Subsequent Medicare Wellness Visit    Chief Complaint   Patient presents with   • Annual Exam   • Hip Pain     left   • Heartburn   • mucoepidermoid cancer      Subjective    History of Present Illness:  Iesha Roldan is a 71 y.o. female who presents for a Subsequent Medicare Wellness Visit, to review chronic issues, and to discuss acute needs. Patient has a history of GATO. Treated for h pylori in the past with abx. She takes omeprazole in a prn fashion.   History of diarrhea. She notes this initially improved w/ benefiber. Now she is having loose stooling 1-3 a day. This is not food dependent. She has noted it in the last 2 weeks.   She has advanced OA left hip. She is to have a THR likely in [facility].   Has a history of parotid mucoepidermoid cancer. She had ct neck and chest that shows no signs of recurrence in 8/21.         The following portions of the patient's history were reviewed and   updated as appropriate: allergies, current medications, past family history, past medical history, past social history, past surgical history and problem list.    Compared to one year ago, the patient feels her physical   health is the same.    Compared to one year ago, the patient feels her mental   health is the same.    Recent Hospitalizations:  She was not admitted to the hospital during the last year.       Current Medical Providers:  Patient Care Team:  Camila Mejia MD as PCP - General (Internal Medicine)  Candelario Claderon MD as Consulting Physician (Otolaryngology)  Fred Love MD (Inactive) as Consulting Physician (Radiation Oncology)  Camila Mejia MD as Referring Physician (Internal Medicine)  Camila Mejia MD Joseph, Udaya Geetha, MD as Consulting Physician (Hematology and Oncology)    Outpatient Medications Prior to Visit   Medication Sig Dispense Refill   • ALPRAZolam (XANAX) 0.5 MG tablet      • Cholecalciferol (Vitamin D3) 50 MCG (2000 UT) capsule  Take 1 capsule by mouth Daily. 90 capsule 3   • fluticasone (Flonase) 50 MCG/ACT nasal spray 2 sprays into the nostril(s) as directed by provider Daily. 16 g 5   • Multiple Vitamin (MULTIVITAMIN) tablet tablet Take 1 tablet by mouth Daily.     • omeprazole (PrilOSEC) 20 MG capsule Take 1 capsule by mouth Daily. 90 capsule 2   • betamethasone valerate (VALISONE) 0.1 % ointment Apply  topically to the appropriate area as directed 2 (Two) Times a Day. 45 g 1   • hydrocortisone 2.5 % cream      • mupirocin (Bactroban) 2 % ointment Apply  topically to the appropriate area as directed 3 (Three) Times a Day. 15 g 0   • vitamin D (ERGOCALCIFEROL) 1.25 MG (62406 UT) capsule capsule Take 1 capsule by mouth 1 (One) Time Per Week. 12 capsule 0     Facility-Administered Medications Prior to Visit   Medication Dose Route Frequency Provider Last Rate Last Admin   • cyanocobalamin injection 1,000 mcg  1,000 mcg Intramuscular Q28 Days Camila Mejia MD   1,000 mcg at 11/22/21 1012       No opioid medication identified on active medication list. I have reviewed chart for other potential  high risk medication/s and harmful drug interactions in the elderly.          Aspirin is not on active medication list.  Aspirin use is not indicated based on review of current medical condition/s. Risk of harm outweighs potential benefits.  .    Patient Active Problem List   Diagnosis   • ADD (attention deficit disorder)   • Fatigue   • Mucoepidermoid carcinoma (HCC)   • Abnormal MRI, cervical spine, not thoracic; C4 to be specific   • Iron deficiency anemia   • B12 deficiency   • Mucoepidermoid carcinoma of parotid gland (HCC)   • Primary osteoarthritis of left hip   • Left hip pain     Advance Care Planning  Advance Directive is not on file.  ACP discussion was held with the patient during this visit. Patient has an advance directive (not in EMR), copy requested.          Objective    Vitals:    11/29/21 0924   BP: 138/68   Pulse: 65   Weight:  "67.1 kg (148 lb)   Height: 170.2 cm (67\")     BMI Readings from Last 1 Encounters:   11/29/21 23.18 kg/m²   BMI is within normal parameters. No follow-up required.    Does the patient have evidence of cognitive impairment? No    Physical Exam  Vitals and nursing note reviewed.   Constitutional:       Appearance: Normal appearance. She is well-developed.   HENT:      Head: Normocephalic and atraumatic.      Right Ear: Tympanic membrane and external ear normal.      Left Ear: Tympanic membrane and external ear normal.      Nose: Nose normal.      Mouth/Throat:      Mouth: Mucous membranes are moist.   Eyes:      Extraocular Movements: Extraocular movements intact.      Pupils: Pupils are equal, round, and reactive to light.   Cardiovascular:      Rate and Rhythm: Normal rate and regular rhythm.      Pulses: Normal pulses.      Heart sounds: Normal heart sounds.   Pulmonary:      Effort: Pulmonary effort is normal. No respiratory distress.      Breath sounds: Normal breath sounds.   Abdominal:      General: Abdomen is flat.      Palpations: Abdomen is soft.   Musculoskeletal:         General: Normal range of motion.      Cervical back: Normal range of motion and neck supple.   Skin:     General: Skin is warm and dry.   Neurological:      General: No focal deficit present.      Mental Status: She is alert and oriented to person, place, and time.   Psychiatric:         Mood and Affect: Mood normal.         Behavior: Behavior normal.         Thought Content: Thought content normal.         Judgment: Judgment normal.       Lab Results   Component Value Date    CHLPL 190 11/22/2021    TRIG 63 11/22/2021    HDL 70 11/22/2021     (H) 11/22/2021    VLDL 12 11/22/2021      EKG for atyp cp/ florina. Sinus 57 bpm. No st changes. Unchanged from prior.         HEALTH RISK ASSESSMENT    Smoking Status:  Social History     Tobacco Use   Smoking Status Never Smoker   Smokeless Tobacco Never Used     Alcohol Consumption:  Social " History     Substance and Sexual Activity   Alcohol Use Not Currently    Comment: SOCIALLY     Fall Risk Screen:    STEADI Fall Risk Assessment was completed, and patient is at LOW risk for falls.Assessment completed on:11/29/2021    Depression Screening:  PHQ-2/PHQ-9 Depression Screening 11/29/2021   Little interest or pleasure in doing things 0   Feeling down, depressed, or hopeless 0   Trouble falling or staying asleep, or sleeping too much -   Feeling tired or having little energy -   Poor appetite or overeating -   Feeling bad about yourself - or that you are a failure or have let yourself or your family down -   Trouble concentrating on things, such as reading the newspaper or watching television -   Moving or speaking so slowly that other people could have noticed. Or the opposite - being so fidgety or restless that you have been moving around a lot more than usual -   Thoughts that you would be better off dead, or of hurting yourself in some way -   Total Score 0       Health Habits and Functional and Cognitive Screening:  Functional & Cognitive Status 11/29/2021   Do you have difficulty preparing food and eating? No   Do you have difficulty bathing yourself, getting dressed or grooming yourself? No   Do you have difficulty using the toilet? No   Do you have difficulty moving around from place to place? No   Do you have trouble with steps or getting out of a bed or a chair? No   Current Diet Well Balanced Diet   Dental Exam Up to date   Eye Exam Up to date   Do you need help using the phone?  No   Are you deaf or do you have serious difficulty hearing?  No   Do you need help with transportation? No   Do you need help shopping? No   Do you need help preparing meals?  No   Do you need help with housework?  No   Do you need help with laundry? No   Do you need help taking your medications? No   Do you need help managing money? No   Do you ever drive or ride in a car without wearing a seat belt? No   Have you  felt unusual stress, anger or loneliness in the last month? No   Who do you live with? Spouse   If you need help, do you have trouble finding someone available to you? No   Have you been bothered in the last four weeks by sexual problems? No   Do you have difficulty concentrating, remembering or making decisions? No       Age-appropriate Screening Schedule:  Refer to the list below for future screening recommendations based on patient's age, sex and/or medical conditions. Orders for these recommended tests are listed in the plan section. The patient has been provided with a written plan.    Health Maintenance   Topic Date Due   • ZOSTER VACCINE (1 of 2) Never done   • INFLUENZA VACCINE  08/01/2021   • DXA SCAN  08/08/2021   • PAP SMEAR  01/01/2023   • MAMMOGRAM  03/11/2023   • TDAP/TD VACCINES (2 - Td or Tdap) 04/18/2026              Assessment/Plan   CMS Preventative Services Quick Reference  Risk Factors Identified During Encounter  Immunizations Discussed/Encouraged (specific Immunizations; Hepatitis A Vaccine/Series, Influenza and Shingrix  The above risks/problems have been discussed with the patient.  Follow up actions/plans if indicated are seen below in the Assessment/Plan Section.  Pertinent information has been shared with the patient in the After Visit Summary.    Diagnoses and all orders for this visit:    1. Healthcare maintenance (Primary)    2. Left hip pain    3. Primary osteoarthritis of left hip    4. Mucoepidermoid carcinoma (HCC)        Follow Up:   No follow-ups on file.     An After Visit Summary and PPPS were made available to the patient. She has advanced oa. To proceed w/ corrective procedure w usual precautions. She did have radiation in the neck region and will advise anesthesiologist of this. She will f/u w/ oncologist routinely but is in full remission at this time. She may use xanax in a prn fashion and is aware of the risks and benefits of this medication. She has left hip pain and  will follow up w/ ortho routinely. She will f/u w/ GI in regards to reflux and alternating constipation and diarrhea. F/u here in 6 months or prn.           I spent 50 minutes caring for Iesha on this date of service. This time includes time spent by me in the following activities:preparing for the visit, reviewing tests, obtaining and/or reviewing a separately obtained history, performing a medically appropriate examination and/or evaluation , counseling and educating the patient/family/caregiver, ordering medications, tests, or procedures, documenting information in the medical record, independently interpreting results and communicating that information with the patient/family/caregiver and care coordination

## 2021-12-01 ENCOUNTER — IMMUNIZATION (OUTPATIENT)
Dept: VACCINE CLINIC | Facility: HOSPITAL | Age: 71
End: 2021-12-01

## 2021-12-01 PROCEDURE — 0004A HC ADM SARSCOV2 30MCG/0.3ML BOOSTER: CPT | Performed by: INTERNAL MEDICINE

## 2021-12-01 PROCEDURE — 91300 HC SARSCOV02 VAC 30MCG/0.3ML IM: CPT | Performed by: INTERNAL MEDICINE

## 2021-12-03 ENCOUNTER — APPOINTMENT (OUTPATIENT)
Dept: VACCINE CLINIC | Facility: HOSPITAL | Age: 71
End: 2021-12-03

## 2021-12-17 ENCOUNTER — LAB (OUTPATIENT)
Dept: INTERNAL MEDICINE | Facility: CLINIC | Age: 71
End: 2021-12-17

## 2021-12-17 PROCEDURE — 96372 THER/PROPH/DIAG INJ SC/IM: CPT | Performed by: INTERNAL MEDICINE

## 2021-12-17 RX ADMIN — CYANOCOBALAMIN 1000 MCG: 1000 INJECTION, SOLUTION INTRAMUSCULAR; SUBCUTANEOUS at 13:25

## 2021-12-20 ENCOUNTER — TELEPHONE (OUTPATIENT)
Dept: ORTHOPEDIC SURGERY | Facility: CLINIC | Age: 71
End: 2021-12-20

## 2021-12-20 NOTE — TELEPHONE ENCOUNTER
Hub staff attempted to follow warm transfer process and was unsuccessful     Caller: CHENTE KHAN    Relationship to patient: SELF    Best call back number: [phone]    Patient is needing: PT WANTS TO SEE IF SHE CAN BE PUT ON A WAITLIST FOR HER SURGERY. ATTEMPTED TO WARM TRANSFER TO SURGERY SCHEDULER BUT WAS UNSUCCESSFUL. PLEASE CALL HER BACK AT THE NUMBER ABOVE.

## 2022-01-10 ENCOUNTER — LAB (OUTPATIENT)
Dept: INTERNAL MEDICINE | Facility: CLINIC | Age: 72
End: 2022-01-10

## 2022-01-10 PROCEDURE — 96372 THER/PROPH/DIAG INJ SC/IM: CPT | Performed by: INTERNAL MEDICINE

## 2022-01-10 RX ADMIN — CYANOCOBALAMIN 1000 MCG: 1000 INJECTION, SOLUTION INTRAMUSCULAR; SUBCUTANEOUS at 14:18

## 2022-01-27 ENCOUNTER — LAB (OUTPATIENT)
Dept: INTERNAL MEDICINE | Facility: CLINIC | Age: 72
End: 2022-01-27

## 2022-01-27 PROCEDURE — 96372 THER/PROPH/DIAG INJ SC/IM: CPT | Performed by: INTERNAL MEDICINE

## 2022-01-27 RX ADMIN — CYANOCOBALAMIN 1000 MCG: 1000 INJECTION, SOLUTION INTRAMUSCULAR; SUBCUTANEOUS at 11:03

## 2022-01-31 DIAGNOSIS — M85.80 OSTEOPENIA, UNSPECIFIED LOCATION: ICD-10-CM

## 2022-01-31 DIAGNOSIS — Z78.0 MENOPAUSE: Primary | ICD-10-CM

## 2022-02-01 ENCOUNTER — TRANSCRIBE ORDERS (OUTPATIENT)
Dept: ADMINISTRATIVE | Facility: HOSPITAL | Age: 72
End: 2022-02-01

## 2022-02-01 ENCOUNTER — TELEPHONE (OUTPATIENT)
Dept: ORTHOPEDIC SURGERY | Facility: CLINIC | Age: 72
End: 2022-02-01

## 2022-02-01 DIAGNOSIS — Z12.31 BREAST CANCER SCREENING BY MAMMOGRAM: Primary | ICD-10-CM

## 2022-02-01 NOTE — TELEPHONE ENCOUNTER
Caller: CHENTE KHAN    Relationship to patient: SELF     Best call back number:     Type of visit: PRE-OP APPOINTMENT    Requested date: THE WEEK OF 2/15/22     If rescheduling, when is the original appointment: 2/10/22    Additional notes: THE PAT[place] THE WEEK OF THE 10TH

## 2022-02-02 ENCOUNTER — TRANSCRIBE ORDERS (OUTPATIENT)
Dept: ADMINISTRATIVE | Facility: HOSPITAL | Age: 72
End: 2022-02-02

## 2022-02-02 DIAGNOSIS — Z13.9 SCREENING DUE: Primary | ICD-10-CM

## 2022-02-09 ENCOUNTER — TELEPHONE (OUTPATIENT)
Dept: INTERNAL MEDICINE | Facility: CLINIC | Age: 72
End: 2022-02-09

## 2022-02-10 ENCOUNTER — APPOINTMENT (OUTPATIENT)
Dept: PREADMISSION TESTING | Facility: HOSPITAL | Age: 72
End: 2022-02-10

## 2022-02-14 ENCOUNTER — PRE-ADMISSION TESTING (OUTPATIENT)
Dept: PREADMISSION TESTING | Facility: HOSPITAL | Age: 72
End: 2022-02-14

## 2022-02-14 VITALS
WEIGHT: 152 LBS | TEMPERATURE: 97.9 F | HEART RATE: 73 BPM | BODY MASS INDEX: 23.86 KG/M2 | DIASTOLIC BLOOD PRESSURE: 77 MMHG | HEIGHT: 67 IN | RESPIRATION RATE: 16 BRPM | OXYGEN SATURATION: 98 % | SYSTOLIC BLOOD PRESSURE: 120 MMHG

## 2022-02-14 DIAGNOSIS — M25.552 LEFT HIP PAIN: ICD-10-CM

## 2022-02-14 LAB
ANION GAP SERPL CALCULATED.3IONS-SCNC: 11 MMOL/L (ref 5–15)
BACTERIA UR QL AUTO: NORMAL /HPF
BILIRUB UR QL STRIP: NEGATIVE
BUN SERPL-MCNC: 20 MG/DL (ref 8–23)
BUN/CREAT SERPL: 25 (ref 7–25)
CALCIUM SPEC-SCNC: 9.1 MG/DL (ref 8.6–10.5)
CHLORIDE SERPL-SCNC: 103 MMOL/L (ref 98–107)
CLARITY UR: CLEAR
CO2 SERPL-SCNC: 27 MMOL/L (ref 22–29)
COLOR UR: YELLOW
CREAT SERPL-MCNC: 0.8 MG/DL (ref 0.57–1)
DEPRECATED RDW RBC AUTO: 38.5 FL (ref 37–54)
ERYTHROCYTE [DISTWIDTH] IN BLOOD BY AUTOMATED COUNT: 11.8 % (ref 12.3–15.4)
GFR SERPL CREATININE-BSD FRML MDRD: 71 ML/MIN/1.73
GLUCOSE SERPL-MCNC: 86 MG/DL (ref 65–99)
GLUCOSE UR STRIP-MCNC: NEGATIVE MG/DL
HCT VFR BLD AUTO: 39.2 % (ref 34–46.6)
HGB BLD-MCNC: 12.9 G/DL (ref 12–15.9)
HGB UR QL STRIP.AUTO: NEGATIVE
HYALINE CASTS UR QL AUTO: NORMAL /LPF
KETONES UR QL STRIP: NEGATIVE
LEUKOCYTE ESTERASE UR QL STRIP.AUTO: ABNORMAL
MCH RBC QN AUTO: 29.5 PG (ref 26.6–33)
MCHC RBC AUTO-ENTMCNC: 32.9 G/DL (ref 31.5–35.7)
MCV RBC AUTO: 89.7 FL (ref 79–97)
NITRITE UR QL STRIP: NEGATIVE
PH UR STRIP.AUTO: <=5 [PH] (ref 5–8)
PLATELET # BLD AUTO: 219 10*3/MM3 (ref 140–450)
PMV BLD AUTO: 10.6 FL (ref 6–12)
POTASSIUM SERPL-SCNC: 4.1 MMOL/L (ref 3.5–5.2)
PROT UR QL STRIP: NEGATIVE
RBC # BLD AUTO: 4.37 10*6/MM3 (ref 3.77–5.28)
RBC # UR STRIP: NORMAL /HPF
REF LAB TEST METHOD: NORMAL
SODIUM SERPL-SCNC: 141 MMOL/L (ref 136–145)
SP GR UR STRIP: 1.01 (ref 1–1.03)
SQUAMOUS #/AREA URNS HPF: NORMAL /HPF
UROBILINOGEN UR QL STRIP: ABNORMAL
WBC # UR STRIP: NORMAL /HPF
WBC NRBC COR # BLD: 4.57 10*3/MM3 (ref 3.4–10.8)

## 2022-02-14 PROCEDURE — 80048 BASIC METABOLIC PNL TOTAL CA: CPT

## 2022-02-14 PROCEDURE — 85027 COMPLETE CBC AUTOMATED: CPT

## 2022-02-14 PROCEDURE — 81001 URINALYSIS AUTO W/SCOPE: CPT

## 2022-02-14 PROCEDURE — 36415 COLL VENOUS BLD VENIPUNCTURE: CPT

## 2022-02-14 RX ORDER — CHLORHEXIDINE GLUCONATE 500 MG/1
CLOTH TOPICAL 2 TIMES DAILY
Status: ACTIVE | OUTPATIENT
Start: 2022-02-14

## 2022-02-14 RX ORDER — CHLORHEXIDINE GLUCONATE 500 MG/1
CLOTH TOPICAL
COMMUNITY
End: 2022-02-24 | Stop reason: HOSPADM

## 2022-02-14 ASSESSMENT — HOOS JR
HOOS JR SCORE: 55.985
HOOS JR SCORE: 11

## 2022-02-14 NOTE — DISCHARGE INSTRUCTIONS
Take the following medications the morning of surgery:    XANAX    ARRIVE AT 1200.      If you are on prescription narcotic pain medication to control your pain you may also take that medication the morning of surgery.    General Instructions:  • Do not eat solid food after midnight the night before surgery.  • You may drink clear liquids day of surgery but must stop at least one hour before your hospital arrival time.  • It is beneficial for you to have a clear drink that contains carbohydrates the day of surgery.  We suggest a 12 to 20 ounce bottle of Gatorade or Powerade for non-diabetic patients or a 12 to 20 ounce bottle of G2 or Powerade Zero for diabetic patients. (Pediatric patients, are not advised to drink a 12 to 20 ounce carbohydrate drink)    Clear liquids are liquids you can see through.  Nothing red in color.     Plain water                               Sports drinks  Sodas                                   Gelatin (Jell-O)  Fruit juices without pulp such as white grape juice and apple juice  Popsicles that contain no fruit or yogurt  Tea or coffee (no cream or milk added)  Gatorade / Powerade  G2 / Powerade Zero    • Infants may have breast milk up to four hours before surgery.  • Infants drinking formula may drink formula up to six hours before surgery.   • Patients who avoid smoking, chewing tobacco and alcohol for 4 weeks prior to surgery have a reduced risk of post-operative complications.  Quit smoking as many days before surgery as you can.  • Do not smoke, use chewing tobacco or drink alcohol the day of surgery.   • If applicable bring your C-PAP/ BI-PAP machine.  • Bring any papers given to you in the doctor’s office.  • Wear clean comfortable clothes.  • Do not wear contact lenses, false eyelashes or make-up.  Bring a case for your glasses.   • Bring crutches or walker if applicable.  • Remove all piercings.  Leave jewelry and any other valuables at home.  • Hair extensions with metal clips  must be removed prior to surgery.  • The Pre-Admission Testing nurse will instruct you to bring medications if unable to obtain an accurate list in Pre-Admission Testing.        If you were given a blood bank ID arm band remember to bring it with you the day of surgery.    Preventing a Surgical Site Infection:  • For 2 to 3 days before surgery, avoid shaving with a razor because the razor can irritate skin and make it easier to develop an infection.    • Any areas of open skin can increase the risk of a post-operative wound infection by allowing bacteria to enter and travel throughout the body.  Notify your surgeon if you have any skin wounds / rashes even if it is not near the expected surgical site.  The area will need assessed to determine if surgery should be delayed until it is healed.  • The night prior to surgery shower using a fresh bar of anti-bacterial soap (such as Dial) and clean washcloth.  Sleep in a clean bed with clean clothing.  Do not allow pets to sleep with you.  • Shower on the morning of surgery using a fresh bar of anti-bacterial soap (such as Dial) and clean washcloth.  Dry with a clean towel and dress in clean clothing.  • Ask your surgeon if you will be receiving antibiotics prior to surgery.  • Make sure you, your family, and all healthcare providers clean their hands with soap and water or an alcohol based hand  before caring for you or your wound.    Day of surgery:  Your arrival time is approximately two hours before your scheduled surgery time.  Upon arrival, a Pre-op nurse and Anesthesiologist will review your health history, obtain vital signs, and answer questions you may have.  The only belongings needed at this time will be a list of your home medications and if applicable your C-PAP/BI-PAP machine.  A Pre-op nurse will start an IV and you may receive medication in preparation for surgery, including something to help you relax.     Please be aware that surgery does come  with discomfort.  We want to make every effort to control your discomfort so please discuss any uncontrolled symptoms with your nurse.   Your doctor will most likely have prescribed pain medications.      If you are going home after surgery you will receive individualized written care instructions before being discharged.  A responsible adult must drive you to and from the hospital on the day of your surgery and stay with you for 24 hours.  Discharge prescriptions can be filled by the hospital pharmacy during regular pharmacy hours.  If you are having surgery late in the day/evening your prescription may be e-prescribed to your pharmacy.  Please verify your pharmacy hours or chose a 24 hour pharmacy to avoid not having access to your prescription because your pharmacy has closed for the day.    If you are staying overnight following surgery, you will be transported to your hospital room following the recovery period.  [facility] has all private rooms.    If you have any questions please call Pre-Admission Testing at [phone].  Deductibles and co-payments are collected on the day of service. Please be prepared to pay the required co-pay, deductible or deposit on the day of service as defined by your plan.    Patient Education for Self-Quarantine Process    • Following your COVID testing, we strongly recommend that you wear a mask when you are with other people and practice social distancing.   • Limit your activities to only required outings.  • Wash your hands with soap and water frequently for at least 20 seconds.   • Avoid touching your eyes, nose and mouth with unwashed hands.  • Do not share anything - utensils, drinking glasses, food from the same bowl.   • Sanitize household surfaces daily. Include all high touch areas (door handles, light switches, phones, countertops, etc.)    Call your surgeon immediately if you experience any of the following symptoms:  • Sore Throat  • Shortness of  Breath or difficulty breathing  • Cough  • Chills  • Body soreness or muscle pain  • Headache  • Fever  • New loss of taste or smell  • Do not arrive for your surgery ill.  Your procedure will need to be rescheduled to another time.  You will need to call your physician before the day of surgery to avoid any unnecessary exposure to hospital staff as well as other patients.      CHLORHEXIDINE CLOTH INSTRUCTIONS  The morning of surgery follow these instructions using the Chlorhexidine cloths you've been given.  These steps reduce bacteria on the body.  Do not use the cloths near your eyes, ears mouth, genitalia or on open wounds.  Throw the cloths away after use but do not try to flush them down a toilet.      • Open and remove one cloth at a time from the package.    • Leave the cloth unfolded and begin the bathing.  • Massage the skin with the cloths using gentle pressure to remove bacteria.  Do not scrub harshly.   • Follow the steps below with one 2% CHG cloth per area (6 total cloths).  • One cloth for neck, shoulders and chest.  • One cloth for both arms, hands, fingers and underarms (do underarms last).  • One cloth for the abdomen followed by groin.  • One cloth for right leg and foot including between the toes.  • One cloth for left leg and foot including between the toes.  • The last cloth is to be used for the back of the neck, back and buttocks.    Allow the CHG to air dry 3 minutes on the skin which will give it time to work and decrease the chance of irritation.  The skin may feel sticky until it is dry.  Do not rinse with water or any other liquid or you will lose the beneficial effects of the CHG.  If mild skin irritation occurs, do rinse the skin to remove the CHG.  Report this to the nurse at time of admission.  Do not apply lotions, creams, ointments, deodorants or perfumes after using the clothes. Dress in clean clothes before coming to the hospital.    BACTROBAN NASAL OINTMENT  There are many  germs normally in your nose. Bactroban is an ointment that will help reduce these germs. Please follow these instructions for Bactroban use:      ____The day before surgery in the morning  Date________    ____The day before surgery in the evening              Date________    ____The day of surgery in the morning    Date________    **Squirt ½ package of Bactroban Ointment onto a cotton applicator and apply to inside of 1st nostril.  Squirt the remaining Bactroban and apply to the inside of the other nostril.

## 2022-02-17 ENCOUNTER — OFFICE VISIT (OUTPATIENT)
Dept: ORTHOPEDIC SURGERY | Facility: CLINIC | Age: 72
End: 2022-02-17

## 2022-02-17 VITALS
BODY MASS INDEX: 24.96 KG/M2 | TEMPERATURE: 96.6 F | DIASTOLIC BLOOD PRESSURE: 82 MMHG | SYSTOLIC BLOOD PRESSURE: 117 MMHG | HEIGHT: 67 IN | WEIGHT: 159 LBS

## 2022-02-17 DIAGNOSIS — M25.552 LEFT HIP PAIN: Primary | ICD-10-CM

## 2022-02-17 PROCEDURE — S0260 H&P FOR SURGERY: HCPCS | Performed by: NURSE PRACTITIONER

## 2022-02-17 NOTE — H&P
Patient: Iesha Roldan    Date of Admission: 2/23/2022    YOB: 1950    Medical Record Number: [id number]    Admitting Physician: Dr. Giorgio Keenan    Reason for Admission: Painful left hip bipolar hemiarthroplasty    History of Present Illness: 71 y.o. female presents with severe pain with history of left hip bipolar hemiarthroplasty despite conservative attempts, there is still severe, constant activity limiting hip pain. Given the severity of the pain, the patient has elected to proceed with conversion from bipolar hemiarthroplasty to total hip replacement  Allergies:   Allergies   Allergen Reactions   • Erythromycin Nausea And Vomiting         Current Medications:  Home Medications:    Current Outpatient Medications on File Prior to Visit   Medication Sig   • ALPRAZolam (Xanax) 0.25 MG tablet Take 1 tablet by mouth 2 (Two) Times a Day As Needed for Anxiety.   • Chlorhexidine Gluconate Cloth 2 % pads Apply  topically.   • Cholecalciferol (Vitamin D3) 50 MCG (2000 UT) capsule Take 1 capsule by mouth Daily.   • fluticasone (Flonase) 50 MCG/ACT nasal spray 2 sprays into the nostril(s) as directed by provider Daily. (Patient taking differently: 2 sprays into the nostril(s) as directed by provider Daily As Needed.)   • Multiple Vitamin (MULTIVITAMIN) tablet tablet Take 1 tablet by mouth Daily. HOLD PER MD INSTR   • mupirocin (BACTROBAN) 2 % nasal ointment into the nostril(s) as directed by provider 2 (Two) Times a Day.     Current Facility-Administered Medications on File Prior to Visit   Medication   • Chlorhexidine Gluconate Cloth 2 % pads   • cyanocobalamin injection 1,000 mcg     PRN Meds:.    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   • Neck pain    • Osteopenia    • Pharyngitis         PSURGH:  Past Surgical History:   Procedure Laterality Date   • COLONOSCOPY  2017   • COLONOSCOPY  2019    Tubular adenoma, diverticulosis   • HIP SURGERY Left 2009    Joint replacement   • PAROTIDECTOMY Right 1/31/2017    Procedure: RIGHT SUPERFICIAL PAROTIDECTOMY EXCISION PAROTID MASS ;  Surgeon: Candelario Calderon MD;  [place] [facility] [place] [facility] [place]:    • TUBAL ABDOMINAL LIGATION  In 1989       SocialHx:  Social History     Occupational History   • 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   Tobacco Use   • Smoking status: Never Smoker   • Smokeless tobacco: Never Used   Vaping Use   • Vaping Use: Never used   Substance and Sexual Activity   • Alcohol use: Not Currently     Comment: SOCIALLY   • Drug use: No   • Sexual activity: Yes     Partners: Male     Birth control/protection: Post-menopausal, Tubal ligation     Comment: spouse = FRANC      Social History     Social History Narrative   • Not on file       FamHx:  Family History   Problem Relation Age of Onset   • Peripheral vascular disease Mother    • Pancreatic cancer Father 91   • Heart disease Father    • Hypertension Father    • Rectal cancer Sister 36   • Diabetes Sister    • No Known Problems Brother    • No Known Problems Daughter    • No Known Problems Son    • Colon cancer Maternal Grandmother    • No Known Problems Paternal Grandmother    • No Known Problems Maternal Aunt    • No Known Problems Paternal Aunt    • Cancer Paternal Uncle 38        Mucoepidermoid CA of the parotid gland   • BRCA 1/2 Neg Hx    • Breast cancer Neg Hx    • Endometrial cancer Neg Hx    • Ovarian cancer Neg Hx    • Malig Hyperthermia Neg Hx          Review of Systems:   A 14 point review of systems was performed, pertinent positives discussed above, all other systems are negative    Physical Exam: 71 y.o. female  Vital Signs :   Vitals:    02/17/22 1339   BP: 117/82   Temp: 96.6 °F (35.9 °C)  "  TempSrc: Temporal   Weight: 72.1 kg (159 lb)   Height: 170.2 cm (67.01\")   PainSc:   2     General: Alert and Oriented x 3, No acute distress.  Psych: mood and affect appropriate; recent and remote memory intact  Eyes: conjunctiva clear; pupils equally round and reactive, sclera nonicteric  CV: no peripheral edema  Resp: normal respiratory effort  Skin: no rashes or wounds; normal turgor  Musculosketetal; pain with hip range of motion. Positive stinchfeld test. No trochanteric  Tenderness.  Vascular: palpable distal pulses    Labs:    Pre-Admission Testing on 02/14/2022   Component Date Value Ref Range Status   • Glucose 02/14/2022 86  65 - 99 mg/dL Final   • BUN 02/14/2022 20  8 - 23 mg/dL Final   • Creatinine 02/14/2022 0.80  0.57 - 1.00 mg/dL Final   • Sodium 02/14/2022 141  136 - 145 mmol/L Final   • Potassium 02/14/2022 4.1  3.5 - 5.2 mmol/L Final   • Chloride 02/14/2022 103  98 - 107 mmol/L Final   • CO2 02/14/2022 27.0  22.0 - 29.0 mmol/L Final   • Calcium 02/14/2022 9.1  8.6 - 10.5 mg/dL Final   • eGFR Non  Amer 02/14/2022 71  >60 mL/min/1.73 Final   • BUN/Creatinine Ratio 02/14/2022 25.0  7.0 - 25.0 Final   • Anion Gap 02/14/2022 11.0  5.0 - 15.0 mmol/L Final   • WBC 02/14/2022 4.57  3.40 - 10.80 10*3/mm3 Final   • RBC 02/14/2022 4.37  3.77 - 5.28 10*6/mm3 Final   • Hemoglobin 02/14/2022 12.9  12.0 - 15.9 g/dL Final   • Hematocrit 02/14/2022 39.2  34.0 - 46.6 % Final   • MCV 02/14/2022 89.7  79.0 - 97.0 fL Final   • MCH 02/14/2022 29.5  26.6 - 33.0 pg Final   • MCHC 02/14/2022 32.9  31.5 - 35.7 g/dL Final   • RDW 02/14/2022 11.8* 12.3 - 15.4 % Final   • RDW-SD 02/14/2022 38.5  37.0 - 54.0 fl Final   • MPV 02/14/2022 10.6  6.0 - 12.0 fL Final   • Platelets 02/14/2022 219  140 - 450 10*3/mm3 Final   • Color, UA 02/14/2022 Yellow  Yellow, Straw Final   • Appearance, UA 02/14/2022 Clear  Clear Final   • pH, UA 02/14/2022 <=5.0  5.0 - 8.0 Final   • Specific Gravity, UA 02/14/2022 1.012  1.005 - 1.030 " Final   • Glucose, UA 02/14/2022 Negative  Negative Final   • Ketones, UA 02/14/2022 Negative  Negative Final   • Bilirubin, UA 02/14/2022 Negative  Negative Final   • Blood, UA 02/14/2022 Negative  Negative Final   • Protein, UA 02/14/2022 Negative  Negative Final   • Leuk Esterase, UA 02/14/2022 Trace* Negative Final   • Nitrite, UA 02/14/2022 Negative  Negative Final   • Urobilinogen, UA 02/14/2022 0.2 E.U./dL  0.2 - 1.0 E.U./dL Final   • RBC, UA 02/14/2022 0-2  None Seen, 0-2 /HPF Final   • WBC, UA 02/14/2022 0-2  None Seen, 0-2 /HPF Final   • Bacteria, UA 02/14/2022 None Seen  None Seen /HPF Final   • Squamous Epithelial Cells, UA 02/14/2022 0-2  None Seen, 0-2 /HPF Final   • Hyaline Casts, UA 02/14/2022 3-6  None Seen /LPF Final   • Methodology 02/14/2022 Automated Microscopy   Final     Assessment: Painful left hip bipolar hemiarthroplasty conservative measures have failed.      Plan:  The plan is to proceed with Left hip conversion from bipolar hemiarthroplasty to ROOPA the patient voiced understanding of the risks, benefits, and alternative forms of treatment that were discussed with Dr Keenan at the time of scheduling. 23     Lamar Perez, APRN  2/17/2022

## 2022-02-17 NOTE — H&P (VIEW-ONLY)
Patient: Iesha Roldan    Date of Admission: 2/23/2022    YOB: 1950    Medical Record Number: [id number]    Admitting Physician: Dr. Giorgio Keenan    Reason for Admission: Painful left hip bipolar hemiarthroplasty    History of Present Illness: 71 y.o. female presents with severe pain with history of left hip bipolar hemiarthroplasty despite conservative attempts, there is still severe, constant activity limiting hip pain. Given the severity of the pain, the patient has elected to proceed with conversion from bipolar hemiarthroplasty to total hip replacement  Allergies:   Allergies   Allergen Reactions   • Erythromycin Nausea And Vomiting         Current Medications:  Home Medications:    Current Outpatient Medications on File Prior to Visit   Medication Sig   • ALPRAZolam (Xanax) 0.25 MG tablet Take 1 tablet by mouth 2 (Two) Times a Day As Needed for Anxiety.   • Chlorhexidine Gluconate Cloth 2 % pads Apply  topically.   • Cholecalciferol (Vitamin D3) 50 MCG (2000 UT) capsule Take 1 capsule by mouth Daily.   • fluticasone (Flonase) 50 MCG/ACT nasal spray 2 sprays into the nostril(s) as directed by provider Daily. (Patient taking differently: 2 sprays into the nostril(s) as directed by provider Daily As Needed.)   • Multiple Vitamin (MULTIVITAMIN) tablet tablet Take 1 tablet by mouth Daily. HOLD PER MD INSTR   • mupirocin (BACTROBAN) 2 % nasal ointment into the nostril(s) as directed by provider 2 (Two) Times a Day.     Current Facility-Administered Medications on File Prior to Visit   Medication   • Chlorhexidine Gluconate Cloth 2 % pads   • cyanocobalamin injection 1,000 mcg     PRN Meds:.    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   • Neck pain    • Osteopenia    • Pharyngitis         PSURGH:  Past Surgical History:   Procedure Laterality Date   • COLONOSCOPY  2017   • COLONOSCOPY  2019    Tubular adenoma, diverticulosis   • HIP SURGERY Left 2009    Joint replacement   • PAROTIDECTOMY Right 1/31/2017    Procedure: RIGHT SUPERFICIAL PAROTIDECTOMY EXCISION PAROTID MASS ;  Surgeon: Candelario Calderon MD;  [place] [facility] [place] [facility] [place];  Service:    • TUBAL ABDOMINAL LIGATION  In 1989       SocialHx:  Social History     Occupational History   • 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   Tobacco Use   • Smoking status: Never Smoker   • Smokeless tobacco: Never Used   Vaping Use   • Vaping Use: Never used   Substance and Sexual Activity   • Alcohol use: Not Currently     Comment: SOCIALLY   • Drug use: No   • Sexual activity: Yes     Partners: Male     Birth control/protection: Post-menopausal, Tubal ligation     Comment: spouse = FRANC      Social History     Social History Narrative   • Not on file       FamHx:  Family History   Problem Relation Age of Onset   • Peripheral vascular disease Mother    • Pancreatic cancer Father 91   • Heart disease Father    • Hypertension Father    • Rectal cancer Sister 36   • Diabetes Sister    • No Known Problems Brother    • No Known Problems Daughter    • No Known Problems Son    • Colon cancer Maternal Grandmother    • No Known Problems Paternal Grandmother    • No Known Problems Maternal Aunt    • No Known Problems Paternal Aunt    • Cancer Paternal Uncle 38        Mucoepidermoid CA of the parotid gland   • BRCA 1/2 Neg Hx    • Breast cancer Neg Hx    • Endometrial cancer Neg Hx    • Ovarian cancer Neg Hx    • Malig Hyperthermia Neg Hx          Review of Systems:   A 14 point review of systems was performed, pertinent positives discussed above, all other systems are negative    Physical Exam: 71 y.o. female  Vital Signs :   Vitals:    02/17/22 1339   BP: 117/82   Temp: 96.6 °F (35.9 °C)  "  TempSrc: Temporal   Weight: 72.1 kg (159 lb)   Height: 170.2 cm (67.01\")   PainSc:   2     General: Alert and Oriented x 3, No acute distress.  Psych: mood and affect appropriate; recent and remote memory intact  Eyes: conjunctiva clear; pupils equally round and reactive, sclera nonicteric  CV: no peripheral edema  Resp: normal respiratory effort  Skin: no rashes or wounds; normal turgor  Musculosketetal; pain with hip range of motion. Positive stinchfeld test. No trochanteric  Tenderness.  Vascular: palpable distal pulses    Labs:    Pre-Admission Testing on 02/14/2022   Component Date Value Ref Range Status   • Glucose 02/14/2022 86  65 - 99 mg/dL Final   • BUN 02/14/2022 20  8 - 23 mg/dL Final   • Creatinine 02/14/2022 0.80  0.57 - 1.00 mg/dL Final   • Sodium 02/14/2022 141  136 - 145 mmol/L Final   • Potassium 02/14/2022 4.1  3.5 - 5.2 mmol/L Final   • Chloride 02/14/2022 103  98 - 107 mmol/L Final   • CO2 02/14/2022 27.0  22.0 - 29.0 mmol/L Final   • Calcium 02/14/2022 9.1  8.6 - 10.5 mg/dL Final   • eGFR Non  Amer 02/14/2022 71  >60 mL/min/1.73 Final   • BUN/Creatinine Ratio 02/14/2022 25.0  7.0 - 25.0 Final   • Anion Gap 02/14/2022 11.0  5.0 - 15.0 mmol/L Final   • WBC 02/14/2022 4.57  3.40 - 10.80 10*3/mm3 Final   • RBC 02/14/2022 4.37  3.77 - 5.28 10*6/mm3 Final   • Hemoglobin 02/14/2022 12.9  12.0 - 15.9 g/dL Final   • Hematocrit 02/14/2022 39.2  34.0 - 46.6 % Final   • MCV 02/14/2022 89.7  79.0 - 97.0 fL Final   • MCH 02/14/2022 29.5  26.6 - 33.0 pg Final   • MCHC 02/14/2022 32.9  31.5 - 35.7 g/dL Final   • RDW 02/14/2022 11.8* 12.3 - 15.4 % Final   • RDW-SD 02/14/2022 38.5  37.0 - 54.0 fl Final   • MPV 02/14/2022 10.6  6.0 - 12.0 fL Final   • Platelets 02/14/2022 219  140 - 450 10*3/mm3 Final   • Color, UA 02/14/2022 Yellow  Yellow, Straw Final   • Appearance, UA 02/14/2022 Clear  Clear Final   • pH, UA 02/14/2022 <=5.0  5.0 - 8.0 Final   • Specific Gravity, UA 02/14/2022 1.012  1.005 - 1.030 " Final   • Glucose, UA 02/14/2022 Negative  Negative Final   • Ketones, UA 02/14/2022 Negative  Negative Final   • Bilirubin, UA 02/14/2022 Negative  Negative Final   • Blood, UA 02/14/2022 Negative  Negative Final   • Protein, UA 02/14/2022 Negative  Negative Final   • Leuk Esterase, UA 02/14/2022 Trace* Negative Final   • Nitrite, UA 02/14/2022 Negative  Negative Final   • Urobilinogen, UA 02/14/2022 0.2 E.U./dL  0.2 - 1.0 E.U./dL Final   • RBC, UA 02/14/2022 0-2  None Seen, 0-2 /HPF Final   • WBC, UA 02/14/2022 0-2  None Seen, 0-2 /HPF Final   • Bacteria, UA 02/14/2022 None Seen  None Seen /HPF Final   • Squamous Epithelial Cells, UA 02/14/2022 0-2  None Seen, 0-2 /HPF Final   • Hyaline Casts, UA 02/14/2022 3-6  None Seen /LPF Final   • Methodology 02/14/2022 Automated Microscopy   Final     Assessment: Painful left hip bipolar hemiarthroplasty conservative measures have failed.      Plan:  The plan is to proceed with Left hip conversion from bipolar hemiarthroplasty to ROOPA the patient voiced understanding of the risks, benefits, and alternative forms of treatment that were discussed with Dr Keenan at the time of scheduling. 23     Lamar Perez, APRN  2/17/2022

## 2022-02-18 ENCOUNTER — TELEPHONE (OUTPATIENT)
Dept: ORTHOPEDIC SURGERY | Facility: CLINIC | Age: 72
End: 2022-02-18

## 2022-02-18 NOTE — TELEPHONE ENCOUNTER
Caller: CHENTE KHAN    Relationship: SELF     Best call back number: [phone]    What was the call regarding: PATIENT  IS REQUESTING TO SPEAK WITH ELAINE REGARDING UPCOMING SURGERY- PATIENT STATED SHE HAD RADIATION THREE YEARS AGO; SOMEONE TOLD HER TO SHE SHOULD LET PROVIDER KNOW- PLEASE RETURN CALL     Do you require a callback: YES

## 2022-02-22 ENCOUNTER — LAB (OUTPATIENT)
Dept: LAB | Facility: HOSPITAL | Age: 72
End: 2022-02-22

## 2022-02-22 DIAGNOSIS — M25.552 LEFT HIP PAIN: ICD-10-CM

## 2022-02-22 LAB — SARS-COV-2 ORF1AB RESP QL NAA+PROBE: NOT DETECTED

## 2022-02-22 PROCEDURE — C9803 HOPD COVID-19 SPEC COLLECT: HCPCS

## 2022-02-22 PROCEDURE — U0005 INFEC AGEN DETEC AMPLI PROBE: HCPCS

## 2022-02-22 PROCEDURE — U0004 COV-19 TEST NON-CDC HGH THRU: HCPCS

## 2022-02-23 ENCOUNTER — APPOINTMENT (OUTPATIENT)
Dept: GENERAL RADIOLOGY | Facility: HOSPITAL | Age: 72
End: 2022-02-23

## 2022-02-23 ENCOUNTER — HOSPITAL ENCOUNTER (OUTPATIENT)
Facility: HOSPITAL | Age: 72
Discharge: HOME HEALTH | End: 2022-02-24
Attending: ORTHOPAEDIC SURGERY | Admitting: ORTHOPAEDIC SURGERY

## 2022-02-23 ENCOUNTER — ANESTHESIA (OUTPATIENT)
Dept: PERIOP | Facility: HOSPITAL | Age: 72
End: 2022-02-23

## 2022-02-23 ENCOUNTER — ANESTHESIA EVENT (OUTPATIENT)
Dept: PERIOP | Facility: HOSPITAL | Age: 72
End: 2022-02-23

## 2022-02-23 DIAGNOSIS — Z96.642 HISTORY OF REVISION OF TOTAL REPLACEMENT OF LEFT HIP JOINT: Primary | ICD-10-CM

## 2022-02-23 DIAGNOSIS — M25.552 LEFT HIP PAIN: ICD-10-CM

## 2022-02-23 LAB
ABO GROUP BLD: NORMAL
BLD GP AB SCN SERPL QL: NEGATIVE
RH BLD: NEGATIVE
T&S EXPIRATION DATE: NORMAL

## 2022-02-23 PROCEDURE — G0378 HOSPITAL OBSERVATION PER HR: HCPCS

## 2022-02-23 PROCEDURE — 25010000002 VANCOMYCIN 1 G RECONSTITUTED SOLUTION: Performed by: ANESTHESIOLOGY

## 2022-02-23 PROCEDURE — 86850 RBC ANTIBODY SCREEN: CPT | Performed by: ORTHOPAEDIC SURGERY

## 2022-02-23 PROCEDURE — 73501 X-RAY EXAM HIP UNI 1 VIEW: CPT

## 2022-02-23 PROCEDURE — 25010000002 EPINEPHRINE 1 MG/ML SOLUTION 30 ML VIAL: Performed by: ORTHOPAEDIC SURGERY

## 2022-02-23 PROCEDURE — 25010000002 FENTANYL CITRATE (PF) 50 MCG/ML SOLUTION: Performed by: ANESTHESIOLOGY

## 2022-02-23 PROCEDURE — C1713 ANCHOR/SCREW BN/BN,TIS/BN: HCPCS | Performed by: ORTHOPAEDIC SURGERY

## 2022-02-23 PROCEDURE — 0 CEFAZOLIN IN DEXTROSE 2-4 GM/100ML-% SOLUTION: Performed by: ORTHOPAEDIC SURGERY

## 2022-02-23 PROCEDURE — 86900 BLOOD TYPING SEROLOGIC ABO: CPT | Performed by: ORTHOPAEDIC SURGERY

## 2022-02-23 PROCEDURE — C1889 IMPLANT/INSERT DEVICE, NOC: HCPCS | Performed by: ORTHOPAEDIC SURGERY

## 2022-02-23 PROCEDURE — 25010000002 ROPIVACAINE PER 1 MG: Performed by: ORTHOPAEDIC SURGERY

## 2022-02-23 PROCEDURE — 25010000002 KETOROLAC TROMETHAMINE PER 15 MG: Performed by: ANESTHESIOLOGY

## 2022-02-23 PROCEDURE — 25010000002 KETOROLAC TROMETHAMINE PER 15 MG: Performed by: ORTHOPAEDIC SURGERY

## 2022-02-23 PROCEDURE — 27132 TOTAL HIP ARTHROPLASTY: CPT | Performed by: ORTHOPAEDIC SURGERY

## 2022-02-23 PROCEDURE — C1776 JOINT DEVICE (IMPLANTABLE): HCPCS | Performed by: ORTHOPAEDIC SURGERY

## 2022-02-23 PROCEDURE — 25010000002 PROPOFOL 10 MG/ML EMULSION: Performed by: ANESTHESIOLOGY

## 2022-02-23 PROCEDURE — 25010000002 MIDAZOLAM PER 1 MG: Performed by: ANESTHESIOLOGY

## 2022-02-23 PROCEDURE — 25010000002 ONDANSETRON PER 1 MG: Performed by: ANESTHESIOLOGY

## 2022-02-23 PROCEDURE — 25010000002 MORPHINE PER 10 MG: Performed by: ORTHOPAEDIC SURGERY

## 2022-02-23 PROCEDURE — 25010000002 HYDROMORPHONE PER 4 MG: Performed by: ANESTHESIOLOGY

## 2022-02-23 PROCEDURE — 0 CEFAZOLIN IN DEXTROSE 2-4 GM/100ML-% SOLUTION: Performed by: ANESTHESIOLOGY

## 2022-02-23 PROCEDURE — 0 CEFAZOLIN IN DEXTROSE 2-4 GM/100ML-% SOLUTION: Performed by: NURSE PRACTITIONER

## 2022-02-23 PROCEDURE — 25010000002 DEXAMETHASONE PER 1 MG: Performed by: ANESTHESIOLOGY

## 2022-02-23 PROCEDURE — 86901 BLOOD TYPING SEROLOGIC RH(D): CPT | Performed by: ORTHOPAEDIC SURGERY

## 2022-02-23 PROCEDURE — 25010000002 VANCOMYCIN PER 500 MG: Performed by: ORTHOPAEDIC SURGERY

## 2022-02-23 DEVICE — R3 3 HOLE ACETABULAR SHELL 52MM
Type: IMPLANTABLE DEVICE | Site: HIP | Status: FUNCTIONAL
Brand: R3 ACETABULAR

## 2022-02-23 DEVICE — REFLECTION SPHERICAL HEAD SCREW 20MM
Type: IMPLANTABLE DEVICE | Site: HIP | Status: FUNCTIONAL
Brand: REFLECTION

## 2022-02-23 DEVICE — REFLECTION SPHERICAL HEAD SCREW 35MM
Type: IMPLANTABLE DEVICE | Site: HIP | Status: FUNCTIONAL
Brand: REFLECTION

## 2022-02-23 DEVICE — BIOLOX DELTA TS CERAMIC FEMORAL HEAD 12/14 TAPER REVISION DIAMETER 36MM +5
Type: IMPLANTABLE DEVICE | Site: HIP | Status: FUNCTIONAL
Brand: BIOLOX DELTA

## 2022-02-23 DEVICE — REFLECTION SPHERICAL HEAD SCREW 25MM
Type: IMPLANTABLE DEVICE | Site: HIP | Status: FUNCTIONAL
Brand: REFLECTION

## 2022-02-23 DEVICE — DEV CONTRL TISS STRATAFIX SYMM PDS PLUS VIL CT-1 60CM: Type: IMPLANTABLE DEVICE | Site: HIP | Status: FUNCTIONAL

## 2022-02-23 DEVICE — R3 20 DEGREE XLPE ACETABULAR LINER                                    36MM INNER DIAMETER X OUTER DIAMETER 52MM
Type: IMPLANTABLE DEVICE | Site: HIP | Status: FUNCTIONAL
Brand: R3

## 2022-02-23 DEVICE — DEV CONTRL TISS STRATAFIX SPIRAL MNCRYL UD 3/0 PLS 30CM: Type: IMPLANTABLE DEVICE | Site: HIP | Status: FUNCTIONAL

## 2022-02-23 RX ORDER — POLYETHYLENE GLYCOL 3350 17 G/17G
17 POWDER, FOR SOLUTION ORAL 2 TIMES DAILY
Status: DISCONTINUED | OUTPATIENT
Start: 2022-02-23 | End: 2022-02-24 | Stop reason: HOSPADM

## 2022-02-23 RX ORDER — FLUMAZENIL 0.1 MG/ML
0.2 INJECTION INTRAVENOUS AS NEEDED
Status: DISCONTINUED | OUTPATIENT
Start: 2022-02-23 | End: 2022-02-23 | Stop reason: HOSPADM

## 2022-02-23 RX ORDER — PROMETHAZINE HYDROCHLORIDE 25 MG/1
25 TABLET ORAL ONCE AS NEEDED
Status: DISCONTINUED | OUTPATIENT
Start: 2022-02-23 | End: 2022-02-23 | Stop reason: HOSPADM

## 2022-02-23 RX ORDER — ONDANSETRON 2 MG/ML
INJECTION INTRAMUSCULAR; INTRAVENOUS AS NEEDED
Status: DISCONTINUED | OUTPATIENT
Start: 2022-02-23 | End: 2022-02-23 | Stop reason: SURG

## 2022-02-23 RX ORDER — SODIUM CHLORIDE, SODIUM LACTATE, POTASSIUM CHLORIDE, CALCIUM CHLORIDE 600; 310; 30; 20 MG/100ML; MG/100ML; MG/100ML; MG/100ML
9 INJECTION, SOLUTION INTRAVENOUS CONTINUOUS
Status: DISCONTINUED | OUTPATIENT
Start: 2022-02-23 | End: 2022-02-23 | Stop reason: HOSPADM

## 2022-02-23 RX ORDER — DIPHENHYDRAMINE HCL 25 MG
25 CAPSULE ORAL
Status: DISCONTINUED | OUTPATIENT
Start: 2022-02-23 | End: 2022-02-23 | Stop reason: HOSPADM

## 2022-02-23 RX ORDER — UREA 10 %
3 LOTION (ML) TOPICAL NIGHTLY PRN
Status: DISCONTINUED | OUTPATIENT
Start: 2022-02-23 | End: 2022-02-24 | Stop reason: HOSPADM

## 2022-02-23 RX ORDER — PANTOPRAZOLE SODIUM 40 MG/1
40 TABLET, DELAYED RELEASE ORAL DAILY
Qty: 14 TABLET | Refills: 0 | Status: SHIPPED | OUTPATIENT
Start: 2022-02-23 | End: 2022-03-10

## 2022-02-23 RX ORDER — VANCOMYCIN HYDROCHLORIDE 1 G/200ML
15 INJECTION, SOLUTION INTRAVENOUS ONCE
Status: COMPLETED | OUTPATIENT
Start: 2022-02-23 | End: 2022-02-23

## 2022-02-23 RX ORDER — HYDROCODONE BITARTRATE AND ACETAMINOPHEN 7.5; 325 MG/1; MG/1
2 TABLET ORAL EVERY 4 HOURS PRN
Status: DISCONTINUED | OUTPATIENT
Start: 2022-02-23 | End: 2022-02-24 | Stop reason: HOSPADM

## 2022-02-23 RX ORDER — LIDOCAINE HYDROCHLORIDE 10 MG/ML
0.5 INJECTION, SOLUTION EPIDURAL; INFILTRATION; INTRACAUDAL; PERINEURAL ONCE AS NEEDED
Status: DISCONTINUED | OUTPATIENT
Start: 2022-02-23 | End: 2022-02-23 | Stop reason: HOSPADM

## 2022-02-23 RX ORDER — CEFAZOLIN SODIUM 2 G/100ML
2 INJECTION, SOLUTION INTRAVENOUS EVERY 8 HOURS
Status: COMPLETED | OUTPATIENT
Start: 2022-02-23 | End: 2022-02-24

## 2022-02-23 RX ORDER — MELOXICAM 15 MG/1
15 TABLET ORAL DAILY
Status: DISCONTINUED | OUTPATIENT
Start: 2022-02-24 | End: 2022-02-24 | Stop reason: HOSPADM

## 2022-02-23 RX ORDER — OXYCODONE AND ACETAMINOPHEN 7.5; 325 MG/1; MG/1
1 TABLET ORAL EVERY 4 HOURS PRN
Status: DISCONTINUED | OUTPATIENT
Start: 2022-02-23 | End: 2022-02-23 | Stop reason: HOSPADM

## 2022-02-23 RX ORDER — HYDRALAZINE HYDROCHLORIDE 20 MG/ML
5 INJECTION INTRAMUSCULAR; INTRAVENOUS
Status: DISCONTINUED | OUTPATIENT
Start: 2022-02-23 | End: 2022-02-23 | Stop reason: HOSPADM

## 2022-02-23 RX ORDER — DIPHENHYDRAMINE HYDROCHLORIDE 50 MG/ML
12.5 INJECTION INTRAMUSCULAR; INTRAVENOUS
Status: DISCONTINUED | OUTPATIENT
Start: 2022-02-23 | End: 2022-02-23 | Stop reason: HOSPADM

## 2022-02-23 RX ORDER — CEFAZOLIN SODIUM 2 G/100ML
INJECTION, SOLUTION INTRAVENOUS AS NEEDED
Status: DISCONTINUED | OUTPATIENT
Start: 2022-02-23 | End: 2022-02-23 | Stop reason: SURG

## 2022-02-23 RX ORDER — VANCOMYCIN HYDROCHLORIDE 1 G/20ML
INJECTION, POWDER, LYOPHILIZED, FOR SOLUTION INTRAVENOUS AS NEEDED
Status: DISCONTINUED | OUTPATIENT
Start: 2022-02-23 | End: 2022-02-23 | Stop reason: SURG

## 2022-02-23 RX ORDER — LABETALOL HYDROCHLORIDE 5 MG/ML
5 INJECTION, SOLUTION INTRAVENOUS
Status: DISCONTINUED | OUTPATIENT
Start: 2022-02-23 | End: 2022-02-23 | Stop reason: HOSPADM

## 2022-02-23 RX ORDER — DEXAMETHASONE SODIUM PHOSPHATE 4 MG/ML
INJECTION, SOLUTION INTRA-ARTICULAR; INTRALESIONAL; INTRAMUSCULAR; INTRAVENOUS; SOFT TISSUE AS NEEDED
Status: DISCONTINUED | OUTPATIENT
Start: 2022-02-23 | End: 2022-02-23 | Stop reason: SURG

## 2022-02-23 RX ORDER — PROMETHAZINE HYDROCHLORIDE 12.5 MG/1
12.5 TABLET ORAL EVERY 4 HOURS PRN
Status: DISCONTINUED | OUTPATIENT
Start: 2022-02-23 | End: 2022-02-24 | Stop reason: HOSPADM

## 2022-02-23 RX ORDER — FENTANYL CITRATE 50 UG/ML
50 INJECTION, SOLUTION INTRAMUSCULAR; INTRAVENOUS
Status: DISCONTINUED | OUTPATIENT
Start: 2022-02-23 | End: 2022-02-23 | Stop reason: HOSPADM

## 2022-02-23 RX ORDER — FAMOTIDINE 10 MG/ML
20 INJECTION, SOLUTION INTRAVENOUS ONCE
Status: COMPLETED | OUTPATIENT
Start: 2022-02-23 | End: 2022-02-23

## 2022-02-23 RX ORDER — HYDROCODONE BITARTRATE AND ACETAMINOPHEN 7.5; 325 MG/1; MG/1
1 TABLET ORAL ONCE AS NEEDED
Status: DISCONTINUED | OUTPATIENT
Start: 2022-02-23 | End: 2022-02-23 | Stop reason: HOSPADM

## 2022-02-23 RX ORDER — EPHEDRINE SULFATE 50 MG/ML
5 INJECTION, SOLUTION INTRAVENOUS ONCE AS NEEDED
Status: DISCONTINUED | OUTPATIENT
Start: 2022-02-23 | End: 2022-02-23 | Stop reason: HOSPADM

## 2022-02-23 RX ORDER — PREGABALIN 75 MG/1
150 CAPSULE ORAL ONCE
Status: COMPLETED | OUTPATIENT
Start: 2022-02-23 | End: 2022-02-23

## 2022-02-23 RX ORDER — MELOXICAM 15 MG/1
15 TABLET ORAL ONCE
Status: COMPLETED | OUTPATIENT
Start: 2022-02-23 | End: 2022-02-23

## 2022-02-23 RX ORDER — NALOXONE HCL 0.4 MG/ML
0.2 VIAL (ML) INJECTION AS NEEDED
Status: DISCONTINUED | OUTPATIENT
Start: 2022-02-23 | End: 2022-02-23 | Stop reason: HOSPADM

## 2022-02-23 RX ORDER — TRANEXAMIC ACID 100 MG/ML
INJECTION, SOLUTION INTRAVENOUS AS NEEDED
Status: DISCONTINUED | OUTPATIENT
Start: 2022-02-23 | End: 2022-02-23 | Stop reason: SURG

## 2022-02-23 RX ORDER — IBUPROFEN 600 MG/1
600 TABLET ORAL ONCE AS NEEDED
Status: DISCONTINUED | OUTPATIENT
Start: 2022-02-23 | End: 2022-02-23 | Stop reason: HOSPADM

## 2022-02-23 RX ORDER — ALPRAZOLAM 0.25 MG/1
0.25 TABLET ORAL 2 TIMES DAILY PRN
Status: DISCONTINUED | OUTPATIENT
Start: 2022-02-23 | End: 2022-02-24 | Stop reason: HOSPADM

## 2022-02-23 RX ORDER — MAGNESIUM HYDROXIDE 1200 MG/15ML
LIQUID ORAL AS NEEDED
Status: DISCONTINUED | OUTPATIENT
Start: 2022-02-23 | End: 2022-02-23 | Stop reason: HOSPADM

## 2022-02-23 RX ORDER — PROPOFOL 10 MG/ML
VIAL (ML) INTRAVENOUS AS NEEDED
Status: DISCONTINUED | OUTPATIENT
Start: 2022-02-23 | End: 2022-02-23 | Stop reason: SURG

## 2022-02-23 RX ORDER — ROCURONIUM BROMIDE 10 MG/ML
INJECTION, SOLUTION INTRAVENOUS AS NEEDED
Status: DISCONTINUED | OUTPATIENT
Start: 2022-02-23 | End: 2022-02-23 | Stop reason: SURG

## 2022-02-23 RX ORDER — PROPOFOL 10 MG/ML
VIAL (ML) INTRAVENOUS CONTINUOUS PRN
Status: DISCONTINUED | OUTPATIENT
Start: 2022-02-23 | End: 2022-02-23 | Stop reason: SURG

## 2022-02-23 RX ORDER — EPHEDRINE SULFATE 50 MG/ML
INJECTION, SOLUTION INTRAVENOUS AS NEEDED
Status: DISCONTINUED | OUTPATIENT
Start: 2022-02-23 | End: 2022-02-23 | Stop reason: SURG

## 2022-02-23 RX ORDER — POLYETHYLENE GLYCOL 3350 17 G/17G
17 POWDER, FOR SOLUTION ORAL 2 TIMES DAILY
Qty: 238 G | Refills: 0 | Status: SHIPPED | OUTPATIENT
Start: 2022-02-23 | End: 2022-03-03

## 2022-02-23 RX ORDER — HYDROMORPHONE HYDROCHLORIDE 1 MG/ML
0.5 INJECTION, SOLUTION INTRAMUSCULAR; INTRAVENOUS; SUBCUTANEOUS
Status: DISCONTINUED | OUTPATIENT
Start: 2022-02-23 | End: 2022-02-23 | Stop reason: HOSPADM

## 2022-02-23 RX ORDER — MIDAZOLAM HYDROCHLORIDE 1 MG/ML
0.5 INJECTION INTRAMUSCULAR; INTRAVENOUS
Status: DISCONTINUED | OUTPATIENT
Start: 2022-02-23 | End: 2022-02-23 | Stop reason: HOSPADM

## 2022-02-23 RX ORDER — CEFAZOLIN SODIUM 2 G/100ML
2 INJECTION, SOLUTION INTRAVENOUS ONCE
Status: COMPLETED | OUTPATIENT
Start: 2022-02-23 | End: 2022-02-23

## 2022-02-23 RX ORDER — ONDANSETRON 4 MG/1
4 TABLET, FILM COATED ORAL EVERY 6 HOURS PRN
Status: DISCONTINUED | OUTPATIENT
Start: 2022-02-23 | End: 2022-02-24 | Stop reason: HOSPADM

## 2022-02-23 RX ORDER — HYDROCODONE BITARTRATE AND ACETAMINOPHEN 7.5; 325 MG/1; MG/1
1 TABLET ORAL EVERY 4 HOURS PRN
Status: DISCONTINUED | OUTPATIENT
Start: 2022-02-23 | End: 2022-02-24 | Stop reason: HOSPADM

## 2022-02-23 RX ORDER — ASPIRIN 81 MG/1
TABLET ORAL
Qty: 60 TABLET | Refills: 0 | Status: SHIPPED | OUTPATIENT
Start: 2022-02-24 | End: 2022-09-08

## 2022-02-23 RX ORDER — HYDROCODONE BITARTRATE AND ACETAMINOPHEN 7.5; 325 MG/1; MG/1
TABLET ORAL
Qty: 42 TABLET | Refills: 0 | Status: SHIPPED | OUTPATIENT
Start: 2022-02-23 | End: 2022-09-08

## 2022-02-23 RX ORDER — ONDANSETRON 2 MG/ML
4 INJECTION INTRAMUSCULAR; INTRAVENOUS EVERY 6 HOURS PRN
Status: DISCONTINUED | OUTPATIENT
Start: 2022-02-23 | End: 2022-02-24 | Stop reason: HOSPADM

## 2022-02-23 RX ORDER — PROMETHAZINE HYDROCHLORIDE 25 MG/1
25 SUPPOSITORY RECTAL ONCE AS NEEDED
Status: DISCONTINUED | OUTPATIENT
Start: 2022-02-23 | End: 2022-02-23 | Stop reason: HOSPADM

## 2022-02-23 RX ORDER — ONDANSETRON 4 MG/1
4 TABLET, FILM COATED ORAL EVERY 8 HOURS PRN
Qty: 10 TABLET | Refills: 0 | Status: SHIPPED | OUTPATIENT
Start: 2022-02-23 | End: 2022-09-08

## 2022-02-23 RX ORDER — ASPIRIN 81 MG/1
81 TABLET ORAL EVERY 12 HOURS SCHEDULED
Status: DISCONTINUED | OUTPATIENT
Start: 2022-02-24 | End: 2022-02-24 | Stop reason: HOSPADM

## 2022-02-23 RX ORDER — SODIUM CHLORIDE 0.9 % (FLUSH) 0.9 %
3 SYRINGE (ML) INJECTION EVERY 12 HOURS SCHEDULED
Status: DISCONTINUED | OUTPATIENT
Start: 2022-02-23 | End: 2022-02-23 | Stop reason: HOSPADM

## 2022-02-23 RX ORDER — LIDOCAINE HYDROCHLORIDE 20 MG/ML
INJECTION, SOLUTION INFILTRATION; PERINEURAL AS NEEDED
Status: DISCONTINUED | OUTPATIENT
Start: 2022-02-23 | End: 2022-02-23 | Stop reason: SURG

## 2022-02-23 RX ORDER — SODIUM CHLORIDE 0.9 % (FLUSH) 0.9 %
3-10 SYRINGE (ML) INJECTION AS NEEDED
Status: DISCONTINUED | OUTPATIENT
Start: 2022-02-23 | End: 2022-02-23 | Stop reason: HOSPADM

## 2022-02-23 RX ORDER — KETOROLAC TROMETHAMINE 30 MG/ML
INJECTION, SOLUTION INTRAMUSCULAR; INTRAVENOUS AS NEEDED
Status: DISCONTINUED | OUTPATIENT
Start: 2022-02-23 | End: 2022-02-23 | Stop reason: SURG

## 2022-02-23 RX ORDER — ONDANSETRON 2 MG/ML
4 INJECTION INTRAMUSCULAR; INTRAVENOUS ONCE AS NEEDED
Status: COMPLETED | OUTPATIENT
Start: 2022-02-23 | End: 2022-02-23

## 2022-02-23 RX ADMIN — LIDOCAINE HYDROCHLORIDE 100 MG: 20 INJECTION, SOLUTION INFILTRATION; PERINEURAL at 14:45

## 2022-02-23 RX ADMIN — DEXAMETHASONE SODIUM PHOSPHATE 8 MG: 4 INJECTION, SOLUTION INTRAMUSCULAR; INTRAVENOUS at 14:45

## 2022-02-23 RX ADMIN — KETOROLAC TROMETHAMINE 30 MG: 30 INJECTION, SOLUTION INTRAMUSCULAR at 15:50

## 2022-02-23 RX ADMIN — CEFAZOLIN SODIUM 2 G: 2 INJECTION, SOLUTION INTRAVENOUS at 14:45

## 2022-02-23 RX ADMIN — TRANEXAMIC ACID 1000 MG: 1 INJECTION, SOLUTION INTRAVENOUS at 15:00

## 2022-02-23 RX ADMIN — MUPIROCIN 1 APPLICATION: 20 OINTMENT TOPICAL at 21:00

## 2022-02-23 RX ADMIN — EPHEDRINE SULFATE 10 MG: 50 INJECTION INTRAVENOUS at 15:27

## 2022-02-23 RX ADMIN — SUGAMMADEX 137 MG: 100 INJECTION, SOLUTION INTRAVENOUS at 15:50

## 2022-02-23 RX ADMIN — VANCOMYCIN HYDROCHLORIDE 1 G: 1 INJECTION, POWDER, LYOPHILIZED, FOR SOLUTION INTRAVENOUS at 14:45

## 2022-02-23 RX ADMIN — MELOXICAM 15 MG: 15 TABLET ORAL at 13:21

## 2022-02-23 RX ADMIN — VANCOMYCIN HYDROCHLORIDE 1000 MG: 1 INJECTION, SOLUTION INTRAVENOUS at 13:21

## 2022-02-23 RX ADMIN — Medication 50 MCG/KG/MIN: at 14:55

## 2022-02-23 RX ADMIN — POLYETHYLENE GLYCOL 3350 17 G: 17 POWDER, FOR SOLUTION ORAL at 20:36

## 2022-02-23 RX ADMIN — ONDANSETRON 4 MG: 2 INJECTION INTRAMUSCULAR; INTRAVENOUS at 14:45

## 2022-02-23 RX ADMIN — MIDAZOLAM 0.5 MG: 1 INJECTION INTRAMUSCULAR; INTRAVENOUS at 13:59

## 2022-02-23 RX ADMIN — CEFAZOLIN SODIUM 2 G: 2 INJECTION, SOLUTION INTRAVENOUS at 20:37

## 2022-02-23 RX ADMIN — CEFAZOLIN SODIUM 2 G: 2 INJECTION, SOLUTION INTRAVENOUS at 14:32

## 2022-02-23 RX ADMIN — PROPOFOL 150 MG: 10 INJECTION, EMULSION INTRAVENOUS at 14:45

## 2022-02-23 RX ADMIN — PREGABALIN 150 MG: 75 CAPSULE ORAL at 13:21

## 2022-02-23 RX ADMIN — SODIUM CHLORIDE, POTASSIUM CHLORIDE, SODIUM LACTATE AND CALCIUM CHLORIDE 9 ML/HR: 600; 310; 30; 20 INJECTION, SOLUTION INTRAVENOUS at 13:52

## 2022-02-23 RX ADMIN — TRANEXAMIC ACID 1000 MG: 1 INJECTION, SOLUTION INTRAVENOUS at 15:50

## 2022-02-23 RX ADMIN — ONDANSETRON 4 MG: 2 INJECTION INTRAMUSCULAR; INTRAVENOUS at 17:54

## 2022-02-23 RX ADMIN — HYDROMORPHONE HYDROCHLORIDE 0.5 MG: 1 INJECTION, SOLUTION INTRAMUSCULAR; INTRAVENOUS; SUBCUTANEOUS at 17:05

## 2022-02-23 RX ADMIN — FENTANYL CITRATE 50 MCG: 50 INJECTION INTRAMUSCULAR; INTRAVENOUS at 16:47

## 2022-02-23 RX ADMIN — FAMOTIDINE 20 MG: 10 INJECTION INTRAVENOUS at 13:53

## 2022-02-23 RX ADMIN — FENTANYL CITRATE 50 MCG: 50 INJECTION INTRAMUSCULAR; INTRAVENOUS at 17:22

## 2022-02-23 RX ADMIN — ROCURONIUM BROMIDE 50 MG: 50 INJECTION INTRAVENOUS at 14:45

## 2022-02-23 NOTE — ANESTHESIA PROCEDURE NOTES
Airway  Urgency: elective    Date/Time: 2/23/2022 2:52 PM  Airway not difficult    General Information and Staff    Patient location during procedure: OR  Anesthesiologist: Thomas Ybarra MD    Indications and Patient Condition  Indications for airway management: airway protection    Preoxygenated: yes  Mask difficulty assessment: 1 - vent by mask    Final Airway Details  Final airway type: endotracheal airway      Successful airway: ETT  Cuffed: yes   Successful intubation technique: direct laryngoscopy  Facilitating devices/methods: anterior pressure/BURP  Blade: Matthews  Blade size: 3  ETT size (mm): 7.0  Cormack-Lehane Classification: grade IIa - partial view of glottis  Placement verified by: chest auscultation   Measured from: lips  ETT/EBT  to lips (cm): 20  Number of attempts at approach: 2  Assessment: atraumatic intubation    Additional Comments  Unable to ventilate with first attempt so removed and replaced easily with BURP

## 2022-02-23 NOTE — ANESTHESIA POSTPROCEDURE EVALUATION
"Patient: Iesha Roldan    Procedure Summary     Date: 02/23/22 Room / Location: [facility] OR [address] [place] [address] MAIN OR    Anesthesia Start: 1436 Anesthesia Stop: 1633    Procedure: CONVERSION TO TOTAL HIP (Left Hip) Diagnosis:       Left hip pain      (Left hip pain [M25.552])    Surgeons: Giorgio Keenan MD Provider: Thomas Ybarra MD    Anesthesia Type: general ASA Status: 3          Anesthesia Type: general    Vitals  Vitals Value Taken Time   /75 02/23/22 1746   Temp 36.3 °C (97.4 °F) 02/23/22 1743   Pulse 77 02/23/22 1755   Resp 16 02/23/22 1745   SpO2 99 % 02/23/22 1755   Vitals shown include unvalidated device data.        Post Anesthesia Care and Evaluation    Patient location during evaluation: bedside  Patient participation: complete - patient participated  Level of consciousness: awake  Pain management: adequate  Airway patency: patent  Anesthetic complications: No anesthetic complications    Cardiovascular status: acceptable  Respiratory status: acceptable  Hydration status: acceptable    Comments: /75   Pulse 73   Temp 36.3 °C (97.4 °F) (Axillary)   Resp 16   Ht 170.2 cm (67\")   Wt 68.3 kg (150 lb 9.6 oz)   SpO2 98%   BMI 23.59 kg/m²       "

## 2022-02-23 NOTE — ANESTHESIA PREPROCEDURE EVALUATION
Anesthesia Evaluation     Patient summary reviewed and Nursing notes reviewed                Airway   Mallampati: II  Neck ROM: full  Dental      Pulmonary - negative pulmonary ROS   Cardiovascular - negative cardio ROS    ECG reviewed  Rhythm: regular  Rate: normal        Neuro/Psych  (+) psychiatric history ADD,    GI/Hepatic/Renal/Endo - negative ROS     Musculoskeletal     (+) neck pain,   Abdominal    Substance History - negative use     OB/GYN negative ob/gyn ROS         Other   arthritis,    history of cancer                    Anesthesia Plan    ASA 3     general   (I have reviewed the patient's history with the patient and the chart, including all pertinent laboratory results and imaging. I have explained the risks of anesthesia including but not limited to dental damage, corneal abrasion, nerve injury, MI, stroke, and death. Questions asked and answered. Anesthetic plan discussed with patient and team as indicated. Patient expressed understanding of the above.  )  intravenous induction     Anesthetic plan, all risks, benefits, and alternatives have been provided, discussed and informed consent has been obtained with: patient and spouse/significant other.        CODE STATUS:

## 2022-02-24 ENCOUNTER — READMISSION MANAGEMENT (OUTPATIENT)
Dept: CALL CENTER | Facility: HOSPITAL | Age: 72
End: 2022-02-24

## 2022-02-24 ENCOUNTER — HOME HEALTH ADMISSION (OUTPATIENT)
Dept: HOME HEALTH SERVICES | Facility: HOME HEALTHCARE | Age: 72
End: 2022-02-24

## 2022-02-24 VITALS
DIASTOLIC BLOOD PRESSURE: 56 MMHG | WEIGHT: 150.6 LBS | HEART RATE: 60 BPM | OXYGEN SATURATION: 98 % | BODY MASS INDEX: 23.64 KG/M2 | RESPIRATION RATE: 16 BRPM | SYSTOLIC BLOOD PRESSURE: 91 MMHG | TEMPERATURE: 96.8 F | HEIGHT: 67 IN

## 2022-02-24 LAB
HCT VFR BLD AUTO: 34 % (ref 34–46.6)
HGB BLD-MCNC: 11.4 G/DL (ref 12–15.9)

## 2022-02-24 PROCEDURE — 97161 PT EVAL LOW COMPLEX 20 MIN: CPT

## 2022-02-24 PROCEDURE — 85014 HEMATOCRIT: CPT | Performed by: NURSE PRACTITIONER

## 2022-02-24 PROCEDURE — 97110 THERAPEUTIC EXERCISES: CPT

## 2022-02-24 PROCEDURE — 85018 HEMOGLOBIN: CPT | Performed by: NURSE PRACTITIONER

## 2022-02-24 PROCEDURE — 0 CEFAZOLIN IN DEXTROSE 2-4 GM/100ML-% SOLUTION: Performed by: NURSE PRACTITIONER

## 2022-02-24 PROCEDURE — 99024 POSTOP FOLLOW-UP VISIT: CPT | Performed by: NURSE PRACTITIONER

## 2022-02-24 PROCEDURE — G0378 HOSPITAL OBSERVATION PER HR: HCPCS

## 2022-02-24 RX ADMIN — POLYETHYLENE GLYCOL 3350 17 G: 17 POWDER, FOR SOLUTION ORAL at 08:50

## 2022-02-24 RX ADMIN — ASPIRIN 81 MG: 81 TABLET, COATED ORAL at 08:51

## 2022-02-24 RX ADMIN — MELOXICAM 15 MG: 15 TABLET ORAL at 08:50

## 2022-02-24 RX ADMIN — CEFAZOLIN SODIUM 2 G: 2 INJECTION, SOLUTION INTRAVENOUS at 07:19

## 2022-02-24 RX ADMIN — HYDROCODONE BITARTRATE AND ACETAMINOPHEN 2 TABLET: 7.5; 325 TABLET ORAL at 11:48

## 2022-02-24 RX ADMIN — MUPIROCIN 1 APPLICATION: 20 OINTMENT TOPICAL at 08:51

## 2022-02-24 NOTE — DISCHARGE PLACEMENT REQUEST
"Chente Roldan (71 y.o. Female)             Date of Birth Social Security Number Address Home Phone MRN    1950  [address] [address]  [facility] [phone] [id number]    Buddhist Marital Status             Roman Catholic        Admission Date Admission Type Admitting Provider Attending Provider Department, Room/Bed    2/23/22 Elective Giorgio Keenan MD Brown, Reid B, MD [address], P788/1    Discharge Date Discharge Disposition Discharge Destination           [place] [facility]              Attending Provider: Giorgio Keenan MD    Allergies: Erythromycin    Isolation: None   Infection: None   Code Status: CPR   Advance Care Planning Activity    Ht: 170.2 cm (67\")   Wt: 68.3 kg (150 lb 9.6 oz)    Admission Cmt: None   Principal Problem: Left hip pain [M25.552]                 Active Insurance as of 2/23/2022     Primary Coverage     Payor Plan Insurance Group Employer/Plan Group    MEDICARE MEDICARE A & B      Payor Plan Address Payor Plan Phone Number Payor Plan Fax Number Effective Dates    [address] [phone]  6/1/2016 - None Entered    [facility] [id number]       Subscriber Name Subscriber Birth Date Member ID       CHENTE ROLDAN 1950 [id number]           Secondary Coverage     Payor Plan Insurance Group Employer/Plan Group    [id number]     Payor Plan Address Payor Plan Phone Number Payor Plan Fax Number Effective Dates    [address]   1/1/2015 - None Entered    [facility] [id number]       Subscriber Name Subscriber Birth Date Member ID       CHENTE ROLDAN 1950 [id number]                 Emergency Contacts      (Rel.) Home Phone Work Phone Mobile Phone    Eduard Roldan (Spouse) [phone] -- --          "

## 2022-02-24 NOTE — DISCHARGE SUMMARY
Patient Name: Iesha Roldan  Patient YOB: 1950    Date of Admission:  2/23/2022  Date of Discharge:  2/24/2022  Discharge Diagnosis: CT REVISE TOTAL HIP REPLACEMENT [74345] (TOTAL HIP ARTHROPLASTY REVISION)  Presenting Problem/History of Present Illness: Left hip pain [M25.552]  Admitting Physician: Dr Giorgio Keenan  Consults:   Consults     No orders found for last 30 day(s).          DETAILS OF HOSPITAL STAY:  Patient is a 71 y.o. female was admitted to the floor following the above procedure and underwent an uncomplicated hospital stay.  Patient did well with physical therapy and was ambulating well without problems.  On the day of discharge the wound was clean, dry and intact and calf was soft and non tender and Homans sign was negative.  Patient was tolerating   Diet Instructions     Advance Diet as Tolerated      May advance diet as tolerated while in hospital.    Diet:      Diet Texture / Consistency: Regular       without problems.  Patient will be discharged home.    Condition on Discharge:  Stable    Vital Signs  Temp:  [96.5 °F (35.8 °C)-97.5 °F (36.4 °C)] 96.9 °F (36.1 °C)  Heart Rate:  [58-79] 64  Resp:  [16-18] 16  BP: ()/(50-75) 92/56    LABS:   Admission on 02/23/2022   Component Date Value Ref Range Status   • ABO Type 02/23/2022 O   Final   • RH type 02/23/2022 Negative   Final   • Antibody Screen 02/23/2022 Negative   Final   • T&S Expiration Date 02/23/2022 2/26/2022 11:59:59 PM   Final   Lab on 02/22/2022   Component Date Value Ref Range Status   • COVID19 02/22/2022 Not Detected  Not Detected - Ref. Range Final       No results found.        Discharge Medications     Discharge Medications      New Medications      Instructions Start Date   aspirin 81 MG EC tablet   Take 1 table by mouth twice daily for 14 days; then take 1 tablet by mouth daily for 28 days      HYDROcodone-acetaminophen 7.5-325 MG per tablet  Commonly known as: NORCO   Take 1-2 tablets by mouth every 4-6  hours as needed pain.  Take 2 only when in severe pain.      ondansetron 4 MG tablet  Commonly known as: Zofran   4 mg, Oral, Every 8 Hours PRN      pantoprazole 40 MG EC tablet  Commonly known as: PROTONIX   40 mg, Oral, Daily      polyethylene glycol 17 GM/SCOOP powder  Commonly known as: MIRALAX   17 g, Oral, 2 Times Daily, Dispense 7 day supply         Changes to Medications      Instructions Start Date   fluticasone 50 MCG/ACT nasal spray  Commonly known as: Flonase  What changed:   · when to take this  · reasons to take this   2 sprays, Nasal, Daily         Continue These Medications      Instructions Start Date   ALPRAZolam 0.25 MG tablet  Commonly known as: Xanax   0.25 mg, Oral, 2 Times Daily PRN         Stop These Medications    Chlorhexidine Gluconate Cloth 2 % pads     multivitamin tablet tablet  Generic drug: multivitamin     mupirocin 2 % nasal ointment  Commonly known as: BACTROBAN     Vitamin D3 50 MCG (2000 UT) capsule            Activity at Discharge:   Activity Instructions     Discharge Activity      Dr Giorgio Keenan  [address] [place] [address] [address]  [place] [address]  [phone]      Discharge Information (HIP REPLACEMENT)    The first 2-3 days after surgery your pain will likely be diminished due to medications that were given to you during surgery. It is very important to follow a few simple instructions to continue with good pain control after arriving home.    Activity control :  DON'T OVERDO IT, small amounts of activity on a frequent basis. You are encouraged to get up and move around  for  short periods but do not engage in any prolonged walking, standing or activity until cleared by your physical therapist. You may walk short distances frequently.  You will be notified while in hospital if you have any specific hip precautions  Ice - you should ice the hip as much as possible over the first week or two.  Placing a clean hand towel or pillowcase between the icepack and skin will allow  you to ice for extended periods.   Pain Medication - Take some pain medication (1-2 tablets) on a regular schedule (every 4-6 hours) for the first 72 hours after arriving home. This is important to keep the pain under control as the operative medication begins to wear off. Make sure to have food in your stomach when taking the medication. If you develop any nausea with the pain medication, try taking Zofran 30 minutes before taking the pain pills. After the first 72 hours you can take the medication as needed based on your pain.  REMEMBER - PAIN MEDICATION WORKS BETTER AT PREVENTING PAIN THAN IT DOES IN CATCHING UP TO PAIN ONCE IT INCREASES.  Physical therapy:  Follow the instructions of your physical therapist.  You may put full weight on your leg unless instructed otherwise.  If you have hip precautions, this will be discussed with you in the hospital. Continue to follow any hip precautions / restrictions until your follow-up appointment.   If you have been told that you have no hip precautions then you may sit / lay/ bend in any position within reason.  If you lay on your side then you should place a pillow between your legs for the first 2 weeks.  For all patients - “listen to your hip” - meaning don't force your hip into an uncomfortable position.    Showering :   The waterproof dressing will allow you to shower as soon as you get home.    The dressing should be left in place.  After 7 days the suction unit will stop functioning and at that time you may disconnect the suction tube.    If the dressing becomes disloged or saturated it should be changed.  If you have a home health nurse or therapist they can be contacted to assist with dressing change or repair.  Please refer to the [facility] information sheet if you have any questions about the dressing.  You may also call the [facility] dressing hotline for questions related to the dressing ([phone]). If there still other problems or questions related to the  dressing despite these measures then you can contact Beena at our office [phone]    Driving : No driving during the first 2 weeks post surgery. After the 2 week visit, Dr. Keenan, Lamar, or Jose Miguel will determine when you’re ready to drive.    Blood Clot (DVT) Prevention:  Keep legs elevated when possible to limit swelling  Perform “calf pump” exercises regularly to encourage blood flow through the calf veins.  Most patients will be discharged on asprin 81mg (full strength) twice daily for 2 weeks, then once daily for four weeks. Some high risk patients may require Coumadin, Xarelto, or other blood thinners.    Follow-Up Visit: After arriving home, please call Dr Keenan’s office ([phone]) to arrange your follow-up appointment. This visit will be approximately 2 weeks post-op.     Your Implant: Your hip implant is made of the finest materials available. It is made by [employer]. For more info visit [email or web address]. There are four components:  Polarstem titanium femoral stem  R3 titanium acetabular cup  Oxidized zirconium femoral head (Oxinium ™)  Crosslinked polyethylene acetabular insert    Patient May Shower; No Tub Baths, Pools or Hot Tubs      Weight Bearing As Tolerated            Discharge Instructions:   1)  Patient is to continue with physical therapy exercises daily and continue working with the physical therapist as ordered.  2)  Follow Posterior hip precautions.   3)  Patient may weight bear as tolerated.   4)  Apply ice regularly. You may ice for long periods of time as long as ice is not directly on the skin. Patient instructed on frequent calf pumping exercises.  Patient also instructed on incentive spirometer during hospitalization and encouraged to continue to use at home regularly.   5)  The dressing should be left in place. If waterproof dressing is intact the patient may shower immediately following discharge. If the dressing becomes disloged or saturated it should be  changed. Please refer to the [facility] information sheet if you have any questions about the dressing.  If you have a home health nurse or therapist they can be contacted to assist with dressing change or repair. You may also call the [facility] dressing hotline for questions related to the dressing ([phone]). If there still other problems or questions related to the dressing despite these measures then you can contact Beena at our office [phone].   6)  Follow up appointment in 2 weeks - patient to call the office at [phone] to schedule. 7)  Patient will be discharged on aspirin 81mg BID x weeks, then daily x 4weeks    Complete Discharge Diagnosis:    Patient Active Problem List   Diagnosis   • ADD (attention deficit disorder)   • Fatigue   • Mucoepidermoid carcinoma (HCC)   • Abnormal MRI, cervical spine, not thoracic; C4 to be specific   • Iron deficiency anemia   • B12 deficiency   • Mucoepidermoid carcinoma of parotid gland (HCC)   • Primary osteoarthritis of left hip   • Left hip pain           Follow-up Appointments  Future Appointments   Date Time Provider Department Center   3/10/2022 10:40 AM Giorgio Keenan MD [facility] L100 JO   4/22/2022  1:30 PM JO MAMM 2  JO MAMMO JO   5/26/2022  9:20 AM [facility]   5/31/2022 10:00 AM Camila Mejia MD [facility] JO   6/24/2022  8:00 AM [facility] SCREENING [facility]ART  JO OVEP JO   8/9/2022 10:30 AM JO DEXA 1  JO DEXA JO              ROBERT Whitney  02/24/22  07:54 EST

## 2022-02-24 NOTE — OP NOTE
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    ASSISTANT: RANDY MERCADO    A surgical assistant was integral in ensuring a successful outcome with this procedure.  The assistant was utilized to assist in positioning the patient, draping the patient, was used throughout the case to provide with retraction of tissues, suctioning of blood and body fluids for visualization, positioning of the extremity to allow for proper exposure so that I could perform the procedure.  Without the use of a surgical assistant during this procedure I feel that the outcome may have been compromised or would have been suboptimal or at risk for complications.    IMPLANTS:   Implant Name Type Inv. Item Serial No.  Lot No. LRB No. Used Action   DEV CONTRL TISS STRATAFIX SPIRAL MNCRYL UD 3/0 PLS 30CM - [id number] Implant DEV CONTRL TISS STRATAFIX SPIRAL MNCRYL UD 3/0 PLS 30CM  [employer]  DIV [employer] J [employer] J [id number] Left 1 Implanted   DEV CONTRL TISS STRATAFIX SYMM PDS PLUS REZA CT-1 60CM - [id number] Implant DEV CONTRL TISS STRATAFIX SYMM PDS PLUS REZA CT-1 60CM  [employer]  DIV OF J AND J [id number] Left 1 Implanted   SCRW SPH HD REFLECTION 6.5X25MM - [id number] Implant SCRW SPH HD REFLECTION 6.5X25MM  [employer] [id number] Left 1 Implanted   SCRW SPH HD REFLECTION 6.5X20MM - [id number] Implant SCRW SPH HD REFLECTION 6.5X20MM  [employer] [id number] Left 1 Implanted   LINER ACET R3 XLPE 20D [id number] - [id number] Implant LINER ACET R3 XLPE 20D [id number]  [employer] [id number] Left 1 Implanted   SHLL ACET R3 3H STD 52MM - [id number] Implant SHLL ACET R3 3H STD 52MM  [employer] [id number] Left 1 Implanted   SCRW SPH HD REFLECTION 6.5X35MM -  [id number] [facility] REFLECTION 6.5X35MM  [employer] [id number] Left 1 Implanted   HD FEM BIOLOXDELTA/TS CERAM 12/14 36MM 36MM - [id number] Implant HD FEM BIOLOXDELTA/TS CERAM 12/14 36MM 36MM  [id number] Left 1 Implanted       Estimated Blood Loss: 200cc  Specimens : none  Complications: none    DESCRIPTION OF PROCEDURE:    The patient was taken to the operating room and placed in the supine position. A sequential compression device was carefully placed on the non-operative leg. Preoperative antibiotics were administered. Surgical time out was performed. After adequate induction of anesthesia the patient was then transferred onto the  table and positioned appropriately in the lateral decubitus position. The hip was then prepped and draped in the usual sterile fashion.    A posterior lateral surgical incision was then made.  The gluteus wellington fascia was then divided the gluteus wellington muscle was then bluntly dissected.  A Charnley self-retaining retractor was then placed.  A posterior capsulotomy was then performed.  The superior limb was divided in line with the previous location of the piriformis tendon.  There was minimal fluid within the hip joint. The pericapsular tissue appeared intact posteriorly and trupti was significant anterior scar tissue from previous approach. The hip was then dislocated. We then placed an anterior retractor and inferior retractor.The fibrous tissue and scar tissue around the edges of the acetabulum was then excised.  The remaining bone stock appeared good.   I then progressively reamed up to the appropriate size and 45° of abduction and 20° of anteversion. Line to line reaming was performed. We then impacted the acetabular component in 45 of abduction and 20 of anteversion the cup was stable.  Additional fixation included 3 screw in the sup/posterior quadrant.  At this point the cup was quite stable and we placed the final 20 deg poly insertt.  We then chose the  trial femoral head and reduced the hip.  Leg lengths were equal.  The hip was then reduced with the final headand taken through a full range of motion.  He is very stable in flexion internal rotation as well as extension and external rotation.  The hip was copiously irrigated with pulse lavage and injected with anesthetic cocktail solution. We then copiously irrigated the hip with pulsed lavage and the capsule was then reapproximated with #1 PDS suture, and the remainder of the hip was closed in multiple layers in standard fashion..  There was excellent hemostasis.  Sterile dressing were applied. At the end of the case, the sponge and needle counts were reported as being correct. There were no known complications. The patient was then transported to the recovery room.        Giorgio Keenan M.D.  2/23/2022

## 2022-02-24 NOTE — THERAPY DISCHARGE NOTE
Patient Name: Iesha Roldan  : 1950    MRN: [id number]                              Today's Date: 2022       Admit Date: 2022    Visit Dx:     ICD-10-CM ICD-9-CM   1. 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 Left hip pain  M25.552 719.45     Patient Active Problem List   Diagnosis   • ADD (attention deficit disorder)   • Fatigue   • Mucoepidermoid carcinoma (HCC)   • Abnormal MRI, cervical spine, not thoracic; C4 to be specific   • Iron deficiency anemia   • B12 deficiency   • Mucoepidermoid carcinoma of parotid gland (HCC)   • Primary osteoarthritis of left hip   • Left hip pain     Past Medical History:   Diagnosis Date   • Acute bronchitis    • Anemia    • Arthritis    • BOOP (bronchiolitis obliterans with organizing pneumonia) (HCC)     Resolved   • Fracture, hip (HCC)    • Gastroenteritis    • H/O Lung nodule    • Herpes zoster    • Hx of radiation therapy    • Mucoepidermoid carcinoma (HCC) 2017   • Neck pain    • Osteopenia    • Pharyngitis      Past Surgical History:   Procedure Laterality Date   • COLONOSCOPY     • COLONOSCOPY  2019    Tubular adenoma, diverticulosis   • HIP SURGERY Left     Joint replacement   • PAROTIDECTOMY Right 2017    Procedure: RIGHT SUPERFICIAL PAROTIDECTOMY EXCISION PAROTID MASS ;  Surgeon: Candelario Calderon MD;  Location: [facility];  Service:    • TOTAL HIP ARTHROPLASTY REVISION Left 2022    Procedure: CONVERSION TO TOTAL HIP;  Surgeon: Giorgio Keenan MD;  Location: [facility];  Service: Orthopedics;  Laterality: Left;   • TUBAL ABDOMINAL LIGATION  In       General Information     Row Name 22 1050          Physical Therapy Time and Intention    Document Type discharge evaluation/summary  Pt. is s/p Left Total hip conversion  -MS     Mode of Treatment physical therapy; individual therapy  -MS     Row Name 22 1053          General Information    Patient Profile  Reviewed yes  -MS     Prior Level of Function independent:  -MS     Existing Precautions/Restrictions hip, posterior  -MS     Barriers to Rehab none identified  -MS     Row Name 02/24/22 1050          Cognition    Orientation Status (Cognition) oriented x 3  -MS     Row Name 02/24/22 1050          Safety Issues, Functional Mobility    Comment, Safety Issues/Impairments (Mobility) Gait belt used for safety.  -MS           User Key  (r) = Recorded By, (t) = Taken By, (c) = Cosigned By    Initials Name Provider Type    Renny Bedolla, PT Physical Therapist               Mobility     Row Name 02/24/22 1051          Bed Mobility    Bed Mobility supine-sit; sit-supine  -MS     Supine-Sit Branch (Bed Mobility) independent  -MS     Row Name 02/24/22 1051          Sit-Stand Transfer    Sit-Stand Branch (Transfers) independent  -MS     Assistive Device (Sit-Stand Transfers) walker, front-wheeled  -MS     Row Name 02/24/22 1051          Gait/Stairs (Locomotion)    Branch Level (Gait) standby assist  -MS     Assistive Device (Gait) walker, front-wheeled  -MS     Distance in Feet (Gait) 120 feet  -MS     Branch Level (Stairs) stand by assist  -MS     Number of Steps (Stairs) 3 (Backwards with use of Rwx)  -MS     Ascending Technique (Stairs) step-to-step  -MS     Descending Technique (Stairs) step-to-step  -MS           User Key  (r) = Recorded By, (t) = Taken By, (c) = Cosigned By    Initials Name Provider Type    Renny Bedolla PT Physical Therapist               Obj/Interventions     Row Name 02/24/22 1051          Range of Motion Comprehensive    Comment, General Range of Motion BUE/RLE (WFL's)  -MS     Row Name 02/24/22 1051          Strength Comprehensive (MMT)    Comment, General Manual Muscle Testing (MMT) Assessment BUE/RLE (3+/5)  -MS     Row Name 02/24/22 1051          Motor Skills    Therapeutic Exercise --  Left THR ther. ex. program x 10 reps completed  -MS           User Key   (r) = Recorded By, (t) = Taken By, (c) = Cosigned By    Initials Name Provider Type    Renny Bedolla, PT Physical Therapist               Goals/Plan    No documentation.                Clinical Impression     Row Name 02/24/22 1052          Pain    Additional Documentation Pain Scale: Numbers Pre/Post-Treatment (Group)  -MS     Row Name 02/24/22 1052          Pain Scale: Numbers Pre/Post-Treatment    Pretreatment Pain Rating 3/10  -MS     Posttreatment Pain Rating 3/10  -MS     Pain Location - Side Left  -MS     Pain Location hip  -MS     Row Name 02/24/22 1052          Plan of Care Review    Plan of Care Reviewed With patient  -MS     Row Name 02/24/22 1052          Positioning and Restraints    Pre-Treatment Position in bed  -MS     Post Treatment Position chair  -MS     In Chair notified nsg; reclined; sitting; call light within reach; encouraged to call for assist; exit alarm on  -MS           User Key  (r) = Recorded By, (t) = Taken By, (c) = Cosigned By    Initials Name Provider Type    Renny Bedolla, PT Physical Therapist               Outcome Measures     Row Name 02/24/22 1052          How much help from another person do you currently need...    Turning from your back to your side while in flat bed without using bedrails? 4  -MS     Moving from lying on back to sitting on the side of a flat bed without bedrails? 4  -MS     Moving to and from a bed to a chair (including a wheelchair)? 3  -MS     Standing up from a chair using your arms (e.g., wheelchair, bedside chair)? 3  -MS     Climbing 3-5 steps with a railing? 3  -MS     To walk in hospital room? 3  -MS     AM-PAC 6 Clicks Score (PT) 20  -MS     [facility] Name 02/24/22 1052          Functional Assessment    Outcome Measure Options AM-PAC 6 Clicks Basic Mobility (PT)  -MS           User Key  (r) = Recorded By, (t) = Taken By, (c) = Cosigned By    Initials Name Provider Type    Renny Bedolla, PT Physical Therapist              Physical  Therapy Education                 Title: PT OT SLP Therapies (Resolved)     Topic: Physical Therapy (Resolved)     Point: Mobility training (Resolved)     Learning Progress Summary           Patient Acceptance, E,D, VU,DU by MS at 2/24/2022 1052                   Point: Home exercise program (Resolved)     Learning Progress Summary           Patient Acceptance, E,D, VU,DU by MS at 2/24/2022 1052                   Point: Body mechanics (Resolved)     Learning Progress Summary           Patient Acceptance, E,D, VU,DU by MS at 2/24/2022 1052                   Point: Precautions (Resolved)     Learning Progress Summary           Patient Acceptance, E,D, VU,DU by MS at 2/24/2022 1052                               User Key     Initials Effective Dates Name Provider Type Discipline    MS 06/16/21 -  Renny Hoover PT Physical Therapist PT              PT Recommendation and Plan     Plan of Care Reviewed With: patient  Outcome Summary: Pt. is currently independent/SBA with functional mobility and has no further questions/concerns regarding functional mobility or home safety.  Encouraged pt. to continue ther. ex. program 2-3 x's daily and to ambulate every 1-2 hours once home. Plan for discharge home this date.  Will sign off.     Time Calculation:    PT Charges     Row Name 02/24/22 1053             Time Calculation    Start Time 0805  -MS      Stop Time 0821  -MS      Time Calculation (min) 16 min  -MS      PT Received On 02/24/22  -MS              Time Calculation- PT    Total Timed Code Minutes- PT 15 minute(s)  -MS            User Key  (r) = Recorded By, (t) = Taken By, (c) = Cosigned By    Initials Name Provider Type    MS Renny Hoover, PT Physical Therapist              Therapy Charges for Today     Code Description Service Date Service Provider Modifiers Qty    [id number]  PT EVAL LOW COMPLEXITY 1 2/24/2022 Renny Hoover, PT GP 1    [id number] HC PT THER PROC EA 15 MIN 2/24/2022 Renny Hoover, PT  GP 1          PT G-Codes  Outcome Measure Options: AM-PAC 6 Clicks Basic Mobility (PT)  AM-PAC 6 Clicks Score (PT): 20    PT Discharge Summary  Anticipated Discharge Disposition (PT): home with assist, home with home health  Reason for Discharge: Discharge from facility  Discharge Destination: Home with assist, Home with home health    Renny Hoover, PT  2/24/2022

## 2022-02-24 NOTE — PROGRESS NOTES
Continued Stay Note  [facility]     Patient Name: Iesha Roldan  MRN: [id number]  Today's Date: 2/24/2022    Admit Date: 2/23/2022     Discharge Plan     Row Name 02/24/22 1133       Plan    Plan [facility]    Patient/Family in Agreement with Plan yes    Plan Comments Spoke with pt, verified correct information on facesheet and explained the role of CCP. Pt would like to d/c home with [facility], referral sent in Epic to [facility]. Plan will be to d/c home with [facility] and family support. No other needs identified.    Final Discharge Disposition Code 06 - home with home health care    Final Note Pt to d/c home with [facility]               Discharge Codes    No documentation.               Expected Discharge Date and Time     Expected Discharge Date Expected Discharge Time    Feb 24, 2022             Keyla Boss RN

## 2022-02-24 NOTE — PLAN OF CARE
Goal Outcome Evaluation:         Patient is progressing with care plans and is making progress in recovery.

## 2022-02-24 NOTE — PROGRESS NOTES
[facility] will follow post hospital. Patient agreeable to service. Contact information confirmed. Physical address is [id number] [place] [address]  [place] [address].

## 2022-02-25 ENCOUNTER — HOME CARE VISIT (OUTPATIENT)
Dept: HOME HEALTH SERVICES | Facility: HOME HEALTHCARE | Age: 72
End: 2022-02-25

## 2022-02-25 ENCOUNTER — TELEPHONE (OUTPATIENT)
Dept: ORTHOPEDIC SURGERY | Facility: CLINIC | Age: 72
End: 2022-02-25

## 2022-02-25 ENCOUNTER — TRANSITIONAL CARE MANAGEMENT TELEPHONE ENCOUNTER (OUTPATIENT)
Dept: CALL CENTER | Facility: HOSPITAL | Age: 72
End: 2022-02-25

## 2022-02-25 PROCEDURE — G0151 HHCP-SERV OF PT,EA 15 MIN: HCPCS

## 2022-02-25 NOTE — OUTREACH NOTE
Prep Survey      Responses   [facility] facility patient discharged from? [place]   Is LACE score < 7 ? No   Emergency Room discharge w/ pulse ox? No   Eligibility [facility]   Date of Admission 02/23/22   Date of Discharge 02/24/22   Discharge Disposition Home-Health Care [facility]   Discharge diagnosis TOTAL HIP ARTHROPLASTY REVISION   Does the patient have one of the following disease processes/diagnoses(primary or secondary)? Total Joint Replacement   Does the patient have Home health ordered? Yes   What is the Home health agency?  [facility]   Is there a DME ordered? No   Prep survey completed? Yes          Melisa Early RN

## 2022-02-25 NOTE — OUTREACH NOTE
Call Center TCM Note      Responses   [facility] patient discharged from? [place]   Does the patient have one of the following disease processes/diagnoses(primary or secondary)? Total Joint Replacement   Joint surgery performed? Hip   TCM attempt successful? Yes   Call start time 1046   Call end time 1048   Has the patient been back in either the hospital or Emergency Department since discharge? No   Discharge diagnosis TOTAL HIP ARTHROPLASTY REVISION   Does the patient have all medications related to this admission filled (includes all antibiotics, pain medications, etc.) Yes   Is the patient taking all medications as directed (includes completed medication regime)? Yes   Comments regarding appointments f/u with surgeon on 3/10   Does the patient have a follow up appointment with their surgeon? Yes   Has the patient kept scheduled appointments due by today? N/A   What is the Home health agency?  [facility]   Has home health visited the patient within 72 hours of discharge? Yes   Home health comments home health PT will be there today at 12 pm   Psychosocial issues? No   Has the patient began therapy sessions (either in the home or as an out patient)? No   Does the patient have a wound vac in place? N/A   Has the patient fallen since discharge? No   Did the patient receive a copy of their discharge instructions? Yes   What is the patient's perception of their functional status since discharge? Improving   Is the patient able to teach back signs and symptoms of infection? Temp >100.4 for 24h or longer   Is the patient/caregiver able to teach back the hierarchy of who to call/visit for symptoms/problems? PCP, Specialist, Home health nurse, Urgent Care, ED, 911 Yes   TCM call completed? Yes   Wrap up additional comments Doing well, no questions at this time, she will f/u with her surgeon on 3/10.          Keira Antony RN    2/25/2022, 10:48 EST

## 2022-02-25 NOTE — TELEPHONE ENCOUNTER
Received a call earlier today from [facility] PT.  They were wanting to know if the dressing on her hip needed to be changed.  Apparently the moraima dressing was removed prior to her being discharged from the hospital and an island dressing was placed.  Discussed with RBB.  Have left a message for the therapist that Dr. Keenan is recommending that the dressing remain on until her follow-up office visit.  Dressing should only be changed if the patient has saturated dressing.  Have left it open for them to call if they have any other questions or concerns

## 2022-02-27 VITALS
RESPIRATION RATE: 18 BRPM | SYSTOLIC BLOOD PRESSURE: 118 MMHG | BODY MASS INDEX: 22.76 KG/M2 | HEART RATE: 73 BPM | OXYGEN SATURATION: 93 % | TEMPERATURE: 97.5 F | DIASTOLIC BLOOD PRESSURE: 62 MMHG | HEIGHT: 67 IN | WEIGHT: 145 LBS

## 2022-02-28 NOTE — HOME HEALTH
Patient is a 72yo female discharegd from [facility] after having LTHA revision on 2/23/22 (discharged on 2/24/22) by Dr. Giorgio Keenan. Patient underwent LTHA after failed left LTHA from 2009. Patient lives with spouse in 2 story home, steps to enter home, and upstairs, bedroom on main floor. SKILLED PHYSICAL THERAPY IS MEDICALLY NECESSARY to address limited functional mobility after, and strength, limited ambulation after recent total hip revision. Patient has rolling walker.    Patient is an early riser. Reviewed supine and sitting therapeutic exercises, shower transfer, and bed mobility.  TUG 34 seconds without RW    PLAN FOR NEXT VISIT:  --Continue progressing home exercise program to improve muscle weakness  --Ensure island dressing not saturated  --Continue gait training to improve gait zohra

## 2022-03-02 ENCOUNTER — HOME CARE VISIT (OUTPATIENT)
Dept: HOME HEALTH SERVICES | Facility: HOME HEALTHCARE | Age: 72
End: 2022-03-02

## 2022-03-02 VITALS
OXYGEN SATURATION: 96 % | TEMPERATURE: 97 F | HEART RATE: 103 BPM | SYSTOLIC BLOOD PRESSURE: 118 MMHG | DIASTOLIC BLOOD PRESSURE: 72 MMHG

## 2022-03-02 PROCEDURE — G0157 HHC PT ASSISTANT EA 15: HCPCS

## 2022-03-02 NOTE — HOME HEALTH
Subjective:I am hurting more today    Wound- left hip covered with island dressing  Edema- left hip min/moderate  Dr. Keenan appt?    Assessment: Patient doing well with walker and trialed outside steps. Patient able to review supine HEP and progressed with sitting and standing. Patient education on pain managment with pain pills    Plan for next visit/Communication  gait with cane  review HEP  balance  steps with cane

## 2022-03-04 ENCOUNTER — HOME CARE VISIT (OUTPATIENT)
Dept: HOME HEALTH SERVICES | Facility: HOME HEALTHCARE | Age: 72
End: 2022-03-04

## 2022-03-04 ENCOUNTER — READMISSION MANAGEMENT (OUTPATIENT)
Dept: CALL CENTER | Facility: HOSPITAL | Age: 72
End: 2022-03-04

## 2022-03-04 NOTE — CASE COMMUNICATION
Patient missed a Physical Therapy visit from [facility] on 3-4-22     Reason: Patient stated she thinks she has a stomach bug and declined PT today      For your records only.   As per home health protocol, MD must be notified of missed/cancelled visits; therefore the prescribed frequency was not met.     Judy Hernandez [facility]

## 2022-03-04 NOTE — OUTREACH NOTE
Total Joint Week 2 Survey      Responses   [facility] patient discharged from? [place]   Does the patient have one of the following disease processes/diagnoses(primary or secondary)? Total Joint Replacement   Joint surgery performed? Hip   Week 2 attempt successful? No   Unsuccessful attempts Attempt 1          Delfina Escudero RN

## 2022-03-07 ENCOUNTER — HOME CARE VISIT (OUTPATIENT)
Dept: HOME HEALTH SERVICES | Facility: HOME HEALTHCARE | Age: 72
End: 2022-03-07

## 2022-03-07 ENCOUNTER — TELEPHONE (OUTPATIENT)
Dept: INTERNAL MEDICINE | Facility: CLINIC | Age: 72
End: 2022-03-07

## 2022-03-07 ENCOUNTER — TELEPHONE (OUTPATIENT)
Dept: PHYSICAL THERAPY | Facility: CLINIC | Age: 72
End: 2022-03-07

## 2022-03-07 VITALS
OXYGEN SATURATION: 98 % | DIASTOLIC BLOOD PRESSURE: 64 MMHG | TEMPERATURE: 97.6 F | SYSTOLIC BLOOD PRESSURE: 110 MMHG | HEART RATE: 89 BPM

## 2022-03-07 PROCEDURE — G0157 HHC PT ASSISTANT EA 15: HCPCS

## 2022-03-07 NOTE — HOME HEALTH
Subjective: I am still having alot of pain sometimes, I got an order to do Oupatient, THey are supposed to call me back    Wound- left hip covered with island dressing   Edema- left hip min/moderate     Dr. Keenan appt 3-10-22    Assessment:  Discussion with patient on low blood awareness and she is calling to PCP to check her Iron levels. she has history of low Hemiglobin. No medicine changes. Patient showed no unsteadiness today. Patient was able to progress bilaterally with standing HEP and trialed cane for mobility.    Plan for next visit/Communication   gait with cane     review HEP   balance   steps with cane

## 2022-03-07 NOTE — TELEPHONE ENCOUNTER
PATIENT FINISHES WITH HOME HEALTH THIS WED. CAN YOU PLEASE CALL TO SCHEDULE SINCE I CAN NOT SEE ANYTHING

## 2022-03-07 NOTE — TELEPHONE ENCOUNTER
Caller: Iesha Roldan     Relationship: SELF     Best call back number: [phone]    What is your medical concern? PATIENT HAS SOME QUESTIONS ABOUT HER HEMOGLOBIN, PATIENT HAS ALSO NOT HAD HER B12 SHOT IN AWHILE      How long has this issue been going on? SINCE SHE HAS HAD SURGERY ON FEB 23 2022    Is your provider already aware of this issue? NO    WOULD LIKE DR. GALVEZ OR NURSE TO GIVE PATIENT A CALL TO ADVISE HER ON WHAT NEXT STEPS SHOULD BE.

## 2022-03-09 ENCOUNTER — READMISSION MANAGEMENT (OUTPATIENT)
Dept: CALL CENTER | Facility: HOSPITAL | Age: 72
End: 2022-03-09

## 2022-03-09 NOTE — OUTREACH NOTE
Total Joint Week 2 Survey    Flowsheet Row Responses   [facility] patient discharged from? [place]   Does the patient have one of the following disease processes/diagnoses(primary or secondary)? Total Joint Replacement   Joint surgery performed? Hip   Week 2 attempt successful? Yes   Call start time 1121   Call end time 1127   Has the patient been back in either the hospital or Emergency Department since discharge? No   Discharge diagnosis TOTAL HIP ARTHROPLASTY REVISION   Does the patient have all medications related to this admission filled (includes all antibiotics, pain medications, etc.) Yes   Is the patient taking all medications as directed (includes completed medication regime)? Yes   Is the patient able to teach back alternate methods of pain control? Ice, Reposition, Correct alignment, Short, frequent activity   Does the patient have a follow up appointment with their surgeon? Yes   Comments Patient sees surgeon tomorrow. 3/10/2022   What is the Home health agency?  [facility]   Psychosocial issues? No   Has the patient began therapy sessions (either in the home or as an out patient)? Yes   If the patient has started attending therapy, what post op day did they begin to attend (either in home or as an out patient)?   PT coming to home, but next week she will start outpatient PT    Has the patient fallen since discharge? No   Did the patient receive a copy of their discharge instructions? Yes   Nursing interventions Reviewed instructions with patient   What is the patient's perception of their functional status since discharge? Improving   Is the patient able to teach back signs and symptoms of infection? Temp >100.4 for 24h or longer, Severe discomfort or pain, Increased swelling or redness around incision (not associated with surgical edema), Incisional drainage   Is the patient able to teach back how to prevent infection? Check incision daily, Shower only as directed by surgeon, No tub baths, hot tub  or swimming   Is the patient able to teach back signs and symptoms of DVT? Redness in calf, Swelling in calf   Did the patient implement home safety suggestions from pre-surgery classes if attended? N/A   If the patient is a current smoker, are they able to teach back resources for cessation? Not a smoker   Is the patient/caregiver able to teach back the hierarchy of who to call/visit for symptoms/problems? PCP, Specialist, Home health nurse, Urgent Care, ED, 911 Yes   Week 2 call completed? Yes   Wrap up additional comments Doing very well. No questions or needs at this time. Sees her surgeon tomorrow.           JOVANY FELIPE - Registered Nurse

## 2022-03-10 ENCOUNTER — OFFICE VISIT (OUTPATIENT)
Dept: ORTHOPEDIC SURGERY | Facility: CLINIC | Age: 72
End: 2022-03-10

## 2022-03-10 VITALS — TEMPERATURE: 96.5 F | WEIGHT: 145 LBS | HEIGHT: 67 IN | BODY MASS INDEX: 22.76 KG/M2

## 2022-03-10 DIAGNOSIS — M25.552 HIP PAIN, LEFT: Primary | ICD-10-CM

## 2022-03-10 PROCEDURE — 73502 X-RAY EXAM HIP UNI 2-3 VIEWS: CPT | Performed by: ORTHOPAEDIC SURGERY

## 2022-03-10 PROCEDURE — 99024 POSTOP FOLLOW-UP VISIT: CPT | Performed by: ORTHOPAEDIC SURGERY

## 2022-03-10 NOTE — PROGRESS NOTES
Iesha Roldan : 1950 MRN: [id number] DATE: 3/10/2022    DIAGNOSIS: 2 week follow up left total hip ACETABULAR REVISION    SUBJECTIVE:Patient returns today for 2 week follow up of left total hip replacement ACETABULAR EVISION. Patient reports doing well with no unusual complaints. Appears to be progressing appropriately.    OBJECTIVE:   Exam:. The incision is healing appropriately. No sign of infection. Range of motion is progressing as expected. The calf is soft and nontender with a negative Homans sign.    DIAGNOSTIC STUDIES  Xrays: 2 views of the left hip (AP pelvis and lateral left hip) were ordered and reviewed for evaluation of recent hip replacement. They demonstrate a well positioned, well aligned hip replacement without complicating factors noted. In comparison with previous films there has been interval implant placement.    ASSESSMENT: 2 week status post left hip replacement acetabular revision.    PLAN: 1) Staples removed and steri strips applied   2) PT exercises   3) Discontinue FREDIS hose   4) Continue ice PRN   5) WBAT   6) aspirin 81 mg orally every day for 1 month   7) Follow up in 6 weeks with repeat Xrays of left hip (2views)    Giorgio Keenan MD  3/10/2022

## 2022-03-11 ENCOUNTER — HOME CARE VISIT (OUTPATIENT)
Dept: HOME HEALTH SERVICES | Facility: HOME HEALTHCARE | Age: 72
End: 2022-03-11

## 2022-03-11 ENCOUNTER — OFFICE VISIT (OUTPATIENT)
Dept: INTERNAL MEDICINE | Facility: CLINIC | Age: 72
End: 2022-03-11

## 2022-03-11 VITALS
HEIGHT: 67 IN | WEIGHT: 148 LBS | DIASTOLIC BLOOD PRESSURE: 64 MMHG | BODY MASS INDEX: 23.23 KG/M2 | SYSTOLIC BLOOD PRESSURE: 130 MMHG | HEART RATE: 83 BPM

## 2022-03-11 VITALS
RESPIRATION RATE: 18 BRPM | DIASTOLIC BLOOD PRESSURE: 60 MMHG | SYSTOLIC BLOOD PRESSURE: 110 MMHG | OXYGEN SATURATION: 98 % | TEMPERATURE: 96.4 F | HEART RATE: 80 BPM

## 2022-03-11 DIAGNOSIS — R53.83 FATIGUE, UNSPECIFIED TYPE: ICD-10-CM

## 2022-03-11 DIAGNOSIS — M25.552 LEFT HIP PAIN: ICD-10-CM

## 2022-03-11 DIAGNOSIS — E53.8 B12 DEFICIENCY: Primary | ICD-10-CM

## 2022-03-11 DIAGNOSIS — D51.0 VITAMIN B12 DEFICIENCY ANEMIA DUE TO INTRINSIC FACTOR DEFICIENCY: ICD-10-CM

## 2022-03-11 PROCEDURE — 96372 THER/PROPH/DIAG INJ SC/IM: CPT | Performed by: INTERNAL MEDICINE

## 2022-03-11 PROCEDURE — 99214 OFFICE O/P EST MOD 30 MIN: CPT | Performed by: INTERNAL MEDICINE

## 2022-03-11 PROCEDURE — G0180 MD CERTIFICATION HHA PATIENT: HCPCS | Performed by: ORTHOPAEDIC SURGERY

## 2022-03-11 PROCEDURE — G0151 HHCP-SERV OF PT,EA 15 MIN: HCPCS

## 2022-03-11 RX ORDER — CYANOCOBALAMIN 1000 UG/ML
1000 INJECTION, SOLUTION INTRAMUSCULAR; SUBCUTANEOUS
Status: DISCONTINUED | OUTPATIENT
Start: 2022-03-11 | End: 2022-12-06

## 2022-03-11 RX ADMIN — CYANOCOBALAMIN 1000 MCG: 1000 INJECTION, SOLUTION INTRAMUSCULAR; SUBCUTANEOUS at 15:31

## 2022-03-11 NOTE — PROGRESS NOTES
Chief Complaint   Patient presents with   • low hemoglobin     Post surgery   • Fatigue   • post op left hip THR       History of Present Illness   Iesha Roldan is a 71 y.o. female presents for acute care. Patient had end stage OA left hip. She is now s/p L hip thr.   History of anemia. She feels fatigue similar to at this time. hgb 11.4 prior to surgery. Patient has a history of b12 deficiency related to pernicious anemia. She has not had a b12 injection for 2 months related to surgery.       The following portions of the patient's history were reviewed and updated as appropriate: allergies, current medications, past family history, past medical history, past social history, past surgical history and problem list.  Current Outpatient Medications on File Prior to Visit   Medication Sig Dispense Refill   • ALPRAZolam (Xanax) 0.25 MG tablet Take 1 tablet by mouth 2 (Two) Times a Day As Needed for Anxiety. 20 tablet 2   • aspirin 81 MG EC tablet Take 1 table by mouth twice daily for 14 days; then take 1 tablet by mouth daily for 28 days 60 tablet 0   • fluticasone (Flonase) 50 MCG/ACT nasal spray 2 sprays into the nostril(s) as directed by provider Daily. (Patient taking differently: 2 sprays into the nostril(s) as directed by provider Daily As Needed.) 16 g 5   • HYDROcodone-acetaminophen (NORCO) 7.5-325 MG per tablet Take 1-2 tablets by mouth every 4-6 hours as needed pain.  Take 2 only when in severe pain. (Patient taking differently: Take 1-2 tablets by mouth every 4-6 hours as needed pain.  Take 2 only when in severe pain.) 42 tablet 0   • ondansetron (Zofran) 4 MG tablet Take 1 tablet by mouth Every 8 (Eight) Hours As Needed for Nausea or Vomiting for up to 10 doses. 10 tablet 0   • [] pantoprazole (PROTONIX) 40 MG EC tablet Take 1 tablet by mouth Daily for 14 days. 14 tablet 0     Current Facility-Administered Medications on File Prior to Visit   Medication Dose Route Frequency Provider Last Rate  "Last Admin   • Chlorhexidine Gluconate Cloth 2 % pads   Apply externally BID Giorgio Keenan MD         Review of Systems   Constitutional: Positive for fatigue.   HENT: Negative.    Eyes: Negative.    Respiratory: Negative.    Cardiovascular: Negative.    Gastrointestinal: Negative.    Endocrine: Negative.    Genitourinary: Negative.    Musculoskeletal: Negative.    Skin: Negative.    Allergic/Immunologic: Negative.    Neurological: Negative.    Hematological: Negative.    Psychiatric/Behavioral: Negative.        Objective   Physical Exam  Vitals and nursing note reviewed.   Constitutional:       Appearance: Normal appearance. She is well-developed.   HENT:      Head: Normocephalic and atraumatic.      Right Ear: Tympanic membrane and external ear normal.      Left Ear: Tympanic membrane and external ear normal.      Nose: Nose normal.      Mouth/Throat:      Mouth: Mucous membranes are moist.   Eyes:      Pupils: Pupils are equal, round, and reactive to light.   Cardiovascular:      Rate and Rhythm: Normal rate and regular rhythm.      Pulses: Normal pulses.      Heart sounds: Normal heart sounds.   Pulmonary:      Effort: Pulmonary effort is normal. No respiratory distress.      Breath sounds: Normal breath sounds.   Abdominal:      General: Abdomen is flat.      Palpations: Abdomen is soft.   Musculoskeletal:         General: Normal range of motion.      Cervical back: Normal range of motion and neck supple.   Skin:     General: Skin is warm and dry.   Neurological:      General: No focal deficit present.      Mental Status: She is alert and oriented to person, place, and time.   Psychiatric:         Mood and Affect: Mood normal.         Behavior: Behavior normal.         Thought Content: Thought content normal.         Judgment: Judgment normal.          /64   Pulse 83   Ht 170.2 cm (67\")   Wt 67.1 kg (148 lb)   BMI 23.18 kg/m²     Assessment/Plan   Diagnoses and all orders for this visit:    B12 " deficiency  -     cyanocobalamin injection 1,000 mcg  -     CBC & Differential  -     Vitamin B12  -     Folate  -     Basic Metabolic Panel  -     Iron Profile  -     Ferritin  -     TSH  -     T4, Free    Vitamin B12 deficiency anemia due to intrinsic factor deficiency  -     CBC & Differential  -     Vitamin B12  -     Folate  -     Basic Metabolic Panel  -     Iron Profile  -     Ferritin  -     TSH  -     T4, Free    Fatigue, unspecified type  -     CBC & Differential  -     Vitamin B12  -     Folate  -     Basic Metabolic Panel  -     Iron Profile  -     Ferritin  -     TSH  -     T4, Free    Left hip pain    patient w/ fatigue.  history of anemia related to b12 def from intrinsic factor def. She has not had inj x 2 mo. Will give b12 injection today. She may might need q 2 week x 2 if low. Will test iron and thyroid levels as well. She is to continue rehab as directed for post op. F/u prn.

## 2022-03-12 LAB
BASOPHILS # BLD AUTO: 0 X10E3/UL (ref 0–0.2)
BASOPHILS NFR BLD AUTO: 1 %
BUN SERPL-MCNC: 24 MG/DL (ref 8–27)
BUN/CREAT SERPL: 32 (ref 12–28)
CALCIUM SERPL-MCNC: 9.4 MG/DL (ref 8.7–10.3)
CHLORIDE SERPL-SCNC: 102 MMOL/L (ref 96–106)
CO2 SERPL-SCNC: 24 MMOL/L (ref 20–29)
CREAT SERPL-MCNC: 0.74 MG/DL (ref 0.57–1)
EGFR GENE MUT ANL BLD/T: 87 ML/MIN/1.73
EOSINOPHIL # BLD AUTO: 0.3 X10E3/UL (ref 0–0.4)
EOSINOPHIL NFR BLD AUTO: 4 %
ERYTHROCYTE [DISTWIDTH] IN BLOOD BY AUTOMATED COUNT: 12 % (ref 11.7–15.4)
FERRITIN SERPL-MCNC: 189 NG/ML (ref 15–150)
FOLATE SERPL-MCNC: 12.3 NG/ML
GLUCOSE SERPL-MCNC: 94 MG/DL (ref 65–99)
HCT VFR BLD AUTO: 35.7 % (ref 34–46.6)
HGB BLD-MCNC: 11.7 G/DL (ref 11.1–15.9)
IMM GRANULOCYTES # BLD AUTO: 0 X10E3/UL (ref 0–0.1)
IMM GRANULOCYTES NFR BLD AUTO: 0 %
IRON SATN MFR SERPL: 11 % (ref 15–55)
IRON SERPL-MCNC: 31 UG/DL (ref 27–139)
LYMPHOCYTES # BLD AUTO: 1.4 X10E3/UL (ref 0.7–3.1)
LYMPHOCYTES NFR BLD AUTO: 21 %
MCH RBC QN AUTO: 30.2 PG (ref 26.6–33)
MCHC RBC AUTO-ENTMCNC: 32.8 G/DL (ref 31.5–35.7)
MCV RBC AUTO: 92 FL (ref 79–97)
MONOCYTES # BLD AUTO: 0.5 X10E3/UL (ref 0.1–0.9)
MONOCYTES NFR BLD AUTO: 7 %
NEUTROPHILS # BLD AUTO: 4.3 X10E3/UL (ref 1.4–7)
NEUTROPHILS NFR BLD AUTO: 67 %
PLATELET # BLD AUTO: 385 X10E3/UL (ref 150–450)
POTASSIUM SERPL-SCNC: 4.3 MMOL/L (ref 3.5–5.2)
RBC # BLD AUTO: 3.88 X10E6/UL (ref 3.77–5.28)
SODIUM SERPL-SCNC: 141 MMOL/L (ref 134–144)
T4 FREE SERPL-MCNC: 1.19 NG/DL (ref 0.82–1.77)
TIBC SERPL-MCNC: 289 UG/DL (ref 250–450)
TSH SERPL DL<=0.005 MIU/L-ACNC: 2.66 UIU/ML (ref 0.45–4.5)
UIBC SERPL-MCNC: 258 UG/DL (ref 118–369)
VIT B12 SERPL-MCNC: 778 PG/ML (ref 232–1245)
WBC # BLD AUTO: 6.5 X10E3/UL (ref 3.4–10.8)

## 2022-03-14 ENCOUNTER — TELEPHONE (OUTPATIENT)
Dept: PHYSICAL THERAPY | Facility: CLINIC | Age: 72
End: 2022-03-14

## 2022-03-23 ENCOUNTER — HOSPITAL ENCOUNTER (OUTPATIENT)
Dept: MAMMOGRAPHY | Facility: HOSPITAL | Age: 72
Discharge: HOME | End: 2022-03-23
Admitting: INTERNAL MEDICINE

## 2022-03-23 DIAGNOSIS — Z12.31 BREAST CANCER SCREENING BY MAMMOGRAM: ICD-10-CM

## 2022-03-23 PROCEDURE — 77067 SCR MAMMO BI INCL CAD: CPT

## 2022-03-23 PROCEDURE — 77063 BREAST TOMOSYNTHESIS BI: CPT

## 2022-03-25 ENCOUNTER — READMISSION MANAGEMENT (OUTPATIENT)
Dept: CALL CENTER | Facility: HOSPITAL | Age: 72
End: 2022-03-25

## 2022-03-25 NOTE — OUTREACH NOTE
Total Joint Month 1 Survey    Flowsheet Row Responses   [facility] facility patient discharged from? [facility]   Does the patient have one of the following disease processes/diagnoses(primary or secondary)? Total Joint Replacement   Joint surgery performed? Hip   Month 1 attempt successful? No   Unsuccessful attempts Attempt 1          AUDIE MALHOTRA - Registered Nurse

## 2022-03-29 ENCOUNTER — READMISSION MANAGEMENT (OUTPATIENT)
Dept: CALL CENTER | Facility: HOSPITAL | Age: 72
End: 2022-03-29

## 2022-03-29 ENCOUNTER — TREATMENT (OUTPATIENT)
Dept: PHYSICAL THERAPY | Facility: CLINIC | Age: 72
End: 2022-03-29

## 2022-03-29 DIAGNOSIS — Z96.642 STATUS POST TOTAL REPLACEMENT OF LEFT HIP: ICD-10-CM

## 2022-03-29 DIAGNOSIS — R26.2 DIFFICULTY WALKING: ICD-10-CM

## 2022-03-29 DIAGNOSIS — M25.552 LEFT HIP PAIN: Primary | ICD-10-CM

## 2022-03-29 PROCEDURE — 97110 THERAPEUTIC EXERCISES: CPT | Performed by: PHYSICAL THERAPIST

## 2022-03-29 PROCEDURE — 97161 PT EVAL LOW COMPLEX 20 MIN: CPT | Performed by: PHYSICAL THERAPIST

## 2022-03-29 PROCEDURE — 97530 THERAPEUTIC ACTIVITIES: CPT | Performed by: PHYSICAL THERAPIST

## 2022-03-29 NOTE — OUTREACH NOTE
Total Joint Month 1 Survey    Flowsheet Row Responses   [facility] facility patient discharged from? [place]   Does the patient have one of the following disease processes/diagnoses(primary or secondary)? Total Joint Replacement   Joint surgery performed? Hip   Month 1 attempt successful? Yes   Unsuccessful attempts Attempt 2   Rescheduled Rescheduled-pt requested  [ requested to try pt again later]   Person spoke with today (if not patient) and relationship , Eduard ARTHUR - Registered Nurse

## 2022-03-29 NOTE — PROGRESS NOTES
Physical Therapy Initial Evaluation and Plan of Care    Patient: Iesha Roldan   : 1950  Diagnosis/ICD-10 Code:  Left hip pain [M25.552]  Referring practitioner: ROBERT Weeks  Past medical Hx reviewed: 3/29/2022    Subjective Evaluation    History of Present Illness  Mechanism of injury: I had a revision of the L hip partial replacement from .  I knew something was going on the whole time but we finally had the whole thing replaced.  Since surgery I'm doing better but the first 2 weeks were rough.  When I sit too long and stand up, the initial couple of steps are painful with pressure at first.  As I keep moving it lets up.  I can tell that I limp when I walk.  I was having some knee pain after surgery but that has let up.  The doctor did lengthen the L leg out some with the replacement but now it feels a little long.  I still lead with the R leg going up and L down and 2 feet per step.  Getting into the car I am careful.  I feel like when I walk, my knee is falling inward.  My incision is on the side and back of the L leg and lower.  Sleeping on my back        Patient Occupation: [employer] working.  12-15/week. Standing as often as possible.   Quality of life: good    Pain  Current pain ratin (Ache)  At best pain rating: 3 (Lying flat with ice.  Or sitting up with leg elvated.  )  At worst pain ratin (Standing too long: > 30 min on hard surface.  Sitting too long)  Location: L lateral hip area.    Quality: dull ache and tight (Soreness)  Relieving factors: ice, medications and rest  Aggravating factors: ambulation, lifting, stairs, prolonged positioning and repetitive movement    Social Support  Lives in: multiple-level home (main level owner suite and laundry)    Treatments  Previous treatment: home therapy (3-4 visits.  )  Patient Goals  Patient goals for therapy: decreased pain, improved balance, increased strength and independence with ADLs/IADLs  Patient goal: Back to walking  "3-5 miles with hills and terrain.  Going up and down outside stairs.  Riding bicycle.      PLOF: Independent but with lingering hip pain.      Objective          Passive Range of Motion     Right Hip   Flexion: 120 degrees   Extension: 6 degrees   Abduction: 20 degrees   External rotation (90/90): 30 degrees   Internal rotation (90/90): 30 degrees     Strength/Myotome Testing     Left Hip   Planes of Motion   External rotation: 3  Internal rotation: 4- (pain )    Additional Strength Details  L hip ER SMCD.      Ambulation     Observational Gait   Gait: antalgic and asymmetric   Increased left stance time and right stance time. Decreased walking speed and stride length.     Additional Observational Gait Details  Increased L knee valgus noted with increased L trunk Sway on L stance.        Functional Assessment     Forward Step Up 6\"   Left Leg  Increased contralateral push off.     Forward Step Down 6\"   Left Leg  Ipsilateral trunk side bending (felt tender and a little weak) and preload.     Single Leg Stance   Left: 15 (L trunk lean for counter balance and increased effort.  ) seconds  Right: 15 seconds    Comments  TU.91 (Independent).  Mild limp noted  5 x sit to stand:  Noted trunk lean to the R.  Noted genu valgus L >R.  Pain reported L knee and hip (7/10).       Assessment & Plan     Assessment  Impairments: abnormal gait, abnormal muscle firing, abnormal or restricted ROM, activity intolerance, impaired balance, impaired physical strength, lacks appropriate home exercise program, pain with function and weight-bearing intolerance  Functional Limitations: lifting, walking, uncomfortable because of pain, sitting, standing and unable to perform repetitive tasks  Assessment details: Pt presents to PT with symptoms consistent with limitations secondary to L hip lateral approach revision and total hip replacement.  She does demonstrate gait deviations including noted L lateral trunk sway to compensate for CKC " weakness, Hip ER weakness and genu valgus /torsion as result.  Lastly, she does have noticeable vaulting on L LE in stance phase and indicate leg length difference (L>R) affecting her gait pattern as well.  Pt would benefit from skilled PT intervention to address the deficits noted.   Prognosis: good    Goals  Plan Goals:  SHORT TERM GOALS:  1.  Patient to be compliant with HEP and demo good efficiency with TE  2.  Report < 2 sleep disturbances 2° hip pain.     3.  Increased Lumbar and SIJ stability to allow for improved pelvic alignment and gait mechanics (equal step length and level pelvis throughout gait).    4.  Increased hip ER ROM to WFL (ER to 40°) degrees to allow for increased ease with bed mobility and squatting.  5. Pt will report minimal-no tenderness to palpation with firm pressure.   6. Pt. Able to ambulate up to 30 min with pain < 5/10 with acceptable pattern.      LONG TERM GOALS:  1.  Pt. to score > 10% perceived ability on LEFS  2.  Pain level < 2/10 in hips with all activities including sitting/walking/standing > 1 hr continuously.   3.  Hip  AROM to WFL (ER to 50+ deg, Flexion: 125 deg, ABD: 30 deg ) to allow for return to ADL's/IADLS and functional activities without increased symptoms.  4. Hip strength to 4+/5  to allow for pushing, pulling and more strenuous activities to occur without pain.  Walk >3 miles.  no pain > 2 /10  5.Pt to traverse stairs > 2 flights alternating pattern and minimal UE assist for stability/safety.    6.  30 sec sit to stand x 12 reps.  With even lev        Plan  Therapy options: will be seen for skilled therapy services  Planned modality interventions: cryotherapy, electrical stimulation/Russian stimulation, iontophoresis, TENS, thermotherapy (hydrocollator packs), traction and ultrasound  Other planned modality interventions: Dry Needling  Planned therapy interventions: abdominal trunk stabilization, ADL retraining, body mechanics training, balance/weight-bearing  training, flexibility, functional ROM exercises, gait training, home exercise program, joint mobilization, manual therapy, neuromuscular re-education, postural training, soft tissue mobilization, spinal/joint mobilization, strengthening, stretching and therapeutic activities  Frequency: 2x week  Duration in visits: 16  Treatment plan discussed with: patient      Manual Therapy:    -     mins  19943;  Therapeutic Exercise:    12     mins  55290;     Neuromuscular Sara:    -    mins  27565;    Therapeutic Activity:     15     mins  67196;   Pt education and heel lift trial   Gait Training:      -     mins  98513;     Ultrasound:     -     mins  13942;    Electrical Stimulation:    -     mins  72088 ( );  Dry Needling     -     mins self-pay    Timed Treatment:   27   mins   Total Treatment:     60   mins    PT SIGNATURE:  Gilbert Ochoa DPT, PT     Gilbert Ochoa PT   [place] License #: [id number]    DATE TREATMENT INITIATED: 3/30/2022    Medicare Initial Certification  Certification Period: 6/28/2022  I certify that the therapy services are furnished while this patient is under my care.  The services outlined above are required by this patient, and will be reviewed every 90 days.     PHYSICIAN: Jose Miguel Webster APRN      DATE:     Please sign and return via fax to [phone].. Thank you, [facility].

## 2022-03-30 ENCOUNTER — READMISSION MANAGEMENT (OUTPATIENT)
Dept: CALL CENTER | Facility: HOSPITAL | Age: 72
End: 2022-03-30

## 2022-03-30 NOTE — OUTREACH NOTE
Total Joint Month 1 Survey    Flowsheet Row Responses   [facility] patient discharged from? [employer]   Does the patient have one of the following disease processes/diagnoses(primary or secondary)? Total Joint Replacement   Joint surgery performed? Hip   Month 1 attempt successful? Yes   Call start time 0840   Call end time 0842   Has the patient been back in either the hospital or Emergency Department since discharge? No   Discharge diagnosis TOTAL HIP ARTHROPLASTY REVISION   Is the patient taking all medications as directed (includes completed medication regime)? N/A   Has the patient kept scheduled appointments due by today? Yes   Is the patient still receiving Home Health Services? No   Is the patient still attending therapy sessions(either in the home or as an outpatient)? Yes   Has the patient fallen since discharge? No   What is the patient's perception of their functional status since discharge? Improving   Month 1 call completed? Yes          FIDEL JOHNSON - Registered Nurse

## 2022-03-31 ENCOUNTER — TREATMENT (OUTPATIENT)
Dept: PHYSICAL THERAPY | Facility: CLINIC | Age: 72
End: 2022-03-31

## 2022-03-31 DIAGNOSIS — M25.552 LEFT HIP PAIN: Primary | ICD-10-CM

## 2022-03-31 DIAGNOSIS — Z96.642 STATUS POST TOTAL REPLACEMENT OF LEFT HIP: ICD-10-CM

## 2022-03-31 DIAGNOSIS — R26.2 DIFFICULTY WALKING: ICD-10-CM

## 2022-03-31 DIAGNOSIS — M25.562 CHRONIC PAIN OF LEFT KNEE: ICD-10-CM

## 2022-03-31 DIAGNOSIS — G89.29 CHRONIC PAIN OF LEFT KNEE: ICD-10-CM

## 2022-03-31 PROCEDURE — 97110 THERAPEUTIC EXERCISES: CPT | Performed by: PHYSICAL THERAPIST

## 2022-04-07 ENCOUNTER — TREATMENT (OUTPATIENT)
Dept: PHYSICAL THERAPY | Facility: CLINIC | Age: 72
End: 2022-04-07

## 2022-04-07 DIAGNOSIS — M25.552 LEFT HIP PAIN: Primary | ICD-10-CM

## 2022-04-07 DIAGNOSIS — R26.2 DIFFICULTY WALKING: ICD-10-CM

## 2022-04-07 DIAGNOSIS — G89.29 CHRONIC PAIN OF LEFT KNEE: ICD-10-CM

## 2022-04-07 DIAGNOSIS — M25.562 CHRONIC PAIN OF LEFT KNEE: ICD-10-CM

## 2022-04-07 DIAGNOSIS — Z96.642 STATUS POST TOTAL REPLACEMENT OF LEFT HIP: ICD-10-CM

## 2022-04-07 PROCEDURE — 97530 THERAPEUTIC ACTIVITIES: CPT | Performed by: PHYSICAL THERAPIST

## 2022-04-07 PROCEDURE — 97110 THERAPEUTIC EXERCISES: CPT | Performed by: PHYSICAL THERAPIST

## 2022-04-07 NOTE — PROGRESS NOTES
Physical Therapy Daily Progress Note      Patient: Iesha Roldan   : 1950  Diagnosis/ICD-10 Code:  Left hip pain [M25.552]  Referring practitioner: ROBERT Weeks  Date of Initial Visit: Type: THERAPY  Noted: 3/29/2022  Today's Date: 2022  Patient seen for 3 sessions    Subjective : Iesha Roldan reports:  I have been working on not tilting to the side as much when I walk.  I have also notices that I feel twisted while walking and I really want to work on that.      Objective:   See Exercise, Manual, and Modality Logs for complete treatment.     Assessment/Plan:  We reviewed her heel lift use/placement, practiced exercise in supine and in standing/WB position with self-correction for trunk shift and for slight pelvic rotation during exercise, with hands on hips during mini-squat to increase awareness of pelvic shift or rotation during.   Discussed pt's desire to return to some independent exercise at [employer], practiced some exercise with correction on leg press in anticipation of returning to that activity at the gym.  Found single leg press with multiple reps at 20# (L) LE challenging.       Progress per Plan of Care and Progress strengthening /stabilization /functional activity     Manual Therapy:     -     mins  11396;  Therapeutic Exercise:    40     mins  95282;     Neuromuscular Sara:    -    mins  51535;    Therapeutic Activity:     10     mins  01838;     Gait Training:      -     mins  18547;     Ultrasound:     -     mins  22278;    Electrical Stimulation:    -     mins  79435 ( );  Dry Needling     -     mins self-pay    Timed Treatment:   50   mins   Total Treatment:     58   mins      MICHELLE Tomlin License #[id number]  Physical Therapist Assistant

## 2022-04-12 ENCOUNTER — TELEPHONE (OUTPATIENT)
Dept: PHYSICAL THERAPY | Facility: CLINIC | Age: 72
End: 2022-04-12

## 2022-04-15 ENCOUNTER — LAB (OUTPATIENT)
Dept: INTERNAL MEDICINE | Facility: CLINIC | Age: 72
End: 2022-04-15

## 2022-04-15 PROCEDURE — 96372 THER/PROPH/DIAG INJ SC/IM: CPT | Performed by: INTERNAL MEDICINE

## 2022-04-15 RX ADMIN — CYANOCOBALAMIN 1000 MCG: 1000 INJECTION, SOLUTION INTRAMUSCULAR; SUBCUTANEOUS at 11:57

## 2022-04-22 ENCOUNTER — APPOINTMENT (OUTPATIENT)
Dept: MAMMOGRAPHY | Facility: HOSPITAL | Age: 72
End: 2022-04-22

## 2022-05-02 ENCOUNTER — READMISSION MANAGEMENT (OUTPATIENT)
Dept: CALL CENTER | Facility: HOSPITAL | Age: 72
End: 2022-05-02

## 2022-05-02 NOTE — OUTREACH NOTE
Total Joint Month 2 Survey    Flowsheet Row Responses   [facility] facility patient discharged from? [place]   Does the patient have one of the following disease processes/diagnoses(primary or secondary)? Total Joint Replacement   Joint surgery performed? Hip   Month 2 attempt successful? No   Unsuccessful attempts Attempt 1          AJ JOHNSON - Registered Nurse

## 2022-05-05 ENCOUNTER — READMISSION MANAGEMENT (OUTPATIENT)
Dept: CALL CENTER | Facility: HOSPITAL | Age: 72
End: 2022-05-05

## 2022-05-05 NOTE — OUTREACH NOTE
Total Joint Month 2 Survey    Flowsheet Row Responses   [facility] facility patient discharged from? [place]   Does the patient have one of the following disease processes/diagnoses(primary or secondary)? Total Joint Replacement   Joint surgery performed? Hip   Month 2 attempt successful? No   Unsuccessful attempts Attempt 2          AJ JOHNSON - Registered Nurse

## 2022-05-19 ENCOUNTER — LAB (OUTPATIENT)
Dept: INTERNAL MEDICINE | Facility: CLINIC | Age: 72
End: 2022-05-19

## 2022-05-19 ENCOUNTER — TRANSCRIBE ORDERS (OUTPATIENT)
Dept: ADMINISTRATIVE | Facility: HOSPITAL | Age: 72
End: 2022-05-19

## 2022-05-19 DIAGNOSIS — Z13.6 ENCOUNTER FOR SCREENING FOR VASCULAR DISEASE: Primary | ICD-10-CM

## 2022-05-19 PROCEDURE — 96372 THER/PROPH/DIAG INJ SC/IM: CPT | Performed by: INTERNAL MEDICINE

## 2022-05-19 RX ADMIN — CYANOCOBALAMIN 1000 MCG: 1000 INJECTION, SOLUTION INTRAMUSCULAR; SUBCUTANEOUS at 13:51

## 2022-05-24 ENCOUNTER — OFFICE VISIT (OUTPATIENT)
Dept: ORTHOPEDIC SURGERY | Facility: CLINIC | Age: 72
End: 2022-05-24

## 2022-05-24 VITALS — HEIGHT: 67 IN | BODY MASS INDEX: 23.23 KG/M2 | RESPIRATION RATE: 16 BRPM | WEIGHT: 148 LBS | TEMPERATURE: 97.1 F

## 2022-05-24 DIAGNOSIS — Z96.642 HISTORY OF REVISION OF TOTAL REPLACEMENT OF LEFT HIP JOINT: ICD-10-CM

## 2022-05-24 DIAGNOSIS — M25.552 HIP PAIN, LEFT: Primary | ICD-10-CM

## 2022-05-24 PROCEDURE — 73502 X-RAY EXAM HIP UNI 2-3 VIEWS: CPT | Performed by: NURSE PRACTITIONER

## 2022-05-24 PROCEDURE — 99024 POSTOP FOLLOW-UP VISIT: CPT | Performed by: NURSE PRACTITIONER

## 2022-05-24 NOTE — PROGRESS NOTES
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 Patient reports doing well with no unusual complaints. Appears to be progressing appropriately and is off a cane. Does report she is still having pain off an on from her hip to her thigh, but states her pain is better than before surgery.    OBJECTIVE:   Exam:. The incision is healed. No sign of infection. Range of motion is progressing as expected. The calf is soft and nontender with a negative Homans sign. Strength progressing    DIAGNOSTIC STUDIES  Xrays: 2 views of the left hip (AP pelvis and lateral left hip) were ordered and reviewed for evaluation of recent hip replacement. They demonstrate a well positioned, well aligned hip replacement without complicating factors noted. In comparison with previous films there has been interval implant placement.    ASSESSMENT: 8 week status post left hip replacement.    PLAN: 1) Activity as tolerated   2) Continue hip strengthening exercises    3) Follow up 1 year post-op with repeat Xrays of left hip (2views AP Pelvis      and lateral left hip)    ROBERT Weeks  2022

## 2022-06-06 ENCOUNTER — READMISSION MANAGEMENT (OUTPATIENT)
Dept: CALL CENTER | Facility: HOSPITAL | Age: 72
End: 2022-06-06

## 2022-06-06 NOTE — OUTREACH NOTE
Total Joint Month 3 Survey    Flowsheet Row Responses   [facility] facility patient discharged from? [place]   Does the patient have one of the following disease processes/diagnoses(primary or secondary)? Total Joint Replacement   Joint surgery performed? Hip   Month 3 attempt successful? No   Unsuccessful attempts Attempt 1          SANDRA ARTHUR - Registered Nurse

## 2022-06-09 ENCOUNTER — READMISSION MANAGEMENT (OUTPATIENT)
Dept: CALL CENTER | Facility: HOSPITAL | Age: 72
End: 2022-06-09

## 2022-06-09 NOTE — OUTREACH NOTE
Total Joint Month 3 Survey    Flowsheet Row Responses   [facility] facility patient discharged from? [place]   Does the patient have one of the following disease processes/diagnoses(primary or secondary)? Total Joint Replacement   Joint surgery performed? Hip   Month 3 attempt successful? No   Unsuccessful attempts Attempt 2          AJ JOHNSON - Registered Nurse

## 2022-06-10 ENCOUNTER — CLINICAL SUPPORT (OUTPATIENT)
Dept: INTERNAL MEDICINE | Facility: CLINIC | Age: 72
End: 2022-06-10

## 2022-06-10 PROCEDURE — 96372 THER/PROPH/DIAG INJ SC/IM: CPT | Performed by: INTERNAL MEDICINE

## 2022-06-10 RX ADMIN — CYANOCOBALAMIN 1000 MCG: 1000 INJECTION, SOLUTION INTRAMUSCULAR; SUBCUTANEOUS at 15:39

## 2022-06-14 ENCOUNTER — OFFICE VISIT (OUTPATIENT)
Dept: INTERNAL MEDICINE | Facility: CLINIC | Age: 72
End: 2022-06-14

## 2022-06-14 VITALS
WEIGHT: 148 LBS | DIASTOLIC BLOOD PRESSURE: 62 MMHG | SYSTOLIC BLOOD PRESSURE: 118 MMHG | HEART RATE: 81 BPM | BODY MASS INDEX: 23.23 KG/M2 | HEIGHT: 67 IN

## 2022-06-14 DIAGNOSIS — R45.89 DEPRESSED MOOD: ICD-10-CM

## 2022-06-14 DIAGNOSIS — M16.12 PRIMARY OSTEOARTHRITIS OF LEFT HIP: ICD-10-CM

## 2022-06-14 DIAGNOSIS — R06.00 DYSPNEA, UNSPECIFIED TYPE: Primary | ICD-10-CM

## 2022-06-14 DIAGNOSIS — E53.8 B12 DEFICIENCY: ICD-10-CM

## 2022-06-14 PROCEDURE — 99214 OFFICE O/P EST MOD 30 MIN: CPT | Performed by: INTERNAL MEDICINE

## 2022-06-14 PROCEDURE — 93000 ELECTROCARDIOGRAM COMPLETE: CPT | Performed by: INTERNAL MEDICINE

## 2022-06-14 PROCEDURE — 71046 X-RAY EXAM CHEST 2 VIEWS: CPT | Performed by: INTERNAL MEDICINE

## 2022-06-14 RX ORDER — DULOXETIN HYDROCHLORIDE 30 MG/1
30 CAPSULE, DELAYED RELEASE ORAL DAILY
Qty: 90 CAPSULE | Refills: 2 | Status: SHIPPED | OUTPATIENT
Start: 2022-06-14 | End: 2022-09-08

## 2022-06-14 NOTE — PROGRESS NOTES
Chief Complaint   Patient presents with   • ADD     Untreated at present. Easily irritable.      • OTHER     History b12 deficiency       • Shortness of Breath       History of Present Illness   Iesha Roldan is a 72 y.o. female presents for follow up evaluation. Feeling well today. Has history ADD, OA, b12 deficiency, mucoepidermoid carcinoma. She does note occasional dyspnea. She noted this in the past when anemic. She did have a recent b12 injection. She has not had as much dyspnea after this. She is able to walk for fitness without any notable shortness of air. She feels easily irritable/ short fused.             The following portions of the patient's history were reviewed and updated as appropriate: allergies, current medications, past family history, past medical history, past social history, past surgical history and problem list.  Current Outpatient Medications on File Prior to Visit   Medication Sig Dispense Refill   • ALPRAZolam (Xanax) 0.25 MG tablet Take 1 tablet by mouth 2 (Two) Times a Day As Needed for Anxiety. 20 tablet 2   • aspirin 81 MG EC tablet Take 1 table by mouth twice daily for 14 days; then take 1 tablet by mouth daily for 28 days 60 tablet 0   • fluticasone (Flonase) 50 MCG/ACT nasal spray 2 sprays into the nostril(s) as directed by provider Daily. (Patient taking differently: 2 sprays into the nostril(s) as directed by provider Daily As Needed.) 16 g 5   • HYDROcodone-acetaminophen (NORCO) 7.5-325 MG per tablet Take 1-2 tablets by mouth every 4-6 hours as needed pain.  Take 2 only when in severe pain. (Patient taking differently: Take 1-2 tablets by mouth every 4-6 hours as needed pain.  Take 2 only when in severe pain.) 42 tablet 0   • ondansetron (Zofran) 4 MG tablet Take 1 tablet by mouth Every 8 (Eight) Hours As Needed for Nausea or Vomiting for up to 10 doses. 10 tablet 0     Current Facility-Administered Medications on File Prior to Visit   Medication Dose Route Frequency  Provider Last Rate Last Admin   • Chlorhexidine Gluconate Cloth 2 % pads   Apply externally BID Giorgio Keenan MD       • cyanocobalamin injection 1,000 mcg  1,000 mcg Intramuscular Q28 Days Camila Mejia MD   1,000 mcg at 05/19/22 1351     Review of Systems   Constitutional: Negative.    HENT: Negative.    Eyes: Negative.    Respiratory: Negative.    Cardiovascular: Negative.    Gastrointestinal: Negative.    Endocrine: Negative.    Genitourinary: Negative.    Musculoskeletal: Negative.    Skin: Negative.    Allergic/Immunologic: Negative.    Neurological: Negative.    Hematological: Negative.    Psychiatric/Behavioral: Negative.        Objective   Physical Exam  Vitals and nursing note reviewed.   Constitutional:       Appearance: Normal appearance. She is well-developed.   HENT:      Head: Normocephalic and atraumatic.      Right Ear: Tympanic membrane and external ear normal.      Left Ear: Tympanic membrane and external ear normal.      Nose: Nose normal.      Mouth/Throat:      Mouth: Mucous membranes are moist.   Eyes:      Extraocular Movements: Extraocular movements intact.      Pupils: Pupils are equal, round, and reactive to light.   Cardiovascular:      Rate and Rhythm: Normal rate and regular rhythm.      Pulses: Normal pulses.      Heart sounds: Normal heart sounds.   Pulmonary:      Effort: Pulmonary effort is normal. No respiratory distress.      Breath sounds: Normal breath sounds.   Abdominal:      General: Abdomen is flat.      Palpations: Abdomen is soft.   Musculoskeletal:         General: Normal range of motion.      Cervical back: Normal range of motion and neck supple.   Skin:     General: Skin is warm and dry.   Neurological:      General: No focal deficit present.      Mental Status: She is alert and oriented to person, place, and time.   Psychiatric:         Mood and Affect: Mood normal.         Behavior: Behavior normal.         Thought Content: Thought content normal.         Judgment:  "Judgment normal.          /62   Pulse 81   Ht 170.2 cm (67\")   Wt 67.1 kg (148 lb)   BMI 23.18 kg/m²     Assessment & Plan   Diagnoses and all orders for this visit:    B12 deficiency    Primary osteoarthritis of left hip    Dyspnea, unspecified type    Depressed mood    Other orders  -     DULoxetine (Cymbalta) 30 MG capsule; Take 1 capsule by mouth Daily.        Patient w/ mild dyspnea. cxr and ekg wnl. She likely is deconditioned from hip replacement. Will gradually increase activity. Additional testing if persistent. Patient w easy irritability/ somewhat depressed mood. Will try cymbalta low dose. Advance if needed. She will listed labs. She will follow up in 3 months or prn.          "

## 2022-06-15 LAB — 25(OH)D3+25(OH)D2 SERPL-MCNC: 40.8 NG/ML (ref 30–100)

## 2022-06-17 ENCOUNTER — TELEPHONE (OUTPATIENT)
Dept: INTERNAL MEDICINE | Facility: CLINIC | Age: 72
End: 2022-06-17

## 2022-06-17 NOTE — TELEPHONE ENCOUNTER
Pt informed    ----- Message from Camila Mejia MD sent at 6/17/2022  7:33 AM EDT -----  Please advise patient her chest xray is NORMAL  jw

## 2022-06-21 ENCOUNTER — TELEPHONE (OUTPATIENT)
Dept: INTERNAL MEDICINE | Facility: CLINIC | Age: 72
End: 2022-06-21

## 2022-06-21 NOTE — TELEPHONE ENCOUNTER
Caller: Chente Roldan    Relationship: Self    Best call back number: [phone] (H)    What is the best time to reach you: ANYTIME, ASAP    Who are you requesting to speak with (clinical staff, provider,  specific staff member): CLINICAL STAFF/ ONUR SPECIFICALLY    Do you know the name of the person who called: CHENTE ROLDAN    What was the call regarding: PATIENT NEEDS TO MAKE SURE THAT THE LAB RAN MORE THAN HER VITAMIN D LEVEL ON HER LAB WORK, PLEASE CALL PATIENT BACK ASAP    Do you require a callback: YES, ASAP

## 2022-06-24 ENCOUNTER — APPOINTMENT (OUTPATIENT)
Dept: CARDIOLOGY | Facility: HOSPITAL | Age: 72
End: 2022-06-24

## 2022-07-06 ENCOUNTER — CLINICAL SUPPORT (OUTPATIENT)
Dept: INTERNAL MEDICINE | Facility: CLINIC | Age: 72
End: 2022-07-06

## 2022-07-06 DIAGNOSIS — D51.0 VITAMIN B12 DEFICIENCY ANEMIA DUE TO INTRINSIC FACTOR DEFICIENCY: Primary | ICD-10-CM

## 2022-07-06 PROCEDURE — 96372 THER/PROPH/DIAG INJ SC/IM: CPT | Performed by: INTERNAL MEDICINE

## 2022-07-06 RX ADMIN — CYANOCOBALAMIN 1000 MCG: 1000 INJECTION, SOLUTION INTRAMUSCULAR; SUBCUTANEOUS at 15:27

## 2022-07-22 ENCOUNTER — CLINICAL SUPPORT (OUTPATIENT)
Dept: INTERNAL MEDICINE | Facility: CLINIC | Age: 72
End: 2022-07-22

## 2022-08-03 RX ORDER — MELOXICAM 15 MG/1
15 TABLET ORAL DAILY
Qty: 30 TABLET | Refills: 5 | Status: SHIPPED | OUTPATIENT
Start: 2022-08-03 | End: 2022-11-21 | Stop reason: SDUPTHER

## 2022-08-09 ENCOUNTER — OFFICE VISIT (OUTPATIENT)
Dept: ORTHOPEDIC SURGERY | Facility: CLINIC | Age: 72
End: 2022-08-09

## 2022-08-09 ENCOUNTER — TELEPHONE (OUTPATIENT)
Dept: INTERNAL MEDICINE | Facility: CLINIC | Age: 72
End: 2022-08-09

## 2022-08-09 ENCOUNTER — HOSPITAL ENCOUNTER (OUTPATIENT)
Dept: BONE DENSITY | Facility: HOSPITAL | Age: 72
Discharge: HOME | End: 2022-08-09
Admitting: INTERNAL MEDICINE

## 2022-08-09 VITALS — BODY MASS INDEX: 23.17 KG/M2 | TEMPERATURE: 97.3 F | RESPIRATION RATE: 16 BRPM | HEIGHT: 67 IN | WEIGHT: 147.6 LBS

## 2022-08-09 DIAGNOSIS — M17.12 PRIMARY OSTEOARTHRITIS OF LEFT KNEE: ICD-10-CM

## 2022-08-09 DIAGNOSIS — R52 PAIN: Primary | ICD-10-CM

## 2022-08-09 PROCEDURE — 73562 X-RAY EXAM OF KNEE 3: CPT | Performed by: NURSE PRACTITIONER

## 2022-08-09 PROCEDURE — 99213 OFFICE O/P EST LOW 20 MIN: CPT | Performed by: NURSE PRACTITIONER

## 2022-08-09 PROCEDURE — 20610 DRAIN/INJ JOINT/BURSA W/O US: CPT | Performed by: NURSE PRACTITIONER

## 2022-08-09 PROCEDURE — 77080 DXA BONE DENSITY AXIAL: CPT

## 2022-08-09 RX ORDER — LIDOCAINE HYDROCHLORIDE 10 MG/ML
5 INJECTION, SOLUTION EPIDURAL; INFILTRATION; INTRACAUDAL; PERINEURAL
Status: COMPLETED | OUTPATIENT
Start: 2022-08-09 | End: 2022-08-09

## 2022-08-09 RX ORDER — METHYLPREDNISOLONE ACETATE 80 MG/ML
80 INJECTION, SUSPENSION INTRA-ARTICULAR; INTRALESIONAL; INTRAMUSCULAR; SOFT TISSUE
Status: COMPLETED | OUTPATIENT
Start: 2022-08-09 | End: 2022-08-09

## 2022-08-09 RX ADMIN — METHYLPREDNISOLONE ACETATE 80 MG: 80 INJECTION, SUSPENSION INTRA-ARTICULAR; INTRALESIONAL; INTRAMUSCULAR; SOFT TISSUE at 17:01

## 2022-08-09 RX ADMIN — LIDOCAINE HYDROCHLORIDE 5 ML: 10 INJECTION, SOLUTION EPIDURAL; INFILTRATION; INTRACAUDAL; PERINEURAL at 17:01

## 2022-08-09 NOTE — TELEPHONE ENCOUNTER
Pt informed    ----- Message from Camila Mejia MD sent at 8/9/2022  3:44 PM EDT -----  Patient has osteoporosis. Can discuss at scheduled follow up or make appointment to discuss if desires before October visit.   radha

## 2022-08-09 NOTE — PROGRESS NOTES
Patient: Iesha Roldan  YOB: 1950 72 y.o. female  Medical Record Number: [id number]    Chief Complaints:   Chief Complaint   Patient presents with   • Left Knee - Initial Evaluation       History of Present Illness:Iesha Roldan is a 72 y.o. female who presents with complaints of having a left knee pain that is chronic in nature.  Patient reports that she has been hurting for a few months ago with it worsening over the last month.  Patient reports no known injury to her knee.  She states her knee is catching and popping and sometimes locking.  She states she has a constant daily ache that is pretty severe at sometimes.  She reports her pain is worse going up and down steps and she gets some relief by taking NSAIDs and brief periods of rest.  She denies any signs or symptoms of infection.  She is without any other significant complaints today.    Allergies:   Allergies   Allergen Reactions   • Erythromycin Nausea And Vomiting       Medications:   Current Outpatient Medications   Medication Sig Dispense Refill   • ALPRAZolam (Xanax) 0.25 MG tablet Take 1 tablet by mouth 2 (Two) Times a Day As Needed for Anxiety. 20 tablet 2   • aspirin 81 MG EC tablet Take 1 table by mouth twice daily for 14 days; then take 1 tablet by mouth daily for 28 days 60 tablet 0   • DULoxetine (Cymbalta) 30 MG capsule Take 1 capsule by mouth Daily. 90 capsule 2   • fluticasone (Flonase) 50 MCG/ACT nasal spray 2 sprays into the nostril(s) as directed by provider Daily. (Patient taking differently: 2 sprays into the nostril(s) as directed by provider Daily As Needed.) 16 g 5   • meloxicam (MOBIC) 15 MG tablet Take 1 tablet by mouth Daily. 30 tablet 5   • HYDROcodone-acetaminophen (NORCO) 7.5-325 MG per tablet Take 1-2 tablets by mouth every 4-6 hours as needed pain.  Take 2 only when in severe pain. (Patient taking differently: Take 1-2 tablets by mouth every 4-6 hours as needed pain.  Take 2 only when in severe  "pain.) 42 tablet 0   • ondansetron (Zofran) 4 MG tablet Take 1 tablet by mouth Every 8 (Eight) Hours As Needed for Nausea or Vomiting for up to 10 doses. 10 tablet 0     Current Facility-Administered Medications   Medication Dose Route Frequency Provider Last Rate Last Admin   • cyanocobalamin injection 1,000 mcg  1,000 mcg Intramuscular Q28 Days Camila Mejia MD   1,000 mcg at 07/06/22 1527     Facility-Administered Medications Ordered in Other Visits   Medication Dose Route Frequency Provider Last Rate Last Admin   • Chlorhexidine Gluconate Cloth 2 % pads   Apply externally BID Giorgio Keenan MD             The following portions of the patient's history were reviewed and updated as appropriate: allergies, current medications, past family history, past medical history, past social history, past surgical history and problem list.    Review of Systems:   A 14 point review of systems was performed. All systems negative except pertinent positives/negative listed in HPI above    Physical Exam:   Vitals:    08/09/22 1358   Resp: 16   Temp: 97.3 °F (36.3 °C)   TempSrc: Temporal   Weight: 67 kg (147 lb 9.6 oz)   Height: 170.2 cm (67.01\")   PainSc:   8       General: A and O x 3, ASA, NAD    SCLERA:    Normal    DENTITION:   Normal    Knee:  left    ALIGNMENT:     Valgus      GAIT:    Antalgic    SKIN:    No abnormality    RANGE OF MOTION:   5  -  120   DEG    STRENGTH:   4  / 5    LIGAMENTS:    No varus / valgus instability.   Negative  Lachman.    MENISCUS:     Negative   Angelito       DISTAL PULSES:    Paplable    DISTAL SENSATION :   Intact    LYMPHATICS:     No   lymphadenopathy    OTHER:          - Positive   effusion      - Crepitance with ROM       -Palpable Baker's cyst present       Radiology:  Xrays 3views (ap,lateral, sunrise) were ordered and reviewed for evaluation of knee pain demonstrating advanced valgus osteoarthritis with bone on bone articulation, subchondral cysts, and periarticular osteophytes as " well as patellofemoral osteoarthritis present.  No other knee x-rays available for comparison purposes.    Assessment/Plan: Primary osteoarthritis left knee    Treatment option as well as imaging results were discussed in detail with the patient.  Patient does have advanced arthritis to the left knee but has not done significant conservative measures.  Therefore we will get a proceed forward with conservative measures at this time.  I will give her a prescription for physical therapy to work on range of motion and strengthening exercises.  A muscle and give her a prescription for a medial heel wedge.  We will also do an intra-articular joint injection today.  Want the patient follow back up with me on an as-needed basis.    Large Joint Arthrocentesis: L knee  Date/Time: 8/9/2022 5:01 PM  Consent given by: patient  Site marked: site marked  Timeout: Immediately prior to procedure a time out was called to verify the correct patient, procedure, equipment, support staff and site/side marked as required   Supporting Documentation  Indications: pain and joint swelling   Procedure Details  Location: knee - L knee  Preparation: Patient was prepped and draped in the usual sterile fashion  Needle size: 22 G  Approach: anterolateral  Medications administered: 80 mg methylPREDNISolone acetate 80 MG/ML; 5 mL lidocaine PF 1% 1 %  Patient tolerance: patient tolerated the procedure well with no immediate complications              ROBERT Weeks  8/9/2022

## 2022-08-15 ENCOUNTER — CLINICAL SUPPORT (OUTPATIENT)
Dept: INTERNAL MEDICINE | Facility: CLINIC | Age: 72
End: 2022-08-15

## 2022-08-15 PROCEDURE — 96372 THER/PROPH/DIAG INJ SC/IM: CPT | Performed by: INTERNAL MEDICINE

## 2022-08-15 RX ADMIN — CYANOCOBALAMIN 1000 MCG: 1000 INJECTION, SOLUTION INTRAMUSCULAR; SUBCUTANEOUS at 13:12

## 2022-09-08 ENCOUNTER — TELEPHONE (OUTPATIENT)
Dept: GASTROENTEROLOGY | Facility: CLINIC | Age: 72
End: 2022-09-08

## 2022-09-08 ENCOUNTER — OFFICE VISIT (OUTPATIENT)
Dept: GASTROENTEROLOGY | Facility: CLINIC | Age: 72
End: 2022-09-08

## 2022-09-08 VITALS
WEIGHT: 147.6 LBS | HEIGHT: 67 IN | TEMPERATURE: 95 F | DIASTOLIC BLOOD PRESSURE: 80 MMHG | BODY MASS INDEX: 23.17 KG/M2 | SYSTOLIC BLOOD PRESSURE: 136 MMHG

## 2022-09-08 DIAGNOSIS — R19.7 DIARRHEA, UNSPECIFIED TYPE: Primary | ICD-10-CM

## 2022-09-08 DIAGNOSIS — Z86.010 HISTORY OF ADENOMATOUS POLYP OF COLON: ICD-10-CM

## 2022-09-08 PROCEDURE — 99214 OFFICE O/P EST MOD 30 MIN: CPT | Performed by: INTERNAL MEDICINE

## 2022-09-08 RX ORDER — DICYCLOMINE HCL 20 MG
20 TABLET ORAL
Qty: 60 TABLET | Refills: 2 | Status: SHIPPED | OUTPATIENT
Start: 2022-09-08 | End: 2023-02-14

## 2022-09-08 NOTE — TELEPHONE ENCOUNTER
Called pt and pt is wanting to switch her appt for her .  Checked with Dr Conteh and Dona TOWNSEND who advised that this is ok. Advised pt that when switching appts one of the spots could be taken if someone calls in.  Pt verb understanding and would prefer to just keep her appt.

## 2022-09-08 NOTE — TELEPHONE ENCOUNTER
Hub staff attempted to follow warm transfer process and was unsuccessful     Caller: Chente Roldan    Relationship to patient: Self    Best call back number: [phone]    Patient is needing: CHENTE IS WANTING TO SWAP HER APPOINTMENT TODAY WITH  SHEELA ROLDAN 9/29 11:45AM #[phone]. SHE HAS AN OPPORTUNITY TO WORK. PLEASE CALL PT TO ADVISE.

## 2022-09-08 NOTE — PROGRESS NOTES
Subjective   Chief Complaint   Patient presents with   • Diarrhea   • Anemia       Iesha Roldan is a  72 y.o. female here for a follow up visit for anemia and diarrhea.  She was last seen via telehealth in April 2020.    She had an EGD and colonoscopy in March 2019 that was notable for H pylori chronic active gastritis, a single tubular adenoma in the colon and diverticulosis.    She takes fiber supp 2 tsp 1-2 times daily.  She continues to have urgency at times - comes and goes.  Stools are mostly formed - she is never constipated.  Episodic bouts of diarrhea for a few days.  No blood in stool or cramping.  Last time it happened was after a salad with oil dressing.    Rare heartburn - otherwise no ugi symptoms.    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   • Neck pain    • Osteopenia    • Pharyngitis      Past Surgical History:   Procedure Laterality Date   • COLONOSCOPY  2017   • COLONOSCOPY  2019    Tubular adenoma, diverticulosis   • HIP SURGERY Left 2009    Joint replacement   • PAROTIDECTOMY Right 1/31/2017    Procedure: RIGHT SUPERFICIAL PAROTIDECTOMY EXCISION PAROTID MASS ;  Surgeon: Candelario Calderon MD;  Location: [facility];  Service:    • TOTAL HIP ARTHROPLASTY REVISION Left 2/23/2022    Procedure: CONVERSION TO TOTAL HIP;  Surgeon: Giorgio Keenan MD;  Location[facility];  Service: Orthopedics;  Laterality: Left;   • TUBAL ABDOMINAL LIGATION  In 1989       Current Outpatient Medications:   •  fluticasone (Flonase) 50 MCG/ACT nasal spray, 2 sprays into the nostril(s) as directed by provider Daily. (Patient taking differently: 2 sprays into the nostril(s) as directed by provider Daily As  Needed.), Disp: 16 g, Rfl: 5  •  meloxicam (MOBIC) 15 MG tablet, Take 1 tablet by mouth Daily., Disp: 30 tablet, Rfl: 5  •  dicyclomine (BENTYL) 20 MG tablet, Take 1 tablet by mouth 4 (Four) Times a Day Before Meals & at Bedtime As Needed (diarrhea)., Disp: 60 tablet, Rfl: 2    Current Facility-Administered Medications:   •  cyanocobalamin injection 1,000 mcg, 1,000 mcg, Intramuscular, Q28 Days, Camila Mejia MD, 1,000 mcg at 08/15/22 1312    Facility-Administered Medications Ordered in Other Visits:   •  Chlorhexidine Gluconate Cloth 2 % pads, , Apply externally, BID, Giorgio Keenan MD  PRN Meds:.  Allergies   Allergen Reactions   • Erythromycin Nausea And Vomiting     Social History     Socioeconomic History   • Marital status:      Spouse name: Franc   • Number of children: 2   • Years of education: College   Tobacco Use   • Smoking status: Never Smoker   • Smokeless tobacco: Never Used   Vaping Use   • Vaping Use: Never used   Substance and Sexual Activity   • Alcohol use: Not Currently     Comment: SOCIALLY   • Drug use: No   • Sexual activity: Yes     Partners: Male     Birth control/protection: Post-menopausal, Tubal ligation     Comment: spouse = FRANC     Family History   Problem Relation Age of Onset   • Peripheral vascular disease Mother    • Pancreatic cancer Father 91   • Heart disease Father    • Hypertension Father    • Rectal cancer Sister 36   • Diabetes Sister    • No Known Problems Brother    • No Known Problems Daughter    • No Known Problems Son    • Colon cancer Maternal Grandmother    • No Known Problems Paternal Grandmother    • No Known Problems Maternal Aunt    • No Known Problems Paternal Aunt    • Cancer Paternal Uncle 38        Mucoepidermoid CA of the parotid gland   • BRCA 1/2 Neg Hx    • Breast cancer Neg Hx    • Endometrial cancer Neg Hx    • Ovarian cancer Neg Hx    • Malig Hyperthermia Neg Hx      Review of Systems   Constitutional: Negative for appetite change and  unexpected weight change.   HENT: Negative for trouble swallowing.    Gastrointestinal: Positive for diarrhea. Negative for abdominal pain, blood in stool and constipation.     Vitals:    09/08/22 1050   BP: 136/80   Temp: 95 °F (35 °C)         09/08/22  1050   Weight: 67 kg (147 lb 9.6 oz)       Objective   Physical Exam  Constitutional:       Appearance: Normal appearance. She is well-developed.   HENT:      Head: Normocephalic and atraumatic.   Eyes:      General: No scleral icterus.     Conjunctiva/sclera: Conjunctivae normal.   Pulmonary:      Effort: Pulmonary effort is normal.   Abdominal:      General: There is no distension.      Palpations: Abdomen is soft.   Musculoskeletal:      Cervical back: Normal range of motion and neck supple.   Skin:     General: Skin is warm and dry.   Neurological:      Mental Status: She is alert.   Psychiatric:         Mood and Affect: Mood normal.         Behavior: Behavior normal.       No radiology results for the last 7 days    Assessment & Plan   Diagnoses and all orders for this visit:    Diarrhea, unspecified type    History of adenomatous polyp of colon    Other orders  -     dicyclomine (BENTYL) 20 MG tablet; Take 1 tablet by mouth 4 (Four) Times a Day Before Meals & at Bedtime As Needed (diarrhea).      Plan:  · Continue fiber supplement -increase morning dose to eliminate afternoon dose and improve compliance  · Can use dicyclomine as needed for symptoms when present.  · We reviewed the results of her labs over the past several years.  She had a minimal decline in her hemoglobin with no visible blood loss.  MCV has been stable.  In the absence of worsening hemoglobin or microcytosis, this is not concerning for GI blood loss.

## 2022-09-09 ENCOUNTER — OFFICE VISIT (OUTPATIENT)
Dept: INTERNAL MEDICINE | Facility: CLINIC | Age: 72
End: 2022-09-09

## 2022-09-09 VITALS
WEIGHT: 148 LBS | DIASTOLIC BLOOD PRESSURE: 76 MMHG | BODY MASS INDEX: 23.23 KG/M2 | HEART RATE: 67 BPM | SYSTOLIC BLOOD PRESSURE: 126 MMHG | HEIGHT: 67 IN

## 2022-09-09 DIAGNOSIS — D64.9 ANEMIA, UNSPECIFIED TYPE: Primary | ICD-10-CM

## 2022-09-09 DIAGNOSIS — M81.0 AGE-RELATED OSTEOPOROSIS WITHOUT CURRENT PATHOLOGICAL FRACTURE: ICD-10-CM

## 2022-09-09 PROCEDURE — 99214 OFFICE O/P EST MOD 30 MIN: CPT | Performed by: INTERNAL MEDICINE

## 2022-09-09 RX ORDER — ALENDRONATE SODIUM 70 MG/1
70 TABLET ORAL
Qty: 12 TABLET | Refills: 1 | Status: SHIPPED | OUTPATIENT
Start: 2022-09-09

## 2022-09-09 NOTE — PROGRESS NOTES
Chief Complaint   Patient presents with   • B12 Injection   • Osteoporosis       History of Present Illness   Iesha Roldan is a 72 y.o. female presents for follow up evaluation. She in general is doing well. Had DEXA testing. Scoring does c/w osteoporosis. She has not ever been on medication for this. Previously was osteopenic in 2017. She reports that there is a paternal history of likely osteoporosis. Patient does routienly consume calcium. She takes a vitamin d supplement. Patient has a history of anemia. She has felt well recently. hgb 3/11 11.7. history parotid tumor. Ct neck benign 8/21.   Patient is s/p THR. She reports doing well from this.           The following portions of the patient's history were reviewed and updated as appropriate: allergies, current medications, past family history, past medical history, past social history, past surgical history and problem list.  Current Outpatient Medications on File Prior to Visit   Medication Sig Dispense Refill   • dicyclomine (BENTYL) 20 MG tablet Take 1 tablet by mouth 4 (Four) Times a Day Before Meals & at Bedtime As Needed (diarrhea). 60 tablet 2   • fluticasone (Flonase) 50 MCG/ACT nasal spray 2 sprays into the nostril(s) as directed by provider Daily. (Patient taking differently: 2 sprays into the nostril(s) as directed by provider Daily As Needed.) 16 g 5   • meloxicam (MOBIC) 15 MG tablet Take 1 tablet by mouth Daily. 30 tablet 5     Current Facility-Administered Medications on File Prior to Visit   Medication Dose Route Frequency Provider Last Rate Last Admin   • Chlorhexidine Gluconate Cloth 2 % pads   Apply externally BID Giorgio Keenan MD       • cyanocobalamin injection 1,000 mcg  1,000 mcg Intramuscular Q28 Days Camila Mejia MD   1,000 mcg at 08/15/22 1312     Review of Systems   Constitutional: Negative.    HENT: Negative.    Eyes: Negative.    Respiratory: Negative.    Cardiovascular: Negative.    Gastrointestinal: Negative.   "  Endocrine: Negative.    Genitourinary: Negative.    Musculoskeletal: Negative.    Skin: Negative.    Allergic/Immunologic: Negative.    Neurological: Negative.    Hematological: Negative.    Psychiatric/Behavioral: Negative.        Objective   Physical Exam  Vitals and nursing note reviewed.   Constitutional:       Appearance: Normal appearance. She is well-developed.   HENT:      Head: Normocephalic and atraumatic.      Right Ear: Tympanic membrane and external ear normal.      Left Ear: Tympanic membrane and external ear normal.      Nose: Nose normal.      Mouth/Throat:      Mouth: Mucous membranes are moist.   Eyes:      Extraocular Movements: Extraocular movements intact.      Pupils: Pupils are equal, round, and reactive to light.   Cardiovascular:      Rate and Rhythm: Normal rate and regular rhythm.      Pulses: Normal pulses.      Heart sounds: Normal heart sounds.   Pulmonary:      Effort: Pulmonary effort is normal. No respiratory distress.      Breath sounds: Normal breath sounds.   Abdominal:      General: Abdomen is flat.      Palpations: Abdomen is soft.   Musculoskeletal:         General: Normal range of motion.      Cervical back: Normal range of motion and neck supple.   Skin:     General: Skin is warm and dry.   Neurological:      General: No focal deficit present.      Mental Status: She is alert and oriented to person, place, and time.   Psychiatric:         Mood and Affect: Mood normal.         Behavior: Behavior normal.         Thought Content: Thought content normal.         Judgment: Judgment normal.          /76   Pulse 67   Ht 170.2 cm (67\")   Wt 67.1 kg (148 lb)   BMI 23.18 kg/m²     Assessment & Plan   Diagnoses and all orders for this visit:    Anemia, unspecified type  -     CBC & Differential  -     Comprehensive Metabolic Panel  -     TSH  -     PTH, Intact  -     Vitamin B12    Age-related osteoporosis without current pathological fracture  -     CBC & Differential  -    "  Comprehensive Metabolic Panel  -     TSH  -     PTH, Intact    Other orders  -     alendronate (Fosamax) 70 MG tablet; Take 1 tablet by mouth Every 7 (Seven) Days.    patient w/ osteoporosis. Will start fosamax at 70 mg once weekly. To repeat DEXA in 2 years. Will test parathyroid hormone today. She will have repeat cbc and b12 testing given history of anemia. Weight bearing exercises, dietary calcium 1500 mg daily. Vitamin d 2000 iu daily all emphasized. She is aware of the risks and benefits of the medications. To f/u here in 4 months. Covid, flu, and shingrix all recommended.

## 2022-09-10 LAB
ALBUMIN SERPL-MCNC: 4.4 G/DL (ref 3.7–4.7)
ALBUMIN/GLOB SERPL: 1.9 {RATIO} (ref 1.2–2.2)
ALP SERPL-CCNC: 89 IU/L (ref 44–121)
ALT SERPL-CCNC: 15 IU/L (ref 0–32)
AST SERPL-CCNC: 23 IU/L (ref 0–40)
BASOPHILS # BLD AUTO: 0 X10E3/UL (ref 0–0.2)
BASOPHILS NFR BLD AUTO: 1 %
BILIRUB SERPL-MCNC: 0.6 MG/DL (ref 0–1.2)
BUN SERPL-MCNC: 17 MG/DL (ref 8–27)
BUN/CREAT SERPL: 21 (ref 12–28)
CALCIUM SERPL-MCNC: 9.6 MG/DL (ref 8.7–10.3)
CHLORIDE SERPL-SCNC: 103 MMOL/L (ref 96–106)
CO2 SERPL-SCNC: 25 MMOL/L (ref 20–29)
CREAT SERPL-MCNC: 0.81 MG/DL (ref 0.57–1)
EGFRCR-CYS SERPLBLD CKD-EPI 2021: 78 ML/MIN/1.73
EOSINOPHIL # BLD AUTO: 0.2 X10E3/UL (ref 0–0.4)
EOSINOPHIL NFR BLD AUTO: 4 %
ERYTHROCYTE [DISTWIDTH] IN BLOOD BY AUTOMATED COUNT: 13.3 % (ref 11.7–15.4)
GLOBULIN SER CALC-MCNC: 2.3 G/DL (ref 1.5–4.5)
GLUCOSE SERPL-MCNC: 88 MG/DL (ref 65–99)
HCT VFR BLD AUTO: 39.7 % (ref 34–46.6)
HGB BLD-MCNC: 13.1 G/DL (ref 11.1–15.9)
IMM GRANULOCYTES # BLD AUTO: 0 X10E3/UL (ref 0–0.1)
IMM GRANULOCYTES NFR BLD AUTO: 0 %
LYMPHOCYTES # BLD AUTO: 1.1 X10E3/UL (ref 0.7–3.1)
LYMPHOCYTES NFR BLD AUTO: 27 %
MCH RBC QN AUTO: 30.2 PG (ref 26.6–33)
MCHC RBC AUTO-ENTMCNC: 33 G/DL (ref 31.5–35.7)
MCV RBC AUTO: 92 FL (ref 79–97)
MONOCYTES # BLD AUTO: 0.5 X10E3/UL (ref 0.1–0.9)
MONOCYTES NFR BLD AUTO: 11 %
NEUTROPHILS # BLD AUTO: 2.4 X10E3/UL (ref 1.4–7)
NEUTROPHILS NFR BLD AUTO: 57 %
PLATELET # BLD AUTO: 231 X10E3/UL (ref 150–450)
POTASSIUM SERPL-SCNC: 4.4 MMOL/L (ref 3.5–5.2)
PROT SERPL-MCNC: 6.7 G/DL (ref 6–8.5)
PTH-INTACT SERPL-MCNC: 27 PG/ML (ref 15–65)
RBC # BLD AUTO: 4.34 X10E6/UL (ref 3.77–5.28)
SODIUM SERPL-SCNC: 143 MMOL/L (ref 134–144)
TSH SERPL DL<=0.005 MIU/L-ACNC: 3.62 UIU/ML (ref 0.45–4.5)
VIT B12 SERPL-MCNC: 545 PG/ML (ref 232–1245)
WBC # BLD AUTO: 4.2 X10E3/UL (ref 3.4–10.8)

## 2022-09-28 ENCOUNTER — CLINICAL SUPPORT (OUTPATIENT)
Dept: INTERNAL MEDICINE | Facility: CLINIC | Age: 72
End: 2022-09-28

## 2022-09-28 RX ADMIN — CYANOCOBALAMIN 1000 MCG: 1000 INJECTION, SOLUTION INTRAMUSCULAR; SUBCUTANEOUS at 09:28

## 2022-10-26 ENCOUNTER — FLU SHOT (OUTPATIENT)
Dept: INTERNAL MEDICINE | Facility: CLINIC | Age: 72
End: 2022-10-26

## 2022-10-26 DIAGNOSIS — Z23 NEED FOR INFLUENZA VACCINATION: Primary | ICD-10-CM

## 2022-10-26 PROCEDURE — G0008 ADMIN INFLUENZA VIRUS VAC: HCPCS | Performed by: INTERNAL MEDICINE

## 2022-10-26 PROCEDURE — 90662 IIV NO PRSV INCREASED AG IM: CPT | Performed by: INTERNAL MEDICINE

## 2022-10-26 RX ADMIN — CYANOCOBALAMIN 1000 MCG: 1000 INJECTION, SOLUTION INTRAMUSCULAR; SUBCUTANEOUS at 16:00

## 2022-11-02 ENCOUNTER — OFFICE VISIT (OUTPATIENT)
Dept: INTERNAL MEDICINE | Facility: CLINIC | Age: 72
End: 2022-11-02

## 2022-11-02 VITALS
HEIGHT: 67 IN | OXYGEN SATURATION: 98 % | SYSTOLIC BLOOD PRESSURE: 116 MMHG | BODY MASS INDEX: 22.95 KG/M2 | DIASTOLIC BLOOD PRESSURE: 70 MMHG | WEIGHT: 146.2 LBS | TEMPERATURE: 98.2 F | HEART RATE: 72 BPM

## 2022-11-02 DIAGNOSIS — R06.02 SHORTNESS OF BREATH: Primary | ICD-10-CM

## 2022-11-02 DIAGNOSIS — J04.0 ACUTE LARYNGITIS: ICD-10-CM

## 2022-11-02 LAB
BASOPHILS # BLD AUTO: 0.03 10*3/MM3 (ref 0–0.2)
BASOPHILS NFR BLD AUTO: 0.4 % (ref 0–1.5)
EOSINOPHIL # BLD AUTO: 0.13 10*3/MM3 (ref 0–0.4)
EOSINOPHIL NFR BLD AUTO: 1.9 % (ref 0.3–6.2)
ERYTHROCYTE [DISTWIDTH] IN BLOOD BY AUTOMATED COUNT: 11.5 % (ref 12.3–15.4)
HCT VFR BLD AUTO: 39 % (ref 34–46.6)
HGB BLD-MCNC: 13.5 G/DL (ref 12–15.9)
IMM GRANULOCYTES # BLD AUTO: 0.02 10*3/MM3 (ref 0–0.05)
IMM GRANULOCYTES NFR BLD AUTO: 0.3 % (ref 0–0.5)
LYMPHOCYTES # BLD AUTO: 1.31 10*3/MM3 (ref 0.7–3.1)
LYMPHOCYTES NFR BLD AUTO: 19.4 % (ref 19.6–45.3)
MCH RBC QN AUTO: 30.1 PG (ref 26.6–33)
MCHC RBC AUTO-ENTMCNC: 34.6 G/DL (ref 31.5–35.7)
MCV RBC AUTO: 86.9 FL (ref 79–97)
MONOCYTES # BLD AUTO: 0.64 10*3/MM3 (ref 0.1–0.9)
MONOCYTES NFR BLD AUTO: 9.5 % (ref 5–12)
NEUTROPHILS # BLD AUTO: 4.63 10*3/MM3 (ref 1.7–7)
NEUTROPHILS NFR BLD AUTO: 68.5 % (ref 42.7–76)
NRBC BLD AUTO-RTO: 0 /100 WBC (ref 0–0.2)
PLATELET # BLD AUTO: 229 10*3/MM3 (ref 140–450)
RBC # BLD AUTO: 4.49 10*6/MM3 (ref 3.77–5.28)
WBC # BLD AUTO: 6.76 10*3/MM3 (ref 3.4–10.8)

## 2022-11-02 PROCEDURE — 99212 OFFICE O/P EST SF 10 MIN: CPT | Performed by: STUDENT IN AN ORGANIZED HEALTH CARE EDUCATION/TRAINING PROGRAM

## 2022-11-02 RX ORDER — BENZONATATE 100 MG/1
100 CAPSULE ORAL 3 TIMES DAILY PRN
Qty: 30 CAPSULE | Refills: 0 | Status: SHIPPED | OUTPATIENT
Start: 2022-11-02 | End: 2023-02-14

## 2022-11-02 NOTE — PROGRESS NOTES
"  Doc Suh M.D.  Internal Medicine  [facility]  [address], [address]  [place] [address]  [phone]      Chief Complaint  Cough (Cough x 1 wk / tested for covid twice both test negative )    SUBJECTIVE    History of Present Illness    Iesha Roldan is a 72 y.o. female who presents to the office today as an established patient of Dr Camila Mejia MD here today for an acute care visit.     Has history ADD, OA, b12 deficiency, mucoepidermoid carcinoma, BOOP    Fever chills, joint aches for 1 week. Then cough developed, feels tired, voice is hoarse for 2 days, sore throat. Left ear feels full. She has nasal congestion. She has \"on and off diarrhea.\" Had shortness of air with anemia in past. She is drinking water to stay hydrated. She is having 2-3 loose to normal BMs/day.     She is using mucinex without relief. She had PNA before states it did not feel like this.  She is concerned because she had shortness of breath with anemia in the past.  She denies any recent bleeding, no blood in stool.    No fevers since start, feels short of breath. Her  has been sick at well but not with flu or COVID.     Review of Systems    Allergies   Allergen Reactions   • Erythromycin Nausea And Vomiting        Outpatient Medications Marked as Taking for the 11/2/22 encounter (Office Visit) with Doc Suh MD   Medication Sig Dispense Refill   • alendronate (Fosamax) 70 MG tablet Take 1 tablet by mouth Every 7 (Seven) Days. 12 tablet 1   • dicyclomine (BENTYL) 20 MG tablet Take 1 tablet by mouth 4 (Four) Times a Day Before Meals & at Bedtime As Needed (diarrhea). 60 tablet 2   • fluticasone (Flonase) 50 MCG/ACT nasal spray 2 sprays into the nostril(s) as directed by provider Daily. (Patient taking differently: 2 sprays into the nostril(s) as directed by provider Daily As Needed.) 16 g 5   • meloxicam (MOBIC) 15 MG tablet Take 1 tablet by mouth Daily. 30 tablet 5     Current " Facility-Administered Medications for the 11/2/22 encounter (Office Visit) with Doc Suh MD   Medication Dose Route Frequency Provider Last Rate Last Admin   • cyanocobalamin injection 1,000 mcg  1,000 mcg Intramuscular Q28 Days Camila Mejia MD   1,000 mcg at 10/26/22 1600        Past Medical History:   Diagnosis Date   • Acute bronchitis    • Anemia    • Arthritis    • BOOP (bronchiolitis obliterans with organizing pneumonia) ([facility]) 2000    Resolved   • Fracture, hip ([facility])    • Gastroenteritis    • H/O Lung nodule    • Herpes zoster 2015   • Hx of radiation therapy    • 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   • Neck pain    • Osteopenia    • Pharyngitis      Past Surgical History:   Procedure Laterality Date   • COLONOSCOPY  2017   • COLONOSCOPY  2019    Tubular adenoma, diverticulosis   • HIP SURGERY Left 2009    Joint replacement   • PAROTIDECTOMY Right 1/31/2017    Procedure: RIGHT SUPERFICIAL PAROTIDECTOMY EXCISION PAROTID MASS ;  Surgeon: Candelario Calderon MD;  Location: [facility];  Service:    • TOTAL HIP ARTHROPLASTY REVISION Left 2/23/2022    Procedure: CONVERSION TO TOTAL HIP;  Surgeon: Giorgio Keenan MD;  Location: [facility] OR;  Service: Orthopedics;  Laterality: Left;   • TUBAL ABDOMINAL LIGATION  In 1989     Family History   Problem Relation Age of Onset   • Peripheral vascular disease Mother    • Pancreatic cancer Father 91   • Heart disease Father    • Hypertension Father    • Rectal cancer Sister 36   • Diabetes Sister    • No Known Problems Brother    • No Known Problems Daughter    • No Known Problems Son    • Colon cancer Maternal Grandmother    • No Known Problems Paternal Grandmother    • No Known Problems Maternal Aunt    • No Known Problems Paternal Aunt    • Cancer Paternal Uncle 38        Mucoepidermoid CA of the parotid gland   • BRCA 1/2 Neg Hx    • Breast cancer Neg Hx    • Endometrial cancer Neg Hx    • Ovarian cancer Neg Hx    • Malig Hyperthermia Neg Hx     reports  "that she has never smoked. She has never used smokeless tobacco. She reports that she does not currently use alcohol. She reports that she does not use drugs.    OBJECTIVE    Vital Signs:   /70   Pulse 72   Temp 98.2 °F (36.8 °C)   Ht 170.2 cm (67.01\")   Wt 66.3 kg (146 lb 3.2 oz)   SpO2 98%   BMI 22.89 kg/m²     Physical Exam  Constitutional:       Appearance: Normal appearance. She is normal weight.   HENT:      Right Ear: Ear canal normal. A middle ear effusion is present. Tympanic membrane is not injected.      Left Ear: Ear canal normal.  No middle ear effusion. Tympanic membrane is not injected.      Mouth/Throat:      Pharynx: No oropharyngeal exudate or posterior oropharyngeal erythema.   Cardiovascular:      Rate and Rhythm: Normal rate and regular rhythm.      Heart sounds: Normal heart sounds. No murmur heard.  Pulmonary:      Effort: Pulmonary effort is normal.      Breath sounds: Normal breath sounds.   Abdominal:      General: Abdomen is flat. There is no distension.      Palpations: Abdomen is soft.      Tenderness: There is no abdominal tenderness.   Skin:     General: Skin is warm and dry.   Neurological:      Mental Status: She is alert.   Psychiatric:         Mood and Affect: Mood normal.         Behavior: Behavior normal.         Thought Content: Thought content normal.        Voice sounds hoarse    The following data was reviewed by: Doc Suh MD on 11/02/2022:  CMP    CMP 2/14/22 3/11/22 9/9/22   Glucose 86 94 88   BUN 20 24 17   Creatinine 0.80 0.74 0.81   eGFR Non African Am 71     Sodium 141 141 143   Potassium 4.1 4.3 4.4   Chloride 103 102 103   Calcium 9.1 9.4 9.6   Total Protein   6.7   Albumin   4.4   Globulin   2.3   Total Bilirubin   0.6   Alkaline Phosphatase   89   AST (SGOT)   23   ALT (SGPT)   15           CBC w/diff    CBC w/Diff 2/24/22 3/11/22 9/9/22   WBC  6.5 4.2   RBC  3.88 4.34   Hemoglobin 11.4 (A) 11.7 13.1   Hematocrit 34.0 35.7 39.7   MCV  92 92   MCH  " 30.2 30.2   MCHC  32.8 33.0   RDW  12.0 13.3   Platelets  385 231   Neutrophil Rel %  67 57   Lymphocyte Rel %  21 27   Monocyte Rel %  7 11   Eosinophil Rel %  4 4   Basophil Rel %  1 1   (A) Abnormal value            Lipid Panel    Lipid Panel 11/22/21   Total Cholesterol 190   Triglycerides 63   HDL Cholesterol 70   VLDL Cholesterol 12   LDL Cholesterol  108 (A)   (A) Abnormal value            TSH    TSH 11/22/21 3/11/22 9/9/22   TSH 3.030 2.660 3.620               Data reviewed: Previous PCP notes             ASSESSMENT & PLAN     Diagnoses and all orders for this visit:    1. Shortness of breath (Primary)  -     CBC & Differential    Other orders  -     Cancel: XR Chest PA & Lateral; Future  -     benzonatate (Tessalon Perles) 100 MG capsule; Take 1 capsule by mouth 3 (Three) Times a Day As Needed for Cough.  Dispense: 30 capsule; Refill: 0  -     Dextromethorphan-guaiFENesin 5-50 MG/5ML syrup; Take 10 mL by mouth Every 6 (Six) Hours As Needed (cough) for up to 30 days.  Dispense: 118 mL; Refill: 0  -     phenol (CHLORASEPTIC) 1.4 % liquid liquid; Apply 1 spray to the mouth or throat Every 2 (Two) Hours As Needed (sore throat).  Dispense: 118 mL; Refill: 0    Upper respiratory infection with 2 negative home COVID tests.  Patient is wondering if she has recurrence of anemia because she has mild shortness of breath.  Her x-ray technician is not in office currently and patient prefers to hold off on x-ray for now rather than waiting for her to return her going to hospital.  I encouraged her to let us know if she has inability to catch her breath.  Will check CBC given anemia symptoms.  Symptomatic medication management only at this point.  Encouraged her to try some humidified air for hoarse voice.  Patient to let us know if she fails to have improvement of symptoms or has new or worsening symptoms.    Health Maintenance Due   Topic Date Due   • ZOSTER VACCINE (1 of 2) Never done   • COVID-19 Vaccine (4 - Booster  for Pfizer series) 01/26/2022        Follow Up  No follow-ups on file.    Patient/family had no further questions at this time and verbalized understanding of the plan discussed today.

## 2022-11-07 ENCOUNTER — OFFICE VISIT (OUTPATIENT)
Dept: INTERNAL MEDICINE | Facility: CLINIC | Age: 72
End: 2022-11-07

## 2022-11-07 VITALS — WEIGHT: 145 LBS | BODY MASS INDEX: 22.76 KG/M2 | OXYGEN SATURATION: 96 % | HEIGHT: 67 IN | TEMPERATURE: 98.3 F

## 2022-11-07 DIAGNOSIS — J01.00 ACUTE NON-RECURRENT MAXILLARY SINUSITIS: ICD-10-CM

## 2022-11-07 DIAGNOSIS — R05.1 ACUTE COUGH: Primary | ICD-10-CM

## 2022-11-07 DIAGNOSIS — J40 BRONCHITIS: ICD-10-CM

## 2022-11-07 DIAGNOSIS — R50.9 FEVER, UNSPECIFIED FEVER CAUSE: ICD-10-CM

## 2022-11-07 LAB
EXPIRATION DATE: ABNORMAL
EXPIRATION DATE: NORMAL
FLUAV AG NPH QL: NEGATIVE
FLUBV AG NPH QL: NEGATIVE
INTERNAL CONTROL: ABNORMAL
INTERNAL CONTROL: NORMAL
Lab: ABNORMAL
Lab: NORMAL
SARS-COV-2 AG UPPER RESP QL IA.RAPID: NOT DETECTED

## 2022-11-07 PROCEDURE — 71046 X-RAY EXAM CHEST 2 VIEWS: CPT | Performed by: INTERNAL MEDICINE

## 2022-11-07 PROCEDURE — 87426 SARSCOV CORONAVIRUS AG IA: CPT | Performed by: INTERNAL MEDICINE

## 2022-11-07 PROCEDURE — 99214 OFFICE O/P EST MOD 30 MIN: CPT | Performed by: INTERNAL MEDICINE

## 2022-11-07 PROCEDURE — 87804 INFLUENZA ASSAY W/OPTIC: CPT | Performed by: INTERNAL MEDICINE

## 2022-11-07 RX ORDER — ALBUTEROL SULFATE 90 UG/1
2 AEROSOL, METERED RESPIRATORY (INHALATION) EVERY 4 HOURS PRN
Qty: 18 G | Refills: 2 | Status: SHIPPED | OUTPATIENT
Start: 2022-11-07

## 2022-11-07 RX ORDER — DOXYCYCLINE 100 MG/1
100 CAPSULE ORAL 2 TIMES DAILY
Qty: 14 CAPSULE | Refills: 0 | Status: SHIPPED | OUTPATIENT
Start: 2022-11-07 | End: 2022-11-23

## 2022-11-07 RX ORDER — PREDNISONE 20 MG/1
20 TABLET ORAL DAILY
Qty: 5 TABLET | Refills: 0 | Status: SHIPPED | OUTPATIENT
Start: 2022-11-07 | End: 2022-11-14 | Stop reason: SDUPTHER

## 2022-11-11 ENCOUNTER — TELEPHONE (OUTPATIENT)
Dept: INTERNAL MEDICINE | Facility: CLINIC | Age: 72
End: 2022-11-11

## 2022-11-11 NOTE — TELEPHONE ENCOUNTER
Pt informed and is feeling much better      ----- Message from Camila Mejia MD sent at 11/11/2022  7:43 AM EST -----  Chest xray confirms bronchitis. Is she improving w/ meds given?   JW

## 2022-11-14 ENCOUNTER — TELEPHONE (OUTPATIENT)
Dept: INTERNAL MEDICINE | Facility: CLINIC | Age: 72
End: 2022-11-14

## 2022-11-14 RX ORDER — PREDNISONE 20 MG/1
20 TABLET ORAL DAILY
Qty: 5 TABLET | Refills: 0 | Status: SHIPPED | OUTPATIENT
Start: 2022-11-14 | End: 2022-12-06

## 2022-11-14 RX ORDER — GUAIFENESIN AND CODEINE PHOSPHATE 100; 10 MG/5ML; MG/5ML
5 SOLUTION ORAL 3 TIMES DAILY PRN
Qty: 125 ML | Refills: 0 | Status: SHIPPED | OUTPATIENT
Start: 2022-11-14 | End: 2022-12-06 | Stop reason: SDUPTHER

## 2022-11-14 NOTE — TELEPHONE ENCOUNTER
Caller: Iesha Roldan    Relationship: Self    Best call back number: [phone]    What is the best time to reach you: ANYTIME     Who are you requesting to speak with (clinical staff, provider,  specific staff member): ON[facility]    What was the call regarding: PATIENT STATES SHE WAS GETTING A LITTLE BETTER EACH DAY WITH THE PREDNISONE PACK BUT NOW THAT SHE HAS BEEN OFF THE PREDNISONE FOR 3 DAYS SHE IS EXPERIENCING THE COUGH AND DRAINAGE AGAIN.     PATIENT STATES SHE WANTS TO KNOW IF ANOTHER MEDICATION SHOULD BE CALLED IN OR WHAT SHE NEEDS TO DO.    PATIENT STATES SHE IS REQUESTING A CALL BACK FROM ONUR.

## 2022-11-21 RX ORDER — MELOXICAM 15 MG/1
15 TABLET ORAL DAILY
Qty: 30 TABLET | Refills: 5 | Status: SHIPPED | OUTPATIENT
Start: 2022-11-21

## 2022-11-23 ENCOUNTER — CLINICAL SUPPORT (OUTPATIENT)
Dept: INTERNAL MEDICINE | Facility: CLINIC | Age: 72
End: 2022-11-23

## 2022-11-23 ENCOUNTER — TELEMEDICINE (OUTPATIENT)
Dept: INTERNAL MEDICINE | Facility: CLINIC | Age: 72
End: 2022-11-23

## 2022-11-23 DIAGNOSIS — E55.9 VITAMIN D DEFICIENCY: Primary | ICD-10-CM

## 2022-11-23 DIAGNOSIS — U07.1 COVID-19 VIRUS DETECTED: Primary | ICD-10-CM

## 2022-11-23 LAB
EXPIRATION DATE: ABNORMAL
FLUAV AG UPPER RESP QL IA.RAPID: NOT DETECTED
FLUBV AG UPPER RESP QL IA.RAPID: NOT DETECTED
INTERNAL CONTROL: ABNORMAL
Lab: ABNORMAL
SARS-COV-2 AG UPPER RESP QL IA.RAPID: DETECTED

## 2022-11-23 PROCEDURE — 99214 OFFICE O/P EST MOD 30 MIN: CPT | Performed by: STUDENT IN AN ORGANIZED HEALTH CARE EDUCATION/TRAINING PROGRAM

## 2022-11-23 PROCEDURE — 96372 THER/PROPH/DIAG INJ SC/IM: CPT | Performed by: INTERNAL MEDICINE

## 2022-11-23 PROCEDURE — 87428 SARSCOV & INF VIR A&B AG IA: CPT | Performed by: STUDENT IN AN ORGANIZED HEALTH CARE EDUCATION/TRAINING PROGRAM

## 2022-11-23 RX ADMIN — CYANOCOBALAMIN 1000 MCG: 1000 INJECTION, SOLUTION INTRAMUSCULAR; SUBCUTANEOUS at 13:08

## 2022-11-23 NOTE — PROGRESS NOTES
Doc Suh M.D.  Internal Medicine  [facility]  [address], [address]  [place] [address]  [phone]      Chief Complaint  COVID symptoms    SUBJECTIVE    History of Present Illness    Iesha Roldan is a 72 y.o. female who presents to the office today as an established patient of Dr Camila Mejia MD here today for an acute care visit.     Mode of Visit: Video  Location of patient: other: car she is on the way to the clinic for a COVID test.  Patient has pulled over for video visit.  Location of provider: [facility]  You have chosen to receive care through a telehealth visit.  Does the patient consent to use a video/audio connection their medical care today? yes  The visit included audio and video interaction. No technical issues occurred during this visit.     She was around her niece who had COVID over the weekend. She has sympotms for 1 month with bronchitis but worse for past day. Cough is worse over a few days. She had fever and chills this AM. She has malaise. Temp to 101 F.  No shortness of air. She took a COVID test this AM and was negative. She is coming to get COVID test in our clinic.  Doxycycline and prednisone intitially helped for her bronchitis symptoms but she had acute worsening after COVID exposure.    Review of Systems    Allergies   Allergen Reactions   • Erythromycin Nausea And Vomiting        No outpatient medications have been marked as taking for the 11/23/22 encounter (Telemedicine) with Doc Suh MD.     Current Facility-Administered Medications for the 11/23/22 encounter (Telemedicine) with Doc Suh MD   Medication Dose Route Frequency Provider Last Rate Last Admin   • cyanocobalamin injection 1,000 mcg  1,000 mcg Intramuscular Q28 Days Camila Mejia MD   1,000 mcg at 11/23/22 1308        Past Medical History:   Diagnosis Date   • Acute bronchitis    • Anemia    • Arthritis    • BOOP (bronchiolitis obliterans with organizing pneumonia) (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   • Neck pain    • Osteopenia    • Pharyngitis      Past Surgical History:   Procedure Laterality Date   • COLONOSCOPY  2017   • COLONOSCOPY  2019    Tubular adenoma, diverticulosis   • HIP SURGERY Left 2009    Joint replacement   • PAROTIDECTOMY Right 1/31/2017    Procedure: RIGHT SUPERFICIAL PAROTIDECTOMY EXCISION PAROTID MASS ;  Surgeon: Candelario Calderon MD;  [place] [facility]  Service:    • TOTAL HIP ARTHROPLASTY REVISION Left 2/23/2022    Procedure: CONVERSION TO TOTAL HIP;  Surgeon: Giorgio Keenan MD;  Location: [facility] OR;  Service: Orthopedics;  Laterality: Left;   • TUBAL ABDOMINAL LIGATION  In 1989     Family History   Problem Relation Age of Onset   • Peripheral vascular disease Mother    • Pancreatic cancer Father 91   • Heart disease Father    • Hypertension Father    • Rectal cancer Sister 36   • Diabetes Sister    • No Known Problems Brother    • No Known Problems Daughter    • No Known Problems Son    • Colon cancer Maternal Grandmother    • No Known Problems Paternal Grandmother    • No Known Problems Maternal Aunt    • No Known Problems Paternal Aunt    • Cancer Paternal Uncle 38        Mucoepidermoid CA of the parotid gland   • BRCA 1/2 Neg Hx    • Breast cancer Neg Hx    • Endometrial cancer Neg Hx    • Ovarian cancer Neg Hx    • Malig Hyperthermia Neg Hx     reports that she has never smoked. She has never used smokeless tobacco. She reports that she does not currently use alcohol. She reports that she does not use drugs.    OBJECTIVE    Vital Signs:   There were no vitals taken for this visit.    Physical Exam  Constitutional:       General: She is not in acute distress.     Appearance: Normal appearance. She is normal weight. She is not ill-appearing.   Pulmonary:      Effort: Pulmonary effort is  normal. No respiratory distress.   Neurological:      General: No focal deficit present.      Mental Status: She is alert.   Psychiatric:         Mood and Affect: Mood normal.         Behavior: Behavior normal.         Thought Content: Thought content normal.            The following data was reviewed by: Doc Suh MD on 11/23/2022:  CMP    CMP 2/14/22 3/11/22 9/9/22   Glucose 86 94 88   BUN 20 24 17   Creatinine 0.80 0.74 0.81   eGFR Non African Am 71     Sodium 141 141 143   Potassium 4.1 4.3 4.4   Chloride 103 102 103   Calcium 9.1 9.4 9.6   Total Protein   6.7   Albumin   4.4   Globulin   2.3   Total Bilirubin   0.6   Alkaline Phosphatase   89   AST (SGOT)   23   ALT (SGPT)   15           CBC w/diff    CBC w/Diff 3/11/22 9/9/22 11/2/22   WBC 6.5 4.2 6.76   RBC 3.88 4.34 4.49   Hemoglobin 11.7 13.1 13.5   Hematocrit 35.7 39.7 39.0   MCV 92 92 86.9   MCH 30.2 30.2 30.1   MCHC 32.8 33.0 34.6   RDW 12.0 13.3 11.5 (A)   Platelets 385 231 229   Neutrophil Rel % 67 57 68.5   Lymphocyte Rel % 21 27 19.4 (A)   Monocyte Rel % 7 11 9.5   Eosinophil Rel % 4 4 1.9   Basophil Rel % 1 1 0.4   (A) Abnormal value                TSH    TSH 3/11/22 9/9/22   TSH 2.660 3.620               Data reviewed: Previous PCP notes.         meloxicam + alendronate  meloxicam, alendronate. Either increases toxicity of the other by pharmacodynamic synergism. Minor/Significance Unknown. Increased risk of GI ulceration.    ASSESSMENT & PLAN     Diagnoses and all orders for this visit:    1. COVID-19 virus detected (Primary)  -     POCT SARS-CoV-2 Antigen CYNTHIA    Other orders  -     Nirmatrelvir&Ritonavir 300/100 (PAXLOVID) 20 x 150 MG & 10 x 100MG tablet therapy pack tablet; Take 3 tablets by mouth 2 (Two) Times a Day for 5 days.  Dispense: 30 tablet; Refill: 0      -Although patient does have bronchitis for past month she had acute worsening of symptoms yesterday with high fever and COVID exposure over the weekend so I will consider her to be  on day 2 of COVID symptoms.  -advised pt that COVID 19 in general is treated like other viral URI: cough/cold medications over the counter, tylenol and ibuprofen, ample fluid intake  -advised pt that there is an emergency approved medication (PAXLOVID) approved for COVID 19 in high risk patients to prevent the risk of progression to severe illness requiring hospitalization. Discussed with her that the long term side effects are not known. Short term side effects that have been seen include muscle aches, diarrhea, bad taste in the mouth. She was agreeable to initiate treatment.   -I performed a drug interaction check and meloxicam may interact with Paxlovid and so I advised her to hold this while she is on treatment  -renal function is appropriate for full dosed PAXLOVID  -informed pt to report to ER for worsening symptoms, feeling of inability to catch breath, chest pain, worsening fever or chills  -I advised her to isolate from friends and family for next 5 days including over Thanksgiving.  If she is improving after those 5 days then she can go into the public with a mask for the next 5 days.      Health Maintenance Due   Topic Date Due   • ZOSTER VACCINE (1 of 2) Never done   • COVID-19 Vaccine (4 - Booster for Pfizer series) 01/26/2022        Follow Up  No follow-ups on file.    Patient/family had no further questions at this time and verbalized understanding of the plan discussed today.

## 2022-12-06 ENCOUNTER — OFFICE VISIT (OUTPATIENT)
Dept: INTERNAL MEDICINE | Facility: CLINIC | Age: 72
End: 2022-12-06

## 2022-12-06 ENCOUNTER — TELEPHONE (OUTPATIENT)
Dept: INTERNAL MEDICINE | Facility: CLINIC | Age: 72
End: 2022-12-06

## 2022-12-06 VITALS
WEIGHT: 147 LBS | OXYGEN SATURATION: 98 % | BODY MASS INDEX: 23.07 KG/M2 | HEART RATE: 68 BPM | HEIGHT: 67 IN | DIASTOLIC BLOOD PRESSURE: 70 MMHG | TEMPERATURE: 96.6 F | SYSTOLIC BLOOD PRESSURE: 110 MMHG

## 2022-12-06 DIAGNOSIS — R05.9 COUGH, UNSPECIFIED TYPE: ICD-10-CM

## 2022-12-06 DIAGNOSIS — J40 BRONCHITIS: ICD-10-CM

## 2022-12-06 DIAGNOSIS — U07.1 COVID-19 VIRUS DETECTED: Primary | ICD-10-CM

## 2022-12-06 PROCEDURE — 71046 X-RAY EXAM CHEST 2 VIEWS: CPT | Performed by: INTERNAL MEDICINE

## 2022-12-06 PROCEDURE — 99213 OFFICE O/P EST LOW 20 MIN: CPT | Performed by: INTERNAL MEDICINE

## 2022-12-06 RX ORDER — GUAIFENESIN AND CODEINE PHOSPHATE 100; 10 MG/5ML; MG/5ML
5 SOLUTION ORAL 3 TIMES DAILY PRN
Qty: 125 ML | Refills: 0 | Status: SHIPPED | OUTPATIENT
Start: 2022-12-06 | End: 2023-02-14

## 2022-12-06 RX ORDER — METHYLPREDNISOLONE 4 MG/1
TABLET ORAL
Qty: 21 EACH | Refills: 0 | Status: SHIPPED | OUTPATIENT
Start: 2022-12-06 | End: 2022-12-06 | Stop reason: SDUPTHER

## 2022-12-06 RX ORDER — METHYLPREDNISOLONE 4 MG/1
TABLET ORAL
Qty: 21 EACH | Refills: 0 | Status: SHIPPED | OUTPATIENT
Start: 2022-12-06 | End: 2023-02-14

## 2022-12-06 NOTE — PROGRESS NOTES
"Chief Complaint   Patient presents with   • Cough       History of Present Illness   Iesha Roldan is a 72 y.o. female presents for acute care. She had bronchitis. Just as she was finishing treatment for this she contracted covid 19. She completed a 5 day course of paxlovid. Most pronounced symptoms were fatigue and cough. Notes that \"I thought I was getting out of it\" but now having continued cough. She does not have same level of fatigue.   Utilizing albuterol 2 times daily down from 3-4. Has utilized codeine cough w/ benefit but has completed rx.           The following portions of the patient's history were reviewed and updated as appropriate: allergies, current medications, past family history, past medical history, past social history, past surgical history and problem list.  Current Outpatient Medications on File Prior to Visit   Medication Sig Dispense Refill   • albuterol sulfate  (90 Base) MCG/ACT inhaler Inhale 2 puffs Every 4 (Four) Hours As Needed for Wheezing. 18 g 2   • alendronate (Fosamax) 70 MG tablet Take 1 tablet by mouth Every 7 (Seven) Days. 12 tablet 1   • fluticasone (Flonase) 50 MCG/ACT nasal spray 2 sprays into the nostril(s) as directed by provider Daily. (Patient taking differently: 2 sprays into the nostril(s) as directed by provider Daily As Needed.) 16 g 5   • meloxicam (MOBIC) 15 MG tablet Take 1 tablet by mouth Daily. 30 tablet 5   • benzonatate (Tessalon Perles) 100 MG capsule Take 1 capsule by mouth 3 (Three) Times a Day As Needed for Cough. 30 capsule 0   • dicyclomine (BENTYL) 20 MG tablet Take 1 tablet by mouth 4 (Four) Times a Day Before Meals & at Bedtime As Needed (diarrhea). 60 tablet 2   • guaiFENesin-codeine (GUAIFENESIN AC) 100-10 MG/5ML liquid Take 5 mL by mouth 3 (Three) Times a Day As Needed for Cough. 125 mL 0   • phenol (CHLORASEPTIC) 1.4 % liquid liquid Apply 1 spray to the mouth or throat Every 2 (Two) Hours As Needed (sore throat). 118 mL 0   • " [DISCONTINUED] predniSONE (DELTASONE) 20 MG tablet Take 1 tablet by mouth Daily. 5 tablet 0     Current Facility-Administered Medications on File Prior to Visit   Medication Dose Route Frequency Provider Last Rate Last Admin   • Chlorhexidine Gluconate Cloth 2 % pads   Apply externally BID Giorgio Keenan MD       • [DISCONTINUED] cyanocobalamin injection 1,000 mcg  1,000 mcg Intramuscular Q28 Days Camila Mejia MD   1,000 mcg at 11/23/22 1308     Review of Systems   Constitutional: Positive for fatigue.   HENT: Positive for postnasal drip, rhinorrhea and sinus pressure.    Eyes: Negative.    Respiratory: Positive for cough and wheezing.    Cardiovascular: Negative.    Gastrointestinal: Negative.    Endocrine: Negative.    Genitourinary: Negative.    Musculoskeletal: Negative.    Allergic/Immunologic: Negative.    Neurological: Negative.    Hematological: Negative.    Psychiatric/Behavioral: Negative.        Objective   Physical Exam  Vitals and nursing note reviewed.   Constitutional:       Comments: Mild ill appearing.   No acute distress   HENT:      Head: Normocephalic and atraumatic.      Right Ear: Tympanic membrane normal.      Left Ear: Tympanic membrane normal.      Nose: Nose normal.      Mouth/Throat:      Mouth: Mucous membranes are moist.   Eyes:      Extraocular Movements: Extraocular movements intact.      Pupils: Pupils are equal, round, and reactive to light.   Cardiovascular:      Rate and Rhythm: Normal rate and regular rhythm.      Pulses: Normal pulses.      Heart sounds: Normal heart sounds.   Pulmonary:      Effort: Pulmonary effort is normal.      Breath sounds: Normal breath sounds.   Abdominal:      General: Abdomen is flat.      Palpations: Abdomen is soft.   Musculoskeletal:         General: Normal range of motion.      Cervical back: Normal range of motion.   Skin:     General: Skin is warm and dry.   Neurological:      General: No focal deficit present.      Mental Status: She is alert  "and oriented to person, place, and time.   Psychiatric:         Mood and Affect: Mood normal.         Behavior: Behavior normal.         Thought Content: Thought content normal.         Judgment: Judgment normal.          /70   Pulse 68   Temp 96.6 °F (35.9 °C) (Infrared)   Ht 170.2 cm (67\")   Wt 66.7 kg (147 lb)   SpO2 98%   BMI 23.02 kg/m²     Assessment & Plan   Diagnoses and all orders for this visit:    COVID-19 virus detected    Cough, unspecified type  -     XR Chest PA & Lateral (In Office)    Bronchitis    Other orders  -     methylPREDNISolone (MEDROL) 4 MG dose pack; Take as directed on package instructions.      Patient w/ recent bronchitis then covid 19 infection. Continued cough/ wheeze/ post infectious bronchospasm. She will start a medrol dose pack. She will hydrate, rest , use mucinex prn.          "

## 2022-12-06 NOTE — TELEPHONE ENCOUNTER
"----- Message from Camila Mejia MD sent at 12/6/2022  1:21 PM EST -----  Patient is scheduled for Friday. Spouse in office today and reports that she \"is not doing well\". Does she need to be seen today? If so work in at 3:00. Chest xray on arrival if indicated.   jw    "

## 2022-12-09 NOTE — TELEPHONE ENCOUNTER
Pt calling in regards to when her  was seen in office by Dr. Mejia and he has stated that he thinks Dr. Mejia said she would like to see pt before her appt on Fri. Nov. 9- pt thinks it might've just been a miscommunication but wanted to just check up on that and make sure- Please advise   74F w/ hx of DM, HTN, HLD, and nephrolithiasis p/w gross hematuria. CTU imaging showed extensive clot in the right renal collecting system, without evidence of enhancing renal mass or definitive urothelial lesion. Will establish outpatient follow up for further imaging and complete gross hematuria work up with cystoscopy, possible dx URS if a lesion is suspected on repeat imaging with resolution of clots.

## 2022-12-16 ENCOUNTER — TELEPHONE (OUTPATIENT)
Dept: ORTHOPEDIC SURGERY | Facility: CLINIC | Age: 72
End: 2022-12-16

## 2022-12-16 NOTE — TELEPHONE ENCOUNTER
Caller: CHENTE KHAN     Relationship to patient: PATIENT     Best call back number: [phone]    Chief complaint: LEFT KNEE    Type of visit:  INJECTION     Requested date: ASAP

## 2022-12-19 ENCOUNTER — CLINICAL SUPPORT (OUTPATIENT)
Dept: INTERNAL MEDICINE | Facility: CLINIC | Age: 72
End: 2022-12-19
Payer: MEDICARE

## 2022-12-19 DIAGNOSIS — E53.8 B12 DEFICIENCY: Primary | ICD-10-CM

## 2022-12-19 PROCEDURE — 96372 THER/PROPH/DIAG INJ SC/IM: CPT | Performed by: INTERNAL MEDICINE

## 2022-12-19 RX ORDER — CYANOCOBALAMIN 1000 UG/ML
1000 INJECTION, SOLUTION INTRAMUSCULAR; SUBCUTANEOUS
Status: SHIPPED | OUTPATIENT
Start: 2022-12-19 | End: 2023-12-14

## 2022-12-19 RX ADMIN — CYANOCOBALAMIN 1000 MCG: 1000 INJECTION, SOLUTION INTRAMUSCULAR; SUBCUTANEOUS at 16:56

## 2023-01-03 ENCOUNTER — CLINICAL SUPPORT (OUTPATIENT)
Dept: ORTHOPEDIC SURGERY | Facility: CLINIC | Age: 73
End: 2023-01-03
Payer: MEDICARE

## 2023-01-03 VITALS — BODY MASS INDEX: 23.07 KG/M2 | WEIGHT: 147 LBS | HEIGHT: 67 IN | TEMPERATURE: 98.6 F

## 2023-01-03 DIAGNOSIS — M70.62 TROCHANTERIC BURSITIS OF LEFT HIP: Primary | ICD-10-CM

## 2023-01-03 DIAGNOSIS — M17.12 PRIMARY OSTEOARTHRITIS OF LEFT KNEE: ICD-10-CM

## 2023-01-03 PROCEDURE — 20610 DRAIN/INJ JOINT/BURSA W/O US: CPT | Performed by: NURSE PRACTITIONER

## 2023-01-03 PROCEDURE — 99213 OFFICE O/P EST LOW 20 MIN: CPT | Performed by: NURSE PRACTITIONER

## 2023-01-03 RX ORDER — METHYLPREDNISOLONE ACETATE 80 MG/ML
80 INJECTION, SUSPENSION INTRA-ARTICULAR; INTRALESIONAL; INTRAMUSCULAR; SOFT TISSUE
Status: COMPLETED | OUTPATIENT
Start: 2023-01-03 | End: 2023-01-03

## 2023-01-03 RX ADMIN — METHYLPREDNISOLONE ACETATE 80 MG: 80 INJECTION, SUSPENSION INTRA-ARTICULAR; INTRALESIONAL; INTRAMUSCULAR; SOFT TISSUE at 11:59

## 2023-01-03 NOTE — PROGRESS NOTES
1/3/2023    Iesha Roldan is here today for worsening knee pain. Pt has undergone injection of the knee in the past with good resolution of symptoms. Pt is requesting a repeat injection. Patient also reports she has been having some left sided hip pain for the last few months. Reports it's to the posterior lateral side of the hip and describes it as a moderate ache worse with lay in it.     Hip:  left    LEG ALIGNMENT:     Neutral   ,    equal leg lengths    GAIT:     Nonantalgic    SKIN:     No abnormality    RANGE OF MOTION:      Full without joint irritability    STRENGTH:     5 / 5    hip flexion and abduction    DISTAL PULSES:    Paplable    DISTAL SENSATION :   Intact    LYMPHATICS:     No   lymphadenopathy    OTHER:          - Negative Stinchfeld test      - Negative log roll      +Tenderness to palpation trochanteric bursa        KNEE Injection Procedure Note:    Large Joint Arthrocentesis: L knee  Date/Time: 1/3/2023 11:59 AM  Consent given by: patient  Site marked: site marked  Timeout: Immediately prior to procedure a time out was called to verify the correct patient, procedure, equipment, support staff and site/side marked as required   Supporting Documentation  Indications: pain and joint swelling   Procedure Details  Location: knee - L knee  Preparation: Patient was prepped and draped in the usual sterile fashion  Needle size: 22 G  Approach: anterolateral  Medications administered: 80 mg methylPREDNISolone acetate 80 MG/ML; 2 mL lidocaine (cardiac)  Patient tolerance: patient tolerated the procedure well with no immediate complications          At the conclusion of the injection I discussed the importance of continued quad strengthening exercises on a daily basis. I will give her a prescription for PT for hip fascial exercises as well as modalities.  I will see the patient back if the symptoms should fail to improve or worsen.    Jose Miguel Webster, APRN  1/3/2023

## 2023-01-23 ENCOUNTER — CLINICAL SUPPORT (OUTPATIENT)
Dept: INTERNAL MEDICINE | Facility: CLINIC | Age: 73
End: 2023-01-23
Payer: MEDICARE

## 2023-01-23 DIAGNOSIS — E53.8 B12 DEFICIENCY: Primary | ICD-10-CM

## 2023-01-23 RX ADMIN — CYANOCOBALAMIN 1000 MCG: 1000 INJECTION, SOLUTION INTRAMUSCULAR; SUBCUTANEOUS at 15:12

## 2023-01-27 DIAGNOSIS — M81.0 OSTEOPOROSIS, UNSPECIFIED OSTEOPOROSIS TYPE, UNSPECIFIED PATHOLOGICAL FRACTURE PRESENCE: Primary | ICD-10-CM

## 2023-02-06 DIAGNOSIS — R53.83 FATIGUE, UNSPECIFIED TYPE: ICD-10-CM

## 2023-02-06 DIAGNOSIS — D50.9 IRON DEFICIENCY ANEMIA, UNSPECIFIED IRON DEFICIENCY ANEMIA TYPE: Primary | ICD-10-CM

## 2023-02-12 LAB
ALBUMIN SERPL-MCNC: 4.5 G/DL (ref 3.5–5.2)
ALBUMIN/GLOB SERPL: 1.8 G/DL
ALP SERPL-CCNC: 79 U/L (ref 39–117)
ALT SERPL-CCNC: 15 U/L (ref 1–33)
APPEARANCE UR: CLEAR
AST SERPL-CCNC: 23 U/L (ref 1–32)
BACTERIA #/AREA URNS HPF: NORMAL /HPF
BACTERIA UR CULT: NORMAL
BACTERIA UR CULT: NORMAL
BASOPHILS # BLD AUTO: 0.04 10*3/MM3 (ref 0–0.2)
BASOPHILS NFR BLD AUTO: 0.7 % (ref 0–1.5)
BILIRUB SERPL-MCNC: 0.5 MG/DL (ref 0–1.2)
BILIRUB UR QL STRIP: NEGATIVE
BUN SERPL-MCNC: 19 MG/DL (ref 8–23)
BUN/CREAT SERPL: 25 (ref 7–25)
CALCIUM SERPL-MCNC: 9.7 MG/DL (ref 8.6–10.5)
CASTS URNS QL MICRO: NORMAL /LPF
CHLORIDE SERPL-SCNC: 100 MMOL/L (ref 98–107)
CO2 SERPL-SCNC: 31 MMOL/L (ref 22–29)
COLOR UR: YELLOW
CREAT SERPL-MCNC: 0.76 MG/DL (ref 0.57–1)
EGFRCR SERPLBLD CKD-EPI 2021: 83.9 ML/MIN/1.73
EOSINOPHIL # BLD AUTO: 0.13 10*3/MM3 (ref 0–0.4)
EOSINOPHIL NFR BLD AUTO: 2.4 % (ref 0.3–6.2)
EPI CELLS #/AREA URNS HPF: NORMAL /HPF (ref 0–10)
ERYTHROCYTE [DISTWIDTH] IN BLOOD BY AUTOMATED COUNT: 13.1 % (ref 12.3–15.4)
GLOBULIN SER CALC-MCNC: 2.5 GM/DL
GLUCOSE SERPL-MCNC: 80 MG/DL (ref 65–99)
GLUCOSE UR QL STRIP: NEGATIVE
HCT VFR BLD AUTO: 41.6 % (ref 34–46.6)
HGB BLD-MCNC: 13 G/DL (ref 12–15.9)
HGB UR QL STRIP: ABNORMAL
IMM GRANULOCYTES # BLD AUTO: 0.01 10*3/MM3 (ref 0–0.05)
IMM GRANULOCYTES NFR BLD AUTO: 0.2 % (ref 0–0.5)
KETONES UR QL STRIP: ABNORMAL
LEUKOCYTE ESTERASE UR QL STRIP: ABNORMAL
LYMPHOCYTES # BLD AUTO: 1.01 10*3/MM3 (ref 0.7–3.1)
LYMPHOCYTES NFR BLD AUTO: 18.7 % (ref 19.6–45.3)
MCH RBC QN AUTO: 28.3 PG (ref 26.6–33)
MCHC RBC AUTO-ENTMCNC: 31.3 G/DL (ref 31.5–35.7)
MCV RBC AUTO: 90.4 FL (ref 79–97)
MICRO URNS: ABNORMAL
MONOCYTES # BLD AUTO: 0.53 10*3/MM3 (ref 0.1–0.9)
MONOCYTES NFR BLD AUTO: 9.8 % (ref 5–12)
NEUTROPHILS # BLD AUTO: 3.69 10*3/MM3 (ref 1.7–7)
NEUTROPHILS NFR BLD AUTO: 68.2 % (ref 42.7–76)
NITRITE UR QL STRIP: NEGATIVE
NRBC BLD AUTO-RTO: 0 /100 WBC (ref 0–0.2)
PH UR STRIP: 5 [PH] (ref 5–7.5)
PLATELET # BLD AUTO: 272 10*3/MM3 (ref 140–450)
POTASSIUM SERPL-SCNC: 4.1 MMOL/L (ref 3.5–5.2)
PROT SERPL-MCNC: 7 G/DL (ref 6–8.5)
PROT UR QL STRIP: NEGATIVE
RBC # BLD AUTO: 4.6 10*6/MM3 (ref 3.77–5.28)
RBC #/AREA URNS HPF: NORMAL /HPF (ref 0–2)
SODIUM SERPL-SCNC: 141 MMOL/L (ref 136–145)
SP GR UR STRIP: 1.02 (ref 1–1.03)
TSH SERPL DL<=0.005 MIU/L-ACNC: 2.21 UIU/ML (ref 0.27–4.2)
URINALYSIS REFLEX: ABNORMAL
UROBILINOGEN UR STRIP-MCNC: 0.2 MG/DL (ref 0.2–1)
WBC # BLD AUTO: 5.41 10*3/MM3 (ref 3.4–10.8)
WBC #/AREA URNS HPF: NORMAL /HPF (ref 0–5)

## 2023-02-14 ENCOUNTER — OFFICE VISIT (OUTPATIENT)
Dept: INTERNAL MEDICINE | Facility: CLINIC | Age: 73
End: 2023-02-14
Payer: MEDICARE

## 2023-02-14 VITALS — HEIGHT: 67 IN | WEIGHT: 147 LBS | BODY MASS INDEX: 23.07 KG/M2

## 2023-02-14 DIAGNOSIS — Z00.00 HEALTHCARE MAINTENANCE: Primary | ICD-10-CM

## 2023-02-14 DIAGNOSIS — M25.552 LEFT HIP PAIN: ICD-10-CM

## 2023-02-14 DIAGNOSIS — Z12.31 ENCOUNTER FOR SCREENING MAMMOGRAM FOR BREAST CANCER: ICD-10-CM

## 2023-02-14 DIAGNOSIS — E53.8 B12 DEFICIENCY: ICD-10-CM

## 2023-02-14 DIAGNOSIS — M81.0 AGE-RELATED OSTEOPOROSIS WITHOUT CURRENT PATHOLOGICAL FRACTURE: ICD-10-CM

## 2023-02-28 ENCOUNTER — TELEPHONE (OUTPATIENT)
Dept: ORTHOPEDIC SURGERY | Facility: CLINIC | Age: 73
End: 2023-02-28

## 2023-02-28 NOTE — TELEPHONE ENCOUNTER
Caller: CHENTE     Relationship: SELF     Best call back number: [phone]     Where will you receive your lab/imaging services: [facility]     Additional notes: FAX: [phone]

## 2023-03-03 ENCOUNTER — CLINICAL SUPPORT (OUTPATIENT)
Dept: INTERNAL MEDICINE | Facility: CLINIC | Age: 73
End: 2023-03-03
Payer: MEDICARE

## 2023-03-03 DIAGNOSIS — E53.8 B12 DEFICIENCY: Primary | ICD-10-CM

## 2023-03-03 PROCEDURE — 96372 THER/PROPH/DIAG INJ SC/IM: CPT | Performed by: INTERNAL MEDICINE

## 2023-03-03 RX ADMIN — CYANOCOBALAMIN 1000 MCG: 1000 INJECTION, SOLUTION INTRAMUSCULAR; SUBCUTANEOUS at 13:46

## 2023-03-16 ENCOUNTER — TELEPHONE (OUTPATIENT)
Dept: ORTHOPEDIC SURGERY | Facility: CLINIC | Age: 73
End: 2023-03-16

## 2023-03-16 NOTE — TELEPHONE ENCOUNTER
Caller: PATIENT      Relationship to patient: SELF     Best call back number:  [phone]    Chief complaint: LEFT KNEE PAIN     Type of visit: CORTISONE INJECTION     Requested date: ASAP

## 2023-03-29 ENCOUNTER — HOSPITAL ENCOUNTER (OUTPATIENT)
Dept: CARDIOLOGY | Facility: HOSPITAL | Age: 73
Discharge: HOME | End: 2023-03-29
Admitting: INTERNAL MEDICINE

## 2023-03-29 ENCOUNTER — HOSPITAL ENCOUNTER (OUTPATIENT)
Dept: MAMMOGRAPHY | Facility: HOSPITAL | Age: 73
Discharge: HOME | End: 2023-03-29
Admitting: INTERNAL MEDICINE
Payer: MEDICARE

## 2023-03-29 DIAGNOSIS — Z12.31 ENCOUNTER FOR SCREENING MAMMOGRAM FOR BREAST CANCER: ICD-10-CM

## 2023-03-29 DIAGNOSIS — Z13.6 ENCOUNTER FOR SCREENING FOR VASCULAR DISEASE: ICD-10-CM

## 2023-03-29 LAB
BH CV ECHO MEAS - DIST AO DIAM: 1.44 CM
BH CV VAS BP LEFT ARM: NORMAL MMHG
BH CV VAS BP RIGHT ARM: NORMAL MMHG
BH CV XLRA MEAS - MID AO DIAM: 1.43 CM
BH CV XLRA MEAS - PAD LEFT ABI DP: 1.17
BH CV XLRA MEAS - PAD LEFT ABI PT: 1.17
BH CV XLRA MEAS - PAD LEFT ARM: 117 MMHG
BH CV XLRA MEAS - PAD LEFT LEG DP: 139 MMHG
BH CV XLRA MEAS - PAD LEFT LEG PT: 139 MMHG
BH CV XLRA MEAS - PAD RIGHT ABI DP: 1.24
BH CV XLRA MEAS - PAD RIGHT ABI PT: 1.2
BH CV XLRA MEAS - PAD RIGHT ARM: 119 MMHG
BH CV XLRA MEAS - PAD RIGHT LEG DP: 148 MMHG
BH CV XLRA MEAS - PAD RIGHT LEG PT: 143 MMHG
BH CV XLRA MEAS - PROX AO DIAM: 1.72 CM
BH CV XLRA MEAS LEFT DIST CCA EDV: -16.4 CM/SEC
BH CV XLRA MEAS LEFT DIST CCA PSV: -79.7 CM/SEC
BH CV XLRA MEAS LEFT ICA/CCA RATIO: 0.52
BH CV XLRA MEAS LEFT MID CCA PSV: NORMAL CM/SEC
BH CV XLRA MEAS LEFT MID ICA PSV: NORMAL CM/SEC
BH CV XLRA MEAS LEFT PROX ECA EDV: -14.7 CM/SEC
BH CV XLRA MEAS LEFT PROX ECA PSV: NORMAL CM/SEC
BH CV XLRA MEAS LEFT PROX ICA EDV: -12.2 CM/SEC
BH CV XLRA MEAS LEFT PROX ICA PSV: -41.6 CM/SEC
BH CV XLRA MEAS RIGHT DIST CCA EDV: 26.4 CM/SEC
BH CV XLRA MEAS RIGHT DIST CCA PSV: 88.5 CM/SEC
BH CV XLRA MEAS RIGHT ICA/CCA RATIO: 0.69
BH CV XLRA MEAS RIGHT MID CCA PSV: NORMAL CM/SEC
BH CV XLRA MEAS RIGHT MID ICA PSV: NORMAL CM/SEC
BH CV XLRA MEAS RIGHT PROX ECA EDV: -9.5 CM/SEC
BH CV XLRA MEAS RIGHT PROX ECA PSV: NORMAL CM/SEC
BH CV XLRA MEAS RIGHT PROX ICA EDV: -22.9 CM/SEC
BH CV XLRA MEAS RIGHT PROX ICA PSV: -61 CM/SEC
MAXIMAL PREDICTED HEART RATE: 149 BPM
STRESS TARGET HR: 127 BPM

## 2023-03-29 PROCEDURE — 93799 UNLISTED CV SVC/PROCEDURE: CPT

## 2023-03-29 PROCEDURE — 77063 BREAST TOMOSYNTHESIS BI: CPT

## 2023-03-29 PROCEDURE — 77067 SCR MAMMO BI INCL CAD: CPT

## 2023-03-30 ENCOUNTER — CLINICAL SUPPORT (OUTPATIENT)
Dept: ORTHOPEDIC SURGERY | Facility: CLINIC | Age: 73
End: 2023-03-30
Payer: MEDICARE

## 2023-03-30 VITALS — TEMPERATURE: 97.7 F | RESPIRATION RATE: 16 BRPM | WEIGHT: 147 LBS | HEIGHT: 67 IN | BODY MASS INDEX: 23.07 KG/M2

## 2023-03-30 DIAGNOSIS — M17.12 PRIMARY OSTEOARTHRITIS OF LEFT KNEE: Primary | ICD-10-CM

## 2023-03-30 RX ORDER — FLUTICASONE PROPIONATE 50 MCG
2 SPRAY, SUSPENSION (ML) NASAL DAILY
Qty: 16 G | Refills: 5 | Status: SHIPPED | OUTPATIENT
Start: 2023-03-30

## 2023-03-30 RX ORDER — ACETAMINOPHEN 160 MG
TABLET,DISINTEGRATING ORAL
Qty: 90 CAPSULE | Refills: 3 | Status: SHIPPED | OUTPATIENT
Start: 2023-03-30

## 2023-03-30 RX ORDER — TRIAMCINOLONE ACETONIDE 40 MG/ML
80 INJECTION, SUSPENSION INTRA-ARTICULAR; INTRAMUSCULAR
Status: COMPLETED | OUTPATIENT
Start: 2023-03-30 | End: 2023-03-30

## 2023-03-30 RX ORDER — LIDOCAINE HYDROCHLORIDE 10 MG/ML
5 INJECTION, SOLUTION EPIDURAL; INFILTRATION; INTRACAUDAL; PERINEURAL
Status: COMPLETED | OUTPATIENT
Start: 2023-03-30 | End: 2023-03-30

## 2023-03-30 RX ADMIN — TRIAMCINOLONE ACETONIDE 80 MG: 40 INJECTION, SUSPENSION INTRA-ARTICULAR; INTRAMUSCULAR at 09:12

## 2023-03-30 RX ADMIN — LIDOCAINE HYDROCHLORIDE 5 ML: 10 INJECTION, SOLUTION EPIDURAL; INFILTRATION; INTRACAUDAL; PERINEURAL at 09:12

## 2023-03-30 NOTE — PROGRESS NOTES
3/30/2023    Iesha Roldan is here today for worsening knee pain. Pt has undergone injection of the knee in the past with good resolution of symptoms. Pt is requesting a repeat injection.  Patient reports the methylprednisone only lasted a month, therefore we will try Kenalog today.    KNEE Injection Procedure Note:    Large Joint Arthrocentesis: L knee  Date/Time: 3/30/2023 9:12 AM  Consent given by: patient  Site marked: site marked  Timeout: Immediately prior to procedure a time out was called to verify the correct patient, procedure, equipment, support staff and site/side marked as required   Supporting Documentation  Indications: pain and joint swelling   Procedure Details  Location: knee - L knee  Preparation: Patient was prepped and draped in the usual sterile fashion  Needle size: 22 G  Approach: anterolateral  Medications administered: 5 mL lidocaine PF 1% 1 %; 80 mg triamcinolone acetonide 40 MG/ML  Patient tolerance: patient tolerated the procedure well with no immediate complications        3 mL of lidocaine was used for anesthetic purposes    At the conclusion of the injection I discussed the importance of continued quad strengthening exercises on a daily basis. I will see the patient back if the symptoms should fail to improve or worsen.     Jose Miguel Webster, APRN  3/30/2023

## 2023-04-04 ENCOUNTER — CLINICAL SUPPORT (OUTPATIENT)
Dept: INTERNAL MEDICINE | Facility: CLINIC | Age: 73
End: 2023-04-04
Payer: MEDICARE

## 2023-04-04 RX ADMIN — CYANOCOBALAMIN 1000 MCG: 1000 INJECTION, SOLUTION INTRAMUSCULAR; SUBCUTANEOUS at 15:53

## 2023-05-01 RX ORDER — ALENDRONATE SODIUM 70 MG/1
TABLET ORAL
Qty: 12 TABLET | Refills: 1 | Status: SHIPPED | OUTPATIENT
Start: 2023-05-01

## 2023-05-12 ENCOUNTER — OFFICE VISIT (OUTPATIENT)
Dept: INTERNAL MEDICINE | Facility: CLINIC | Age: 73
End: 2023-05-12
Payer: MEDICARE

## 2023-05-12 VITALS
DIASTOLIC BLOOD PRESSURE: 70 MMHG | BODY MASS INDEX: 22.91 KG/M2 | SYSTOLIC BLOOD PRESSURE: 114 MMHG | HEART RATE: 68 BPM | OXYGEN SATURATION: 97 % | TEMPERATURE: 97.6 F | WEIGHT: 146 LBS | HEIGHT: 67 IN

## 2023-05-12 DIAGNOSIS — E53.8 B12 DEFICIENCY: ICD-10-CM

## 2023-05-12 DIAGNOSIS — J06.9 UPPER RESPIRATORY TRACT INFECTION, UNSPECIFIED TYPE: Primary | ICD-10-CM

## 2023-05-12 PROCEDURE — 1159F MED LIST DOCD IN RCRD: CPT | Performed by: INTERNAL MEDICINE

## 2023-05-12 PROCEDURE — 1160F RVW MEDS BY RX/DR IN RCRD: CPT | Performed by: INTERNAL MEDICINE

## 2023-05-12 PROCEDURE — 99213 OFFICE O/P EST LOW 20 MIN: CPT | Performed by: INTERNAL MEDICINE

## 2023-05-12 RX ORDER — MONTELUKAST SODIUM 10 MG/1
10 TABLET ORAL NIGHTLY
Qty: 90 TABLET | Refills: 3 | Status: SHIPPED | OUTPATIENT
Start: 2023-05-12

## 2023-05-12 RX ORDER — CYANOCOBALAMIN 1000 UG/ML
1000 INJECTION, SOLUTION INTRAMUSCULAR; SUBCUTANEOUS
Status: SHIPPED | OUTPATIENT
Start: 2023-05-12

## 2023-05-12 RX ORDER — FLUTICASONE PROPIONATE 50 MCG
2 SPRAY, SUSPENSION (ML) NASAL DAILY
Qty: 16 G | Refills: 5 | Status: SHIPPED | OUTPATIENT
Start: 2023-05-12

## 2023-05-12 RX ORDER — ALBUTEROL SULFATE 90 UG/1
2 AEROSOL, METERED RESPIRATORY (INHALATION) EVERY 4 HOURS PRN
Qty: 18 G | Refills: 1 | Status: SHIPPED | OUTPATIENT
Start: 2023-05-12

## 2023-05-12 RX ADMIN — CYANOCOBALAMIN 1000 MCG: 1000 INJECTION, SOLUTION INTRAMUSCULAR; SUBCUTANEOUS at 09:49

## 2023-05-12 NOTE — PROGRESS NOTES
Chief Complaint   Patient presents with   • Cough   • URI   b12 def      History of Present Illness   Iesha Roldan is a 72 y.o. female presents for acute needs. Patient has had a persistent cough off and on for several weeks. Spouse w/ recent bronchitis. She notes persistently clearing her throat. Some fatigue. Sinus drainage.  Patient has not been on any meds for sinus allergies. She does note sinus drainage.   b12 def. Due for injection.        The following portions of the patient's history were reviewed and updated as appropriate: allergies, current medications, past family history, past medical history, past social history, past surgical history and problem list.  Current Outpatient Medications on File Prior to Visit   Medication Sig Dispense Refill   • albuterol sulfate  (90 Base) MCG/ACT inhaler Inhale 2 puffs Every 4 (Four) Hours As Needed for Wheezing. 18 g 2   • alendronate (FOSAMAX) 70 MG tablet TAKE 1 TABLET BY MOUTH ONCE WEEKLY ON AN EMPTY STOMACH BEFORE BREAKFAST. REMAIN UPRIGHT FOR 30 MINUTES AND TAKE WITH 8 OUNCES OF WATER 12 tablet 1   • Cholecalciferol (Vitamin D3) 50 MCG (2000 UT) capsule TAKE ONE CAPSULE BY MOUTH DAILY 90 capsule 3   • fluticasone (Flonase) 50 MCG/ACT nasal spray 2 sprays into the nostril(s) as directed by provider Daily. 16 g 5   • hydrocortisone 2.5 % cream      • meloxicam (MOBIC) 15 MG tablet Take 1 tablet by mouth Daily. 30 tablet 5   • phenol (CHLORASEPTIC) 1.4 % liquid liquid Apply 1 spray to the mouth or throat Every 2 (Two) Hours As Needed (sore throat). 118 mL 0     Current Facility-Administered Medications on File Prior to Visit   Medication Dose Route Frequency Provider Last Rate Last Admin   • Chlorhexidine Gluconate Cloth 2 % pads   Apply externally BID Giorgio Keenan MD       • cyanocobalamin injection 1,000 mcg  1,000 mcg Intramuscular Q30 Days Camila Mejia MD   1,000 mcg at 04/04/23 1553     Review of Systems   Constitutional: Negative.    HENT:  "Negative.    Eyes: Negative.    Respiratory: Negative.    Cardiovascular: Negative.    Gastrointestinal: Negative.    Endocrine: Negative.    Genitourinary: Negative.    Musculoskeletal: Negative.    Skin: Negative.    Allergic/Immunologic: Negative.    Neurological: Negative.    Hematological: Negative.    Psychiatric/Behavioral: Negative.        Objective   Physical Exam  Vitals and nursing note reviewed.   Constitutional:       Appearance: Normal appearance. She is well-developed.   HENT:      Head: Normocephalic and atraumatic.      Right Ear: Tympanic membrane and external ear normal.      Left Ear: Tympanic membrane and external ear normal.      Nose: Nose normal.      Mouth/Throat:      Mouth: Mucous membranes are moist.   Eyes:      Extraocular Movements: Extraocular movements intact.      Pupils: Pupils are equal, round, and reactive to light.   Cardiovascular:      Rate and Rhythm: Normal rate and regular rhythm.      Pulses: Normal pulses.      Heart sounds: Normal heart sounds.   Pulmonary:      Effort: Pulmonary effort is normal. No respiratory distress.      Breath sounds: Normal breath sounds.   Abdominal:      General: Abdomen is flat.      Palpations: Abdomen is soft.   Musculoskeletal:         General: Normal range of motion.      Cervical back: Normal range of motion and neck supple.   Skin:     General: Skin is warm and dry.   Neurological:      General: No focal deficit present.      Mental Status: She is alert and oriented to person, place, and time.   Psychiatric:         Mood and Affect: Mood normal.         Behavior: Behavior normal.         Thought Content: Thought content normal.         Judgment: Judgment normal.          /70   Pulse 68   Temp 97.6 °F (36.4 °C)   Ht 170.2 cm (67.01\")   Wt 66.2 kg (146 lb)   SpO2 97%   BMI 22.86 kg/m²     Assessment & Plan   Diagnoses and all orders for this visit:    Upper respiratory tract infection, unspecified type    Other orders  -     " fluticasone (FLONASE) 50 MCG/ACT nasal spray; 2 sprays into the nostril(s) as directed by provider Daily.  -     albuterol sulfate  (90 Base) MCG/ACT inhaler; Inhale 2 puffs Every 4 (Four) Hours As Needed for Wheezing.  -     montelukast (Singulair) 10 MG tablet; Take 1 tablet by mouth Every Night.    patient w/ subacute uri. She is to hydrate well. To utilize flonase 2 spray each nostril daily. She is to take singulair one tablet daily. May also take daily antihistamine. To utilize nasal saline three times daily. Prn albuterol inhaler. She is due for b12 injection and will have today.

## 2023-06-05 ENCOUNTER — CLINICAL SUPPORT (OUTPATIENT)
Dept: INTERNAL MEDICINE | Facility: CLINIC | Age: 73
End: 2023-06-05
Payer: MEDICARE

## 2023-06-05 DIAGNOSIS — R53.83 FATIGUE, UNSPECIFIED TYPE: Primary | ICD-10-CM

## 2023-06-05 PROCEDURE — 96372 THER/PROPH/DIAG INJ SC/IM: CPT | Performed by: INTERNAL MEDICINE

## 2023-06-05 RX ADMIN — CYANOCOBALAMIN 1000 MCG: 1000 INJECTION, SOLUTION INTRAMUSCULAR; SUBCUTANEOUS at 11:54

## 2023-07-14 ENCOUNTER — CLINICAL SUPPORT (OUTPATIENT)
Dept: INTERNAL MEDICINE | Facility: CLINIC | Age: 73
End: 2023-07-14
Payer: MEDICARE

## 2023-07-14 DIAGNOSIS — E53.8 B12 DEFICIENCY: Primary | ICD-10-CM

## 2023-08-09 ENCOUNTER — CLINICAL SUPPORT (OUTPATIENT)
Dept: INTERNAL MEDICINE | Facility: CLINIC | Age: 73
End: 2023-08-09
Payer: MEDICARE

## 2023-08-09 DIAGNOSIS — E53.8 VITAMIN B 12 DEFICIENCY: Primary | ICD-10-CM

## 2023-08-09 RX ADMIN — CYANOCOBALAMIN 1000 MCG: 1000 INJECTION, SOLUTION INTRAMUSCULAR; SUBCUTANEOUS at 14:54

## 2023-08-10 ENCOUNTER — TELEPHONE (OUTPATIENT)
Dept: INTERNAL MEDICINE | Facility: CLINIC | Age: 73
End: 2023-08-10
Payer: MEDICARE

## 2023-08-10 DIAGNOSIS — E53.8 B12 DEFICIENCY: Primary | ICD-10-CM

## 2023-08-10 DIAGNOSIS — D50.8 IRON DEFICIENCY ANEMIA SECONDARY TO INADEQUATE DIETARY IRON INTAKE: ICD-10-CM

## 2023-08-10 NOTE — TELEPHONE ENCOUNTER
Caller: Iesha Roldan    Relationship: Self    Best call back [phone]     What was the call regarding: PATIENT HAS PUSHED HER OFFICE VISIT OUT 3 DAYS WITH DR GALVEZ SO SHE WOULD LIKE TO KNOW IF SHE ALSO NEEDS TO CHANGE HER LAB APPOINTMENT OR CAN IT STAY THE SAME.    PLEASE CALL NAD ADVISE

## 2023-08-18 LAB
ALBUMIN SERPL-MCNC: 4.4 G/DL (ref 3.5–5.2)
ALBUMIN/GLOB SERPL: 2.1 G/DL
ALP SERPL-CCNC: 69 U/L (ref 39–117)
ALT SERPL-CCNC: 15 U/L (ref 1–33)
AST SERPL-CCNC: 24 U/L (ref 1–32)
BASOPHILS # BLD AUTO: 0.03 10*3/MM3 (ref 0–0.2)
BASOPHILS NFR BLD AUTO: 0.8 % (ref 0–1.5)
BILIRUB SERPL-MCNC: 0.6 MG/DL (ref 0–1.2)
BUN SERPL-MCNC: 19 MG/DL (ref 8–23)
BUN/CREAT SERPL: 22.6 (ref 7–25)
CALCIUM SERPL-MCNC: 9.7 MG/DL (ref 8.6–10.5)
CHLORIDE SERPL-SCNC: 101 MMOL/L (ref 98–107)
CO2 SERPL-SCNC: 31.7 MMOL/L (ref 22–29)
CREAT SERPL-MCNC: 0.84 MG/DL (ref 0.57–1)
EGFRCR SERPLBLD CKD-EPI 2021: 73.9 ML/MIN/1.73
EOSINOPHIL # BLD AUTO: 0.11 10*3/MM3 (ref 0–0.4)
EOSINOPHIL NFR BLD AUTO: 3 % (ref 0.3–6.2)
ERYTHROCYTE [DISTWIDTH] IN BLOOD BY AUTOMATED COUNT: 11.9 % (ref 12.3–15.4)
GLOBULIN SER CALC-MCNC: 2.1 GM/DL
GLUCOSE SERPL-MCNC: 92 MG/DL (ref 65–99)
HCT VFR BLD AUTO: 38.1 % (ref 34–46.6)
HGB BLD-MCNC: 12.6 G/DL (ref 12–15.9)
IMM GRANULOCYTES # BLD AUTO: 0 10*3/MM3 (ref 0–0.05)
IMM GRANULOCYTES NFR BLD AUTO: 0 % (ref 0–0.5)
LYMPHOCYTES # BLD AUTO: 1.28 10*3/MM3 (ref 0.7–3.1)
LYMPHOCYTES NFR BLD AUTO: 35.4 % (ref 19.6–45.3)
MCH RBC QN AUTO: 29.4 PG (ref 26.6–33)
MCHC RBC AUTO-ENTMCNC: 33.1 G/DL (ref 31.5–35.7)
MCV RBC AUTO: 89 FL (ref 79–97)
MONOCYTES # BLD AUTO: 0.38 10*3/MM3 (ref 0.1–0.9)
MONOCYTES NFR BLD AUTO: 10.5 % (ref 5–12)
NEUTROPHILS # BLD AUTO: 1.82 10*3/MM3 (ref 1.7–7)
NEUTROPHILS NFR BLD AUTO: 50.3 % (ref 42.7–76)
NRBC BLD AUTO-RTO: 0 /100 WBC (ref 0–0.2)
PLATELET # BLD AUTO: 214 10*3/MM3 (ref 140–450)
POTASSIUM SERPL-SCNC: 4.2 MMOL/L (ref 3.5–5.2)
PROT SERPL-MCNC: 6.5 G/DL (ref 6–8.5)
RBC # BLD AUTO: 4.28 10*6/MM3 (ref 3.77–5.28)
SODIUM SERPL-SCNC: 140 MMOL/L (ref 136–145)
VIT B12 SERPL-MCNC: 847 PG/ML (ref 211–946)
WBC # BLD AUTO: 3.62 10*3/MM3 (ref 3.4–10.8)

## 2023-09-08 ENCOUNTER — OFFICE VISIT (OUTPATIENT)
Dept: INTERNAL MEDICINE | Facility: CLINIC | Age: 73
End: 2023-09-08
Payer: MEDICARE

## 2023-09-08 VITALS
OXYGEN SATURATION: 95 % | WEIGHT: 145 LBS | BODY MASS INDEX: 22.76 KG/M2 | HEIGHT: 67 IN | DIASTOLIC BLOOD PRESSURE: 78 MMHG | HEART RATE: 90 BPM | SYSTOLIC BLOOD PRESSURE: 146 MMHG

## 2023-09-08 DIAGNOSIS — M81.0 AGE-RELATED OSTEOPOROSIS WITHOUT CURRENT PATHOLOGICAL FRACTURE: ICD-10-CM

## 2023-09-08 DIAGNOSIS — E53.8 B12 DEFICIENCY: Primary | ICD-10-CM

## 2023-09-08 DIAGNOSIS — I65.22 CAROTID ARTERY PLAQUE, LEFT: ICD-10-CM

## 2023-09-08 PROCEDURE — 99214 OFFICE O/P EST MOD 30 MIN: CPT | Performed by: INTERNAL MEDICINE

## 2023-09-08 RX ADMIN — CYANOCOBALAMIN 1000 MCG: 1000 INJECTION, SOLUTION INTRAMUSCULAR; SUBCUTANEOUS at 15:09

## 2023-09-08 NOTE — PROGRESS NOTES
Chief Complaint   Patient presents with    b12 deficiency    osteoporosis    carotid plaque       History of Present Illness   Iesha Roldan is a 73 y.o. female presents for follow up evaluation. She has history of b12 def anemia. She is well supplemented at this time. She is getting injections once monthly. Patient recently diagnosed with osteoporosis. She was started on fosamax for this. She is engaged in weight bearing exercises.   She was found to have carotid plaque on screen.       The following portions of the patient's history were reviewed and updated as appropriate: allergies, current medications, past family history, past medical history, past social history, past surgical history and problem list.  Current Outpatient Medications on File Prior to Visit   Medication Sig Dispense Refill    albuterol sulfate  (90 Base) MCG/ACT inhaler Inhale 2 puffs Every 4 (Four) Hours As Needed for Wheezing. 18 g 2    albuterol sulfate  (90 Base) MCG/ACT inhaler Inhale 2 puffs Every 4 (Four) Hours As Needed for Wheezing. 18 g 1    alendronate (FOSAMAX) 70 MG tablet TAKE 1 TABLET BY MOUTH ONCE WEEKLY ON AN EMPTY STOMACH BEFORE BREAKFAST. REMAIN UPRIGHT FOR 30 MINUTES AND TAKE WITH 8 OUNCES OF WATER 12 tablet 1    Cholecalciferol (Vitamin D3) 50 MCG (2000 UT) capsule TAKE ONE CAPSULE BY MOUTH DAILY 90 capsule 3    fluticasone (Flonase) 50 MCG/ACT nasal spray 2 sprays into the nostril(s) as directed by provider Daily. 16 g 5    fluticasone (FLONASE) 50 MCG/ACT nasal spray 2 sprays into the nostril(s) as directed by provider Daily. 16 g 5    hydrocortisone 2.5 % cream       meloxicam (MOBIC) 15 MG tablet Take 1 tablet by mouth Daily. 30 tablet 5    montelukast (Singulair) 10 MG tablet Take 1 tablet by mouth Every Night. 90 tablet 3    phenol (CHLORASEPTIC) 1.4 % liquid liquid Apply 1 spray to the mouth or throat Every 2 (Two) Hours As Needed (sore throat). 118 mL 0     Current Facility-Administered  Medications on File Prior to Visit   Medication Dose Route Frequency Provider Last Rate Last Admin    Chlorhexidine Gluconate Cloth 2 % pads   Apply externally BID Giorgio Keenan MD        cyanocobalamin injection 1,000 mcg  1,000 mcg Intramuscular Q28 Days Camila Mejia MD   1,000 mcg at 08/09/23 1454     Review of Systems   Constitutional: Negative.    HENT: Negative.     Eyes: Negative.    Respiratory: Negative.     Cardiovascular: Negative.    Gastrointestinal: Negative.    Endocrine: Negative.    Genitourinary: Negative.    Musculoskeletal: Negative.    Skin: Negative.    Allergic/Immunologic: Negative.    Neurological: Negative.    Hematological: Negative.    Psychiatric/Behavioral: Negative.       Objective   Physical Exam  Vitals and nursing note reviewed.   Constitutional:       Appearance: Normal appearance. She is well-developed.   HENT:      Head: Normocephalic and atraumatic.      Right Ear: Tympanic membrane and external ear normal.      Left Ear: Tympanic membrane and external ear normal.      Nose: Nose normal.      Mouth/Throat:      Mouth: Mucous membranes are moist.   Eyes:      Extraocular Movements: Extraocular movements intact.      Pupils: Pupils are equal, round, and reactive to light.   Cardiovascular:      Rate and Rhythm: Normal rate and regular rhythm.      Pulses: Normal pulses.      Heart sounds: Normal heart sounds.   Pulmonary:      Effort: Pulmonary effort is normal. No respiratory distress.      Breath sounds: Normal breath sounds.   Abdominal:      General: Abdomen is flat.      Palpations: Abdomen is soft.   Musculoskeletal:         General: Normal range of motion.      Cervical back: Normal range of motion and neck supple.   Skin:     General: Skin is warm and dry.   Neurological:      General: No focal deficit present.      Mental Status: She is alert and oriented to person, place, and time.   Psychiatric:         Mood and Affect: Mood normal.         Behavior: Behavior  "normal.         Thought Content: Thought content normal.         Judgment: Judgment normal.        /78   Pulse 90   Ht 170.2 cm (67\")   Wt 65.8 kg (145 lb)   SpO2 95%   BMI 22.71 kg/m²     Assessment & Plan   Diagnoses and all orders for this visit:    B12 deficiency    Carotid artery plaque, left  -     Lipid Panel With LDL / HDL Ratio  -     CT Cardiac Calcium Score Without Dye; Future    Age-related osteoporosis without current pathological fracture      Pateint w/ b12 deficiency. She is supplemented well w/ monthly b12 injections. She was found to have carotid plaque on her screening testing. Will test lipid levels today. She will test ct cardiac calcium score as well. She will repeat carotid ultrasound in 2 years. She will have repeat dexa in one year. She will continue 2000 iu vit d3 daily w/ dietary calcium and weight bearing exercises. Will continue fosamax as well. F/u here in 6 months or prn.          "

## 2023-10-04 LAB
CHOLEST SERPL-MCNC: 208 MG/DL (ref 100–199)
HDLC SERPL-MCNC: 73 MG/DL
LDLC SERPL CALC-MCNC: 121 MG/DL (ref 0–99)
LDLC/HDLC SERPL: 1.7 RATIO (ref 0–3.2)
TRIGL SERPL-MCNC: 80 MG/DL (ref 0–149)
VLDLC SERPL CALC-MCNC: 14 MG/DL (ref 5–40)

## 2023-10-25 ENCOUNTER — CLINICAL SUPPORT (OUTPATIENT)
Dept: INTERNAL MEDICINE | Facility: CLINIC | Age: 73
End: 2023-10-25
Payer: MEDICARE

## 2023-10-25 ENCOUNTER — TELEPHONE (OUTPATIENT)
Dept: INTERNAL MEDICINE | Facility: CLINIC | Age: 73
End: 2023-10-25

## 2023-10-25 DIAGNOSIS — E53.8 B12 DEFICIENCY: Primary | ICD-10-CM

## 2023-10-25 RX ADMIN — CYANOCOBALAMIN 1000 MCG: 1000 INJECTION, SOLUTION INTRAMUSCULAR; SUBCUTANEOUS at 15:51

## 2023-10-25 NOTE — TELEPHONE ENCOUNTER
Caller: Iesha Roldan    Relationship to patient: Self    Best call back number: [phone]    Patient is needing: PATIENT WOULD LIKE TO COME IN FOR A B12 TODAY.    PLEASE CALL.

## 2023-11-06 RX ORDER — ALENDRONATE SODIUM 70 MG/1
TABLET ORAL
Qty: 12 TABLET | Refills: 1 | Status: SHIPPED | OUTPATIENT
Start: 2023-11-06

## 2023-11-22 ENCOUNTER — CLINICAL SUPPORT (OUTPATIENT)
Dept: INTERNAL MEDICINE | Facility: CLINIC | Age: 73
End: 2023-11-22
Payer: MEDICARE

## 2023-11-22 DIAGNOSIS — E53.8 B12 DEFICIENCY: Primary | ICD-10-CM

## 2023-11-22 RX ADMIN — CYANOCOBALAMIN 1000 MCG: 1000 INJECTION, SOLUTION INTRAMUSCULAR; SUBCUTANEOUS at 14:22

## 2023-11-29 ENCOUNTER — TRANSCRIBE ORDERS (OUTPATIENT)
Dept: ADMINISTRATIVE | Facility: HOSPITAL | Age: 73
End: 2023-11-29
Payer: MEDICARE

## 2023-11-29 DIAGNOSIS — Z12.31 BREAST CANCER SCREENING BY MAMMOGRAM: Primary | ICD-10-CM

## 2023-12-11 ENCOUNTER — OFFICE VISIT (OUTPATIENT)
Dept: INTERNAL MEDICINE | Facility: CLINIC | Age: 73
End: 2023-12-11
Payer: MEDICARE

## 2023-12-11 VITALS
DIASTOLIC BLOOD PRESSURE: 74 MMHG | BODY MASS INDEX: 22.91 KG/M2 | HEART RATE: 64 BPM | SYSTOLIC BLOOD PRESSURE: 122 MMHG | WEIGHT: 146 LBS | OXYGEN SATURATION: 98 % | HEIGHT: 67 IN

## 2023-12-11 DIAGNOSIS — R53.83 FATIGUE, UNSPECIFIED TYPE: ICD-10-CM

## 2023-12-11 DIAGNOSIS — E55.9 VITAMIN D DEFICIENCY: ICD-10-CM

## 2023-12-11 DIAGNOSIS — D50.8 IRON DEFICIENCY ANEMIA SECONDARY TO INADEQUATE DIETARY IRON INTAKE: ICD-10-CM

## 2023-12-11 DIAGNOSIS — R05.1 ACUTE COUGH: ICD-10-CM

## 2023-12-11 DIAGNOSIS — E53.8 B12 DEFICIENCY: ICD-10-CM

## 2023-12-11 LAB
EXPIRATION DATE: NORMAL
FLUAV AG UPPER RESP QL IA.RAPID: NOT DETECTED
FLUBV AG UPPER RESP QL IA.RAPID: NOT DETECTED
INTERNAL CONTROL: NORMAL
Lab: NORMAL
SARS-COV-2 AG UPPER RESP QL IA.RAPID: NOT DETECTED

## 2023-12-11 PROCEDURE — 99214 OFFICE O/P EST MOD 30 MIN: CPT | Performed by: INTERNAL MEDICINE

## 2023-12-11 PROCEDURE — 1160F RVW MEDS BY RX/DR IN RCRD: CPT | Performed by: INTERNAL MEDICINE

## 2023-12-11 PROCEDURE — G0008 ADMIN INFLUENZA VIRUS VAC: HCPCS | Performed by: INTERNAL MEDICINE

## 2023-12-11 PROCEDURE — 87428 SARSCOV & INF VIR A&B AG IA: CPT | Performed by: INTERNAL MEDICINE

## 2023-12-11 PROCEDURE — 90662 IIV NO PRSV INCREASED AG IM: CPT | Performed by: INTERNAL MEDICINE

## 2023-12-11 PROCEDURE — 1159F MED LIST DOCD IN RCRD: CPT | Performed by: INTERNAL MEDICINE

## 2023-12-11 RX ORDER — MONTELUKAST SODIUM 10 MG/1
10 TABLET ORAL NIGHTLY
Qty: 90 TABLET | Refills: 3 | Status: SHIPPED | OUTPATIENT
Start: 2023-12-11

## 2023-12-11 RX ORDER — FLUTICASONE PROPIONATE 50 MCG
2 SPRAY, SUSPENSION (ML) NASAL DAILY
Qty: 16 G | Refills: 5 | Status: SHIPPED | OUTPATIENT
Start: 2023-12-11

## 2023-12-11 RX ORDER — ALBUTEROL SULFATE 90 UG/1
2 AEROSOL, METERED RESPIRATORY (INHALATION) EVERY 4 HOURS PRN
Qty: 18 G | Refills: 2 | Status: SHIPPED | OUTPATIENT
Start: 2023-12-11

## 2023-12-11 RX ADMIN — CYANOCOBALAMIN 1000 MCG: 1000 INJECTION, SOLUTION INTRAMUSCULAR; SUBCUTANEOUS at 11:08

## 2023-12-11 NOTE — PROGRESS NOTES
"Chief Complaint   Patient presents with    Cough    Fatigue    b12 deficiency       Cough       Iesha Roldan is a 73 y.o. female presents for acute care. She notes cough and fatigue. She has been very busy. Spouse recently diagnosed w/ cancer and has traveled to [facility] for this. Notes that she can get a \"raspy cough\" at times. \"Not constant but there a lot\". Tested for flu and covid today and is now negative. She had covid 19 earlier this year and that is when she started noting the cough. She notes waking up earlier recently as early as 445 but may be going to sleep anytime from 730-930.       The following portions of the patient's history were reviewed and updated as appropriate: allergies, current medications, past family history, past medical history, past social history, past surgical history and problem list.  Current Outpatient Medications on File Prior to Visit   Medication Sig Dispense Refill    alendronate (FOSAMAX) 70 MG tablet TAKE 1 TABLET BY MOUTH ONCE WEEKLY ON AN EMPTY STOMACH BEFORE BREAKFAST. REMAIN UPRIGHT FOR 30 MINUTES AND TAKE WITH 8 OUNCES OF WATER 12 tablet 1    Cholecalciferol (Vitamin D3) 50 MCG (2000 UT) capsule TAKE ONE CAPSULE BY MOUTH DAILY 90 capsule 3    hydrocortisone 2.5 % cream       meloxicam (MOBIC) 15 MG tablet Take 1 tablet by mouth Daily. 30 tablet 5    phenol (CHLORASEPTIC) 1.4 % liquid liquid Apply 1 spray to the mouth or throat Every 2 (Two) Hours As Needed (sore throat). 118 mL 0    [DISCONTINUED] albuterol sulfate  (90 Base) MCG/ACT inhaler Inhale 2 puffs Every 4 (Four) Hours As Needed for Wheezing. 18 g 2    [DISCONTINUED] fluticasone (Flonase) 50 MCG/ACT nasal spray 2 sprays into the nostril(s) as directed by provider Daily. 16 g 5    [DISCONTINUED] montelukast (Singulair) 10 MG tablet Take 1 tablet by mouth Every Night. 90 tablet 3    [DISCONTINUED] albuterol sulfate  (90 Base) MCG/ACT inhaler Inhale 2 puffs Every 4 (Four) Hours As Needed " for Wheezing. 18 g 1    [DISCONTINUED] fluticasone (FLONASE) 50 MCG/ACT nasal spray 2 sprays into the nostril(s) as directed by provider Daily. 16 g 5     Current Facility-Administered Medications on File Prior to Visit   Medication Dose Route Frequency Provider Last Rate Last Admin    Chlorhexidine Gluconate Cloth 2 % pads   Apply externally BID Giorgio Keenan MD        cyanocobalamin injection 1,000 mcg  1,000 mcg Intramuscular Q28 Days Camila Mejia MD   1,000 mcg at 11/22/23 1422     Review of Systems   Constitutional:  Positive for fatigue.   HENT: Negative.     Eyes: Negative.    Respiratory:  Positive for cough.    Cardiovascular: Negative.    Gastrointestinal: Negative.    Endocrine: Negative.    Genitourinary: Negative.    Musculoskeletal: Negative.    Allergic/Immunologic: Negative.    Neurological: Negative.    Hematological: Negative.    Psychiatric/Behavioral: Negative.         Objective   Physical Exam  Vitals and nursing note reviewed.   Constitutional:       Appearance: Normal appearance. She is well-developed.   HENT:      Head: Normocephalic and atraumatic.      Right Ear: Tympanic membrane and external ear normal.      Left Ear: Tympanic membrane and external ear normal.      Nose: Nose normal.      Mouth/Throat:      Mouth: Mucous membranes are moist.   Eyes:      Extraocular Movements: Extraocular movements intact.      Pupils: Pupils are equal, round, and reactive to light.   Cardiovascular:      Rate and Rhythm: Normal rate and regular rhythm.      Pulses: Normal pulses.      Heart sounds: Normal heart sounds.   Pulmonary:      Effort: Pulmonary effort is normal. No respiratory distress.      Breath sounds: Normal breath sounds.   Abdominal:      General: Abdomen is flat.      Palpations: Abdomen is soft.   Musculoskeletal:         General: Normal range of motion.      Cervical back: Normal range of motion and neck supple.   Skin:     General: Skin is warm and dry.   Neurological:       "General: No focal deficit present.      Mental Status: She is alert and oriented to person, place, and time.   Psychiatric:         Mood and Affect: Mood normal.         Behavior: Behavior normal.         Thought Content: Thought content normal.         Judgment: Judgment normal.          /74   Pulse 64   Ht 170.2 cm (67\")   Wt 66.2 kg (146 lb)   SpO2 98%   BMI 22.87 kg/m²     Assessment & Plan   Diagnoses and all orders for this visit:    Fatigue, unspecified type  -     CBC & Differential  -     Vitamin B12  -     Folate  -     TSH  -     Comprehensive Metabolic Panel  -     Vitamin D,25-Hydroxy    Acute cough  -     CBC & Differential  -     Vitamin B12  -     Folate  -     TSH  -     Comprehensive Metabolic Panel  -     Vitamin D,25-Hydroxy    B12 deficiency  -     CBC & Differential  -     Vitamin B12  -     Folate  -     TSH  -     Comprehensive Metabolic Panel  -     Vitamin D,25-Hydroxy    Iron deficiency anemia secondary to inadequate dietary iron intake  -     CBC & Differential  -     Vitamin B12  -     Folate  -     TSH  -     Comprehensive Metabolic Panel  -     Vitamin D,25-Hydroxy  -     Iron Profile    Vitamin D deficiency  -     Vitamin D,25-Hydroxy    Other orders  -     Fluzone High-Dose 65+yrs (5339-6044)  -     montelukast (Singulair) 10 MG tablet; Take 1 tablet by mouth Every Night.  -     fluticasone (Flonase) 50 MCG/ACT nasal spray; 2 sprays into the nostril(s) as directed by provider Daily.  -     albuterol sulfate  (90 Base) MCG/ACT inhaler; Inhale 2 puffs Every 4 (Four) Hours As Needed for Wheezing.      Patient w/ cough and fatigue. Discussed cough can be related to post nasal drainage or reflux. Will try pepcid at night. Will start singulair and flonase. Will test for anemia and deficiencies given h/o iron/ b12 def. If continued fatigue she may need sleep study. She will have listed labs tested. She will have b12 injection today. Discussed she may need counseling " related to her past history of cancer and spouse's current diagnosis. F/u routinely or prn.

## 2023-12-12 ENCOUNTER — TELEPHONE (OUTPATIENT)
Dept: INTERNAL MEDICINE | Facility: CLINIC | Age: 73
End: 2023-12-12
Payer: MEDICARE

## 2023-12-12 NOTE — TELEPHONE ENCOUNTER
----- Message from Camila Mejia MD sent at 12/12/2023  7:36 AM EST -----  Normal lab testing.  radha

## 2023-12-13 LAB
25(OH)D3+25(OH)D2 SERPL-MCNC: 37.5 NG/ML (ref 30–100)
ALBUMIN SERPL-MCNC: 4.5 G/DL (ref 3.5–5.2)
ALBUMIN/GLOB SERPL: 1.8 G/DL
ALP SERPL-CCNC: 77 U/L (ref 39–117)
ALT SERPL-CCNC: 12 U/L (ref 1–33)
AST SERPL-CCNC: 21 U/L (ref 1–32)
BASOPHILS # BLD AUTO: 0.04 10*3/MM3 (ref 0–0.2)
BASOPHILS NFR BLD AUTO: 0.8 % (ref 0–1.5)
BILIRUB SERPL-MCNC: 0.3 MG/DL (ref 0–1.2)
BUN SERPL-MCNC: 15 MG/DL (ref 8–23)
BUN/CREAT SERPL: 20 (ref 7–25)
CALCIUM SERPL-MCNC: 10.1 MG/DL (ref 8.6–10.5)
CHLORIDE SERPL-SCNC: 103 MMOL/L (ref 98–107)
CO2 SERPL-SCNC: 28.4 MMOL/L (ref 22–29)
CREAT SERPL-MCNC: 0.75 MG/DL (ref 0.57–1)
EGFRCR SERPLBLD CKD-EPI 2021: 84.7 ML/MIN/1.73
EOSINOPHIL # BLD AUTO: 0.17 10*3/MM3 (ref 0–0.4)
EOSINOPHIL NFR BLD AUTO: 3.4 % (ref 0.3–6.2)
ERYTHROCYTE [DISTWIDTH] IN BLOOD BY AUTOMATED COUNT: 11.9 % (ref 12.3–15.4)
FOLATE SERPL-MCNC: 8.76 NG/ML (ref 4.78–24.2)
GLOBULIN SER CALC-MCNC: 2.5 GM/DL
GLUCOSE SERPL-MCNC: 88 MG/DL (ref 65–99)
HCT VFR BLD AUTO: 39.1 % (ref 34–46.6)
HGB BLD-MCNC: 13.1 G/DL (ref 12–15.9)
IMM GRANULOCYTES # BLD AUTO: 0.01 10*3/MM3 (ref 0–0.05)
IMM GRANULOCYTES NFR BLD AUTO: 0.2 % (ref 0–0.5)
IRON SATN MFR SERPL: 26 % (ref 20–50)
IRON SERPL-MCNC: 107 MCG/DL (ref 37–145)
LYMPHOCYTES # BLD AUTO: 1.4 10*3/MM3 (ref 0.7–3.1)
LYMPHOCYTES NFR BLD AUTO: 28 % (ref 19.6–45.3)
MCH RBC QN AUTO: 29.6 PG (ref 26.6–33)
MCHC RBC AUTO-ENTMCNC: 33.5 G/DL (ref 31.5–35.7)
MCV RBC AUTO: 88.5 FL (ref 79–97)
MONOCYTES # BLD AUTO: 0.42 10*3/MM3 (ref 0.1–0.9)
MONOCYTES NFR BLD AUTO: 8.4 % (ref 5–12)
NEUTROPHILS # BLD AUTO: 2.96 10*3/MM3 (ref 1.7–7)
NEUTROPHILS NFR BLD AUTO: 59.2 % (ref 42.7–76)
NRBC BLD AUTO-RTO: 0 /100 WBC (ref 0–0.2)
PLATELET # BLD AUTO: 248 10*3/MM3 (ref 140–450)
POTASSIUM SERPL-SCNC: 4.7 MMOL/L (ref 3.5–5.2)
PROT SERPL-MCNC: 7 G/DL (ref 6–8.5)
RBC # BLD AUTO: 4.42 10*6/MM3 (ref 3.77–5.28)
SODIUM SERPL-SCNC: 141 MMOL/L (ref 136–145)
TIBC SERPL-MCNC: 404 MCG/DL
TSH SERPL DL<=0.005 MIU/L-ACNC: 2.16 UIU/ML (ref 0.27–4.2)
UIBC SERPL-MCNC: 297 MCG/DL (ref 112–346)
VIT B12 SERPL-MCNC: 742 PG/ML (ref 211–946)
WBC # BLD AUTO: 5 10*3/MM3 (ref 3.4–10.8)

## 2023-12-14 ENCOUNTER — CLINICAL SUPPORT (OUTPATIENT)
Dept: ORTHOPEDIC SURGERY | Facility: CLINIC | Age: 73
End: 2023-12-14
Payer: MEDICARE

## 2023-12-14 VITALS — TEMPERATURE: 98.6 F | HEIGHT: 67 IN | BODY MASS INDEX: 22.93 KG/M2 | WEIGHT: 146.1 LBS

## 2023-12-14 DIAGNOSIS — M17.12 PRIMARY OSTEOARTHRITIS OF LEFT KNEE: Primary | ICD-10-CM

## 2023-12-14 DIAGNOSIS — M25.562 LEFT KNEE PAIN, UNSPECIFIED CHRONICITY: ICD-10-CM

## 2023-12-14 RX ORDER — LIDOCAINE HYDROCHLORIDE 10 MG/ML
5 INJECTION, SOLUTION EPIDURAL; INFILTRATION; INTRACAUDAL; PERINEURAL
Status: COMPLETED | OUTPATIENT
Start: 2023-12-14 | End: 2023-12-14

## 2023-12-14 RX ORDER — TRIAMCINOLONE ACETONIDE 40 MG/ML
80 INJECTION, SUSPENSION INTRA-ARTICULAR; INTRAMUSCULAR
Status: COMPLETED | OUTPATIENT
Start: 2023-12-14 | End: 2023-12-14

## 2023-12-14 RX ADMIN — TRIAMCINOLONE ACETONIDE 80 MG: 40 INJECTION, SUSPENSION INTRA-ARTICULAR; INTRAMUSCULAR at 10:28

## 2023-12-14 RX ADMIN — LIDOCAINE HYDROCHLORIDE 5 ML: 10 INJECTION, SOLUTION EPIDURAL; INFILTRATION; INTRACAUDAL; PERINEURAL at 10:28

## 2023-12-14 NOTE — PROGRESS NOTES
12/14/2023    Iesha Roldan is here today for worsening knee pain. Pt has undergone injection of the knee in the past with good resolution of symptoms. Pt is requesting a repeat injection.     KNEE Injection Procedure Note:    Large Joint Arthrocentesis: L knee  Date/Time: 12/14/2023 10:28 AM  Consent given by: patient  Site marked: site marked  Timeout: Immediately prior to procedure a time out was called to verify the correct patient, procedure, equipment, support staff and site/side marked as required   Supporting Documentation  Indications: pain and joint swelling   Procedure Details  Location: knee - L knee  Preparation: Patient was prepped and draped in the usual sterile fashion  Needle size: 22 G  Approach: anterolateral  Medications administered: 5 mL lidocaine PF 1% 1 %; 80 mg triamcinolone acetonide 40 MG/ML  Patient tolerance: patient tolerated the procedure well with no immediate complications      Imaging: X-rays 3 views left knee (AP, lateral, sunrise views) were ordered and reviewed for evaluation of left knee pain which demonstrate advanced valgus deformity with bone-on-bone articulation, sclerosis and cystic changes, as well as peritubular osteophytes present.  In comparison to previous films patient does not demonstrate significant progression.    At the conclusion of the injection I discussed the importance of continued quad strengthening exercises on a daily basis. I will see the patient back if the symptoms should fail to improve or worsen.    Jose Miguel Webster, APRN  12/14/2023

## 2024-01-15 ENCOUNTER — CLINICAL SUPPORT (OUTPATIENT)
Dept: INTERNAL MEDICINE | Facility: CLINIC | Age: 74
End: 2024-01-15
Payer: MEDICARE

## 2024-01-15 DIAGNOSIS — E53.8 B12 DEFICIENCY: Primary | ICD-10-CM

## 2024-01-15 PROCEDURE — 96372 THER/PROPH/DIAG INJ SC/IM: CPT | Performed by: INTERNAL MEDICINE

## 2024-01-15 RX ADMIN — CYANOCOBALAMIN 1000 MCG: 1000 INJECTION, SOLUTION INTRAMUSCULAR; SUBCUTANEOUS at 13:35

## 2024-02-08 DIAGNOSIS — D50.8 IRON DEFICIENCY ANEMIA SECONDARY TO INADEQUATE DIETARY IRON INTAKE: ICD-10-CM

## 2024-02-08 DIAGNOSIS — Z13.29 SCREENING FOR THYROID DISORDER: ICD-10-CM

## 2024-02-08 DIAGNOSIS — Z13.220 SCREENING, LIPID: ICD-10-CM

## 2024-02-08 DIAGNOSIS — E53.8 B12 DEFICIENCY: Primary | ICD-10-CM

## 2024-02-12 ENCOUNTER — CLINICAL SUPPORT (OUTPATIENT)
Dept: INTERNAL MEDICINE | Facility: CLINIC | Age: 74
End: 2024-02-12
Payer: MEDICARE

## 2024-02-12 DIAGNOSIS — E53.8 B12 DEFICIENCY: Primary | ICD-10-CM

## 2024-02-12 PROCEDURE — 96372 THER/PROPH/DIAG INJ SC/IM: CPT | Performed by: INTERNAL MEDICINE

## 2024-02-12 RX ORDER — CYANOCOBALAMIN 1000 UG/ML
1000 INJECTION, SOLUTION INTRAMUSCULAR; SUBCUTANEOUS
Status: SHIPPED | OUTPATIENT
Start: 2024-02-12

## 2024-02-12 RX ADMIN — CYANOCOBALAMIN 1000 MCG: 1000 INJECTION, SOLUTION INTRAMUSCULAR; SUBCUTANEOUS at 12:04

## 2024-02-15 ENCOUNTER — LAB (OUTPATIENT)
Dept: INTERNAL MEDICINE | Facility: CLINIC | Age: 74
End: 2024-02-15
Payer: MEDICARE

## 2024-02-15 DIAGNOSIS — D50.8 IRON DEFICIENCY ANEMIA SECONDARY TO INADEQUATE DIETARY IRON INTAKE: Primary | ICD-10-CM

## 2024-02-15 DIAGNOSIS — R53.83 FATIGUE, UNSPECIFIED TYPE: ICD-10-CM

## 2024-02-15 LAB
ALBUMIN SERPL-MCNC: 4.3 G/DL (ref 3.5–5.2)
ALBUMIN/GLOB SERPL: 1.7 G/DL
ALP SERPL-CCNC: 71 U/L (ref 39–117)
ALT SERPL-CCNC: 13 U/L (ref 1–33)
AST SERPL-CCNC: 17 U/L (ref 1–32)
BASOPHILS # BLD AUTO: 0.03 10*3/MM3 (ref 0–0.2)
BASOPHILS NFR BLD AUTO: 0.7 % (ref 0–1.5)
BILIRUB SERPL-MCNC: 0.3 MG/DL (ref 0–1.2)
BUN SERPL-MCNC: 20 MG/DL (ref 8–23)
BUN/CREAT SERPL: 27.4 (ref 7–25)
CALCIUM SERPL-MCNC: 9.7 MG/DL (ref 8.6–10.5)
CHLORIDE SERPL-SCNC: 102 MMOL/L (ref 98–107)
CO2 SERPL-SCNC: 28 MMOL/L (ref 22–29)
CREAT SERPL-MCNC: 0.73 MG/DL (ref 0.57–1)
EGFRCR SERPLBLD CKD-EPI 2021: 87.5 ML/MIN/1.73
EOSINOPHIL # BLD AUTO: 0.15 10*3/MM3 (ref 0–0.4)
EOSINOPHIL NFR BLD AUTO: 3.7 % (ref 0.3–6.2)
ERYTHROCYTE [DISTWIDTH] IN BLOOD BY AUTOMATED COUNT: 12.6 % (ref 12.3–15.4)
GLOBULIN SER CALC-MCNC: 2.5 GM/DL
GLUCOSE SERPL-MCNC: 83 MG/DL (ref 65–99)
HCT VFR BLD AUTO: 41.2 % (ref 34–46.6)
HGB BLD-MCNC: 13.5 G/DL (ref 12–15.9)
IMM GRANULOCYTES # BLD AUTO: 0.01 10*3/MM3 (ref 0–0.05)
IMM GRANULOCYTES NFR BLD AUTO: 0.2 % (ref 0–0.5)
LYMPHOCYTES # BLD AUTO: 1.32 10*3/MM3 (ref 0.7–3.1)
LYMPHOCYTES NFR BLD AUTO: 32.2 % (ref 19.6–45.3)
MCH RBC QN AUTO: 29 PG (ref 26.6–33)
MCHC RBC AUTO-ENTMCNC: 32.8 G/DL (ref 31.5–35.7)
MCV RBC AUTO: 88.4 FL (ref 79–97)
MONOCYTES # BLD AUTO: 0.4 10*3/MM3 (ref 0.1–0.9)
MONOCYTES NFR BLD AUTO: 9.8 % (ref 5–12)
NEUTROPHILS # BLD AUTO: 2.19 10*3/MM3 (ref 1.7–7)
NEUTROPHILS NFR BLD AUTO: 53.4 % (ref 42.7–76)
NRBC BLD AUTO-RTO: 0 /100 WBC (ref 0–0.2)
PLATELET # BLD AUTO: 229 10*3/MM3 (ref 140–450)
POTASSIUM SERPL-SCNC: 4.5 MMOL/L (ref 3.5–5.2)
PROT SERPL-MCNC: 6.8 G/DL (ref 6–8.5)
RBC # BLD AUTO: 4.66 10*6/MM3 (ref 3.77–5.28)
SODIUM SERPL-SCNC: 139 MMOL/L (ref 136–145)
TSH SERPL DL<=0.005 MIU/L-ACNC: 2.44 UIU/ML (ref 0.27–4.2)
VIT B12 SERPL-MCNC: 1260 PG/ML (ref 211–946)
WBC # BLD AUTO: 4.1 10*3/MM3 (ref 3.4–10.8)

## 2024-02-17 LAB
APPEARANCE UR: CLEAR
BACTERIA #/AREA URNS HPF: ABNORMAL /HPF
BACTERIA UR CULT: NO GROWTH
BACTERIA UR CULT: NORMAL
BILIRUB UR QL STRIP: NEGATIVE
CASTS URNS QL MICRO: ABNORMAL /LPF
COLOR UR: YELLOW
EPI CELLS #/AREA URNS HPF: ABNORMAL /HPF (ref 0–10)
GLUCOSE UR QL STRIP: NEGATIVE
HGB UR QL STRIP: NEGATIVE
KETONES UR QL STRIP: NEGATIVE
LEUKOCYTE ESTERASE UR QL STRIP: ABNORMAL
MICRO URNS: ABNORMAL
NITRITE UR QL STRIP: NEGATIVE
PH UR STRIP: 5.5 [PH] (ref 5–7.5)
PROT UR QL STRIP: NEGATIVE
RBC #/AREA URNS HPF: ABNORMAL /HPF (ref 0–2)
SP GR UR STRIP: 1.02 (ref 1–1.03)
URINALYSIS REFLEX: ABNORMAL
UROBILINOGEN UR STRIP-MCNC: 0.2 MG/DL (ref 0.2–1)
WBC #/AREA URNS HPF: ABNORMAL /HPF (ref 0–5)

## 2024-02-19 ENCOUNTER — OFFICE VISIT (OUTPATIENT)
Dept: INTERNAL MEDICINE | Facility: CLINIC | Age: 74
End: 2024-02-19
Payer: MEDICARE

## 2024-02-19 ENCOUNTER — TRANSCRIBE ORDERS (OUTPATIENT)
Dept: ADMINISTRATIVE | Facility: HOSPITAL | Age: 74
End: 2024-02-19
Payer: MEDICARE

## 2024-02-19 VITALS
WEIGHT: 148 LBS | DIASTOLIC BLOOD PRESSURE: 80 MMHG | OXYGEN SATURATION: 96 % | HEART RATE: 79 BPM | HEIGHT: 67 IN | SYSTOLIC BLOOD PRESSURE: 124 MMHG | BODY MASS INDEX: 23.23 KG/M2

## 2024-02-19 DIAGNOSIS — Z00.00 HEALTHCARE MAINTENANCE: Primary | ICD-10-CM

## 2024-02-19 DIAGNOSIS — Z82.49 FAMILY HISTORY OF AORTIC ANEURYSM: ICD-10-CM

## 2024-02-19 DIAGNOSIS — F41.9 ANXIOUS MOOD: ICD-10-CM

## 2024-02-19 DIAGNOSIS — Z13.6 ENCOUNTER FOR SCREENING FOR VASCULAR DISEASE: Primary | ICD-10-CM

## 2024-02-19 DIAGNOSIS — R45.89 DEPRESSED MOOD: ICD-10-CM

## 2024-02-19 DIAGNOSIS — R06.09 EXERTIONAL DYSPNEA: ICD-10-CM

## 2024-02-19 DIAGNOSIS — E78.00 ELEVATED LDL CHOLESTEROL LEVEL: ICD-10-CM

## 2024-02-19 PROCEDURE — 1159F MED LIST DOCD IN RCRD: CPT | Performed by: INTERNAL MEDICINE

## 2024-02-19 PROCEDURE — 99214 OFFICE O/P EST MOD 30 MIN: CPT | Performed by: INTERNAL MEDICINE

## 2024-02-19 PROCEDURE — G0439 PPPS, SUBSEQ VISIT: HCPCS | Performed by: INTERNAL MEDICINE

## 2024-02-19 PROCEDURE — 93000 ELECTROCARDIOGRAM COMPLETE: CPT | Performed by: INTERNAL MEDICINE

## 2024-02-19 PROCEDURE — 1170F FXNL STATUS ASSESSED: CPT | Performed by: INTERNAL MEDICINE

## 2024-02-19 PROCEDURE — 96160 PT-FOCUSED HLTH RISK ASSMT: CPT | Performed by: INTERNAL MEDICINE

## 2024-02-19 PROCEDURE — 1160F RVW MEDS BY RX/DR IN RCRD: CPT | Performed by: INTERNAL MEDICINE

## 2024-02-29 ENCOUNTER — HOSPITAL ENCOUNTER (OUTPATIENT)
Dept: CARDIOLOGY | Facility: HOSPITAL | Age: 74
Discharge: HOME | End: 2024-02-29
Admitting: INTERNAL MEDICINE
Payer: MEDICARE

## 2024-02-29 VITALS
SYSTOLIC BLOOD PRESSURE: 118 MMHG | BODY MASS INDEX: 23.23 KG/M2 | DIASTOLIC BLOOD PRESSURE: 74 MMHG | WEIGHT: 148 LBS | HEART RATE: 70 BPM | HEIGHT: 67 IN

## 2024-02-29 DIAGNOSIS — R06.09 EXERTIONAL DYSPNEA: ICD-10-CM

## 2024-02-29 DIAGNOSIS — Z82.49 FAMILY HISTORY OF AORTIC ANEURYSM: ICD-10-CM

## 2024-02-29 LAB
AORTIC ARCH: 2.1 CM
AORTIC DIMENSIONLESS INDEX: 0.9 (DI)
ASCENDING AORTA: 3.3 CM
BH CV ECHO MEAS - ACS: 1.73 CM
BH CV ECHO MEAS - AO MAX PG: 4.3 MMHG
BH CV ECHO MEAS - AO MEAN PG: 2.39 MMHG
BH CV ECHO MEAS - AO ROOT DIAM: 3.3 CM
BH CV ECHO MEAS - AO V2 MAX: 103.2 CM/SEC
BH CV ECHO MEAS - AO V2 VTI: 21.9 CM
BH CV ECHO MEAS - AVA(I,D): 2.6 CM2
BH CV ECHO MEAS - EDV(CUBED): 63.6 ML
BH CV ECHO MEAS - EDV(MOD-SP2): 44 ML
BH CV ECHO MEAS - EDV(MOD-SP4): 58 ML
BH CV ECHO MEAS - EF(MOD-BP): 58.9 %
BH CV ECHO MEAS - EF(MOD-SP2): 56.8 %
BH CV ECHO MEAS - EF(MOD-SP4): 60.3 %
BH CV ECHO MEAS - ESV(CUBED): 20.3 ML
BH CV ECHO MEAS - ESV(MOD-SP2): 19 ML
BH CV ECHO MEAS - ESV(MOD-SP4): 23 ML
BH CV ECHO MEAS - FS: 31.7 %
BH CV ECHO MEAS - IVS/LVPW: 0.94 CM
BH CV ECHO MEAS - IVSD: 0.87 CM
BH CV ECHO MEAS - LAT PEAK E' VEL: 8.5 CM/SEC
BH CV ECHO MEAS - LV DIASTOLIC VOL/BSA (35-75): 32.9 CM2
BH CV ECHO MEAS - LV MASS(C)D: 108.7 GRAMS
BH CV ECHO MEAS - LV MAX PG: 3.1 MMHG
BH CV ECHO MEAS - LV MEAN PG: 1.6 MMHG
BH CV ECHO MEAS - LV SYSTOLIC VOL/BSA (12-30): 13 CM2
BH CV ECHO MEAS - LV V1 MAX: 87.4 CM/SEC
BH CV ECHO MEAS - LV V1 VTI: 19.3 CM
BH CV ECHO MEAS - LVIDD: 4 CM
BH CV ECHO MEAS - LVIDS: 2.7 CM
BH CV ECHO MEAS - LVOT AREA: 3 CM2
BH CV ECHO MEAS - LVOT DIAM: 1.96 CM
BH CV ECHO MEAS - LVPWD: 0.92 CM
BH CV ECHO MEAS - MED PEAK E' VEL: 9 CM/SEC
BH CV ECHO MEAS - MV A DUR: 0.11 SEC
BH CV ECHO MEAS - MV A MAX VEL: 72 CM/SEC
BH CV ECHO MEAS - MV DEC SLOPE: 329.2 CM/SEC2
BH CV ECHO MEAS - MV DEC TIME: 0.25 SEC
BH CV ECHO MEAS - MV E MAX VEL: 76.3 CM/SEC
BH CV ECHO MEAS - MV E/A: 1.06
BH CV ECHO MEAS - MV MAX PG: 3.7 MMHG
BH CV ECHO MEAS - MV MEAN PG: 1.4 MMHG
BH CV ECHO MEAS - MV P1/2T: 94.5 MSEC
BH CV ECHO MEAS - MV V2 VTI: 26.2 CM
BH CV ECHO MEAS - MVA(P1/2T): 2.33 CM2
BH CV ECHO MEAS - MVA(VTI): 2.22 CM2
BH CV ECHO MEAS - PA ACC TIME: 0.16 SEC
BH CV ECHO MEAS - PA V2 MAX: 89.2 CM/SEC
BH CV ECHO MEAS - PULM A REVS DUR: 0.13 SEC
BH CV ECHO MEAS - PULM A REVS VEL: 40.7 CM/SEC
BH CV ECHO MEAS - PULM DIAS VEL: 46.7 CM/SEC
BH CV ECHO MEAS - PULM S/D: 1.37
BH CV ECHO MEAS - PULM SYS VEL: 63.8 CM/SEC
BH CV ECHO MEAS - RV MAX PG: 2.8 MMHG
BH CV ECHO MEAS - RV V1 MAX: 84 CM/SEC
BH CV ECHO MEAS - RV V1 VTI: 18.1 CM
BH CV ECHO MEAS - SI(MOD-SP2): 14.2 ML/M2
BH CV ECHO MEAS - SI(MOD-SP4): 19.8 ML/M2
BH CV ECHO MEAS - SV(LVOT): 58 ML
BH CV ECHO MEAS - SV(MOD-SP2): 25 ML
BH CV ECHO MEAS - SV(MOD-SP4): 35 ML
BH CV ECHO MEAS - TAPSE (>1.6): 2.2 CM
BH CV ECHO MEASUREMENTS AVERAGE E/E' RATIO: 8.72
BH CV XLRA - RV BASE: 2.8 CM
BH CV XLRA - RV LENGTH: 5.9 CM
BH CV XLRA - RV MID: 2.48 CM
BH CV XLRA - TDI S': 13.2 CM/SEC
LEFT ATRIUM VOLUME INDEX: 14 ML/M2
SINUS: 3.1 CM
STJ: 2.5 CM

## 2024-02-29 PROCEDURE — 93306 TTE W/DOPPLER COMPLETE: CPT

## 2024-03-04 ENCOUNTER — TELEPHONE (OUTPATIENT)
Dept: INTERNAL MEDICINE | Facility: CLINIC | Age: 74
End: 2024-03-04
Payer: MEDICARE

## 2024-03-04 ENCOUNTER — HOSPITAL ENCOUNTER (OUTPATIENT)
Dept: CT IMAGING | Facility: HOSPITAL | Age: 74
Discharge: HOME | End: 2024-03-04

## 2024-03-04 ENCOUNTER — HOSPITAL ENCOUNTER (OUTPATIENT)
Dept: CARDIOLOGY | Facility: HOSPITAL | Age: 74
Discharge: HOME | End: 2024-03-04

## 2024-03-04 DIAGNOSIS — Z13.6 ENCOUNTER FOR SCREENING FOR VASCULAR DISEASE: ICD-10-CM

## 2024-03-04 DIAGNOSIS — I65.22 CAROTID ARTERY PLAQUE, LEFT: ICD-10-CM

## 2024-03-04 LAB
BH CV VAS SCREENING CAROTID CCA LEFT: 75 CM/SEC
BH CV VAS SCREENING CAROTID CCA RIGHT: 76 CM/SEC
BH CV VAS SCREENING CAROTID ICA LEFT: 63 CM/SEC
BH CV VAS SCREENING CAROTID ICA RIGHT: 64 CM/SEC
BH CV XLRA MEAS - MID AO DIAM: 1.5 CM
BH CV XLRA MEAS - PAD LEFT ABI PT: 1.17
BH CV XLRA MEAS - PAD LEFT ARM: 136 MMHG
BH CV XLRA MEAS - PAD LEFT LEG PT: 161 MMHG
BH CV XLRA MEAS - PAD RIGHT ABI PT: 1.22
BH CV XLRA MEAS - PAD RIGHT ARM: 138 MMHG
BH CV XLRA MEAS - PAD RIGHT LEG PT: 168 MMHG
BH CV XLRA MEAS LEFT DIST CCA EDV: -14 CM/SEC
BH CV XLRA MEAS LEFT DIST CCA PSV: -74.6 CM/SEC
BH CV XLRA MEAS LEFT ICA/CCA RATIO: 0.8
BH CV XLRA MEAS LEFT PROX ICA EDV: -22.7 CM/SEC
BH CV XLRA MEAS LEFT PROX ICA PSV: -63.1 CM/SEC
BH CV XLRA MEAS RIGHT DIST CCA EDV: 14.5 CM/SEC
BH CV XLRA MEAS RIGHT DIST CCA PSV: 76.4 CM/SEC
BH CV XLRA MEAS RIGHT ICA/CCA RATIO: 0.8
BH CV XLRA MEAS RIGHT PROX ICA EDV: -22 CM/SEC
BH CV XLRA MEAS RIGHT PROX ICA PSV: -63.9 CM/SEC

## 2024-03-04 PROCEDURE — 93799 UNLISTED CV SVC/PROCEDURE: CPT

## 2024-03-04 PROCEDURE — 75571 CT HRT W/O DYE W/CA TEST: CPT

## 2024-03-04 NOTE — TELEPHONE ENCOUNTER
Pt informed    ----- Message from Camila Mejia MD sent at 3/4/2024  4:23 PM EST -----  Ct cardiac calcium score of 0. She is at a very low risk for a cardiac event in the next 5 years.   SONIDO

## 2024-04-01 ENCOUNTER — HOSPITAL ENCOUNTER (OUTPATIENT)
Dept: MAMMOGRAPHY | Facility: HOSPITAL | Age: 74
Discharge: HOME | End: 2024-04-01
Admitting: INTERNAL MEDICINE
Payer: MEDICARE

## 2024-04-01 DIAGNOSIS — Z12.31 BREAST CANCER SCREENING BY MAMMOGRAM: ICD-10-CM

## 2024-04-01 PROCEDURE — 77067 SCR MAMMO BI INCL CAD: CPT

## 2024-04-01 PROCEDURE — 77063 BREAST TOMOSYNTHESIS BI: CPT

## 2024-04-03 DIAGNOSIS — R92.2 INCONCLUSIVE MAMMOGRAM: ICD-10-CM

## 2024-04-03 DIAGNOSIS — Z12.39 ENCOUNTER FOR BREAST CANCER SCREENING OTHER THAN MAMMOGRAM: ICD-10-CM

## 2024-04-03 DIAGNOSIS — R92.30 DENSE BREAST TISSUE ON MAMMOGRAM, UNSPECIFIED TYPE: Primary | ICD-10-CM

## 2024-04-08 ENCOUNTER — CLINICAL SUPPORT (OUTPATIENT)
Dept: INTERNAL MEDICINE | Facility: CLINIC | Age: 74
End: 2024-04-08
Payer: MEDICARE

## 2024-04-08 DIAGNOSIS — E53.8 B12 DEFICIENCY: Primary | ICD-10-CM

## 2024-04-11 RX ADMIN — CYANOCOBALAMIN 1000 MCG: 1000 INJECTION, SOLUTION INTRAMUSCULAR; SUBCUTANEOUS at 14:25

## 2024-04-19 ENCOUNTER — HOSPITAL ENCOUNTER (OUTPATIENT)
Dept: ULTRASOUND IMAGING | Facility: HOSPITAL | Age: 74
Discharge: HOME | End: 2024-04-19
Payer: MEDICARE

## 2024-04-19 DIAGNOSIS — R92.2 INCONCLUSIVE MAMMOGRAM: ICD-10-CM

## 2024-04-19 DIAGNOSIS — R92.30 DENSE BREAST TISSUE ON MAMMOGRAM, UNSPECIFIED TYPE: ICD-10-CM

## 2024-04-19 DIAGNOSIS — Z12.39 ENCOUNTER FOR BREAST CANCER SCREENING OTHER THAN MAMMOGRAM: ICD-10-CM

## 2024-04-19 PROCEDURE — 76641 ULTRASOUND BREAST COMPLETE: CPT

## 2024-04-22 RX ORDER — CHOLECALCIFEROL (VITAMIN D3) 50 MCG
2000 CAPSULE ORAL DAILY
Qty: 90 CAPSULE | Refills: 3 | Status: SHIPPED | OUTPATIENT
Start: 2024-04-22

## 2024-05-09 RX ORDER — ALENDRONATE SODIUM 70 MG/1
TABLET ORAL
Qty: 12 TABLET | Refills: 1 | Status: SHIPPED | OUTPATIENT
Start: 2024-05-09

## 2024-05-14 ENCOUNTER — LAB (OUTPATIENT)
Dept: INTERNAL MEDICINE | Facility: CLINIC | Age: 74
End: 2024-05-14
Payer: MEDICARE

## 2024-05-14 LAB
CHOLEST SERPL-MCNC: 193 MG/DL (ref 0–200)
HDLC SERPL-MCNC: 70 MG/DL (ref 40–60)
LDLC SERPL CALC-MCNC: 107 MG/DL (ref 0–100)
LDLC/HDLC SERPL: 1.5 {RATIO}
TRIGL SERPL-MCNC: 89 MG/DL (ref 0–150)
VLDLC SERPL CALC-MCNC: 16 MG/DL (ref 5–40)

## 2024-05-14 PROCEDURE — 96372 THER/PROPH/DIAG INJ SC/IM: CPT | Performed by: INTERNAL MEDICINE

## 2024-05-14 RX ADMIN — CYANOCOBALAMIN 1000 MCG: 1000 INJECTION, SOLUTION INTRAMUSCULAR; SUBCUTANEOUS at 11:40

## 2024-05-16 ENCOUNTER — OFFICE VISIT (OUTPATIENT)
Dept: ORTHOPEDIC SURGERY | Facility: CLINIC | Age: 74
End: 2024-05-16
Payer: MEDICARE

## 2024-05-16 VITALS — BODY MASS INDEX: 22.7 KG/M2 | TEMPERATURE: 96.9 F | HEIGHT: 67 IN | WEIGHT: 144.6 LBS

## 2024-05-16 DIAGNOSIS — M17.12 PRIMARY OSTEOARTHRITIS OF LEFT KNEE: Primary | ICD-10-CM

## 2024-05-16 RX ORDER — TRIAMCINOLONE ACETONIDE 40 MG/ML
80 INJECTION, SUSPENSION INTRA-ARTICULAR; INTRAMUSCULAR
Status: COMPLETED | OUTPATIENT
Start: 2024-05-16 | End: 2024-05-16

## 2024-05-16 RX ORDER — LIDOCAINE HYDROCHLORIDE 10 MG/ML
5 INJECTION, SOLUTION EPIDURAL; INFILTRATION; INTRACAUDAL; PERINEURAL
Status: COMPLETED | OUTPATIENT
Start: 2024-05-16 | End: 2024-05-16

## 2024-05-16 RX ADMIN — LIDOCAINE HYDROCHLORIDE 5 ML: 10 INJECTION, SOLUTION EPIDURAL; INFILTRATION; INTRACAUDAL; PERINEURAL at 10:36

## 2024-05-16 RX ADMIN — TRIAMCINOLONE ACETONIDE 80 MG: 40 INJECTION, SUSPENSION INTRA-ARTICULAR; INTRAMUSCULAR at 10:36

## 2024-05-16 NOTE — PROGRESS NOTES
5/16/2024    Iesha Roldan is here today for worsening knee pain. Pt has undergone injection of the knee in the past with good resolution of symptoms. Pt is requesting a repeat injection.     KNEE Injection Procedure Note:    Large Joint Arthrocentesis: L knee  Date/Time: 5/16/2024 10:36 AM  Consent given by: patient  Site marked: site marked  Timeout: Immediately prior to procedure a time out was called to verify the correct patient, procedure, equipment, support staff and site/side marked as required   Supporting Documentation  Indications: pain and joint swelling   Procedure Details  Location: knee - L knee  Preparation: Patient was prepped and draped in the usual sterile fashion  Needle size: 22 G  Approach: anterolateral  Medications administered: 5 mL lidocaine PF 1% 1 %; 80 mg triamcinolone acetonide 40 MG/ML  Patient tolerance: patient tolerated the procedure well with no immediate complications    (3 mL of lidocaine was used for anesthetic purposes)      At the conclusion of the injection I discussed the importance of continued quad strengthening exercises on a daily basis. I will see the patient back if the symptoms should fail to improve or worsen.    Jose Miguel Webster, APRN  5/16/2024

## 2024-06-10 ENCOUNTER — CLINICAL SUPPORT (OUTPATIENT)
Dept: INTERNAL MEDICINE | Facility: CLINIC | Age: 74
End: 2024-06-10
Payer: MEDICARE

## 2024-06-10 DIAGNOSIS — E53.8 B12 DEFICIENCY: Primary | ICD-10-CM

## 2024-06-10 PROCEDURE — 96372 THER/PROPH/DIAG INJ SC/IM: CPT | Performed by: INTERNAL MEDICINE

## 2024-06-10 RX ORDER — PREDNISOLONE ACETATE 10 MG/ML
SUSPENSION/ DROPS OPHTHALMIC
COMMUNITY
Start: 2024-04-25 | End: 2024-06-14

## 2024-06-10 RX ORDER — CYCLOSPORINE 0.5 MG/ML
EMULSION OPHTHALMIC
COMMUNITY
Start: 2024-03-29 | End: 2024-06-14

## 2024-06-10 RX ADMIN — CYANOCOBALAMIN 1000 MCG: 1000 INJECTION, SOLUTION INTRAMUSCULAR; SUBCUTANEOUS at 12:01

## 2024-06-13 NOTE — TELEPHONE ENCOUNTER
----- Message from Camila Mejia MD sent at 6/2/2017  5:33 PM EDT -----  Advise patient referral to dr. Sotelo has been made. Also set up a follow up appointment here in 1-3 months.         Pt informed  
n/a

## 2024-06-14 ENCOUNTER — OFFICE VISIT (OUTPATIENT)
Dept: CARDIOLOGY | Facility: CLINIC | Age: 74
End: 2024-06-14
Payer: MEDICARE

## 2024-06-14 VITALS
HEIGHT: 67 IN | HEART RATE: 61 BPM | DIASTOLIC BLOOD PRESSURE: 74 MMHG | WEIGHT: 142.4 LBS | SYSTOLIC BLOOD PRESSURE: 128 MMHG | OXYGEN SATURATION: 98 % | BODY MASS INDEX: 22.35 KG/M2

## 2024-06-14 DIAGNOSIS — Z82.49 FAMILY HISTORY OF THORACIC AORTIC ANEURYSM: ICD-10-CM

## 2024-06-14 DIAGNOSIS — Z13.6 SCREENING FOR CARDIOVASCULAR CONDITION: Primary | ICD-10-CM

## 2024-06-14 DIAGNOSIS — C80.1 MUCOEPIDERMOID CARCINOMA: ICD-10-CM

## 2024-06-14 NOTE — PATIENT INSTRUCTIONS
If your cholesterol remains elevated, LDL > 100, perhaps the screening exams could be repeated in 5 years or so.

## 2024-06-14 NOTE — PROGRESS NOTES
[facility]    Subjective:     Encounter Date:24      Patient ID: Iesha Roldan is a 74 y.o. female.    Chief Complaint: No chief complaint on file.  Establish care for cardiovascular screening, family history of coronary and vascular disease  History of Present Illness    Ms. Roldan is a very pleasant 74 old lady presents for consultation for cardiovascular screenings.  She has a sister who has a thoracic aortic aneurysm, and a mother who  from complications from vascular disease due to tobacco abuse in her 70s.  She was concerned about her cardiovascular risk.  She reports she is always been pretty healthy.  She did receive neck radiation due to thyroid cancer in the past.  She has never smoked.  Her cholesterol has been pretty good mostly, some LDLs in the low 100s, and HDL been high.  She has some intermittent shortness of breath which she may be thinks is deconditioning, when escalating flights of stairs quickly.    As part of the workup of the shortness of breath, she underwent echocardiogram.  I directly reviewed and interpreted the results, it is normal.  The left atrial size is normal, and the diastolic function is normal.  This is a normal echo.    To assess her cardiovascular risk, given her otherwise good cholesterol her PCP had astutely ordered cardiovascular screening, which was obtained before her cardiology appointment with me.  I also reviewed these results, I directly reviewed and interpreted her CT exam, which was normal.    The following portions of the patient's history were reviewed and updated as appropriate: allergies, current medications, past family history, past medical history, past social history, past surgical history and problem list.    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    Neck pain     Osteopenia     Pharyngitis        Past Surgical History:   Procedure Laterality Date    COLONOSCOPY  2017    COLONOSCOPY  2019    Tubular adenoma, diverticulosis    HIP SURGERY Left 2009    Joint replacement    PAROTIDECTOMY Right 1/31/2017    Procedure: RIGHT SUPERFICIAL PAROTIDECTOMY EXCISION PAROTID MASS ;  Surgeon: Candelario Calderon MD;  Location: [facility];  Service:     TOTAL HIP ARTHROPLASTY REVISION Left 2/23/2022    Procedure: CONVERSION TO TOTAL HIP;  Surgeon: Giorgio Keenan MD;  Location: [facility];  Service: Orthopedics;  Laterality: Left;    TUBAL ABDOMINAL LIGATION  In 1989           ECG 12 Lead    Date/Time: 6/14/2024 10:51 AM  Performed by: Humza Khan MD    Authorized by: Humza Khan MD  Comparison: compared with previous ECG from 2/19/2024  Similar to previous ECG  Rhythm: sinus rhythm  Rate: normal  Conduction: conduction normal  ST Segments: ST segments normal  T Waves: T waves normal  QRS axis: normal  Other: no other findings    Clinical impression: normal ECG             Objective:     Vitals:    06/14/24 1020   BP: 128/74   BP Location: Left arm   Patient Position: Sitting   Cuff Size: Adult   Pulse: 61   SpO2: 98%   Weight: 64.6 kg (142 lb 6.4 oz)   Height: 170.2 cm (67\")         Constitutional:       Appearance: Healthy appearance. Not in distress.   Neck:      Vascular: JVD normal.   Pulmonary:      Effort: Pulmonary effort is normal.      Breath sounds: Normal breath sounds.   Cardiovascular:      PMI at left midclavicular line. Normal rate. Regular rhythm. Normal S2.       Murmurs: There is no murmur.   Pulses:     Intact distal pulses.   Edema:     Peripheral edema absent.   Skin:     General: Skin is warm and dry.   Neurological:      General: No focal deficit present.      Mental Status: Alert, oriented to " person, place, and time and oriented to person, place and time.   Psychiatric:         Mood and Affect: Mood and affect normal.         Lab Review:     Lipid Panel          10/3/2023    11:26 5/14/2024    10:59   Lipid Panel   Total Cholesterol 208  193    Triglycerides 80  89    HDL Cholesterol 73  70    VLDL Cholesterol 14  16    LDL Cholesterol  121  107    LDL/HDL Ratio 1.7  1.50      BUN   Date Value Ref Range Status   02/15/2024 20 8 - 23 mg/dL Final   02/14/2022 20 8 - 23 mg/dL Final   08/30/2021 20 6 - 23 mg/dL Final     Comment:     Testing Performed at [facility], [address] [facility], [place] [address]     Creatinine   Date Value Ref Range Status   02/15/2024 0.73 0.57 - 1.00 mg/dL Final   02/14/2022 0.80 0.57 - 1.00 mg/dL Final   08/30/2021 0.82 0.51 - 0.95 mg/dL Final     Comment:     Testing Performed at [facility], [address], [address], [place] [address]     Potassium   Date Value Ref Range Status   02/15/2024 4.5 3.5 - 5.2 mmol/L Final   02/14/2022 4.1 3.5 - 5.2 mmol/L Final     ALT (SGPT)   Date Value Ref Range Status   02/15/2024 13 1 - 33 U/L Final   10/14/2020 13 0 - 33 U/L Final     AST (SGOT)   Date Value Ref Range Status   02/15/2024 17 1 - 32 U/L Final   10/14/2020 25 0 - 32 U/L Final         Performed        Assessment:          Diagnosis Plan   1. Screening for cardiovascular condition        2. Family history of thoracic aortic aneurysm        3. Mucoepidermoid carcinoma               Plan:         Family history of thoracic aortic aneurysm: I directly reviewed and interpreted her CT chest.  It seems that her ascending aorta max measurement is 3.6 cm at the level of pulmonary artery.  She is 5 foot 7.  This is normal.  Echo also without significant abnormalities.  Patient reassured  Cardiovascular screening: Her ASCVD risk is 12% per traditional risk factor estimates, however her vascular screening exams were completely normal.  Her LDL  is barely above normal at 107  Calcium score is 0, no evidence of carotid or other vascular disease on screening ultrasound.  She is going to continue her current lifestyle, I suspect that perhaps if her LDL remains elevated greater than 100 over the next several years, we could consider repeating vascular screenings in 5 years if she still remains on the fence about statin therapy.  Given her normal vascular screenings I doubt she would derive much benefit from this at this time  Intermittent dyspnea: Probably deconditioning.  Exercise encouraged.  Her echocardiogram, which I directly reviewed and interpreted, is normal.  Her diastolic function is normal.  There are no signs of diastolic dysfunction.  History of neck radiation due to thyroid cancer.  Her carotid ultrasound did not show evidence of plaque    Thank you for allowing me to participate in the care of Iesha LIANG Jamar. Feel free to contact me directly with any further questions or concerns.    RTC as needed.  Patient was reassured today.  She can continue further monitoring of her cardiovascular risk with her PCP.  Happy to see her back as needed    Humza Khan MD  [facility]  06/14/24  10:23 EDT       Current Outpatient Medications:     albuterol sulfate  (90 Base) MCG/ACT inhaler, Inhale 2 puffs Every 4 (Four) Hours As Needed for Wheezing., Disp: 18 g, Rfl: 2    alendronate (FOSAMAX) 70 MG tablet, TAKE 1 TABLET BY MOUTH ONCE WEEKLY ON AN EMPTY STOMACH BEFORE BREAKFAST. REMAIN UPRIGHT FOR 30 MINUTES AND TAKE WITH 8 OUNCES OF WATER, Disp: 12 tablet, Rfl: 1    D3 Super Strength 50 MCG (2000 UT) capsule, TAKE ONE CAPSULE BY MOUTH DAILY, Disp: 90 capsule, Rfl: 3    fluticasone (Flonase) 50 MCG/ACT nasal spray, 2 sprays into the nostril(s) as directed by provider Daily., Disp: 16 g, Rfl: 5    Current Facility-Administered Medications:     cyanocobalamin injection 1,000 mcg, 1,000 mcg, Intramuscular, Q28 Days, Camila Mejia MD,  1,000 mcg at 01/15/24 1335    cyanocobalamin injection 1,000 mcg, 1,000 mcg, Intramuscular, Q28 Days, Camila Mejia MD, 1,000 mcg at 06/10/24 1201    Facility-Administered Medications Ordered in Other Visits:     Chlorhexidine Gluconate Cloth 2 % pads, , Apply externally, BID, Giorgio Keenan MD         Return if symptoms worsen or fail to improve.      Part of this note may be an electronic transcription/translation of spoken language to printed text using the Dragon Dictation System.

## 2024-07-12 ENCOUNTER — CLINICAL SUPPORT (OUTPATIENT)
Dept: INTERNAL MEDICINE | Facility: CLINIC | Age: 74
End: 2024-07-12
Payer: MEDICARE

## 2024-07-12 PROCEDURE — 96372 THER/PROPH/DIAG INJ SC/IM: CPT | Performed by: INTERNAL MEDICINE

## 2024-07-12 RX ADMIN — CYANOCOBALAMIN 1000 MCG: 1000 INJECTION, SOLUTION INTRAMUSCULAR; SUBCUTANEOUS at 14:16

## 2024-08-27 ENCOUNTER — OFFICE VISIT (OUTPATIENT)
Dept: INTERNAL MEDICINE | Facility: CLINIC | Age: 74
End: 2024-08-27
Payer: MEDICARE

## 2024-08-27 VITALS
BODY MASS INDEX: 22.76 KG/M2 | DIASTOLIC BLOOD PRESSURE: 66 MMHG | SYSTOLIC BLOOD PRESSURE: 114 MMHG | HEART RATE: 73 BPM | HEIGHT: 67 IN | WEIGHT: 145 LBS | OXYGEN SATURATION: 97 %

## 2024-08-27 DIAGNOSIS — R53.83 OTHER FATIGUE: ICD-10-CM

## 2024-08-27 DIAGNOSIS — D64.9 ANEMIA, UNSPECIFIED TYPE: Primary | ICD-10-CM

## 2024-08-27 DIAGNOSIS — E53.8 B12 DEFICIENCY: ICD-10-CM

## 2024-08-27 DIAGNOSIS — R05.3 CHRONIC COUGH: ICD-10-CM

## 2024-08-27 DIAGNOSIS — K29.50 CHRONIC GASTRITIS WITHOUT BLEEDING, UNSPECIFIED GASTRITIS TYPE: ICD-10-CM

## 2024-08-27 DIAGNOSIS — Z12.11 SCREENING FOR COLON CANCER: ICD-10-CM

## 2024-08-27 LAB
BASOPHILS # BLD AUTO: 0.02 10*3/MM3 (ref 0–0.2)
BASOPHILS NFR BLD AUTO: 0.3 % (ref 0–1.5)
BUN SERPL-MCNC: 20 MG/DL (ref 8–23)
BUN/CREAT SERPL: 26.3 (ref 7–25)
CALCIUM SERPL-MCNC: 9.7 MG/DL (ref 8.6–10.5)
CHLORIDE SERPL-SCNC: 102 MMOL/L (ref 98–107)
CO2 SERPL-SCNC: 29 MMOL/L (ref 22–29)
CREAT SERPL-MCNC: 0.76 MG/DL (ref 0.57–1)
EGFRCR SERPLBLD CKD-EPI 2021: 82.9 ML/MIN/1.73
EOSINOPHIL # BLD AUTO: 0.09 10*3/MM3 (ref 0–0.4)
EOSINOPHIL NFR BLD AUTO: 1.5 % (ref 0.3–6.2)
ERYTHROCYTE [DISTWIDTH] IN BLOOD BY AUTOMATED COUNT: 11.8 % (ref 12.3–15.4)
EXPIRATION DATE: NORMAL
FOLATE SERPL-MCNC: 11.6 NG/ML (ref 4.78–24.2)
GLUCOSE SERPL-MCNC: 88 MG/DL (ref 65–99)
HCT VFR BLD AUTO: 40.6 % (ref 34–46.6)
HGB BLD-MCNC: 13.5 G/DL (ref 12–15.9)
HGB BLDA-MCNC: 12.8 G/DL (ref 12–17)
IMM GRANULOCYTES # BLD AUTO: 0.01 10*3/MM3 (ref 0–0.05)
IMM GRANULOCYTES NFR BLD AUTO: 0.2 % (ref 0–0.5)
IRON SATN MFR SERPL: 28 % (ref 20–50)
IRON SERPL-MCNC: 110 MCG/DL (ref 37–145)
LYMPHOCYTES # BLD AUTO: 1.99 10*3/MM3 (ref 0.7–3.1)
LYMPHOCYTES NFR BLD AUTO: 34 % (ref 19.6–45.3)
Lab: NORMAL
MCH RBC QN AUTO: 30 PG (ref 26.6–33)
MCHC RBC AUTO-ENTMCNC: 33.3 G/DL (ref 31.5–35.7)
MCV RBC AUTO: 90.2 FL (ref 79–97)
MONOCYTES # BLD AUTO: 0.54 10*3/MM3 (ref 0.1–0.9)
MONOCYTES NFR BLD AUTO: 9.2 % (ref 5–12)
NEUTROPHILS # BLD AUTO: 3.2 10*3/MM3 (ref 1.7–7)
NEUTROPHILS NFR BLD AUTO: 54.8 % (ref 42.7–76)
NRBC BLD AUTO-RTO: 0 /100 WBC (ref 0–0.2)
PLATELET # BLD AUTO: 253 10*3/MM3 (ref 140–450)
POTASSIUM SERPL-SCNC: 4.1 MMOL/L (ref 3.5–5.2)
RBC # BLD AUTO: 4.5 10*6/MM3 (ref 3.77–5.28)
SODIUM SERPL-SCNC: 142 MMOL/L (ref 136–145)
T4 FREE SERPL-MCNC: 1.15 NG/DL (ref 0.92–1.68)
TIBC SERPL-MCNC: 393 MCG/DL
TSH SERPL DL<=0.005 MIU/L-ACNC: 1.99 UIU/ML (ref 0.27–4.2)
UIBC SERPL-MCNC: 283 MCG/DL (ref 112–346)
VIT B12 SERPL-MCNC: >2000 PG/ML (ref 211–946)
WBC # BLD AUTO: 5.85 10*3/MM3 (ref 3.4–10.8)

## 2024-08-27 PROCEDURE — 85018 HEMOGLOBIN: CPT | Performed by: INTERNAL MEDICINE

## 2024-08-27 PROCEDURE — 99214 OFFICE O/P EST MOD 30 MIN: CPT | Performed by: INTERNAL MEDICINE

## 2024-08-27 PROCEDURE — 1160F RVW MEDS BY RX/DR IN RCRD: CPT | Performed by: INTERNAL MEDICINE

## 2024-08-27 PROCEDURE — 1125F AMNT PAIN NOTED PAIN PRSNT: CPT | Performed by: INTERNAL MEDICINE

## 2024-08-27 PROCEDURE — 1159F MED LIST DOCD IN RCRD: CPT | Performed by: INTERNAL MEDICINE

## 2024-08-27 PROCEDURE — 71046 X-RAY EXAM CHEST 2 VIEWS: CPT | Performed by: INTERNAL MEDICINE

## 2024-08-27 RX ORDER — CODEINE PHOSPHATE/GUAIFENESIN 10-100MG/5
5 LIQUID (ML) ORAL 3 TIMES DAILY PRN
Qty: 125 ML | Refills: 0 | Status: SHIPPED | OUTPATIENT
Start: 2024-08-27

## 2024-08-27 RX ORDER — PREDNISONE 20 MG/1
20 TABLET ORAL DAILY
Qty: 5 TABLET | Refills: 0 | Status: SHIPPED | OUTPATIENT
Start: 2024-08-27

## 2024-08-27 RX ORDER — ALBUTEROL SULFATE 90 UG/1
2 AEROSOL, METERED RESPIRATORY (INHALATION) EVERY 4 HOURS PRN
Qty: 18 G | Refills: 2 | Status: SHIPPED | OUTPATIENT
Start: 2024-08-27

## 2024-08-27 NOTE — PROGRESS NOTES
"Chief Complaint   Patient presents with    B12 Injection    Bronchitis    Laryngitis    Fatigue    Anemia       History of Present Illness   Iesha Roldan is a 74 y.o. female presents for acute needs. Patient notes \"I just feel sluggish and tired\". Patient has a history of anemia. She also has been exposed to spouse w recent bronchitis. Energy low for 3-4 weeks. Having a cough w raspy voice \"probably for about 3 weeks\". \"Voice completely went away last week'.   On b12. Usually doses monthly but most recently has been 6 weeks from last dosing. History of anemia. Fatigue \"feels like I'm low again\".         The following portions of the patient's history were reviewed and updated as appropriate: allergies, current medications, past family history, past medical history, past social history, past surgical history and problem list.  Current Outpatient Medications on File Prior to Visit   Medication Sig Dispense Refill    alendronate (FOSAMAX) 70 MG tablet TAKE 1 TABLET BY MOUTH ONCE WEEKLY ON AN EMPTY STOMACH BEFORE BREAKFAST. REMAIN UPRIGHT FOR 30 MINUTES AND TAKE WITH 8 OUNCES OF WATER 12 tablet 1    D3 Super Strength 50 MCG (2000 UT) capsule TAKE ONE CAPSULE BY MOUTH DAILY 90 capsule 3    fluticasone (Flonase) 50 MCG/ACT nasal spray 2 sprays into the nostril(s) as directed by provider Daily. 16 g 5    [DISCONTINUED] albuterol sulfate  (90 Base) MCG/ACT inhaler Inhale 2 puffs Every 4 (Four) Hours As Needed for Wheezing. 18 g 2     Current Facility-Administered Medications on File Prior to Visit   Medication Dose Route Frequency Provider Last Rate Last Admin    cyanocobalamin injection 1,000 mcg  1,000 mcg Intramuscular Q28 Days Camila Mejia MD   1,000 mcg at 01/15/24 1335    [DISCONTINUED] Chlorhexidine Gluconate Cloth 2 % pads   Apply externally BID Giorgio Keenan MD        [DISCONTINUED] cyanocobalamin injection 1,000 mcg  1,000 mcg Intramuscular Q28 Days Camila Mejia MD   1,000 mcg at 07/12/24 " "1416     Review of Systems   Constitutional:  Positive for fatigue.   HENT:  Positive for postnasal drip, rhinorrhea and sore throat.    Eyes: Negative.    Respiratory:  Positive for cough and wheezing.    Cardiovascular: Negative.    Gastrointestinal: Negative.    Endocrine: Negative.    Genitourinary: Negative.    Musculoskeletal: Negative.    Skin: Negative.    Allergic/Immunologic: Negative.    Neurological: Negative.    Hematological: Negative.    Psychiatric/Behavioral: Negative.         Objective   Physical Exam  Constitutional:       Comments: Mild ill appearing no acute distress     HENT:      Head: Normocephalic and atraumatic.      Right Ear: Tympanic membrane normal.      Left Ear: Tympanic membrane normal.      Nose: Nose normal.      Mouth/Throat:      Comments: Pharyngeal erythema    Eyes:      Extraocular Movements: Extraocular movements intact.      Pupils: Pupils are equal, round, and reactive to light.   Cardiovascular:      Rate and Rhythm: Normal rate and regular rhythm.      Pulses: Normal pulses.      Heart sounds: Normal heart sounds.   Pulmonary:      Effort: Pulmonary effort is normal.      Breath sounds: Normal breath sounds.   Abdominal:      General: Abdomen is flat.      Palpations: Abdomen is soft.   Musculoskeletal:         General: Normal range of motion.      Cervical back: Normal range of motion.   Skin:     General: Skin is warm and dry.   Neurological:      General: No focal deficit present.      Mental Status: She is oriented to person, place, and time.   Psychiatric:         Mood and Affect: Mood normal.         Behavior: Behavior normal.         Thought Content: Thought content normal.         Judgment: Judgment normal.        CXR no acute disease. No prior to compare.     /66   Pulse 73   Ht 170.2 cm (67\")   Wt 65.8 kg (145 lb)   SpO2 97%   BMI 22.71 kg/m²     Assessment & Plan   Diagnoses and all orders for this visit:    Anemia, unspecified type  -     CBC & " Differential  -     Vitamin B12  -     Iron Profile  -     Folate  -     TSH  -     T4, Free  -     Basic Metabolic Panel  -     POCT hemoglobin    B12 deficiency  -     CBC & Differential  -     Vitamin B12  -     Iron Profile  -     Folate  -     TSH  -     T4, Free  -     Basic Metabolic Panel    Other fatigue  -     CBC & Differential  -     Vitamin B12  -     Iron Profile  -     Folate  -     TSH  -     T4, Free  -     Basic Metabolic Panel    Chronic cough  -     XR Chest PA & Lateral (In Office)    Chronic gastritis without bleeding, unspecified gastritis type  -     Ambulatory Referral For Screening Colonoscopy    Screening for colon cancer  -     Ambulatory Referral For Screening Colonoscopy  -     Ambulatory referral for Screening EGD    Other orders  -     albuterol sulfate  (90 Base) MCG/ACT inhaler; Inhale 2 puffs Every 4 (Four) Hours As Needed for Wheezing.  -     predniSONE (DELTASONE) 20 MG tablet; Take 1 tablet by mouth Daily.  -     guaiFENesin-codeine (GUAIFENESIN AC) 100-10 MG/5ML liquid; Take 5 mL by mouth 3 (Three) Times a Day As Needed for Cough.    Patient w fatigue. Has history of anemia w b12 deficiency. She normally doses q 4 weeks but more like 6 at this time. Injection given. Advised she could train to inject at next visit and then can rx to give at home to ensure compliance. She had recent bronchitis. Cxr w/o acute findings. No prior. Sent for overread to radiology. She is to have listed labs tested. She was gven albuterol neb w some benefit in symtpoms. Will rx albuterol mdi at home and use prn bronchospasm. She will take short course prednisone for bronchospastic disease and prn cheratussin. Listed labs for other source fatigue. Increase rest and hydration. F/u routinely.   Due screening cscope/ egd. Ordered.

## 2024-10-02 ENCOUNTER — CLINICAL SUPPORT (OUTPATIENT)
Dept: INTERNAL MEDICINE | Facility: CLINIC | Age: 74
End: 2024-10-02
Payer: MEDICARE

## 2024-10-02 DIAGNOSIS — E53.8 B12 DEFICIENCY: Primary | ICD-10-CM

## 2024-10-02 PROCEDURE — 96372 THER/PROPH/DIAG INJ SC/IM: CPT | Performed by: INTERNAL MEDICINE

## 2024-10-02 RX ADMIN — CYANOCOBALAMIN 1000 MCG: 1000 INJECTION, SOLUTION INTRAMUSCULAR; SUBCUTANEOUS at 14:24

## 2024-10-22 ENCOUNTER — OFFICE VISIT (OUTPATIENT)
Dept: GASTROENTEROLOGY | Facility: CLINIC | Age: 74
End: 2024-10-22
Payer: MEDICARE

## 2024-10-22 ENCOUNTER — CLINICAL SUPPORT (OUTPATIENT)
Dept: INTERNAL MEDICINE | Facility: CLINIC | Age: 74
End: 2024-10-22
Payer: MEDICARE

## 2024-10-22 VITALS
SYSTOLIC BLOOD PRESSURE: 117 MMHG | HEIGHT: 67 IN | HEART RATE: 66 BPM | WEIGHT: 147.4 LBS | BODY MASS INDEX: 23.13 KG/M2 | DIASTOLIC BLOOD PRESSURE: 72 MMHG

## 2024-10-22 DIAGNOSIS — E53.8 B12 DEFICIENCY: Primary | ICD-10-CM

## 2024-10-22 DIAGNOSIS — Z12.11 ENCOUNTER FOR SCREENING FOR MALIGNANT NEOPLASM OF COLON: Primary | ICD-10-CM

## 2024-10-22 DIAGNOSIS — R13.10 DYSPHAGIA, UNSPECIFIED TYPE: ICD-10-CM

## 2024-10-22 PROCEDURE — 96372 THER/PROPH/DIAG INJ SC/IM: CPT | Performed by: INTERNAL MEDICINE

## 2024-10-22 RX ORDER — CYCLOSPORINE 0.5 MG/ML
EMULSION OPHTHALMIC
COMMUNITY
Start: 2024-09-24

## 2024-10-22 RX ADMIN — CYANOCOBALAMIN 1000 MCG: 1000 INJECTION, SOLUTION INTRAMUSCULAR; SUBCUTANEOUS at 10:17

## 2024-10-22 NOTE — PROGRESS NOTES
"Chief Complaint  Gastritis and Diarrhea    Subjective          History of Present Illness    Iesha Roldan is a  74 y.o. female presents for follow-up visit.  She is a patient of Dr. Conteh and is new to me.  She has a history of anemia and diarrhea.      She has also had H. pylori in the past -diagnosed from biopsies from EGD 2019.  She was treated with tetracycline, amoxicillin, and Flagyl but she did not tolerate this due to nausea and had to be retreated with Levaquin based therapy.  H. pylori was eradicated.    Patient states that since her last visit she has noticed a bit more increase in urgency when she has the bathroom.  This happens every so often will last for 2 to 3 days at a time.  She denies black or bloody stool.  She has also noticed that bulky foods will occasionally get stuck in her throat.  This did not happen super often either.  She denies reflux, nausea.  She has a good appetite.  She does have an occasional \"fullness\" in her upper abdomen.    3/29/2019 colonoscopy showed one 8 mm polyp in sigmoid colon which was removed, diverticulosis in sigmoid colon, nonbleeding internal hemorrhoids, hypertrophied anal papillae and otherwise normal exam.  Polyp biopsy showed adenomatous change-recommend follow-up colonoscopy 5 years.    3/29/2019 EGD showed normal esophagus, gastritis which was biopsied and normal examined duodenum-Biopsies positive for H. pylori.    Objective   Vital Signs:   /72 (BP Location: Right arm, Patient Position: Sitting, Cuff Size: Adult)   Pulse 66   Ht 170.2 cm (67\")   Wt 66.9 kg (147 lb 6.4 oz)   BMI 23.09 kg/m²       Physical Exam  Constitutional:       General: She is not in acute distress.     Appearance: Normal appearance.   Eyes:      General: No scleral icterus.  Pulmonary:      Effort: Pulmonary effort is normal.   Abdominal:      General: Abdomen is flat. There is no distension.      Tenderness: There is no abdominal tenderness. There is no guarding. "   Skin:     Coloration: Skin is not jaundiced.   Neurological:      General: No focal deficit present.      Mental Status: She is alert and oriented to person, place, and time.   Psychiatric:         Mood and Affect: Mood normal.         Behavior: Behavior normal.          Result Review :   The following data was reviewed by: Nathalie Boykin PA-C on 10/22/2024:  CMP          12/11/2023    10:48 2/15/2024    09:18 8/27/2024    10:10   CMP   Glucose 88  83  88    BUN 15  20  20    Creatinine 0.75  0.73  0.76    Sodium 141  139  142    Potassium 4.7  4.5  4.1    Chloride 103  102  102    Calcium 10.1  C 9.7  9.7    Total Protein 7.0  6.8     Albumin 4.5  4.3     Globulin 2.5  2.5     Total Bilirubin 0.3  0.3     Alkaline Phosphatase 77  71     AST (SGOT) 21  17     ALT (SGPT) 12  13     BUN/Creatinine Ratio 20.0  27.4  26.3       Details         C Corrected result             CBC          12/11/2023    10:48 2/15/2024    09:18 8/27/2024    09:57 8/27/2024    10:10   CBC   WBC 5.00  4.10   5.85    RBC 4.42  4.66   4.50    Hemoglobin 13.1  13.5  12.8  13.5    Hematocrit 39.1  41.2   40.6    MCV 88.5  88.4   90.2    MCH 29.6  29.0   30.0    MCHC 33.5  32.8   33.3    RDW 11.9  12.6   11.8    Platelets 248  229   253              Assessment:   Diagnoses and all orders for this visit:    1. Encounter for screening for malignant neoplasm of colon (Primary)  -     Case Request; Standing  -     Case Request    2. Dysphagia, unspecified type  -     Case Request; Standing  -     Case Request    Other orders  -     Implement Anesthesia orders day of procedure.; Standing  -     Follow Anesthesia Guidelines / Protocol; Future  -     Verify bowel prep was successful; Standing  -     Give tap water enema if bowel prep was insufficient; Standing          Plan:   -For increased urgency would recommend fiber supplementation and increased dose as tolerated.  Will plan for colonoscopy for screening.  If she continues to have urgency  "despite fiber could consider colestipol  -She does have food get caught in her throat occasionally.  Prior EGD showed normal esophagus but will plan for EGD as well at same time as colonoscopy for further evaluation.  Will check for gastritis given \"fullness\".  She is not currently taking antacid medicine  -If scope is unremarkable could consider CT scan        Follow Up   Return if symptoms worsen or fail to improve, for EGD and colonoscopy.    Dragon dictation used throughout this note.         Nathalie Boykin PA-C  [facility]  [address] [place] [address]  Office: [phone]  "

## 2024-10-25 DIAGNOSIS — T84.82XA ARTHROFIBROSIS OF TOTAL KNEE REPLACEMENT, INITIAL ENCOUNTER: ICD-10-CM

## 2024-10-25 DIAGNOSIS — D50.8 IRON DEFICIENCY ANEMIA SECONDARY TO INADEQUATE DIETARY IRON INTAKE: Primary | ICD-10-CM

## 2024-10-25 DIAGNOSIS — R53.83 FATIGUE, UNSPECIFIED TYPE: ICD-10-CM

## 2024-10-26 LAB
ALBUMIN SERPL-MCNC: 4.2 G/DL (ref 3.5–5.2)
ALBUMIN/GLOB SERPL: 1.6 G/DL
ALP SERPL-CCNC: 80 U/L (ref 39–117)
ALT SERPL-CCNC: 14 U/L (ref 1–33)
APPEARANCE UR: CLEAR
AST SERPL-CCNC: 23 U/L (ref 1–32)
BACTERIA #/AREA URNS HPF: NORMAL /[HPF]
BASOPHILS # BLD AUTO: 0.03 10*3/MM3 (ref 0–0.2)
BASOPHILS NFR BLD AUTO: 0.6 % (ref 0–1.5)
BILIRUB SERPL-MCNC: 0.5 MG/DL (ref 0–1.2)
BILIRUB UR QL STRIP: NEGATIVE
BUN SERPL-MCNC: 21 MG/DL (ref 8–23)
BUN/CREAT SERPL: 21.6 (ref 7–25)
CALCIUM SERPL-MCNC: 9.7 MG/DL (ref 8.6–10.5)
CASTS URNS QL MICRO: NORMAL /LPF
CHLORIDE SERPL-SCNC: 105 MMOL/L (ref 98–107)
CO2 SERPL-SCNC: 29.1 MMOL/L (ref 22–29)
COLOR UR: YELLOW
CREAT SERPL-MCNC: 0.97 MG/DL (ref 0.57–1)
EGFRCR SERPLBLD CKD-EPI 2021: 61.8 ML/MIN/1.73
EOSINOPHIL # BLD AUTO: 0.1 10*3/MM3 (ref 0–0.4)
EOSINOPHIL NFR BLD AUTO: 2.1 % (ref 0.3–6.2)
EPI CELLS #/AREA URNS HPF: NORMAL /HPF (ref 0–10)
ERYTHROCYTE [DISTWIDTH] IN BLOOD BY AUTOMATED COUNT: 11.7 % (ref 12.3–15.4)
GLOBULIN SER CALC-MCNC: 2.6 GM/DL
GLUCOSE SERPL-MCNC: 88 MG/DL (ref 65–99)
GLUCOSE UR QL STRIP: NEGATIVE
HCT VFR BLD AUTO: 40.3 % (ref 34–46.6)
HGB BLD-MCNC: 13.1 G/DL (ref 12–15.9)
HGB UR QL STRIP: NEGATIVE
IMM GRANULOCYTES # BLD AUTO: 0.01 10*3/MM3 (ref 0–0.05)
IMM GRANULOCYTES NFR BLD AUTO: 0.2 % (ref 0–0.5)
KETONES UR QL STRIP: ABNORMAL
LEUKOCYTE ESTERASE UR QL STRIP: NEGATIVE
LYMPHOCYTES # BLD AUTO: 1.28 10*3/MM3 (ref 0.7–3.1)
LYMPHOCYTES NFR BLD AUTO: 26.8 % (ref 19.6–45.3)
MCH RBC QN AUTO: 29 PG (ref 26.6–33)
MCHC RBC AUTO-ENTMCNC: 32.5 G/DL (ref 31.5–35.7)
MCV RBC AUTO: 89.4 FL (ref 79–97)
MICRO URNS: ABNORMAL
MICRO URNS: ABNORMAL
MONOCYTES # BLD AUTO: 0.41 10*3/MM3 (ref 0.1–0.9)
MONOCYTES NFR BLD AUTO: 8.6 % (ref 5–12)
NEUTROPHILS # BLD AUTO: 2.94 10*3/MM3 (ref 1.7–7)
NEUTROPHILS NFR BLD AUTO: 61.7 % (ref 42.7–76)
NITRITE UR QL STRIP: NEGATIVE
NRBC BLD AUTO-RTO: 0 /100 WBC (ref 0–0.2)
PH UR STRIP: 5.5 [PH] (ref 5–7.5)
PLATELET # BLD AUTO: 227 10*3/MM3 (ref 140–450)
POTASSIUM SERPL-SCNC: 4.1 MMOL/L (ref 3.5–5.2)
PROT SERPL-MCNC: 6.8 G/DL (ref 6–8.5)
PROT UR QL STRIP: NEGATIVE
RBC # BLD AUTO: 4.51 10*6/MM3 (ref 3.77–5.28)
RBC #/AREA URNS HPF: NORMAL /HPF (ref 0–2)
SODIUM SERPL-SCNC: 142 MMOL/L (ref 136–145)
SP GR UR STRIP: 1.02 (ref 1–1.03)
URINALYSIS REFLEX: ABNORMAL
UROBILINOGEN UR STRIP-MCNC: 0.2 MG/DL (ref 0.2–1)
WBC # BLD AUTO: 4.77 10*3/MM3 (ref 3.4–10.8)
WBC #/AREA URNS HPF: NORMAL /HPF (ref 0–5)

## 2024-10-29 ENCOUNTER — OFFICE VISIT (OUTPATIENT)
Dept: INTERNAL MEDICINE | Facility: CLINIC | Age: 74
End: 2024-10-29
Payer: MEDICARE

## 2024-10-29 VITALS
SYSTOLIC BLOOD PRESSURE: 122 MMHG | WEIGHT: 147 LBS | DIASTOLIC BLOOD PRESSURE: 76 MMHG | BODY MASS INDEX: 23.07 KG/M2 | HEIGHT: 67 IN | OXYGEN SATURATION: 95 % | HEART RATE: 63 BPM

## 2024-10-29 DIAGNOSIS — R79.9 ABNORMAL FINDING OF BLOOD CHEMISTRY, UNSPECIFIED: ICD-10-CM

## 2024-10-29 DIAGNOSIS — Z01.810 PREOPERATIVE CARDIOVASCULAR EXAMINATION: Primary | ICD-10-CM

## 2024-10-29 DIAGNOSIS — M17.12 OSTEOARTHRITIS OF LEFT KNEE, UNSPECIFIED OSTEOARTHRITIS TYPE: ICD-10-CM

## 2024-10-29 PROCEDURE — 1125F AMNT PAIN NOTED PAIN PRSNT: CPT | Performed by: INTERNAL MEDICINE

## 2024-10-29 PROCEDURE — 99214 OFFICE O/P EST MOD 30 MIN: CPT | Performed by: INTERNAL MEDICINE

## 2024-10-29 NOTE — PROGRESS NOTES
Chief Complaint   Patient presents with    Surgical Clearance       History of Present Illness   Iesha Roldan is a 74 y.o. female presents for preoperative evaluation L knee TKR. She is doing well today. Patient has good exercise tolerance. Hiked this weekend with knee pain, but no soa or cp. Patient reports >1 year history of pain. She has failed conservative measures for knee and is to have surgical correction.   CBC, CMP, U/A reviewed and all wnl.     The following portions of the patient's history were reviewed and updated as appropriate: allergies, current medications, past family history, past medical history, past social history, past surgical history and problem list.  Current Outpatient Medications on File Prior to Visit   Medication Sig Dispense Refill    albuterol sulfate  (90 Base) MCG/ACT inhaler Inhale 2 puffs Every 4 (Four) Hours As Needed for Wheezing. 18 g 2    alendronate (FOSAMAX) 70 MG tablet TAKE 1 TABLET BY MOUTH ONCE WEEKLY ON AN EMPTY STOMACH BEFORE BREAKFAST. REMAIN UPRIGHT FOR 30 MINUTES AND TAKE WITH 8 OUNCES OF WATER 12 tablet 1    D3 Super Strength 50 MCG (2000 UT) capsule TAKE ONE CAPSULE BY MOUTH DAILY 90 capsule 3    fluticasone (Flonase) 50 MCG/ACT nasal spray 2 sprays into the nostril(s) as directed by provider Daily. 16 g 5    Restasis 0.05 % ophthalmic emulsion       guaiFENesin-codeine (GUAIFENESIN AC) 100-10 MG/5ML liquid Take 5 mL by mouth 3 (Three) Times a Day As Needed for Cough. (Patient not taking: Reported on 10/29/2024) 125 mL 0    predniSONE (DELTASONE) 20 MG tablet Take 1 tablet by mouth Daily. (Patient not taking: Reported on 10/29/2024) 5 tablet 0     Current Facility-Administered Medications on File Prior to Visit   Medication Dose Route Frequency Provider Last Rate Last Admin    cyanocobalamin injection 1,000 mcg  1,000 mcg Intramuscular Q28 Days Camila Mejia MD   1,000 mcg at 10/22/24 1017     Review of Systems   Constitutional: Negative.    HENT:  "Negative.     Eyes: Negative.    Respiratory: Negative.     Cardiovascular: Negative.    Gastrointestinal: Negative.    Endocrine: Negative.    Genitourinary: Negative.    Musculoskeletal: Negative.    Skin: Negative.    Allergic/Immunologic: Negative.    Neurological: Negative.    Hematological: Negative.    Psychiatric/Behavioral: Negative.         Objective   Physical Exam  Vitals and nursing note reviewed.   Constitutional:       Appearance: Normal appearance. She is well-developed.   HENT:      Head: Normocephalic and atraumatic.      Right Ear: Tympanic membrane and external ear normal.      Left Ear: Tympanic membrane and external ear normal.      Nose: Nose normal.      Mouth/Throat:      Mouth: Mucous membranes are moist.   Eyes:      Extraocular Movements: Extraocular movements intact.      Pupils: Pupils are equal, round, and reactive to light.   Cardiovascular:      Rate and Rhythm: Normal rate and regular rhythm.      Pulses: Normal pulses.      Heart sounds: Normal heart sounds.   Pulmonary:      Effort: Pulmonary effort is normal. No respiratory distress.      Breath sounds: Normal breath sounds.   Abdominal:      General: Abdomen is flat.      Palpations: Abdomen is soft.   Musculoskeletal:      Cervical back: Normal range of motion and neck supple.      Comments: Left lateral knee pain     Skin:     General: Skin is warm and dry.   Neurological:      General: No focal deficit present.      Mental Status: She is alert and oriented to person, place, and time.   Psychiatric:         Mood and Affect: Mood normal.         Behavior: Behavior normal.         Thought Content: Thought content normal.         Judgment: Judgment normal.        EKG sinus. No st changes. Unchanged prior.     /76   Pulse 63   Ht 170.2 cm (67\")   Wt 66.7 kg (147 lb)   SpO2 95%   BMI 23.02 kg/m²     Assessment & Plan   Diagnoses and all orders for this visit:    Preoperative cardiovascular examination  -     ECG 12 " Lead  -     Hemoglobin A1c    Osteoarthritis of left knee, unspecified osteoarthritis type  -     Hemoglobin A1c    Abnormal finding of blood chemistry, unspecified  -     Hemoglobin A1c    Patient presents for preoperative evaluation. She has excellent exercise tolerance. Normal EKG and lab evaluation. Has tolerated anesthesia in the past and no personal or fam h/o difficulty. She may proceed w surgery w usual precautions and f/u here routinely post op.

## 2024-10-30 LAB — HBA1C MFR BLD: 5.4 % (ref 4.8–5.6)

## 2024-11-12 RX ORDER — CHOLECALCIFEROL (VITAMIN D3) 50 MCG
2000 CAPSULE ORAL DAILY
Qty: 90 CAPSULE | Refills: 3 | Status: SHIPPED | OUTPATIENT
Start: 2024-11-12

## 2024-11-12 RX ORDER — FLUTICASONE PROPIONATE 50 MCG
2 SPRAY, SUSPENSION (ML) NASAL DAILY
Qty: 16 G | Refills: 5 | Status: SHIPPED | OUTPATIENT
Start: 2024-11-12

## 2024-11-18 ENCOUNTER — TELEPHONE (OUTPATIENT)
Dept: INTERNAL MEDICINE | Facility: CLINIC | Age: 74
End: 2024-11-18
Payer: MEDICARE

## 2024-11-19 ENCOUNTER — OFFICE VISIT (OUTPATIENT)
Dept: INTERNAL MEDICINE | Facility: CLINIC | Age: 74
End: 2024-11-19
Payer: MEDICARE

## 2024-11-19 VITALS
WEIGHT: 146 LBS | HEIGHT: 67 IN | HEART RATE: 94 BPM | BODY MASS INDEX: 22.91 KG/M2 | OXYGEN SATURATION: 98 % | SYSTOLIC BLOOD PRESSURE: 120 MMHG | TEMPERATURE: 98.1 F | DIASTOLIC BLOOD PRESSURE: 78 MMHG

## 2024-11-19 DIAGNOSIS — R05.1 ACUTE COUGH: ICD-10-CM

## 2024-11-19 DIAGNOSIS — R53.83 OTHER FATIGUE: ICD-10-CM

## 2024-11-19 DIAGNOSIS — J40 BRONCHITIS: Primary | ICD-10-CM

## 2024-11-19 PROCEDURE — 1126F AMNT PAIN NOTED NONE PRSNT: CPT | Performed by: INTERNAL MEDICINE

## 2024-11-19 PROCEDURE — 99213 OFFICE O/P EST LOW 20 MIN: CPT | Performed by: INTERNAL MEDICINE

## 2024-11-19 RX ORDER — ALBUTEROL SULFATE 90 UG/1
2 INHALANT RESPIRATORY (INHALATION) EVERY 4 HOURS PRN
Qty: 18 G | Refills: 1 | Status: SHIPPED | OUTPATIENT
Start: 2024-11-19

## 2024-11-19 RX ORDER — IPRATROPIUM BROMIDE 42 UG/1
2 SPRAY, METERED NASAL 3 TIMES DAILY
Qty: 15 ML | Refills: 12 | Status: SHIPPED | OUTPATIENT
Start: 2024-11-19

## 2024-11-19 RX ORDER — SACCHAROMYCES BOULARDII 250 MG
250 CAPSULE ORAL 2 TIMES DAILY
Qty: 30 CAPSULE | Refills: 3 | Status: SHIPPED | OUTPATIENT
Start: 2024-11-19

## 2024-11-19 RX ORDER — ALBUTEROL SULFATE 90 UG/1
2 INHALANT RESPIRATORY (INHALATION) EVERY 4 HOURS PRN
Qty: 18 G | Refills: 2 | Status: SHIPPED | OUTPATIENT
Start: 2024-11-19

## 2024-11-19 RX ORDER — CODEINE PHOSPHATE/GUAIFENESIN 10-100MG/5
5 LIQUID (ML) ORAL 3 TIMES DAILY PRN
Qty: 125 ML | Refills: 0 | Status: SHIPPED | OUTPATIENT
Start: 2024-11-19

## 2024-11-19 NOTE — PROGRESS NOTES
Chief Complaint   Patient presents with    Cough    Fatigue    Nasal Congestion    URI       History of Present Illness   Iesha Roldan is a 74 y.o. female presents for acute care. Cough, fatighe, congestion. Symptoms started about 2 weeks ago. To [facility] and tested neg covid/ flu. Advised otc. Symptoms have progressed from that time.     The following portions of the patient's history were reviewed and updated as appropriate: allergies, current medications, past family history, past medical history, past social history, past surgical history and problem list.  Current Outpatient Medications on File Prior to Visit   Medication Sig Dispense Refill    alendronate (FOSAMAX) 70 MG tablet TAKE 1 TABLET BY MOUTH ONCE WEEKLY ON AN EMPTY STOMACH BEFORE BREAKFAST. REMAIN UPRIGHT FOR 30 MINUTES AND TAKE WITH 8 OUNCES OF WATER 12 tablet 1    Cholecalciferol (D3 Super Strength) 50 MCG (2000 UT) capsule Take 1 capsule by mouth Daily. 90 capsule 3    fluticasone (Flonase) 50 MCG/ACT nasal spray Administer 2 sprays into the nostril(s) as directed by provider Daily. 16 g 5    predniSONE (DELTASONE) 20 MG tablet Take 1 tablet by mouth Daily. 5 tablet 0    Restasis 0.05 % ophthalmic emulsion       [DISCONTINUED] albuterol sulfate  (90 Base) MCG/ACT inhaler Inhale 2 puffs Every 4 (Four) Hours As Needed for Wheezing. 18 g 2    [DISCONTINUED] guaiFENesin-codeine (GUAIFENESIN AC) 100-10 MG/5ML liquid Take 5 mL by mouth 3 (Three) Times a Day As Needed for Cough. 125 mL 0     Current Facility-Administered Medications on File Prior to Visit   Medication Dose Route Frequency Provider Last Rate Last Admin    cyanocobalamin injection 1,000 mcg  1,000 mcg Intramuscular Q28 Days Camila Mejia MD   1,000 mcg at 10/22/24 1017     Review of Systems   Constitutional:  Positive for fatigue and fever.   HENT:  Positive for congestion, postnasal drip, rhinorrhea, sinus pressure, sinus pain and sore throat.    Eyes: Negative.    Respiratory:  " Positive for cough.    Cardiovascular: Negative.    Gastrointestinal: Negative.    Endocrine: Negative.    Genitourinary: Negative.    Musculoskeletal:  Positive for arthralgias.   Allergic/Immunologic: Negative.    Hematological: Negative.    Psychiatric/Behavioral: Negative.         Objective   Physical Exam  Vitals and nursing note reviewed.   Constitutional:       Comments: Ill appearing. Not acutely distressed.    HENT:      Head: Normocephalic and atraumatic.      Ears:      Comments: B tm mild bulge w lower erythema     Nose: Nose normal.      Mouth/Throat:      Mouth: Mucous membranes are moist.      Comments: Pharyngeal erythema  Eyes:      Extraocular Movements: Extraocular movements intact.      Pupils: Pupils are equal, round, and reactive to light.   Neck:      Comments: Shotty ant cerv lad  Cardiovascular:      Rate and Rhythm: Tachycardia present.   Pulmonary:      Effort: Pulmonary effort is normal.      Breath sounds: Normal breath sounds.   Musculoskeletal:         General: Normal range of motion.      Cervical back: Normal range of motion.   Neurological:      General: No focal deficit present.      Mental Status: She is alert and oriented to person, place, and time.   Psychiatric:         Mood and Affect: Mood normal.         Behavior: Behavior normal.         Thought Content: Thought content normal.         Judgment: Judgment normal.        Cxr for cough fever and fatigue. NAD noted. No prior to compare. Sent for overread.     /78   Pulse 94   Temp 98.1 °F (36.7 °C) (Oral)   Ht 170.2 cm (67.01\")   Wt 66.2 kg (146 lb)   SpO2 98%   BMI 22.86 kg/m²     Assessment & Plan   Diagnoses and all orders for this visit:    Bronchitis    Acute cough  -     XR Chest PA & Lateral (In Office)  -     guaiFENesin-codeine (GUAIFENESIN AC) 100-10 MG/5ML liquid; Take 5 mL by mouth 3 (Three) Times a Day As Needed for Cough.    Other fatigue  -     XR Chest PA & Lateral (In Office)  -     " guaiFENesin-codeine (GUAIFENESIN AC) 100-10 MG/5ML liquid; Take 5 mL by mouth 3 (Three) Times a Day As Needed for Cough.    Other orders  -     amoxicillin-clavulanate (AUGMENTIN) 875-125 MG per tablet; Take 1 tablet by mouth 2 (Two) Times a Day.  -     saccharomyces boulardii (Florastor) 250 MG capsule; Take 1 capsule by mouth 2 (Two) Times a Day.  -     albuterol sulfate  (90 Base) MCG/ACT inhaler; Inhale 2 puffs Every 4 (Four) Hours As Needed for Wheezing or Shortness of Air (cough).  -     albuterol sulfate  (90 Base) MCG/ACT inhaler; Inhale 2 puffs Every 4 (Four) Hours As Needed for Wheezing.  -     ipratropium (ATROVENT) 0.06 % nasal spray; Administer 2 sprays into the nostril(s) as directed by provider 3 (Three) Times a Day.      Patient w acute bronchitis.and sinusitis. She has failed conservative measures. Will start augmentin bid for 7-10 days w probiotic. Prn albuterol, atrovent, and cheratussin. Hydration and rest emphasized patient to f/u if symptoms worsen or fail to improve.

## 2024-12-04 ENCOUNTER — CLINICAL SUPPORT (OUTPATIENT)
Dept: INTERNAL MEDICINE | Facility: CLINIC | Age: 74
End: 2024-12-04
Payer: MEDICARE

## 2024-12-04 DIAGNOSIS — E53.8 B12 DEFICIENCY: Primary | ICD-10-CM

## 2024-12-04 PROCEDURE — 96372 THER/PROPH/DIAG INJ SC/IM: CPT | Performed by: INTERNAL MEDICINE

## 2024-12-04 RX ADMIN — CYANOCOBALAMIN 1000 MCG: 1000 INJECTION, SOLUTION INTRAMUSCULAR; SUBCUTANEOUS at 14:24

## 2024-12-19 ENCOUNTER — OUTSIDE FACILITY SERVICE (OUTPATIENT)
Dept: GASTROENTEROLOGY | Facility: CLINIC | Age: 74
End: 2024-12-19
Payer: MEDICARE

## 2025-01-03 ENCOUNTER — CLINICAL SUPPORT (OUTPATIENT)
Dept: INTERNAL MEDICINE | Facility: CLINIC | Age: 75
End: 2025-01-03
Payer: MEDICARE

## 2025-01-03 DIAGNOSIS — E53.8 B12 DEFICIENCY: Primary | ICD-10-CM

## 2025-01-03 PROCEDURE — 96372 THER/PROPH/DIAG INJ SC/IM: CPT | Performed by: STUDENT IN AN ORGANIZED HEALTH CARE EDUCATION/TRAINING PROGRAM

## 2025-01-03 RX ADMIN — CYANOCOBALAMIN 1000 MCG: 1000 INJECTION, SOLUTION INTRAMUSCULAR; SUBCUTANEOUS at 14:38

## 2025-01-30 ENCOUNTER — TELEPHONE (OUTPATIENT)
Dept: GASTROENTEROLOGY | Facility: CLINIC | Age: 75
End: 2025-01-30

## 2025-01-30 NOTE — TELEPHONE ENCOUNTER
Caller: Iesha Roldan    Relationship to patient: Self    Best call back number: [phone]     Patient is needing: PATIENT IS SCHEDULED FOR AN EGD/COLONOSCOPY ON 2/6/25 BUT HAS NOT RECEIVED HER PREP PAPERWORK.  PATIENT STATES IT IS OKAY TO SEND ON HER MYCHART.

## 2025-01-30 NOTE — TELEPHONE ENCOUNTER
Caller: Iesha Roldan    Relationship to patient: Self    Best call back number: [phone]     Patient is needing: PATIENT IS SCHEDULED FOR AN EGD/COLONOSCOPY ON 2/6/25 BUT HAS NOT RECEIVED HER PREP PAPERWORK.  PATIENT STATES IT IS OKAY TO SEND ON HER MYCHART.

## 2025-02-06 ENCOUNTER — LAB REQUISITION (OUTPATIENT)
Dept: LAB | Facility: HOSPITAL | Age: 75
End: 2025-02-06
Payer: MEDICARE

## 2025-02-06 ENCOUNTER — OUTSIDE FACILITY SERVICE (OUTPATIENT)
Dept: GASTROENTEROLOGY | Facility: CLINIC | Age: 75
End: 2025-02-06
Payer: MEDICARE

## 2025-02-06 DIAGNOSIS — K29.60 OTHER GASTRITIS WITHOUT BLEEDING: ICD-10-CM

## 2025-02-06 DIAGNOSIS — K22.89 OTHER SPECIFIED DISEASE OF ESOPHAGUS: ICD-10-CM

## 2025-02-06 DIAGNOSIS — Z86.0100 PERSONAL HISTORY OF COLON POLYPS, UNSPECIFIED: ICD-10-CM

## 2025-02-06 DIAGNOSIS — K64.0 FIRST DEGREE HEMORRHOIDS: ICD-10-CM

## 2025-02-06 DIAGNOSIS — K31.7 POLYP OF STOMACH AND DUODENUM: ICD-10-CM

## 2025-02-06 DIAGNOSIS — D12.5 BENIGN NEOPLASM OF SIGMOID COLON: ICD-10-CM

## 2025-02-06 DIAGNOSIS — K57.30 DIVERTICULOSIS OF LARGE INTESTINE WITHOUT PERFORATION OR ABSCESS WITHOUT BLEEDING: ICD-10-CM

## 2025-02-06 PROCEDURE — 88305 TISSUE EXAM BY PATHOLOGIST: CPT | Performed by: INTERNAL MEDICINE

## 2025-02-07 LAB
CYTO UR: NORMAL
LAB AP CASE REPORT: NORMAL
LAB AP DIAGNOSIS COMMENT: NORMAL
PATH REPORT.FINAL DX SPEC: NORMAL
PATH REPORT.GROSS SPEC: NORMAL

## 2025-02-10 ENCOUNTER — CLINICAL SUPPORT (OUTPATIENT)
Dept: INTERNAL MEDICINE | Facility: CLINIC | Age: 75
End: 2025-02-10
Payer: MEDICARE

## 2025-02-10 DIAGNOSIS — E53.8 B12 DEFICIENCY: Primary | ICD-10-CM

## 2025-02-10 PROCEDURE — 96372 THER/PROPH/DIAG INJ SC/IM: CPT | Performed by: INTERNAL MEDICINE

## 2025-02-10 RX ADMIN — CYANOCOBALAMIN 1000 MCG: 1000 INJECTION, SOLUTION INTRAMUSCULAR; SUBCUTANEOUS at 15:44

## 2025-02-12 DIAGNOSIS — E78.5 ELEVATED LIPIDS: ICD-10-CM

## 2025-02-12 DIAGNOSIS — Z13.29 THYROID DISORDER SCREENING: ICD-10-CM

## 2025-02-12 DIAGNOSIS — Z13.220 LIPID SCREENING: ICD-10-CM

## 2025-02-12 DIAGNOSIS — D50.8 IRON DEFICIENCY ANEMIA SECONDARY TO INADEQUATE DIETARY IRON INTAKE: Primary | ICD-10-CM

## 2025-02-13 ENCOUNTER — TELEPHONE (OUTPATIENT)
Dept: GASTROENTEROLOGY | Facility: CLINIC | Age: 75
End: 2025-02-13
Payer: MEDICARE

## 2025-02-13 NOTE — PROGRESS NOTES
Mild nonspecific inflammation seen on stomach biopsy.  Recommend omeprazole 20 mg bid x 8 weeks  Gastric polyp/esophageal nodule were benign  The colon polyp(s) showed hyperplastic change, which is non-cancerous and not pre-cancerous. Follow-up colonoscopy in 5 years is advised.

## 2025-02-13 NOTE — TELEPHONE ENCOUNTER
----- Message from Naz Conteh sent at 2/13/2025  2:32 PM EST -----  Mild nonspecific inflammation seen on stomach biopsy.  Recommend omeprazole 20 mg bid x 8 weeks  Gastric polyp/esophageal nodule were benign  The colon polyp(s) showed hyperplastic change, which is non-cancerous and not pre-cancerous. Follow-up colonoscopy in 5 years is advised.

## 2025-02-13 NOTE — TELEPHONE ENCOUNTER
Called pt and advised of Dr Conteh['s note. Verb understanding.     C/s placed in recall and hm for 02/06/2030.

## 2025-02-19 LAB
ALBUMIN SERPL-MCNC: 3.8 G/DL (ref 3.5–5.2)
ALBUMIN/GLOB SERPL: 1.2 G/DL
ALP SERPL-CCNC: 116 U/L (ref 39–117)
ALT SERPL-CCNC: 12 U/L (ref 1–33)
APPEARANCE UR: CLEAR
AST SERPL-CCNC: 19 U/L (ref 1–32)
BACTERIA #/AREA URNS HPF: ABNORMAL /[HPF]
BACTERIA UR CULT: NORMAL
BACTERIA UR CULT: NORMAL
BASOPHILS # BLD AUTO: 0.03 10*3/MM3 (ref 0–0.2)
BASOPHILS NFR BLD AUTO: 0.7 % (ref 0–1.5)
BILIRUB SERPL-MCNC: 0.4 MG/DL (ref 0–1.2)
BILIRUB UR QL STRIP: NEGATIVE
BUN SERPL-MCNC: 20 MG/DL (ref 8–23)
BUN/CREAT SERPL: 25.6 (ref 7–25)
CALCIUM SERPL-MCNC: 9.5 MG/DL (ref 8.6–10.5)
CASTS URNS QL MICRO: ABNORMAL /LPF
CHLORIDE SERPL-SCNC: 102 MMOL/L (ref 98–107)
CHOLEST SERPL-MCNC: 172 MG/DL (ref 0–200)
CO2 SERPL-SCNC: 28 MMOL/L (ref 22–29)
COLOR UR: YELLOW
CREAT SERPL-MCNC: 0.78 MG/DL (ref 0.57–1)
EGFRCR SERPLBLD CKD-EPI 2021: 80.3 ML/MIN/1.73
EOSINOPHIL # BLD AUTO: 0.2 10*3/MM3 (ref 0–0.4)
EOSINOPHIL NFR BLD AUTO: 4.7 % (ref 0.3–6.2)
EPI CELLS #/AREA URNS HPF: ABNORMAL /HPF (ref 0–10)
ERYTHROCYTE [DISTWIDTH] IN BLOOD BY AUTOMATED COUNT: 12.7 % (ref 12.3–15.4)
GLOBULIN SER CALC-MCNC: 3.2 GM/DL
GLUCOSE SERPL-MCNC: 83 MG/DL (ref 65–99)
GLUCOSE UR QL STRIP: NEGATIVE
HCT VFR BLD AUTO: 38.4 % (ref 34–46.6)
HDLC SERPL-MCNC: 55 MG/DL (ref 40–60)
HGB BLD-MCNC: 12.4 G/DL (ref 12–15.9)
HGB UR QL STRIP: NEGATIVE
IMM GRANULOCYTES # BLD AUTO: 0 10*3/MM3 (ref 0–0.05)
IMM GRANULOCYTES NFR BLD AUTO: 0 % (ref 0–0.5)
KETONES UR QL STRIP: ABNORMAL
LDLC SERPL CALC-MCNC: 103 MG/DL (ref 0–100)
LEUKOCYTE ESTERASE UR QL STRIP: ABNORMAL
LYMPHOCYTES # BLD AUTO: 1.56 10*3/MM3 (ref 0.7–3.1)
LYMPHOCYTES NFR BLD AUTO: 36.7 % (ref 19.6–45.3)
MCH RBC QN AUTO: 27.8 PG (ref 26.6–33)
MCHC RBC AUTO-ENTMCNC: 32.3 G/DL (ref 31.5–35.7)
MCV RBC AUTO: 86.1 FL (ref 79–97)
MICRO URNS: ABNORMAL
MONOCYTES # BLD AUTO: 0.39 10*3/MM3 (ref 0.1–0.9)
MONOCYTES NFR BLD AUTO: 9.2 % (ref 5–12)
NEUTROPHILS # BLD AUTO: 2.07 10*3/MM3 (ref 1.7–7)
NEUTROPHILS NFR BLD AUTO: 48.7 % (ref 42.7–76)
NITRITE UR QL STRIP: NEGATIVE
NRBC BLD AUTO-RTO: 0 /100 WBC (ref 0–0.2)
PH UR STRIP: 5.5 [PH] (ref 5–7.5)
PLATELET # BLD AUTO: 276 10*3/MM3 (ref 140–450)
POTASSIUM SERPL-SCNC: 4.2 MMOL/L (ref 3.5–5.2)
PROT SERPL-MCNC: 7 G/DL (ref 6–8.5)
PROT UR QL STRIP: NEGATIVE
RBC # BLD AUTO: 4.46 10*6/MM3 (ref 3.77–5.28)
RBC #/AREA URNS HPF: ABNORMAL /HPF (ref 0–2)
SODIUM SERPL-SCNC: 140 MMOL/L (ref 136–145)
SP GR UR STRIP: 1.02 (ref 1–1.03)
TRIGL SERPL-MCNC: 75 MG/DL (ref 0–150)
TSH SERPL DL<=0.005 MIU/L-ACNC: 2.32 UIU/ML (ref 0.27–4.2)
URINALYSIS REFLEX: ABNORMAL
UROBILINOGEN UR STRIP-MCNC: 0.2 MG/DL (ref 0.2–1)
VLDLC SERPL CALC-MCNC: 14 MG/DL (ref 5–40)
WBC # BLD AUTO: 4.25 10*3/MM3 (ref 3.4–10.8)
WBC #/AREA URNS HPF: ABNORMAL /HPF (ref 0–5)

## 2025-02-21 ENCOUNTER — OFFICE VISIT (OUTPATIENT)
Dept: INTERNAL MEDICINE | Facility: CLINIC | Age: 75
End: 2025-02-21
Payer: MEDICARE

## 2025-02-21 VITALS
SYSTOLIC BLOOD PRESSURE: 120 MMHG | WEIGHT: 146 LBS | BODY MASS INDEX: 22.91 KG/M2 | DIASTOLIC BLOOD PRESSURE: 76 MMHG | HEART RATE: 71 BPM | OXYGEN SATURATION: 95 % | HEIGHT: 67 IN

## 2025-02-21 DIAGNOSIS — R92.333 HETEROGENEOUSLY DENSE TISSUE OF BOTH BREASTS ON MAMMOGRAPHY: ICD-10-CM

## 2025-02-21 DIAGNOSIS — Z00.00 HEALTHCARE MAINTENANCE: Primary | ICD-10-CM

## 2025-02-21 DIAGNOSIS — R92.30 INCONCLUSIVE MAMMOGRAM DUE TO DENSE BREASTS: ICD-10-CM

## 2025-02-21 DIAGNOSIS — Z78.0 MENOPAUSE: ICD-10-CM

## 2025-02-21 DIAGNOSIS — Z12.31 BREAST CANCER SCREENING BY MAMMOGRAM: ICD-10-CM

## 2025-02-21 DIAGNOSIS — E53.8 B12 DEFICIENCY: ICD-10-CM

## 2025-02-21 DIAGNOSIS — R92.2 INCONCLUSIVE MAMMOGRAM DUE TO DENSE BREASTS: ICD-10-CM

## 2025-02-21 PROCEDURE — 96160 PT-FOCUSED HLTH RISK ASSMT: CPT | Performed by: INTERNAL MEDICINE

## 2025-02-21 PROCEDURE — 96372 THER/PROPH/DIAG INJ SC/IM: CPT | Performed by: INTERNAL MEDICINE

## 2025-02-21 PROCEDURE — 1126F AMNT PAIN NOTED NONE PRSNT: CPT | Performed by: INTERNAL MEDICINE

## 2025-02-21 PROCEDURE — 1170F FXNL STATUS ASSESSED: CPT | Performed by: INTERNAL MEDICINE

## 2025-02-21 PROCEDURE — 99213 OFFICE O/P EST LOW 20 MIN: CPT | Performed by: INTERNAL MEDICINE

## 2025-02-21 PROCEDURE — G0439 PPPS, SUBSEQ VISIT: HCPCS | Performed by: INTERNAL MEDICINE

## 2025-02-21 RX ORDER — ALENDRONATE SODIUM 70 MG/1
70 TABLET ORAL WEEKLY
Qty: 12 TABLET | Refills: 1 | Status: SHIPPED | OUTPATIENT
Start: 2025-02-21

## 2025-02-21 RX ORDER — CYANOCOBALAMIN 1000 UG/ML
1000 INJECTION, SOLUTION INTRAMUSCULAR; SUBCUTANEOUS
Status: SHIPPED | OUTPATIENT
Start: 2025-02-21

## 2025-02-21 RX ADMIN — CYANOCOBALAMIN 1000 MCG: 1000 INJECTION, SOLUTION INTRAMUSCULAR; SUBCUTANEOUS at 09:22

## 2025-02-21 NOTE — PROGRESS NOTES
Subjective   The ABCs of the Annual Wellness Visit  Medicare Wellness Visit      Iesha Roldan is a 74 y.o. patient who presents for a Medicare Wellness Visit.    The following portions of the patient's history were reviewed and   updated as appropriate: allergies, current medications, past family history, past medical history, past social history, past surgical history, and problem list.    Compared to one year ago, the patient's physical   health is the same.  Compared to one year ago, the patient's mental   health is the same.    Recent Hospitalizations:  She was not admitted to the hospital during the last year.     Current Medical Providers:  Patient Care Team:  Camila Mejia MD as PCP - General (Internal Medicine)  Candelario Calderon MD as Consulting Physician (Otolaryngology)  Fred Love MD (Inactive) as Consulting Physician (Radiation Oncology)  Camila Mejia MD as Referring Physician (Internal Medicine)  Camila Mejia MD Joseph, Udaya Geetha, MD as Consulting Physician (Hematology and Oncology)    Outpatient Medications Prior to Visit   Medication Sig Dispense Refill    albuterol sulfate  (90 Base) MCG/ACT inhaler Inhale 2 puffs Every 4 (Four) Hours As Needed for Wheezing. 18 g 2    Cholecalciferol (D3 Super Strength) 50 MCG (2000 UT) capsule Take 1 capsule by mouth Daily. 90 capsule 3    fluticasone (Flonase) 50 MCG/ACT nasal spray Administer 2 sprays into the nostril(s) as directed by provider Daily. 16 g 5    ipratropium (ATROVENT) 0.06 % nasal spray Administer 2 sprays into the nostril(s) as directed by provider 3 (Three) Times a Day. 15 mL 12    Restasis 0.05 % ophthalmic emulsion       saccharomyces boulardii (Florastor) 250 MG capsule Take 1 capsule by mouth 2 (Two) Times a Day. 30 capsule 3    albuterol sulfate  (90 Base) MCG/ACT inhaler Inhale 2 puffs Every 4 (Four) Hours As Needed for Wheezing or Shortness of Air (cough). 18 g 1    alendronate (FOSAMAX) 70  "MG tablet TAKE 1 TABLET BY MOUTH ONCE WEEKLY ON AN EMPTY STOMACH BEFORE BREAKFAST. REMAIN UPRIGHT FOR 30 MINUTES AND TAKE WITH 8 OUNCES OF WATER 12 tablet 1    omeprazole (priLOSEC) 20 MG capsule Take 1 capsule by mouth 2 (Two) Times a Day. 60 capsule 1    amoxicillin-clavulanate (AUGMENTIN) 875-125 MG per tablet Take 1 tablet by mouth 2 (Two) Times a Day. (Patient not taking: Reported on 2/21/2025) 20 tablet 0    guaiFENesin-codeine (GUAIFENESIN AC) 100-10 MG/5ML liquid Take 5 mL by mouth 3 (Three) Times a Day As Needed for Cough. (Patient not taking: Reported on 2/21/2025) 125 mL 0    predniSONE (DELTASONE) 20 MG tablet Take 1 tablet by mouth Daily. (Patient not taking: Reported on 2/21/2025) 5 tablet 0     Facility-Administered Medications Prior to Visit   Medication Dose Route Frequency Provider Last Rate Last Admin    cyanocobalamin injection 1,000 mcg  1,000 mcg Intramuscular Q28 Days Camila Mejia MD   1,000 mcg at 02/10/25 1544     No opioid medication identified on active medication list. I have reviewed chart for other potential  high risk medication/s and harmful drug interactions in the elderly.      Aspirin is not on active medication list.  Aspirin use is not indicated based on review of current medical condition/s. Risk of harm outweighs potential benefits.  .    Patient Active Problem List   Diagnosis    ADD (attention deficit disorder)    Fatigue    Mucoepidermoid carcinoma    Abnormal MRI, cervical spine, not thoracic; C4 to be specific    Iron deficiency anemia    B12 deficiency    Mucoepidermoid carcinoma of parotid gland    Primary osteoarthritis of left hip    Left hip pain     Advance Care Planning Advance Directive is not on file.  ACP discussion was held with the patient during this visit. Patient has an advance directive (not in EMR), copy requested.            Objective   Vitals:    02/21/25 0906   BP: 120/76   Pulse: 71   SpO2: 95%   Weight: 66.2 kg (146 lb)   Height: 170.2 cm (67.01\") " "  PainSc: 0-No pain       Estimated body mass index is 22.86 kg/m² as calculated from the following:    Height as of this encounter: 170.2 cm (67.01\").    Weight as of this encounter: 66.2 kg (146 lb).    BMI is within normal parameters. No other follow-up for BMI required.           Does the patient have evidence of cognitive impairment? No  Lab Results   Component Value Date    CHLPL 172 2025    TRIG 75 2025    HDL 55 2025     (H) 2025    VLDL 14 2025                                                                                                Health  Risk Assessment    Smoking Status:  Social History     Tobacco Use   Smoking Status Never    Passive exposure: Never   Smokeless Tobacco Never     Alcohol Consumption:  Social History     Substance and Sexual Activity   Alcohol Use Not Currently    Comment: SOCIALLY       Fall Risk Screen  STEADI Fall Risk Assessment was completed, and patient is at LOW risk for falls.Assessment completed on:2025    Depression Screening   Little interest or pleasure in doing things? Not at all   Feeling down, depressed, or hopeless? Not at all   PHQ-2 Total Score 0      Health Habits and Functional and Cognitive Screenin/21/2025     9:07 AM   Functional & Cognitive Status   Do you have difficulty preparing food and eating? No   Do you have difficulty bathing yourself, getting dressed or grooming yourself? No   Do you have difficulty using the toilet? No   Do you have difficulty moving around from place to place? No   Do you have trouble with steps or getting out of a bed or a chair? No   Dental Exam Up to date   Eye Exam Up to date   Do you need help using the phone?  No   Are you deaf or do you have serious difficulty hearing?  No   Do you need help to go to places out of walking distance? No   Do you need help shopping? No   Do you need help preparing meals?  No   Do you need help with housework?  No   Do you need help with " laundry? No   Do you need help taking your medications? No   Do you need help managing money? No   Do you ever drive or ride in a car without wearing a seat belt? No   Have you felt unusual stress, anger or loneliness in the last month? No   Who do you live with? Spouse   If you need help, do you have trouble finding someone available to you? No   Have you been bothered in the last four weeks by sexual problems? No   Do you have difficulty concentrating, remembering or making decisions? No           Age-appropriate Screening Schedule:  Refer to the list below for future screening recommendations based on patient's age, sex and/or medical conditions. Orders for these recommended tests are listed in the plan section. The patient has been provided with a written plan.    Health Maintenance List  Health Maintenance   Topic Date Due    ZOSTER VACCINE (1 of 2) Never done    PAP SMEAR  01/01/2023    DXA SCAN  08/09/2024    COVID-19 Vaccine (5 - 2024-25 season) 02/23/2025 (Originally 9/1/2024)    ANNUAL WELLNESS VISIT  02/21/2026    MAMMOGRAM  04/01/2026    TDAP/TD VACCINES (2 - Td or Tdap) 04/18/2026    COLORECTAL CANCER SCREENING  02/06/2030    HEPATITIS C SCREENING  Completed    INFLUENZA VACCINE  Completed    Pneumococcal Vaccine 50+  Completed                                                                                                                                                CMS Preventative Services Quick Reference  Risk Factors Identified During Encounter  Immunizations Discussed/Encouraged: Shingrix    The above risks/problems have been discussed with the patient.  Pertinent information has been shared with the patient in the After Visit Summary.  An After Visit Summary and PPPS were made available to the patient.    Follow Up:   Next Medicare Wellness visit to be scheduled in 1 year.         Additional E&M Note during same encounter follows:  Patient has additional, significant, and separately identifiable  "condition(s)/problem(s) that require work above and beyond the Medicare Wellness Visit     Chief Complaint  Annual Exam  AWV  Osteoporosis  Florina    Subjective     HPI  Iesha is also being seen today for an annual adult preventative physical exam.  and Iesha is also being seen today for additional medical problem/s. Patient has osteoporosis. Took fosamax for 3 months but did not renew as she thought it was a limited rx. On vitamin d3 2000 daily. She has florina. Now off omeprazole.                 Objective   Vital Signs:  /76   Pulse 71   Ht 170.2 cm (67.01\")   Wt 66.2 kg (146 lb)   SpO2 95%   BMI 22.86 kg/m²   Physical Exam  Vitals and nursing note reviewed.   Constitutional:       Appearance: Normal appearance. She is well-developed.   HENT:      Head: Normocephalic and atraumatic.      Right Ear: Tympanic membrane and external ear normal.      Left Ear: Tympanic membrane and external ear normal.      Nose: Nose normal.      Mouth/Throat:      Mouth: Mucous membranes are moist.   Eyes:      Extraocular Movements: Extraocular movements intact.      Pupils: Pupils are equal, round, and reactive to light.   Cardiovascular:      Rate and Rhythm: Normal rate and regular rhythm.      Pulses: Normal pulses.      Heart sounds: Normal heart sounds.   Pulmonary:      Effort: Pulmonary effort is normal. No respiratory distress.      Breath sounds: Normal breath sounds.   Abdominal:      General: Abdomen is flat.      Palpations: Abdomen is soft.   Musculoskeletal:         General: Normal range of motion.      Cervical back: Normal range of motion and neck supple.   Skin:     General: Skin is warm and dry.   Neurological:      General: No focal deficit present.      Mental Status: She is alert and oriented to person, place, and time.   Psychiatric:         Mood and Affect: Mood normal.         Behavior: Behavior normal.         Thought Content: Thought content normal.         Judgment: Judgment normal. "         The following data was reviewed by: Camila Mejia MD on 02/21/2025:  Data reviewed : Radiologic studies dexa  Common labs          10/25/2024    11:11 10/29/2024    12:22 2/17/2025    08:44   Common Labs   Glucose 88   83    BUN 21   20    Creatinine 0.97   0.78    Sodium 142   140    Potassium 4.1   4.2    Chloride 105   102    Calcium 9.7   9.5    Albumin 4.2   3.8    Total Bilirubin 0.5   0.4    Alkaline Phosphatase 80   116    AST (SGOT) 23   19    ALT (SGPT) 14   12    WBC 4.77   4.25    Hemoglobin 13.1   12.4    Hematocrit 40.3   38.4    Platelets 227   276    Total Cholesterol   172    Triglycerides   75    HDL Cholesterol   55    LDL Cholesterol    103    Hemoglobin A1C  5.40               Assessment and Plan Additional age appropriate preventative wellness advice topics were discussed during today's preventative wellness exam(some topics already addressed during AWV portion of the note above):    Physical Activity: Advised cardiovascular activity 150 minutes per week as tolerated. (example brisk walk for 30 minutes, 5 days a week).     Nutrition: Discussed nutrition plan with patient. Information shared in after visit summary. Goal is for a well balanced diet to enhance overall health.     Injury Prevention Discussion:  Information shared in after visit summary.                 B12 deficiency    Orders:    cyanocobalamin injection 1,000 mcg    Vitamin B12    Menopause    Orders:    DEXA Bone Density Axial    Breast cancer screening by mammogram    Orders:    Mammo screening digital tomosynthesis bilateral w CAD; Future    Heterogeneously dense tissue of both breasts on mammography    Orders:    US breast bilateral complete; Future    Inconclusive mammogram due to dense breasts    Orders:    US breast bilateral complete; Future    Healthcare maintenance  - osteoporosis. Patient has not been on rx alendronate. Will restart this. To take vit d and consume dietary calcium. She will engage in wt  bearing exercises.   -history cancer. Following with oncology. No signs recurrence.   -b12 def- continue b12 injection. Test level.   -dense breast- to get u/s w mammo  -awv- to continue all routine hcm. Fall precautions given.                I spent 45 minutes caring for Iesha on this date of service. This time includes time spent by me in the following activities:preparing for the visit, reviewing tests, obtaining and/or reviewing a separately obtained history, performing a medically appropriate examination and/or evaluation , counseling and educating the patient/family/caregiver, ordering medications, tests, or procedures, referring and communicating with other health care professionals , documenting information in the medical record, and independently interpreting results and communicating that information with the patient/family/caregiver  Follow Up   Return in about 6 months (around 8/21/2025) for Recheck.  Patient was given instructions and counseling regarding her condition or for health maintenance advice. Please see specific information pulled into the AVS if appropriate.

## 2025-02-22 LAB
VIT B12 SERPL-MCNC: 746 PG/ML (ref 211–946)
WRITTEN AUTHORIZATION: NORMAL

## 2025-03-04 NOTE — TELEPHONE ENCOUNTER
"Dr Bean (Radiologist )wants you to order a CT of a spot on her back.  She says the \"spot\" is in hr notes from [facility]  "
Advise patient that ct cspine has been ordered.   Please ensure that this is obtained quickly (advise loraine/ aaliyah it should be asap)  
Pt informed  
160.9

## 2025-03-11 RX ORDER — CHOLECALCIFEROL (VITAMIN D3) 50 MCG
2000 CAPSULE ORAL DAILY
Qty: 90 CAPSULE | Refills: 3 | Status: SHIPPED | OUTPATIENT
Start: 2025-03-11

## 2025-03-11 RX ORDER — MELOXICAM 15 MG/1
15 TABLET ORAL DAILY PRN
Qty: 30 TABLET | Refills: 1 | Status: SHIPPED | OUTPATIENT
Start: 2025-03-11

## 2025-03-19 ENCOUNTER — CLINICAL SUPPORT (OUTPATIENT)
Dept: INTERNAL MEDICINE | Facility: CLINIC | Age: 75
End: 2025-03-19
Payer: MEDICARE

## 2025-03-19 DIAGNOSIS — E53.8 B12 DEFICIENCY: Primary | ICD-10-CM

## 2025-03-19 RX ADMIN — CYANOCOBALAMIN 1000 MCG: 1000 INJECTION, SOLUTION INTRAMUSCULAR; SUBCUTANEOUS at 14:01

## 2025-04-09 RX ORDER — OMEPRAZOLE 20 MG/1
20 CAPSULE, DELAYED RELEASE ORAL 2 TIMES DAILY
Qty: 60 CAPSULE | Refills: 1 | OUTPATIENT
Start: 2025-04-09

## 2025-04-18 ENCOUNTER — CLINICAL SUPPORT (OUTPATIENT)
Dept: INTERNAL MEDICINE | Facility: CLINIC | Age: 75
End: 2025-04-18
Payer: MEDICARE

## 2025-04-18 DIAGNOSIS — E53.8 B12 DEFICIENCY: Primary | ICD-10-CM

## 2025-04-18 RX ADMIN — CYANOCOBALAMIN 1000 MCG: 1000 INJECTION, SOLUTION INTRAMUSCULAR; SUBCUTANEOUS at 14:28

## 2025-04-22 RX ORDER — OMEPRAZOLE 20 MG/1
20 CAPSULE, DELAYED RELEASE ORAL 2 TIMES DAILY
Qty: 60 CAPSULE | Refills: 1 | OUTPATIENT
Start: 2025-04-22

## 2025-04-22 NOTE — TELEPHONE ENCOUNTER
Called pt and pt reports that she did not request the omeprazole refill. Update sent to Dr Conteh.

## 2025-05-27 ENCOUNTER — CLINICAL SUPPORT (OUTPATIENT)
Dept: INTERNAL MEDICINE | Facility: CLINIC | Age: 75
End: 2025-05-27
Payer: MEDICARE

## 2025-05-27 DIAGNOSIS — E53.8 B12 DEFICIENCY: Primary | ICD-10-CM

## 2025-06-24 ENCOUNTER — CLINICAL SUPPORT (OUTPATIENT)
Dept: INTERNAL MEDICINE | Facility: CLINIC | Age: 75
End: 2025-06-24
Payer: MEDICARE

## 2025-06-24 DIAGNOSIS — E53.8 B12 DEFICIENCY: Primary | ICD-10-CM

## 2025-06-24 PROCEDURE — 96372 THER/PROPH/DIAG INJ SC/IM: CPT | Performed by: INTERNAL MEDICINE

## 2025-06-24 RX ADMIN — CYANOCOBALAMIN 1000 MCG: 1000 INJECTION, SOLUTION INTRAMUSCULAR; SUBCUTANEOUS at 14:25

## 2025-08-01 ENCOUNTER — CLINICAL SUPPORT (OUTPATIENT)
Dept: INTERNAL MEDICINE | Facility: CLINIC | Age: 75
End: 2025-08-01
Payer: MEDICARE

## 2025-08-01 DIAGNOSIS — E53.8 B12 DEFICIENCY: Primary | ICD-10-CM

## 2025-08-01 RX ADMIN — CYANOCOBALAMIN 1000 MCG: 1000 INJECTION, SOLUTION INTRAMUSCULAR; SUBCUTANEOUS at 13:11

## 2025-08-15 DIAGNOSIS — E78.5 ELEVATED LIPIDS: ICD-10-CM

## 2025-08-15 DIAGNOSIS — E53.8 B12 DEFICIENCY: Primary | ICD-10-CM

## 2025-08-15 LAB
ALBUMIN SERPL-MCNC: 4.3 G/DL (ref 3.5–5.2)
ALBUMIN/GLOB SERPL: 1.5 G/DL
ALP SERPL-CCNC: 99 U/L (ref 39–117)
ALT SERPL-CCNC: 14 U/L (ref 1–33)
AST SERPL-CCNC: 25 U/L (ref 1–32)
BASOPHILS # BLD AUTO: 0.03 10*3/MM3 (ref 0–0.2)
BASOPHILS NFR BLD AUTO: 0.7 % (ref 0–1.5)
BILIRUB SERPL-MCNC: 0.3 MG/DL (ref 0–1.2)
BUN SERPL-MCNC: 19 MG/DL (ref 8–23)
BUN/CREAT SERPL: 24.4 (ref 7–25)
CALCIUM SERPL-MCNC: 9.5 MG/DL (ref 8.6–10.5)
CHLORIDE SERPL-SCNC: 103 MMOL/L (ref 98–107)
CHOLEST SERPL-MCNC: 180 MG/DL (ref 0–200)
CO2 SERPL-SCNC: 28.5 MMOL/L (ref 22–29)
CREAT SERPL-MCNC: 0.78 MG/DL (ref 0.57–1)
EGFRCR SERPLBLD CKD-EPI 2021: 79.8 ML/MIN/1.73
EOSINOPHIL # BLD AUTO: 0.12 10*3/MM3 (ref 0–0.4)
EOSINOPHIL NFR BLD AUTO: 2.8 % (ref 0.3–6.2)
ERYTHROCYTE [DISTWIDTH] IN BLOOD BY AUTOMATED COUNT: 13.1 % (ref 12.3–15.4)
GLOBULIN SER CALC-MCNC: 2.8 GM/DL
GLUCOSE SERPL-MCNC: 84 MG/DL (ref 65–99)
HCT VFR BLD AUTO: 40.8 % (ref 34–46.6)
HDLC SERPL-MCNC: 63 MG/DL (ref 40–60)
HGB BLD-MCNC: 12.9 G/DL (ref 12–15.9)
IMM GRANULOCYTES # BLD AUTO: 0.01 10*3/MM3 (ref 0–0.05)
IMM GRANULOCYTES NFR BLD AUTO: 0.2 % (ref 0–0.5)
LDLC SERPL CALC-MCNC: 103 MG/DL (ref 0–100)
LYMPHOCYTES # BLD AUTO: 1.77 10*3/MM3 (ref 0.7–3.1)
LYMPHOCYTES NFR BLD AUTO: 41.5 % (ref 19.6–45.3)
MCH RBC QN AUTO: 28.4 PG (ref 26.6–33)
MCHC RBC AUTO-ENTMCNC: 31.6 G/DL (ref 31.5–35.7)
MCV RBC AUTO: 89.7 FL (ref 79–97)
MONOCYTES # BLD AUTO: 0.39 10*3/MM3 (ref 0.1–0.9)
MONOCYTES NFR BLD AUTO: 9.1 % (ref 5–12)
NEUTROPHILS # BLD AUTO: 1.95 10*3/MM3 (ref 1.7–7)
NEUTROPHILS NFR BLD AUTO: 45.7 % (ref 42.7–76)
NRBC BLD AUTO-RTO: 0 /100 WBC (ref 0–0.2)
PLATELET # BLD AUTO: 251 10*3/MM3 (ref 140–450)
POTASSIUM SERPL-SCNC: 4.4 MMOL/L (ref 3.5–5.2)
PROT SERPL-MCNC: 7.1 G/DL (ref 6–8.5)
RBC # BLD AUTO: 4.55 10*6/MM3 (ref 3.77–5.28)
SODIUM SERPL-SCNC: 141 MMOL/L (ref 136–145)
TRIGL SERPL-MCNC: 77 MG/DL (ref 0–150)
VIT B12 SERPL-MCNC: 689 PG/ML (ref 211–946)
VLDLC SERPL CALC-MCNC: 14 MG/DL (ref 5–40)
WBC # BLD AUTO: 4.27 10*3/MM3 (ref 3.4–10.8)

## 2025-08-18 ENCOUNTER — OFFICE VISIT (OUTPATIENT)
Dept: INTERNAL MEDICINE | Facility: CLINIC | Age: 75
End: 2025-08-18
Payer: MEDICARE

## 2025-08-18 VITALS
HEIGHT: 67 IN | SYSTOLIC BLOOD PRESSURE: 100 MMHG | BODY MASS INDEX: 23.07 KG/M2 | DIASTOLIC BLOOD PRESSURE: 68 MMHG | WEIGHT: 147 LBS | HEART RATE: 61 BPM | OXYGEN SATURATION: 97 %

## 2025-08-18 DIAGNOSIS — E53.8 B12 DEFICIENCY: ICD-10-CM

## 2025-08-18 DIAGNOSIS — J30.2 SEASONAL ALLERGIC RHINITIS, UNSPECIFIED TRIGGER: ICD-10-CM

## 2025-08-18 DIAGNOSIS — R09.89 THROAT CLEARING: Primary | ICD-10-CM

## 2025-08-18 PROCEDURE — 1126F AMNT PAIN NOTED NONE PRSNT: CPT | Performed by: INTERNAL MEDICINE

## 2025-08-18 PROCEDURE — G2211 COMPLEX E/M VISIT ADD ON: HCPCS | Performed by: INTERNAL MEDICINE

## 2025-08-18 PROCEDURE — 99214 OFFICE O/P EST MOD 30 MIN: CPT | Performed by: INTERNAL MEDICINE

## 2025-08-18 PROCEDURE — 1159F MED LIST DOCD IN RCRD: CPT | Performed by: INTERNAL MEDICINE

## 2025-08-18 PROCEDURE — 1160F RVW MEDS BY RX/DR IN RCRD: CPT | Performed by: INTERNAL MEDICINE

## 2025-08-18 RX ORDER — PANTOPRAZOLE SODIUM 20 MG/1
20 TABLET, DELAYED RELEASE ORAL DAILY
Qty: 90 TABLET | Refills: 0 | Status: SHIPPED | OUTPATIENT
Start: 2025-08-18

## 2025-08-18 RX ORDER — FLUTICASONE PROPIONATE 50 MCG
2 SPRAY, SUSPENSION (ML) NASAL DAILY
Qty: 16 G | Refills: 5 | Status: SHIPPED | OUTPATIENT
Start: 2025-08-18

## (undated) DEVICE — PK HIP TOTL 40

## (undated) DEVICE — SUT VIC 1 CT1 36IN J947H

## (undated) DEVICE — SUT PDS 1 CT1 36IN Z347H

## (undated) DEVICE — PATIENT RETURN ELECTRODE, SINGLE-USE, CONTACT QUALITY MONITORING, ADULT, WITH 9FT CORD, FOR PATIENTS WEIGING OVER 33LBS. (15KG): Brand: MEGADYNE

## (undated) DEVICE — GAUZE,SPONGE,4"X4",16PLY,XRAY,STRL,LF: Brand: MEDLINE

## (undated) DEVICE — SUT SILK 3/0 TIES 18IN A184H

## (undated) DEVICE — SUT ETHLN 5/0 PS2 18IN 1666H

## (undated) DEVICE — PREMIUM WET SKIN PREP TRAY: Brand: MEDLINE INDUSTRIES, INC.

## (undated) DEVICE — ANTIBACTERIAL UNDYED BRAIDED (POLYGLACTIN 910), SYNTHETIC ABSORBABLE SUTURE: Brand: COATED VICRYL

## (undated) DEVICE — STIMULATOR 8562010 VARI-STIM 10PK ROHS: Brand: VARI-STIM®

## (undated) DEVICE — GLV SURG SENSICARE W/ALOE PF LF 8 STRL

## (undated) DEVICE — PK ENT 40

## (undated) DEVICE — KT DRN EVAC WND PVC PCH WTROC RND 10F400

## (undated) DEVICE — IRRIGATOR BULB ASEPTO 60CC STRL

## (undated) DEVICE — CULT AER/ANAEROB FASTIDIOUS BACT

## (undated) DEVICE — TRAP FLD MINIVAC MEGADYNE 100ML

## (undated) DEVICE — 3M™ IOBAN™ 2 ANTIMICROBIAL INCISE DRAPE 6650EZ: Brand: IOBAN™ 2

## (undated) DEVICE — VIOLET BRAIDED (POLYGLACTIN 910), SYNTHETIC ABSORBABLE SUTURE: Brand: COATED VICRYL

## (undated) DEVICE — DRSNG SURESITE WNDW 2.38X2.75

## (undated) DEVICE — SYR LUERLOK 10CC

## (undated) DEVICE — GLV SURG SENSICARE PI PF LF 7 GRN STRL

## (undated) DEVICE — 7 FRENCH DRAIN SYSTEM, STERILE: Brand: TLS

## (undated) DEVICE — UNDYED BRAIDED (POLYGLACTIN 910), SYNTHETIC ABSORBABLE SUTURE: Brand: COATED VICRYL

## (undated) DEVICE — ELECTRD NDL EDGE/STD 2.84IN

## (undated) DEVICE — REFLECTION FLEXIBLE DRILL 25MM: Brand: REFLECTION

## (undated) DEVICE — STERILE COTTON BALLS LARGE 5/P: Brand: MEDLINE

## (undated) DEVICE — DRSNG SURESITE123 4X10IN

## (undated) DEVICE — 3M™ IOBAN™ 2 ANTIMICROBIAL INCISE DRAPE 6640EZ: Brand: IOBAN™ 2

## (undated) DEVICE — NEEDLE, QUINCKE 22GX3.5": Brand: MEDLINE INDUSTRIES, INC.

## (undated) DEVICE — MAT FLR ABSORBENT LG 4FT 10 2.5FT

## (undated) DEVICE — SUT SILK 2/0 SH CR5 18IN C0125

## (undated) DEVICE — GLV SURG BIOGEL LTX PF 7 1/2

## (undated) DEVICE — GLV SURG SENSICARE PI MIC PF SZ7 LF STRL

## (undated) DEVICE — SPNG GZ WOVN 4X4IN 12PLY 10/BX STRL

## (undated) DEVICE — TOWEL,OR,DSP,ST,BLUE,STD,4/PK,20PK/CS: Brand: MEDLINE

## (undated) DEVICE — DRSNG BURN ACTICOAT FLEX 7 1X24IN

## (undated) DEVICE — NDL HYPO PRECISIONGLIDE REG 25G 1 1/2

## (undated) DEVICE — HARMONIC FOCUS SHEARS 9CM LENGTH + ADAPTIVE TISSUE TECHNOLOGY FOR USE WITH BLUE HAND PIECE ONLY: Brand: HARMONIC FOCUS

## (undated) DEVICE — DRAPE,REIN 53X77,STERILE: Brand: MEDLINE

## (undated) DEVICE — PREP SOL POVIDONE/IODINE BT 4OZ

## (undated) DEVICE — HANDPIECE SET WITH COAXIAL HIGH FLOW TIP AND SUCTION TUBE: Brand: INTERPULSE

## (undated) DEVICE — OPTIFOAM GENTLE SA, POSTOP, 4X12: Brand: MEDLINE

## (undated) DEVICE — TBG PENCL TELESCP MEGADYNE SMOKE EVAC 10FT

## (undated) DEVICE — GLV SURG PREMIERPRO ORTHO LTX PF SZ7.5 BRN

## (undated) DEVICE — APPL DURAPREP IODOPHOR APL 26ML

## (undated) DEVICE — SUT VIC 0 CT1 36IN J946H

## (undated) DEVICE — MAGNETIC DRAPE: Brand: DEVON

## (undated) DEVICE — SYS CLS SKIN PREMIERPRO EXOFINFUSION 22CM

## (undated) DEVICE — INTENDED FOR TISSUE SEPARATION, AND OTHER PROCEDURES THAT REQUIRE A SHARP SURGICAL BLADE TO PUNCTURE OR CUT.: Brand: BARD-PARKER ® STAINLESS STEEL BLADES

## (undated) DEVICE — PICO 7 10CM X 20CM: Brand: PICO™ 7

## (undated) DEVICE — DISPOSABLE BIPOLAR CABLE 12FT. (3.6M): Brand: KIRWAN

## (undated) DEVICE — 3M™ STERI-DRAPE™ INSTRUMENT POUCH 1018: Brand: STERI-DRAPE™

## (undated) DEVICE — DRSNG GZ PETROLTM XEROFORM CURAD 1X8IN STRL